# Patient Record
Sex: MALE | Race: WHITE | NOT HISPANIC OR LATINO | Employment: UNEMPLOYED | URBAN - NONMETROPOLITAN AREA
[De-identification: names, ages, dates, MRNs, and addresses within clinical notes are randomized per-mention and may not be internally consistent; named-entity substitution may affect disease eponyms.]

---

## 2017-06-29 ENCOUNTER — OFFICE VISIT - GICH (OUTPATIENT)
Dept: FAMILY MEDICINE | Facility: OTHER | Age: 27
End: 2017-06-29

## 2017-06-29 ENCOUNTER — HOSPITAL ENCOUNTER (OUTPATIENT)
Dept: RADIOLOGY | Facility: OTHER | Age: 27
End: 2017-06-29
Attending: NURSE PRACTITIONER

## 2017-06-29 ENCOUNTER — HISTORY (OUTPATIENT)
Dept: FAMILY MEDICINE | Facility: OTHER | Age: 27
End: 2017-06-29

## 2017-06-29 DIAGNOSIS — M25.531 PAIN IN RIGHT WRIST: ICD-10-CM

## 2017-06-29 DIAGNOSIS — S60.221A CONTUSION OF RIGHT HAND: ICD-10-CM

## 2017-07-14 ENCOUNTER — HISTORY (OUTPATIENT)
Dept: INTERNAL MEDICINE | Facility: OTHER | Age: 27
End: 2017-07-14

## 2017-07-14 ENCOUNTER — COMMUNICATION - GICH (OUTPATIENT)
Dept: INTERNAL MEDICINE | Facility: OTHER | Age: 27
End: 2017-07-14

## 2017-07-14 ENCOUNTER — OFFICE VISIT - GICH (OUTPATIENT)
Dept: INTERNAL MEDICINE | Facility: OTHER | Age: 27
End: 2017-07-14

## 2017-07-14 DIAGNOSIS — M54.50 LOW BACK PAIN: ICD-10-CM

## 2017-07-14 DIAGNOSIS — R10.33 PERIUMBILICAL PAIN: ICD-10-CM

## 2017-07-14 DIAGNOSIS — Z91.018 ALLERGY TO OTHER FOODS (CODE): ICD-10-CM

## 2017-07-14 DIAGNOSIS — K21.9 GASTRO-ESOPHAGEAL REFLUX DISEASE WITHOUT ESOPHAGITIS: ICD-10-CM

## 2017-07-14 DIAGNOSIS — J45.20 MILD INTERMITTENT ASTHMA, UNCOMPLICATED: ICD-10-CM

## 2017-07-14 DIAGNOSIS — J30.1 ALLERGIC RHINITIS DUE TO POLLEN: ICD-10-CM

## 2017-07-14 DIAGNOSIS — R53.83 OTHER FATIGUE: ICD-10-CM

## 2017-07-14 DIAGNOSIS — R53.81 OTHER MALAISE: ICD-10-CM

## 2017-07-14 DIAGNOSIS — R10.13 EPIGASTRIC PAIN: ICD-10-CM

## 2017-07-14 DIAGNOSIS — R79.89 OTHER SPECIFIED ABNORMAL FINDINGS OF BLOOD CHEMISTRY: ICD-10-CM

## 2017-07-14 DIAGNOSIS — E66.01 MORBID (SEVERE) OBESITY DUE TO EXCESS CALORIES (H): ICD-10-CM

## 2017-07-14 DIAGNOSIS — Z13.220 ENCOUNTER FOR SCREENING FOR LIPOID DISORDERS: ICD-10-CM

## 2017-07-14 DIAGNOSIS — Z71.89 OTHER SPECIFIED COUNSELING: Chronic | ICD-10-CM

## 2017-07-14 DIAGNOSIS — G89.29 OTHER CHRONIC PAIN: ICD-10-CM

## 2017-07-14 DIAGNOSIS — R19.4 CHANGE IN BOWEL HABITS: ICD-10-CM

## 2017-07-14 DIAGNOSIS — Z87.828 PERSONAL HISTORY OF OTHER (HEALED) PHYSICAL INJURY AND TRAUMA: ICD-10-CM

## 2017-07-14 DIAGNOSIS — J34.89 OTHER SPECIFIED DISORDERS OF NOSE AND NASAL SINUSES: ICD-10-CM

## 2017-07-14 LAB
A/G RATIO - HISTORICAL: 1.8 (ref 1–2)
ABSOLUTE BASOPHILS - HISTORICAL: 0 THOU/CU MM
ABSOLUTE EOSINOPHILS - HISTORICAL: 0.1 THOU/CU MM
ABSOLUTE IMMATURE GRANULOCYTES(METAS,MYELOS,PROS) - HISTORICAL: 0 THOU/CU MM
ABSOLUTE LYMPHOCYTES - HISTORICAL: 1.8 THOU/CU MM (ref 0.9–2.9)
ABSOLUTE MONOCYTES - HISTORICAL: 0.4 THOU/CU MM
ABSOLUTE NEUTROPHILS - HISTORICAL: 3.5 THOU/CU MM (ref 1.7–7)
ALBUMIN SERPL-MCNC: 4.8 G/DL (ref 3.5–5.7)
ALP SERPL-CCNC: 68 IU/L (ref 34–104)
ALT (SGPT) - HISTORICAL: 35 IU/L (ref 7–52)
AMYLASE SERPL-CCNC: 48 IU/L (ref 29–103)
ANION GAP - HISTORICAL: 13 (ref 5–18)
AST SERPL-CCNC: 19 IU/L (ref 13–39)
BASOPHILS # BLD AUTO: 0.3 %
BILIRUB SERPL-MCNC: 0.5 MG/DL (ref 0.3–1)
BUN SERPL-MCNC: 16 MG/DL (ref 7–25)
BUN/CREAT RATIO - HISTORICAL: 14
CALCIUM SERPL-MCNC: 9.7 MG/DL (ref 8.6–10.3)
CHLORIDE SERPLBLD-SCNC: 102 MMOL/L (ref 98–107)
CHOL/HDL RATIO - HISTORICAL: 3.38
CHOLESTEROL TOTAL: 189 MG/DL
CO2 SERPL-SCNC: 24 MMOL/L (ref 21–31)
CREAT SERPL-MCNC: 1.14 MG/DL (ref 0.7–1.3)
EOSINOPHIL NFR BLD AUTO: 2.2 %
ERYTHROCYTE [DISTWIDTH] IN BLOOD BY AUTOMATED COUNT: 13 % (ref 11.5–15.5)
ESTIMATED AVERAGE GLUCOSE: 97 MG/DL
GFR IF NOT AFRICAN AMERICAN - HISTORICAL: >60 ML/MIN/1.73M2
GLOBULIN - HISTORICAL: 2.7 G/DL (ref 2–3.7)
GLUCOSE SERPL-MCNC: 98 MG/DL (ref 70–105)
HCT VFR BLD AUTO: 44.5 % (ref 37–53)
HDLC SERPL-MCNC: 56 MG/DL (ref 23–92)
HEMOGLOBIN A1C MONITORING (POCT) - HISTORICAL: 5 % (ref 4–6.2)
HEMOGLOBIN: 15.6 G/DL (ref 13.5–17.5)
IMMATURE GRANULOCYTES(METAS,MYELOS,PROS) - HISTORICAL: 0.3 %
LDLC SERPL CALC-MCNC: 112 MG/DL
LIPASE SERPL-CCNC: 25.5 IU/L (ref 11–82)
LYMPHOCYTES NFR BLD AUTO: 30.9 % (ref 20–44)
MCH RBC QN AUTO: 30.5 PG (ref 26–34)
MCHC RBC AUTO-ENTMCNC: 35.1 G/DL (ref 32–36)
MCV RBC AUTO: 87 FL (ref 80–100)
MONOCYTES NFR BLD AUTO: 6.9 %
NEUTROPHILS NFR BLD AUTO: 59.4 % (ref 42–72)
NON-HDL CHOLESTEROL - HISTORICAL: 133 MG/DL
PATIENT STATUS - HISTORICAL: ABNORMAL
PLATELET # BLD AUTO: 191 THOU/CU MM (ref 140–440)
PMV BLD: 11.1 FL (ref 6.5–11)
POTASSIUM SERPL-SCNC: 4 MMOL/L (ref 3.5–5.1)
PROT SERPL-MCNC: 7.5 G/DL (ref 6.4–8.9)
RED BLOOD COUNT - HISTORICAL: 5.12 MIL/CU MM (ref 4.3–5.9)
SODIUM SERPL-SCNC: 139 MMOL/L (ref 133–143)
T3,FREE - HISTORICAL: 3.2 PG/ML (ref 2.5–3.9)
T4 FREE SERPL-MCNC: 0.86 NG/DL (ref 0.58–1.64)
TRIGL SERPL-MCNC: 107 MG/DL
TSH - HISTORICAL: 1.28 UIU/ML (ref 0.34–5.6)
WHITE BLOOD COUNT - HISTORICAL: 6 THOU/CU MM (ref 4.5–11)

## 2017-07-14 ASSESSMENT — PATIENT HEALTH QUESTIONNAIRE - PHQ9: SUM OF ALL RESPONSES TO PHQ QUESTIONS 1-9: 0

## 2017-07-16 LAB
TESTOSTERONE FREE - HISTORICAL: 11.4 NG/DL
TESTOSTERONE,TOTAL - HISTORICAL: 356 NG/DL

## 2017-07-17 ENCOUNTER — COMMUNICATION - GICH (OUTPATIENT)
Dept: SURGERY | Facility: OTHER | Age: 27
End: 2017-07-17

## 2017-07-17 LAB
CODFISH IGE - HISTORICAL: <0.1 KU/L
CORN (F8) IGE - HISTORICAL: 0.41 KU/L
EGG WHITE (F1) IGE - HISTORICAL: <0.1 KU/L
IMMUNOGLOBULIN E (IGE) - HISTORICAL: 348 KU/L (ref 22–107)
Lab: 0.11 KU/L
Lab: 0.16 KU/L
Lab: <0.1 KU/L
MILK (F2) IGE - HISTORICAL: <0.1 KU/L
PEANUT (F13) IGE - HISTORICAL: 0.59 KU/L
SHRIMP (F24) IGE: 0.14 KU/L
SOYBEAN IGE - HISTORICAL: 0.12 KU/L
WHEAT (F4) IGE - HISTORICAL: 0.24 KU/L

## 2017-07-18 ENCOUNTER — COMMUNICATION - GICH (OUTPATIENT)
Dept: SURGERY | Facility: OTHER | Age: 27
End: 2017-07-18

## 2017-07-18 DIAGNOSIS — R19.7 DIARRHEA: ICD-10-CM

## 2017-07-27 ENCOUNTER — AMBULATORY - GICH (OUTPATIENT)
Dept: RADIOLOGY | Facility: OTHER | Age: 27
End: 2017-07-27

## 2017-07-27 ENCOUNTER — HOSPITAL ENCOUNTER (OUTPATIENT)
Dept: RADIOLOGY | Facility: OTHER | Age: 27
End: 2017-07-27
Attending: INTERNAL MEDICINE

## 2017-07-27 DIAGNOSIS — R10.33 PERIUMBILICAL PAIN: ICD-10-CM

## 2017-07-27 DIAGNOSIS — J34.89 OTHER SPECIFIED DISORDERS OF NOSE AND NASAL SINUSES: ICD-10-CM

## 2017-07-27 DIAGNOSIS — R10.13 EPIGASTRIC PAIN: ICD-10-CM

## 2017-08-09 ENCOUNTER — COMMUNICATION - GICH (OUTPATIENT)
Dept: INTERNAL MEDICINE | Facility: OTHER | Age: 27
End: 2017-08-09

## 2017-08-09 ENCOUNTER — HISTORY (OUTPATIENT)
Dept: INTERNAL MEDICINE | Facility: OTHER | Age: 27
End: 2017-08-09

## 2017-08-09 ENCOUNTER — OFFICE VISIT - GICH (OUTPATIENT)
Dept: INTERNAL MEDICINE | Facility: OTHER | Age: 27
End: 2017-08-09

## 2017-08-09 DIAGNOSIS — R12 HEARTBURN: ICD-10-CM

## 2017-08-09 DIAGNOSIS — J32.0 CHRONIC MAXILLARY SINUSITIS: ICD-10-CM

## 2017-08-09 DIAGNOSIS — R10.13 EPIGASTRIC PAIN: ICD-10-CM

## 2017-08-09 DIAGNOSIS — R76.8 OTHER SPECIFIED ABNORMAL IMMUNOLOGICAL FINDINGS IN SERUM: ICD-10-CM

## 2017-08-09 DIAGNOSIS — K44.9 DIAPHRAGMATIC HERNIA WITHOUT OBSTRUCTION OR GANGRENE: ICD-10-CM

## 2017-08-09 DIAGNOSIS — Z91.018 ALLERGY TO OTHER FOODS (CODE): ICD-10-CM

## 2017-08-09 DIAGNOSIS — J32.2 CHRONIC ETHMOIDAL SINUSITIS: ICD-10-CM

## 2017-08-09 DIAGNOSIS — J34.2 DEVIATED NASAL SEPTUM: ICD-10-CM

## 2017-08-09 ASSESSMENT — PATIENT HEALTH QUESTIONNAIRE - PHQ9: SUM OF ALL RESPONSES TO PHQ QUESTIONS 1-9: 0

## 2017-08-16 RX ORDER — BUDESONIDE 0.5 MG/2ML
0.5 INHALANT ORAL 2 TIMES DAILY
COMMUNITY
End: 2017-09-22

## 2017-08-16 RX ORDER — IPRATROPIUM BROMIDE 42 UG/1
2 SPRAY, METERED NASAL 3 TIMES DAILY
COMMUNITY
End: 2017-09-22

## 2017-08-16 RX ORDER — ALBUTEROL SULFATE 90 UG/1
2 AEROSOL, METERED RESPIRATORY (INHALATION) 4 TIMES DAILY PRN
COMMUNITY
End: 2018-05-07

## 2017-08-16 RX ORDER — SUCRALFATE 1 G/1
1 TABLET ORAL 4 TIMES DAILY
COMMUNITY
End: 2017-09-22

## 2017-09-15 ENCOUNTER — SURGERY (OUTPATIENT)
Dept: SURGERY | Facility: OTHER | Age: 27
End: 2017-09-15

## 2017-09-22 ENCOUNTER — OFFICE VISIT (OUTPATIENT)
Dept: OTOLARYNGOLOGY | Facility: OTHER | Age: 27
End: 2017-09-22
Attending: OTOLARYNGOLOGY
Payer: COMMERCIAL

## 2017-09-22 VITALS
HEIGHT: 67 IN | BODY MASS INDEX: 47.87 KG/M2 | SYSTOLIC BLOOD PRESSURE: 124 MMHG | TEMPERATURE: 97.1 F | DIASTOLIC BLOOD PRESSURE: 82 MMHG | HEART RATE: 110 BPM | WEIGHT: 305 LBS

## 2017-09-22 DIAGNOSIS — J32.4 CHRONIC PANSINUSITIS: Primary | ICD-10-CM

## 2017-09-22 DIAGNOSIS — Z87.81 HISTORY OF CLOSED FRACTURE OF NASAL BONES: ICD-10-CM

## 2017-09-22 DIAGNOSIS — J30.2 PERENNIAL ALLERGIC RHINITIS WITH SEASONAL VARIATION: ICD-10-CM

## 2017-09-22 DIAGNOSIS — J34.2 DNS (DEVIATED NASAL SEPTUM): ICD-10-CM

## 2017-09-22 DIAGNOSIS — J30.89 PERENNIAL ALLERGIC RHINITIS WITH SEASONAL VARIATION: ICD-10-CM

## 2017-09-22 DIAGNOSIS — J34.3 NASAL TURBINATE HYPERTROPHY: ICD-10-CM

## 2017-09-22 DIAGNOSIS — K90.49 FOOD INTOLERANCE: ICD-10-CM

## 2017-09-22 PROCEDURE — 99204 OFFICE O/P NEW MOD 45 MIN: CPT | Mod: 25 | Performed by: OTOLARYNGOLOGY

## 2017-09-22 PROCEDURE — 99213 OFFICE O/P EST LOW 20 MIN: CPT | Mod: 25

## 2017-09-22 PROCEDURE — 31231 NASAL ENDOSCOPY DX: CPT | Performed by: OTOLARYNGOLOGY

## 2017-09-22 RX ORDER — FLUTICASONE PROPIONATE 50 MCG
2 SPRAY, SUSPENSION (ML) NASAL DAILY
Qty: 3 BOTTLE | Refills: 11 | Status: SHIPPED | OUTPATIENT
Start: 2017-09-22 | End: 2018-05-30

## 2017-09-22 RX ORDER — BUDESONIDE 0.5 MG/2ML
INHALANT ORAL
Qty: 3 BOX | Refills: 11 | Status: SHIPPED | OUTPATIENT
Start: 2017-09-22 | End: 2021-05-21

## 2017-09-22 RX ORDER — LEVOCETIRIZINE DIHYDROCHLORIDE 5 MG/1
5 TABLET, FILM COATED ORAL EVERY EVENING
Qty: 90 TABLET | Refills: 3 | Status: SHIPPED | OUTPATIENT
Start: 2017-09-22 | End: 2018-05-30

## 2017-09-22 ASSESSMENT — PAIN SCALES - GENERAL: PAINLEVEL: SEVERE PAIN (6)

## 2017-09-22 NOTE — MR AVS SNAPSHOT
After Visit Summary   9/22/2017    Marija Albright    MRN: [id number]           Patient Information     Date Of Birth          1990        Visit Information        Provider Department      9/22/2017 12:30 PM Sarah Bateman MD [facility] [place]        Today's Diagnoses     Chronic pansinusitis    -  1      Care Instructions    Thank you for allowing Dr. Bateman and our ENT team to participate in your care.  If you have a scheduling or an appointment question please contact [facility] Unit Coordinator at their direct line [phone].   ALL nursing questions or concerns can be directed to your ENT nurse at: [phone] - Freda    Complete Sinus CT (If not already done)  Use Budesonide twice daily  Use Xyzal at night as directed  Use Flonase as prescribed  Follow up for Allergy Skin Testing    Indications for allergy testing include:   1) Confirm suspicion of allergic rhinitis due to inhalant allergies  2) Identify the offending allergen to determine specific mode of treatment  3) In the case of chronic rhinosinusitis: when symptoms are not controlled by avoidance and pharmacotherapy  4) In the Asthma patient when exacerbations may be due to perennial allergen exposure  5) Suspect food allergy  6) Otitis Media, chronic rhinitis, atopic dermatitis, Meniere disease, headache, pharyngitis or eye symptoms    modified quantitative testing (MQT) will be performed.  Signed consent was obtained, and the risks of immunotherapy were discussed, including the potential for anaphylaxis.    If immunotherapy (IT) is recommended, there is continued risk of anaphylaxis.   Anaphylaxis can cause death. The patient will need to be monitored for 30 minutes post injection.  They must present their epinephrine pen prior to injection.  Subcutaneous as well as sublingual immunotherapy (SLIT) were discussed as potential treatment options.  The patient was told SLIT is not approved by the FDA and  "is cash pay.  The general time frame of immunotherapy was discussed (generally 3-5 years, sometimes longer), and the basic immunology behind IT was discussed.            Follow-ups after your visit        Who to contact     If you have questions or need follow up information about today's clinic visit or your schedule please contact [facility] directly at [phone].  Normal or non-critical lab and imaging results will be communicated to you by MyChart, letter or phone within 4 business days after the clinic has received the results. If you do not hear from us within 7 days, please contact the clinic through [employer] or phone. If you have a critical or abnormal lab result, we will notify you by phone as soon as possible.  Submit refill requests through Mendor or call your pharmacy and they will forward the refill request to us. Please allow 3 business days for your refill to be completed.          Additional Information About Your Visit        [employer] Share[employer] Information     Mendor lets you send messages to your doctor, view your test results, renew your prescriptions, schedule appointments and more. To sign up, go to [email or web address] . Click on \"Log in\" on the left side of the screen, which will take you to the Welcome page. Then click on \"Sign up Now\" on the right side of the page.     You will be asked to enter the access code listed below, as well as some personal information. Please follow the directions to create your username and password.     Your access code is: [email or web address]  Expires: 2017 12:52 PM     Your access code will  in 90 days. If you need help or a new code, please call your [facility] or [phone].        Care EveryWhere ID     This is your Care EveryWhere ID. This could be used by other organizations to access your [place] medical records  [id number]        Your Vitals Were     Pulse Temperature Height BMI (Body Mass Index)          110 97.1  F (36.2  C) " "(Tympanic) 5' 7\" (1.702 m) 47.77 kg/m2         Blood Pressure from Last 3 Encounters:   09/22/17 124/82   07/24/13 118/70   05/02/13 122/76    Weight from Last 3 Encounters:   09/22/17 (!) 305 lb (138.3 kg)   07/24/13 250 lb (113.4 kg)   05/02/13 261 lb (118.4 kg)              Today, you had the following     No orders found for display       Primary Care Provider Office Phone # Fax #    Sean Givens PA-C [phone] [address] [place] [address]        Equal Access to Services     JOSE G HERNÁNDEZ : Mesha Peraza, wakrishna mckenzie, noe kaalmada jo, say calzada. So [facility] [phone].    ATENCIÓN: Si habla español, tiene a yao disposición servicios gratuitos de asistencia lingüística. Llame al [phone].    We comply with applicable federal civil rights laws and [place] laws. We do not discriminate on the basis of race, color, national origin, age, disability sex, sexual orientation or gender identity.            Thank you!     Thank you for choosing [facility]  for your care. Our goal is always to provide you with excellent care. Hearing back from our patients is one way we can continue to improve our services. Please take a few minutes to complete the written survey that you may receive in the mail after your visit with us. Thank you!             Your Updated Medication List - Protect others around you: Learn how to safely use, store and throw away your medicines at www.disposemymeds.org.          This list is accurate as of: 9/22/17 12:52 PM.  Always use your most recent med list.                   Brand Name Dispense Instructions for use Diagnosis    albuterol 108 (90 BASE) MCG/ACT Inhaler    PROAIR HFA/PROVENTIL HFA/VENTOLIN HFA     Inhale 2 puffs into the lungs 4 times daily as needed for shortness of breath / dyspnea or wheezing          "

## 2017-09-22 NOTE — PATIENT INSTRUCTIONS
Thank you for allowing Dr. Bateman and our ENT team to participate in your care.  If you have a scheduling or an appointment question please contact [facility] Unit Coordinator at their direct line [phone].   ALL nursing questions or concerns can be directed to your ENT nurse at: [phone] - Freda    Complete Sinus CT (If not already done)  Use Budesonide twice daily  Use Xyzal at night as directed  Use Flonase as prescribed  Follow up for Allergy Skin Testing    Indications for allergy testing include:   1) Confirm suspicion of allergic rhinitis due to inhalant allergies  2) Identify the offending allergen to determine specific mode of treatment  3) In the case of chronic rhinosinusitis: when symptoms are not controlled by avoidance and pharmacotherapy  4) In the Asthma patient when exacerbations may be due to perennial allergen exposure  5) Suspect food allergy  6) Otitis Media, chronic rhinitis, atopic dermatitis, Meniere disease, headache, pharyngitis or eye symptoms    modified quantitative testing (MQT) will be performed.  Signed consent was obtained, and the risks of immunotherapy were discussed, including the potential for anaphylaxis.    If immunotherapy (IT) is recommended, there is continued risk of anaphylaxis.   Anaphylaxis can cause death. The patient will need to be monitored for 30 minutes post injection.  They must present their epinephrine pen prior to injection.  Subcutaneous as well as sublingual immunotherapy (SLIT) were discussed as potential treatment options.  The patient was told SLIT is not approved by the FDA and is cash pay.  The general time frame of immunotherapy was discussed (generally 3-5 years, sometimes longer), and the basic immunology behind IT was discussed.

## 2017-09-22 NOTE — PROGRESS NOTES
Otolaryngology Consultation    Patient: Marija Albright  : 1990    Patient presents with:  Consult: Deviated Nasal Septum, Sinusitis; Referred by Erasmo Saha      HPI:  Marija Albright is a 26 year old male seen today for chronic sinusitis.  He has had over 10 years of chronic congestion and post nasal drainage with associated recurrent sneezing, itchy eyes.  Distant Intradermal testing, state she was 'allergic to everything'  serum specific IgE food reviewed from [facility], low grade positive to corn and peanut.  No hx of anaphylaxis but he notes corn leads to nausea, bloating.  Tries to avoid corn syrup primarily due to weight concerns.     No hx of immunotherapy      He has a significant hx of nasal trauma sustained in sports during high school, but no prior surgery  He was accidentally struck in the face and noted associated ecchymosis and change in the appearance of his nose following the distant trauma.  Surgery was not recommended by the provider he saw at that time.      No new home exposures  Never tobacco  Family hx of perennial allergic rhinitis  Hx of asthma, no recent ED visits but his post nasal drainage seems to flare his asthma    He has tried nasal saline and nasal steroids, AH, in the past to no avail.    Current Outpatient Rx   Medication Sig Dispense Refill     albuterol (PROAIR HFA/PROVENTIL HFA/VENTOLIN HFA) 108 (90 BASE) MCG/ACT Inhaler Inhale 2 puffs into the lungs 4 times daily as needed for shortness of breath / dyspnea or wheezing         Allergies: Corn syrup [glucose]; Morphine sulfate; Peanuts [nuts]; Tramadol; and Gold au 198 [gold]     Past Medical History:   Diagnosis Date     Allergic rhinitis due to pollen      Attention deficit disorder without mention of hyperactivity      Reflux esophagitis      Sprain of cruciate ligament of knee     ACL tear-left knee     Unspecified asthma(493.90)        Past Surgical History:   Procedure Laterality Date     acl revision Left  "2012     C REPAIR CRUCIATE LIGAMENT,KNEE  12/2008    left     endovenous ablation saphenous vein with laser Right 01/04/2011     SHOULDER SURGERY Left      TONSILLECTOMY  2012       ENT family history reviewed    Social History   Substance Use Topics     Smoking status: Never Smoker     Smokeless tobacco: Never Used     Alcohol use No       Review of Systems  ROS: 10 point ROS neg other than the symptoms noted above in the HPI     Physical Exam  /82 (Cuff Size: Adult Large)  Pulse 110  Temp 97.1  F (36.2  C) (Tympanic)  Ht 5' 7\" (1.702 m)  Wt (!) 305 lb (138.3 kg)  BMI 47.77 kg/m2  General - The patient is well nourished and well developed, and appears to have good nutritional status.  Alert and oriented to person and place, answers questions and cooperates with examination appropriately.   Head and Face - Normocephalic and atraumatic, with no gross asymmetry noted.  The facial nerve is intact, with strong symmetric movements.  Voice and Breathing - The patient was breathing comfortably without the use of accessory muscles. There was no wheezing, stridor, or stertor.  The patients voice was clear and strong, and had appropriate pitch and quality.  Ears -The external auditory canals are patent, the tympanic membranes are intact without effusion, retraction or mass.  Bony landmarks are intact.  Eyes - Extraocular movements intact, and the pupils were reactive to light.  Sclera were not icteric or injected, conjunctiva were pink and moist.  Mouth - Examination of the oral cavity showed pink, healthy oral mucosa. No lesions or ulcerations noted.  The tongue was mobile and midline, and the dentition were in good condition.    Throat - The walls of the oropharynx were smooth, pink, moist, symmetric, and had no lesions or ulcerations.  The tonsillar pillars and soft palate were symmetric.  The uvula was midline on elevation.  Cobblestoning throughout Posterior oropharynx  Neck - Normal midline excursion of the " laryngotracheal complex during swallowing.  Full range of motion on passive movement.  Palpation of the occipital, submental, submandibular, internal jugular chain, and supraclavicular nodes did not demonstrate any abnormal lymph nodes or masses.  Palpation of the thyroid was soft and smooth, with no nodules or goiter appreciated.  The trachea was mobile and midline.  Nose - External contour is asymmetric, dorsal c- shaped skew to left.   Nasal mucosa is pink and moist with no abnormal mucus.  The septum and turbinates were evaluated: DNS caudal right, mid deviation R.  No polyps, masses, or purulence noted on examination.    To evaluate the nose and sinuses in the post operative state, I performed rigid nasal endoscopy. The LPN had previously sprayed both nares with lidocaine and neosynephrine.    I began with the LEFT side using a 0 degree rigid nasal endoscope, and then similarly examined the RIGHT side    Findings:  Inferior turbinates:  enalrged  Middle turbinate and middle meatus:  No purulence, no polyposis, narrow right MM  Mucosa is edematous throughout,  No allegra polyps nor polypoid degeneration  Nasopharynx could not be eval due to dns  The patient tolerated the procedure well      Imaging  Plain film sinuses [place]:  DNS, mucoperiosteal thickening throughout max, ethmoids    Impression and Plan- Marija CONNOR Jose Ramon is a 26 year old male with:    ICD-10-CM    1. Chronic pansinusitis J32.4 CT Sinus w/o Contrast     budesonide (PULMICORT) 0.5 MG/2ML neb solution   2. Nasal turbinate hypertrophy J34.3 fluticasone (FLONASE) 50 MCG/ACT spray   3. Perennial allergic rhinitis with seasonal variation J30.89 levocetirizine (XYZAL) 5 MG tablet    J30.2    4. DNS (deviated nasal septum) J34.2    5. History of closed fracture of nasal bones Z87.81    6. Food intolerance K90.49      Minimally proceed with functional septorhinoplasty, turbinate reduction due to chronic nasal obstruction    CT sinuses  pending        Use Budesonide twice daily  Use Xyzal at night as directed  Use Flonase as prescribed  Follow up for Allergy Skin Testing    Indications for allergy testing include:   1) Confirm suspicion of allergic rhinitis due to inhalant allergies  2) Identify the offending allergen to determine specific mode of treatment  3) In the case of chronic rhinosinusitis: when symptoms are not controlled by avoidance and pharmacotherapy  4) In the Asthma patient when exacerbations may be due to perennial allergen exposure  5) Suspect food allergy  6) Otitis Media, chronic rhinitis, atopic dermatitis, Meniere disease, headache, pharyngitis or eye symptoms    modified quantitative testing (MQT) will be performed.  Signed consent was obtained, and the risks of immunotherapy were discussed, including the potential for anaphylaxis.    If immunotherapy (IT) is recommended, there is continued risk of anaphylaxis.   Anaphylaxis can cause death. The patient will need to be monitored for 30 minutes post injection.  They must present their epinephrine pen prior to injection.  Subcutaneous as well as sublingual immunotherapy (SLIT) were discussed as potential treatment options.  The patient was told SLIT is not approved by the FDA and is cash pay.  The general time frame of immunotherapy was discussed (generally 3-5 years, sometimes longer), and the basic immunology behind IT was discussed.          Sarah Bateman D.O.  Otolaryngology/Head and Neck Surgery  Allergy

## 2017-09-22 NOTE — NURSING NOTE
"Chief Complaint   Patient presents with     Consult     Deviated Nasal Septum, Sinusitis; Referred by Erasmo Saha       Initial /82 (Cuff Size: Adult Large)  Pulse 110  Temp 97.1  F (36.2  C) (Tympanic)  Ht 5' 7\" (1.702 m)  Wt (!) 305 lb (138.3 kg)  BMI 47.77 kg/m2 Estimated body mass index is 47.77 kg/(m^2) as calculated from the following:    Height as of this encounter: 5' 7\" (1.702 m).    Weight as of this encounter: 305 lb (138.3 kg).  Medication Reconciliation: complete   Freda Egan      "

## 2017-09-25 ENCOUNTER — TELEPHONE (OUTPATIENT)
Dept: ALLERGY | Facility: OTHER | Age: 27
End: 2017-09-25

## 2017-09-25 NOTE — TELEPHONE ENCOUNTER
I tried to reach Marija regarding instructions and scheduling for allergy skin testing.  NA/LM to call.  Anastasiya Curtis

## 2017-09-26 ENCOUNTER — AMBULATORY - GICH (OUTPATIENT)
Dept: RADIOLOGY | Facility: OTHER | Age: 27
End: 2017-09-26

## 2017-09-26 DIAGNOSIS — J32.4 CHRONIC PANSINUSITIS: ICD-10-CM

## 2017-09-28 ENCOUNTER — HOSPITAL ENCOUNTER (OUTPATIENT)
Dept: RADIOLOGY | Facility: OTHER | Age: 27
End: 2017-09-28

## 2017-09-28 DIAGNOSIS — J32.4 CHRONIC PANSINUSITIS: ICD-10-CM

## 2017-10-10 ENCOUNTER — TELEPHONE (OUTPATIENT)
Dept: ALLERGY | Facility: OTHER | Age: 27
End: 2017-10-10

## 2017-10-10 NOTE — TELEPHONE ENCOUNTER
Spoke with patient regarding MQT allergy testing instructions.  Patient was resistant to being tested, explained it is the patient's decision.  After discussing the testing in detail, patient stated he would talk with the [facility] and schedule an appointment.  Patient transferred to [facility].

## 2017-12-21 ENCOUNTER — COMMUNICATION - GICH (OUTPATIENT)
Dept: SURGERY | Facility: OTHER | Age: 27
End: 2017-12-21

## 2017-12-21 ENCOUNTER — HOSPITAL ENCOUNTER (OUTPATIENT)
Dept: RADIOLOGY | Facility: OTHER | Age: 27
End: 2017-12-21
Attending: INTERNAL MEDICINE

## 2017-12-21 ENCOUNTER — OFFICE VISIT - GICH (OUTPATIENT)
Dept: INTERNAL MEDICINE | Facility: OTHER | Age: 27
End: 2017-12-21

## 2017-12-21 ENCOUNTER — HISTORY (OUTPATIENT)
Dept: INTERNAL MEDICINE | Facility: OTHER | Age: 27
End: 2017-12-21

## 2017-12-21 DIAGNOSIS — J32.0 CHRONIC MAXILLARY SINUSITIS: ICD-10-CM

## 2017-12-21 DIAGNOSIS — J30.1 ALLERGIC RHINITIS DUE TO POLLEN: ICD-10-CM

## 2017-12-21 DIAGNOSIS — M43.16 SPONDYLOLISTHESIS OF LUMBAR REGION: ICD-10-CM

## 2017-12-21 DIAGNOSIS — R19.7 DIARRHEA: ICD-10-CM

## 2017-12-21 DIAGNOSIS — J34.2 DEVIATED NASAL SEPTUM: ICD-10-CM

## 2017-12-21 DIAGNOSIS — J32.2 CHRONIC ETHMOIDAL SINUSITIS: ICD-10-CM

## 2017-12-21 DIAGNOSIS — M25.552 PAIN IN LEFT HIP: ICD-10-CM

## 2017-12-21 ASSESSMENT — PATIENT HEALTH QUESTIONNAIRE - PHQ9: SUM OF ALL RESPONSES TO PHQ QUESTIONS 1-9: 0

## 2017-12-27 NOTE — PROGRESS NOTES
Patient Information     Patient Name MRN Sex Marija Fang [id number] Male 1990      Progress Notes by Diana Alcaraz NP at 2017  4:00 PM     Author:  Diana Alcaraz NP Service:  (none) Author Type:  PHYS- Nurse Practitioner     Filed:  2017  5:04 PM Encounter Date:  2017 Status:  Signed     :  Diana Alcaraz NP (PHYS- Nurse Practitioner)            Nursing Notes:   Mariaa Mcdonough  2017  4:27 PM  Signed  The patient presents with right wrist injury .  Patient stating this happened while he was working on a tire.  He stated he landed on his right wrist and has pain 9/10.  Patient stating he is having tingling esperanza burning in the right wrist . Mariaa Mcdonough LPN..............2017 4:18 PM      SUBJECTIVE:    Marija Albright is a 26 y.o. male who presents for hand pain    Hand Injury    The incident occurred less than 1 hour ago. The incident occurred at home. The injury mechanism was a direct blow and a fall (Fell while changing a tire and landed on the hand). The pain is present in the right hand. The quality of the pain is described as aching. The pain radiates to the right hand. The pain is at a severity of 9/10. The pain has been constant since the incident. Pertinent negatives include no chest pain, muscle weakness, numbness or tingling. The symptoms are aggravated by movement, palpation and lifting. He has tried ice for the symptoms. The treatment provided mild relief.       Current Outpatient Prescriptions on File Prior to Visit       Medication  Sig Dispense Refill     albuterol HFA (PRO-AIR,VENTOLIN,PROVENTIL) 90 mcg/actuation inhaler Inhale 2 Puffs by mouth 4 times daily if needed. 1 Inhaler 5     promethazine (PHENERGAN) 25 mg tablet Take 25 mg by mouth.       No current facility-administered medications on file prior to visit.        REVIEW OF SYSTEMS:  Review of Systems   Cardiovascular: Negative for chest pain.   Neurological: Negative  "for tingling and numbness.       OBJECTIVE:  /88  Pulse 67  Temp 97.7  F (36.5  C) (Tympanic)  Ht 1.702 m (5' 7\")  Wt (!) 136.7 kg (301 lb 6.4 oz)  BMI 47.21 kg/m2    EXAM:   Physical Exam   Constitutional: He is well-developed, well-nourished, and in no distress.   HENT:   Head: Normocephalic and atraumatic.   Eyes: Conjunctivae are normal.   Neck: Neck supple.   Cardiovascular: Normal rate.    Pulmonary/Chest: Effort normal. No respiratory distress.   Musculoskeletal:        Right wrist: He exhibits normal range of motion, no tenderness, no bony tenderness, no swelling, no effusion, no crepitus and no deformity.        Right hand: He exhibits decreased range of motion, tenderness, bony tenderness and swelling. He exhibits normal capillary refill and no deformity. Normal sensation noted. Normal strength noted.        Hands:  Pain over 5th metacarpal. Mild swelling noted. Reduced ROM of 4th and 5th fingers.    Neurological: He is alert.   Skin: Skin is warm and dry.   Nursing note and vitals reviewed.    Completed Hand xray.  I personally reviewed the xray. There was no acute fractures in the hand or what was seen of the wrist upon initial read of xray.  Final read pending by radiology.    ASSESSMENT/PLAN:    ICD-10-CM    1. Wrist pain, acute, right M25.531 XR HAND 3 VIEWS RIGHT      CANCELED: XR WRIST W NAVICULAR MINIMUM 3 VIEWS RIGHT   2. Contusion of right hand, initial encounter S60.221A         Plan:  Ace bandage applied. Home cares and OTC gone over. I explained my diagnostic considerations and recommendations to the patient, who voiced understanding and agreement with the treatment plan. All questions were answered. We discussed potential side effects of any prescribed or recommended therapies, as well as expectations for response to treatments. He was advised to contact our office if there is no improvement or worsening of conditions or symptoms.  If s/s worsen or persist, patient will either come " back or follow up with PCP.         SLOANE AMARO NP ....................  6/29/2017   5:04 PM

## 2017-12-27 NOTE — PROGRESS NOTES
Patient Information     Patient Name MRN Sex Marija Fang [id number] Male 1990      Progress Notes by Lyn Harden at 2017 10:13 AM     Author:  Lyn Harden Service:  (none) Author Type:  Other Clinical Staff     Filed:  2017 10:13 AM Date of Service:  2017 10:13 AM Status:  Signed     :  Lyn Harden (Other Clinical Staff)            IV Contrast- Discharge Instructions After Your CT Scan      The IV contrast you received today will be filtered from your bloodstream by your kidneys during the next 24 hours and pass from the body in urine.  You will not be aware of this process and your urine will not change in color.  To help this process you should drink at least 4 additional glasses of water or juice today.  This reduces stress on your kidneys.    Most contrast reactions are immediate.  Should you develop symptoms of concern after discharge, contact the department at the number below.  After hours you should contact your personal physician.  If you develop breathing distress or wheezing, call 911.

## 2017-12-27 NOTE — PROGRESS NOTES
Patient Information     Patient Name MRN Sex Marija Fang [id number] Male 1990      Progress Notes by Erasmo Saha MD at 2017  4:00 PM     Author:  Erasmo Saha MD Service:  (none) Author Type:  Physician     Filed:  2017 12:53 AM Encounter Date:  2017 Status:  Signed     :  Erasmo Saha MD (Physician)            Nursing Notes:   Radhika Asif  2017  4:41 PM  Signed  Patient presents to the clinic for follow up with CT results with additional questions about medication.    Radhika Asif LPN        2017 4:35 PM    Marija Albright presents to clinic today for:   Chief Complaint    Patient presents with      Follow Up     HPI: Mr. Albright is a 26 y.o. male who presents today for evaluation of above.     (R76.8) Elevated IgE level  (primary encounter diagnosis)  (Z91.018) Multiple food allergies - Corn and Peanuts  (K44.9) Sliding hiatal hernia  (J34.2) Deviated nasal septum  (J32.2) Chronic ethmoidal sinusitis  (J32.0) Chronic maxillary sinusitis  (R10.13) Epigastric abdominal pain     Patient presents for lab follow-up.  IgE levels were quite elevated.  He is one with this means.  He also had a number of food allergies such as corn and Peanuts.  He got the lab results in the mail and actually quit eating corn and Peanuts and a lot of his bowel problems, abdominal pain cramping and stool changes got quite a bit better.  States he hasn't felt that good for a long time.  He actually had 2 or 3 days where he felt really good in his bowel habits actually almost normalized.    CT imaging recently showed sliding hiatal hernia.  States that the Carafate really has been helpful.  He took Prilosec for years and it really didn't help at all.  We also talked about Pepcid or Zantac is a possibility used to help with, what sounds like bile reflux symptoms.  Prescriptions sent to pharmacy.    Patient does have a deviated septum as well as some chronic ethmoid and  maxillary sinusitis.  He broke his nose years ago.  He would like to see ENT to consider possible nasal or sinus surgery.  Referral sent.    With his epigastric abdominal pain, advise consideration of H. pylori gastric infection, also could be due to his hiatal hernia, bile reflux.  He wants to continue Carafate.  Refills sent to pharmacy.  -- We also talked about the possibility of eosinophilic esophagitis.  Advised that this would need to be diagnosed with an EGD.  He does have an EGD scheduled for later in the month.    Mr. Arreagas Body mass index is 47.46 kg/(m^2). This is out of the normal range for a 26 y.o. Normal range for ages 18+ is between 18.5 and 24.9. To lose weight we reviewed risks and benefits of appropriate options such as diet, exercise, and medications. Patient's strategy will be  self-directed nutrition plan and self-directed exercise program   BP Readings from Last 1 Encounters:08/09/17 : 130/78  Mr. Phelps blood pressure is out of the normal range for adults. Per JNC-8 guidelines normal adult blood pressure is < 120/80, pre-hypertensive is between 120/80 and 139/89, and hypertension is 140/90 or greater. Risks of hypertension were discussed. Patient's strategy will be weight loss, increased activity and reduced salt intake    Functional Capacity: > 4 METS.   Reports that he can climb a flight of stairs without any chest pain/heaviness or shortness of breath.   Patient reports no current symptoms of fevers, chills, nausea/vomiting.   No cough. No shortness of breath.   No change in bladder habits. No melena, hematochezia. No Hematuria.   No rashes. No palpitations.  No orthopnea/paroxysmal nocturnal dyspnea   No vision or hearing issues.   No significant mood issues   No bruising.     SAGE:  No flowsheet data found.    PHQ9:  PHQ Depression Screening 7/14/2017 8/9/2017   Date of PHQ exam (doc flow) 7/14/2017 8/9/2017   1. Lack of interest/pleasure 0 - Not at all 0 - Not at all   2.  Feeling down/depressed 0 - Not at all 0 - Not at all   PHQ-2 TOTAL SCORE 0 0   3. Trouble sleeping 0 - Not at all 0 - Not at all   4. Decreased energy 0 - Not at all 0 - Not at all   5. Appetite change 0 - Not at all 0 - Not at all   6. Feelings of failure 0 - Not at all 0 - Not at all   7. Trouble concentrating 0 - Not at all 0 - Not at all   8. Activity level 0 - Not at all 0 - Not at all   9. Hurting yourself 0 - Not at all 0 - Not at all   PHQ-9 TOTAL SCORE 0 0   PHQ-9 Severity Level none none   Functional Impairment not difficult at all not difficult at all   Some recent data might be hidden          I have personally reviewed the past medical history, past surgical history, medications, allergies, family and social history as listed below, on 8/9/2017.    Patient Active Problem List       Diagnosis  Date Noted     Multiple food allergies - Corn and Peanuts  08/09/2017     Sliding hiatal hernia  08/09/2017     Elevated IgE level  08/09/2017     Chronic ethmoidal sinusitis  08/09/2017     Chronic maxillary sinusitis  08/09/2017     Deviated nasal septum  08/09/2017     Mild intermittent asthma without complication  07/14/2017     History of tear of ACL (anterior cruciate ligament)  07/14/2017     Change in stool habits  07/14/2017     Morbid obesity with BMI of 45.0-49.9, adult ([facility])  09/12/2016     s/p left shoulder arthroscopic anterior labral repair with capsulorrhaphy on 9/21/2015 by Dr. Gamboa  09/29/2015     SLAP (superior labrum from anterior to posterior) tear  04/17/2015     Cervical radiculopathy  09/11/2014     GERD (gastroesophageal reflux disease)  07/24/2014     Chronic bilateral low back pain without sciatica  04/28/2011     SPONDYLOLISTHESIS  02/16/2011     REFLUX ESOPHAGITIS       GE Reflux          ALLERGIC RHINITIS, SEASONAL       Past Medical History:     Diagnosis  Date     ADHD (attention deficit hyperactivity disorder)      Asthma, moderate persistent      GERD (gastroesophageal reflux  disease)      SLAP (superior labrum from anterior to posterior) tear 04/17/2015     Past Surgical History:      Procedure  Laterality Date     ACL RECONSTRUCTION Left 12/2008    repair       ACL RECONSTRUCTION Left 2012    revision       ENDOVENOUS ABLATION SAPHENOUS VEIN W/ LASER Right 1/4/2011     fractured arm Left 06/2004    Fracture of left distal radius-torus fracture.        left shoulder repair      labral repair       TONSILLECTOMY  age 21     Current Outpatient Prescriptions       Medication  Sig Dispense Refill     albuterol HFA (PRO-AIR,VENTOLIN,PROVENTIL) 90 mcg/actuation inhaler Inhale 2 Puffs by mouth 4 times daily if needed. 1 Inhaler 5     [START ON 9/14/2017] bisacodyl (DULCOLAX) 5 mg tablet Take 2 tablets by mouth one time for 1 dose. 2 tablet 0     budesonide (PULMICORT RESPULES) 0.5 mg/2 mL neb suspension Make 240 cc Gianluca med sinus irrigationMix 2 ml vial of budesonide 0.5 mg Rinse BID for 2 weeks 100 mL 1     ipratropium (ATROVENT NASAL) 0.06 % nasal spray Inhale 2 Sprays in the nostril(s) 3 times daily. - for Chronic Rhinitis 15 mL 3     [START ON 9/14/2017] polyethylene glycol-electrolyte (NULYTELY) 420 gram solution Take 240 mL by mouth every 10 minutes. 4000 mL 0     ranitidine (ZANTAC) 150 mg tablet Take 1 tablet by mouth 2 times daily. As needed for heartburn -- cancel Rx for omeprazole 60 tablet 3     sucralfate (CARAFATE) 1 gram tablet Take 1 tablet by mouth 4 times daily before meals and at bedtime. - as needed for Heartburn 360 tablet 3     Allergies      Allergen   Reactions     Morphine  Nausea And Vomiting and Pancreatitis     Gold Au 198  Rash     [place]  GI Upset     Other [Unlisted Allergen (Include Detail In Comments)]  Shortness Of Breath and Edema     Patient states he had a reaction at a [employer] unsure of what caused the reaction.       Peanut  GI Upset     Tramadol  Dizziness     Family History       Problem   Relation Age of Onset     Heart Disease  Father       "Cardiac disease        Other  Father      back problems       Liver cancer  Father      -- Hx Alcohol abuse       Other  Mother      Multiple orthopedic problems       Family Status     Relation  Status     Father      Mother      Social History     Social History        Marital status:  Single     Spouse name: N/A     Number of children:  N/A     Years of education:  N/A     Social History Main Topics         Smoking status:   Never Smoker     Smokeless tobacco:   Never Used      Comment: used it 9/20/15      Alcohol use   No     Drug use:   No     Sexual activity:   Yes     Partners:  Female     Other Topics  Concern     Blood Transfusions No     Exercise Yes     Seat Belt Yes     Social History Narrative     Parents are . Lives with mother in [place].    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      9/2016.     Pertinent ROS was performed and was negative as noted in HPI above.     EXAM:   Vitals:     08/09/17 1639   BP: 130/78   Pulse: 60   Weight: (!) 137.4 kg (303 lb)     BP Readings from Last 3 Encounters:    08/09/17 130/78   07/14/17 122/70   06/29/17 168/88     Wt Readings from Last 3 Encounters:    08/09/17 (!) 137.4 kg (303 lb)   07/14/17 (!) 136.8 kg (301 lb 8 oz)   06/29/17 (!) 136.7 kg (301 lb 6.4 oz)     Estimated body mass index is 47.46 kg/(m^2) as calculated from the following:    Height as of 6/29/17: 1.702 m (5' 7\").    Weight as of this encounter: 137.4 kg (303 lb).     EXAM:  Constitutional: Pleasant, alert, appropriate appearance for age. No acute distress  Lymphatic Exam: Non-palpable nodes in neck, clavicular regions  Pulmonary: Lungs are clear to auscultation bilaterally, without wheezes or crackles  Cardiovascular Exam: regular rate and rhythm, no pedal edema  Gastrointestinal Exam: + Mild epigastric tenderness to palpation.  + Obese - Soft, non-distended, positive bowel sounds  Integument: No abnormal rashes, sores, or ulcerations noted  Neurologic Exam: CN 3-12 " grossly intact   Musculoskeletal Exam: Moves upper and lower extremities symmetrically, No focal weakness  Gait and station appear grossly normal  Psychiatric Exam: Awake and Alert, Affect and mood appropriate  Speech is fluent, Thought process is normal    INVESTIGATIONS:  Results for orders placed or performed in visit on 07/14/17      COMPLETE METABOLIC PANEL      Result  Value Ref Range    SODIUM 139 133 - 143 mmol/L    POTASSIUM 4.0 3.5 - 5.1 mmol/L    CHLORIDE 102 98 - 107 mmol/L    CO2,TOTAL 24 21 - 31 mmol/L    ANION GAP 13 5 - 18                    GLUCOSE 98 70 - 105 mg/dL    CALCIUM 9.7 8.6 - 10.3 mg/dL    BUN 16 7 - 25 mg/dL    CREATININE 1.14 0.70 - 1.30 mg/dL    BUN/CREAT RATIO           14                    GFR if African American >60 >60 ml/min/1.73m2    GFR if not African American >60 >60 ml/min/1.73m2    ALBUMIN 4.8 3.5 - 5.7 g/dL    PROTEIN,TOTAL 7.5 6.4 - 8.9 g/dL    GLOBULIN                  2.7 2.0 - 3.7 g/dL    A/G RATIO 1.8 1.0 - 2.0                    BILIRUBIN,TOTAL 0.5 0.3 - 1.0 mg/dL    ALK PHOSPHATASE 68 34 - 104 IU/L    ALT (SGPT) 35 7 - 52 IU/L    AST (SGOT) 19 13 - 39 IU/L   LIPID PANEL      Result  Value Ref Range    CHOLESTEROL,TOTAL 189 <200 mg/dL    TRIGLYCERIDES 107 <150 mg/dL    HDL CHOLESTEROL 56 23 - 92 mg/dL    NON-HDL CHOLESTEROL 133 <145 mg/dl    CHOL/HDL RATIO            3.38 <4.50                    LDL CHOLESTEROL 112 (H) <100 mg/dL    PATIENT STATUS            NOT GIVEN                   Hgb A1c      Result  Value Ref Range    HEMOGLOBIN A1C MONITORING (POCT) 5.0 4.0 - 6.2 %    ESTIMATED AVERAGE GLUCOSE  97 mg/dL   LIPASE      Result  Value Ref Range    LIPASE 25.5 11.0 - 82.0 IU/L   AMYLASE      Result  Value Ref Range    AMYLASE 48 29 - 103 IU/L   TSH      Result  Value Ref Range    TSH 1.28 0.34 - 5.60 uIU/mL   T3,FREE      Result  Value Ref Range    T3,FREE 3.20 2.50 - 3.90 pg/mL   T4,FREE      Result  Value Ref Range    T4,FREE 0.86 0.58 - 1.64 ng/dL    TESTOSTERONE TOTAL AND FREE      Result  Value Ref Range    TESTOSTERONE FREE 11.4 5.25 - 20.7 ng/dL    TESTOSTERONE,TOTAL        356 240 - 950 ng/dL   ADULT FOOD ALLERGY PROFILE      Result  Value Ref Range    Clam (F207) IgE 0.11 <=0.35 kU/L    Codfish (F3) IgE               <0.10 <=0.35 kU/L    Corn (F8) IgE 0.41 (H) <=0.35 kU/L    Egg White (F1) IgE             <0.10 <=0.35 kU/L    IMMUNOGLOBULIN E (IGE) 348.0 (H) 22.0 - 107.0 kU/L    MILK (F2) IGE <0.10 <=0.35 kU/L    PEANUT (F13) IGE 0.59 (H) <=0.35 kU/L    SCALLOP (F338) IGE <0.10 <=0.35 kU/L    SHRIMP (F24) IGE 0.14 <=0.35 kU/L    SOYBEAN (F14) IGE 0.12 <=0.35 kU/L    WALNUT (F256) IGE 0.16 <=0.35 kU/L    WHEAT (F4) IGE 0.24 <=0.35 kU/L   CBC WITH AUTO DIFFERENTIAL      Result  Value Ref Range    WHITE BLOOD COUNT         6.0 4.5 - 11.0 thou/cu mm    RED BLOOD COUNT           5.12 4.30 - 5.90 mil/cu mm    HEMOGLOBIN                15.6 13.5 - 17.5 g/dL    HEMATOCRIT                44.5 37.0 - 53.0 %    MCV                       87 80 - 100 fL    MCH                       30.5 26.0 - 34.0 pg    MCHC                      35.1 32.0 - 36.0 g/dL    RDW                       13.0 11.5 - 15.5 %    PLATELET COUNT            191 140 - 440 thou/cu mm    MPV                       11.1 (H) 6.5 - 11.0 fL    NEUTROPHILS               59.4 42.0 - 72.0 %    LYMPHOCYTES               30.9 20.0 - 44.0 %    MONOCYTES                 6.9 <12.0 %    EOSINOPHILS               2.2 <8.0 %    BASOPHILS                 0.3 <3.0 %    IMMATURE GRANULOCYTES(METAS,MYELOS,PROS) 0.3 %    ABSOLUTE NEUTROPHILS      3.5 1.7 - 7.0 thou/cu mm    ABSOLUTE LYMPHOCYTES      1.8 0.9 - 2.9 thou/cu mm    ABSOLUTE MONOCYTES        0.4 <0.9 thou/cu mm    ABSOLUTE EOSINOPHILS      0.1 <0.5 thou/cu mm    ABSOLUTE BASOPHILS        0.0 <0.3 thou/cu mm    ABSOLUTE IMMATURE GRANULOCYTES(METAS,MYELOS,PROS) 0.0 <=0.3 thou/cu mm       ASSESSMENT AND PLAN:  Marija was seen today for follow up.    Diagnoses and  all orders for this visit:    Elevated IgE level    Multiple food allergies - Corn and Peanuts  -     ranitidine (ZANTAC) 150 mg tablet; Take 1 tablet by mouth 2 times daily. As needed for heartburn -- cancel Rx for omeprazole    Sliding hiatal hernia  -     sucralfate (CARAFATE) 1 gram tablet; Take 1 tablet by mouth 4 times daily before meals and at bedtime. - as needed for Heartburn    Deviated nasal septum  -     AMB CONSULT TO ENT; Future    Chronic ethmoidal sinusitis  -     AMB CONSULT TO ENT; Future    Chronic maxillary sinusitis  -     AMB CONSULT TO ENT; Future    Epigastric abdominal pain  -     sucralfate (CARAFATE) 1 gram tablet; Take 1 tablet by mouth 4 times daily before meals and at bedtime. - as needed for Heartburn  -     ranitidine (ZANTAC) 150 mg tablet; Take 1 tablet by mouth 2 times daily. As needed for heartburn -- cancel Rx for omeprazole      lab results and schedule of future lab studies reviewed with patient, reviewed diet, exercise and weight control, recommended sodium restriction    -- Expected clinical course discussed   -- Medications and their side effects discussed    Return if symptoms worsen or fail to improve.    Patient Instructions     1. Elevated IgE level  2. Multiple food allergies - Corn and Peanuts  3. Sliding hiatal hernia    With the yellow reflux 8/9/2017 -- omeprazole could made bile reflux worse.     With your sliding hiatal hernia -- use carafate 2 to 3 times daily as needed...     -- consider trying Zantac or Pepcid -- (use generic), up to twice daily if needed for heartburn.      Consider inflammatory or auto-immune bowel problem (chrons vs other) -- consider possible eosinophilic esophagitis, causing your heartburn issues.     + Peanut and corn reaction noted on allergy testing.   + IgE elevated -- due to allergy / reactivity.     Consider trial of Zyrtec, Claritin, or Allegra -- as needed to help with allergy symptoms.     -- Another option could be Singulair /  Montelukast.     Results for orders placed or performed in visit on 07/14/17      COMPLETE METABOLIC PANEL      Result  Value Ref Range    SODIUM 139 133 - 143 mmol/L    POTASSIUM 4.0 3.5 - 5.1 mmol/L    CHLORIDE 102 98 - 107 mmol/L    CO2,TOTAL 24 21 - 31 mmol/L    ANION GAP 13 5 - 18                    GLUCOSE 98 70 - 105 mg/dL    CALCIUM 9.7 8.6 - 10.3 mg/dL    BUN 16 7 - 25 mg/dL    CREATININE 1.14 0.70 - 1.30 mg/dL    BUN/CREAT RATIO           14                    GFR if African American >60 >60 ml/min/1.73m2    GFR if not African American >60 >60 ml/min/1.73m2    ALBUMIN 4.8 3.5 - 5.7 g/dL    PROTEIN,TOTAL 7.5 6.4 - 8.9 g/dL    GLOBULIN                  2.7 2.0 - 3.7 g/dL    A/G RATIO 1.8 1.0 - 2.0                    BILIRUBIN,TOTAL 0.5 0.3 - 1.0 mg/dL    ALK PHOSPHATASE 68 34 - 104 IU/L    ALT (SGPT) 35 7 - 52 IU/L    AST (SGOT) 19 13 - 39 IU/L   LIPID PANEL      Result  Value Ref Range    CHOLESTEROL,TOTAL 189 <200 mg/dL    TRIGLYCERIDES 107 <150 mg/dL    HDL CHOLESTEROL 56 23 - 92 mg/dL    NON-HDL CHOLESTEROL 133 <145 mg/dl    CHOL/HDL RATIO            3.38 <4.50                    LDL CHOLESTEROL 112 (H) <100 mg/dL    PATIENT STATUS            NOT GIVEN                   Hgb A1c      Result  Value Ref Range    HEMOGLOBIN A1C MONITORING (POCT) 5.0 4.0 - 6.2 %    ESTIMATED AVERAGE GLUCOSE  97 mg/dL   LIPASE      Result  Value Ref Range    LIPASE 25.5 11.0 - 82.0 IU/L   AMYLASE      Result  Value Ref Range    AMYLASE 48 29 - 103 IU/L   TSH      Result  Value Ref Range    TSH 1.28 0.34 - 5.60 uIU/mL   T3,FREE      Result  Value Ref Range    T3,FREE 3.20 2.50 - 3.90 pg/mL   T4,FREE      Result  Value Ref Range    T4,FREE 0.86 0.58 - 1.64 ng/dL   TESTOSTERONE TOTAL AND FREE      Result  Value Ref Range    TESTOSTERONE FREE 11.4 5.25 - 20.7 ng/dL    TESTOSTERONE,TOTAL        356 240 - 950 ng/dL   ADULT FOOD ALLERGY PROFILE      Result  Value Ref Range    Clam (F207) IgE 0.11 <=0.35 kU/L    Codfish (F3) IgE                <0.10 <=0.35 kU/L    Corn (F8) IgE 0.41 (H) <=0.35 kU/L    Egg White (F1) IgE             <0.10 <=0.35 kU/L    IMMUNOGLOBULIN E (IGE) 348.0 (H) 22.0 - 107.0 kU/L    MILK (F2) IGE <0.10 <=0.35 kU/L    PEANUT (F13) IGE 0.59 (H) <=0.35 kU/L    SCALLOP (F338) IGE <0.10 <=0.35 kU/L    SHRIMP (F24) IGE 0.14 <=0.35 kU/L    SOYBEAN (F14) IGE 0.12 <=0.35 kU/L    WALNUT (F256) IGE 0.16 <=0.35 kU/L    WHEAT (F4) IGE 0.24 <=0.35 kU/L   CBC WITH AUTO DIFFERENTIAL      Result  Value Ref Range    WHITE BLOOD COUNT         6.0 4.5 - 11.0 thou/cu mm    RED BLOOD COUNT           5.12 4.30 - 5.90 mil/cu mm    HEMOGLOBIN                15.6 13.5 - 17.5 g/dL    HEMATOCRIT                44.5 37.0 - 53.0 %    MCV                       87 80 - 100 fL    MCH                       30.5 26.0 - 34.0 pg    MCHC                      35.1 32.0 - 36.0 g/dL    RDW                       13.0 11.5 - 15.5 %    PLATELET COUNT            191 140 - 440 thou/cu mm    MPV                       11.1 (H) 6.5 - 11.0 fL    NEUTROPHILS               59.4 42.0 - 72.0 %    LYMPHOCYTES               30.9 20.0 - 44.0 %    MONOCYTES                 6.9 <12.0 %    EOSINOPHILS               2.2 <8.0 %    BASOPHILS                 0.3 <3.0 %    IMMATURE GRANULOCYTES(METAS,MYELOS,PROS) 0.3 %    ABSOLUTE NEUTROPHILS      3.5 1.7 - 7.0 thou/cu mm    ABSOLUTE LYMPHOCYTES      1.8 0.9 - 2.9 thou/cu mm    ABSOLUTE MONOCYTES        0.4 <0.9 thou/cu mm    ABSOLUTE EOSINOPHILS      0.1 <0.5 thou/cu mm    ABSOLUTE BASOPHILS        0.0 <0.3 thou/cu mm    ABSOLUTE IMMATURE GRANULOCYTES(METAS,MYELOS,PROS) 0.0 <=0.3 thou/cu mm        4. Deviated nasal septum  5. Chronic ethmoidal sinusitis  6. Chronic maxillary sinusitis  - AMB CONSULT TO ENT -- Dr. Bateman in [place]  - they will call with date/time of appointment.      -- Facial / Sinus Xray results and CT Abd/pelvis reviewed today.     Return as needed for follow-up with Dr. Saha.    Clinic :  [phone]  Appointment line: [phone]      Erasmo Saha MD

## 2017-12-27 NOTE — PROGRESS NOTES
Patient Information     Patient Name MRN Sex Marija Fang [id number] Male 1990      Progress Notes by Lyn Harden at 2017 10:13 AM     Author:  Lyn Harden Service:  (none) Author Type:  Other Clinical Staff     Filed:  2017 10:13 AM Date of Service:  2017 10:13 AM Status:  Signed     :  Lyn Harden (Other Clinical Staff)            Falls Risk Criteria:    Age 65 and older or under age 4        Sensory deficits    Poor vision    Use of ambulatory aides    Impaired judgment    Unable to walk independently    Meets High Risk criteria for falls:  no
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
## 2017-12-28 NOTE — TELEPHONE ENCOUNTER
Patient Information     Patient Name MRN Sex Marija Fang [id number] Male 1990      Telephone Encounter by Katiuska Khoury MD at 2017 12:40 PM     Author:  Katiuska Khoury MD Service:  (none) Author Type:  Physician     Filed:  2017 12:40 PM Encounter Date:  2017 Status:  Signed     :  Katiuska Khoury MD (Physician)            Ok to schedule. Thanks! Katiuska Khoury MD ....................  2017   12:40 PM

## 2017-12-28 NOTE — TELEPHONE ENCOUNTER
Patient Information     Patient Name MRN Sex Marija Fagn [id number] Male 1990      Telephone Encounter by Katiuska Khoury MD at 2017  4:19 PM     Author:  Katiuska Khoury MD Service:  (none) Author Type:  Physician     Filed:  2017  4:19 PM Encounter Date:  2017 Status:  Signed     :  Katiuska Khoury MD (Physician)            Thanks!

## 2017-12-28 NOTE — PROGRESS NOTES
Patient Information     Patient Name MRN Sex Marija Fang [id number] Male 1990      Progress Notes by Lyn Harden at 2017 10:13 AM     Author:  Lyn Harden Service:  (none) Author Type:  Other Clinical Staff     Filed:  2017 10:13 AM Date of Service:  2017 10:13 AM Status:  Signed     :  Lyn Harden (Other Clinical Staff)            1.  Has the patient had a previous reaction to IV contrast? No    2.  Does the patient have kidney disease? No    3.  Is the patient on dialysis? No    If YES to any of these questions, exam will be reviewed with a Radiologist before administering contrast.

## 2017-12-28 NOTE — PATIENT INSTRUCTIONS
"Patient Information     Patient Name MRN Marija Nava [id number] Male 1990      Patient Instructions by Erasmo Saah MD at 2017 10:20 AM     Author:  Erasmo Saha MD  Service:  (none) Author Type:  Physician     Filed:  2017 11:11 AM  Encounter Date:  2017 Status:  Addendum     :  Erasmo Saha MD (Physician)        Related Notes: Original Note by Erasmo Saha MD (Physician) filed at 2017 11:10 AM            Avoid NSAIDS like Ibuprofen/Advil, Naproxen/Aleve -- can hurt the kidneys.     Labs today.     Multiple potential issues, contributing to your inability to lose weight, fatigue, low energy, abdominal pain.     CT of abdomen / pelvis ordered  - they will call with date/time of appointment.      Consider gallstones, hernia, pancreatitis, other GI issues....    Allergy list updated.     Glad your breathing is doing well. Use albuterol inhaler intermittently if needed.     Your Body mass index (BMI) is:  Estimated body mass index is 47.22 kg/(m^2) as calculated from the following:    Height as of 17: 1.702 m (5' 7\").    Weight as of this encounter: 136.8 kg (301 lb 8 oz).   (BMI ranges: Normal 18.5 - 25, Overweight 25 - 30, Obesity greater than 30)     Facts about losing weight:   -- Overweight and Obesity increase your risk for developing diabetes, high blood pressure and stroke, and shorten your life.   -- 90% of weight loss comes from dietary changes, only 10% from exercise   -- For every 1 pound of of weight loss -- this is equal to about 8 to 10 pounds of weight off of your knees.   -- there are about 3500 calories in 1 pound of body weight.     -- Goal Caloric intake -- 1200 to 1500 daily.     Get out and walk for 10 to 15 minutes after each meal -- this significantly lowers the spike in blood sugar after eating.     What have successful people done to lose large amounts of weight, and keep it off?   -- Used both diet and exercise to lose " weight   -- Ate a healthy breakfast every day   -- Exercised an hour per day   -- Watched less than 10 hours of TV per week   -- Weighed themselves weekly   -- Drink a large glass of water 10-15 minutes before your meals.   -- Use smaller dinner plates and don't go back for seconds.     What should I do?   -- Work on 5-10% weight loss   -- Focus on a few healthy dietary changes   -- Eat more fresh fruits and vegetables, and fewer carbohydrates (http://myplate.gov/)   -- Exercise every day   -- Weigh yourself once a week    Weight Management   Weight Watchers     Meets Mondays 9:30, 11:45, or 5:30    [facility]illy, [address], [place], [place]     Contact [place] [phone] [email or web address]."[employer]  Weight Watchers www.weightwatchers.com    -- Consider My Fitness Pal on your smart phone  http://www.[employer]/      I would recommend a focus on weight loss and increased exercise with a goal of 30 minutes of exercise at least 5 times per week in order to help prevent worsening of your blood sugar control.     Keep working to try obtain/maintain healthy weight, weight loss, healthy diet and regular exercise.     1. Encounter to establish care with new doctor  2. Mild intermittent asthma without complication  3. Allergic rhinitis due to pollen, unspecified rhinitis seasonality    Start trial of:    - ipratropium (ATROVENT NASAL) 0.06 % nasal spray; Inhale 2 Sprays in the nostril(s) 3 times daily. - for Chronic Rhinitis  Dispense: 15 mL; Refill: 3  - budesonide (PULMICORT RESPULES) 0.5 mg/2 mL neb suspension; Make 240 cc Gianluca med sinus irrigationMix 2 ml vial of budesonide 0.5 mg Rinse BID for 2 weeks  Dispense: 100 mL; Refill: 1    4. Morbid obesity with BMI of 45.0-49.9, adult ([facility])  - Hgb A1c; Future  - TSH; Future  - T3,FREE; Future  - T4,FREE; Future  - TESTOSTERONE TOTAL AND FREE; Future    5. History of tear of ACL (anterior cruciate ligament)    6. Chronic bilateral low back pain without sciatica    7.  Gastroesophageal reflux disease, esophagitis presence not specified    8. Sinus pressure    9. Elevated serum creatinine  - COMPLETE METABOLIC PANEL; Future    10. Epigastric abdominal pain  - CBC WITH DIFFERENTIAL; Future  - COMPLETE METABOLIC PANEL; Future  - LIPASE; Future  - AMYLASE; Future  - CT ABDOMEN PELVIS W; Future    11. Screening for lipid disorders  - LIPID PANEL; Future    12. Periumbilical abdominal pain  - CT ABDOMEN PELVIS W; Future    13. Malaise and fatigue  - TESTOSTERONE TOTAL AND FREE; Future    -- Start trial of Carafate -- to help with stomach issues / pain.       Return in approximately 1 month(s), or sooner as needed for follow-up with Dr. Saha.  -- follow-up labs, CT results, abd pain.     Clinic : [phone]  Appointment line: [phone]

## 2017-12-28 NOTE — TELEPHONE ENCOUNTER
Patient Information     Patient Name MRN Sex Marija Fang [id number] Male 1990      Telephone Encounter by Radhika Asif at 2017  4:19 PM     Author:  Radhika Asif Service:  (none) Author Type:  (none)     Filed:  2017  4:20 PM Encounter Date:  2017 Status:  Signed     :  Radhika Asif            Patient notified of providers note.      Radhika Asif LPN        2017 4:19 PM

## 2017-12-28 NOTE — TELEPHONE ENCOUNTER
Patient Information     Patient Name MRN Sex Marija Fang [id number] Male 1990      Telephone Encounter by Erasmo Saha MD at 2017  4:03 PM     Author:  Erasmo Saha MD Service:  (none) Author Type:  Physician     Filed:  2017  4:03 PM Encounter Date:  2017 Status:  Signed     :  Erasmo Saha MD (Physician)            Should start with a sinus x-ray as the first test.  Orders placed.    Erasmo Saha MD

## 2017-12-28 NOTE — TELEPHONE ENCOUNTER
Patient Information     Patient Name MRN Sex Marija Fang [id number] Male 1990      Telephone Encounter by Anette Warren at 2017  2:09 PM     Author:  Anette Warren Service:  (none) Author Type:  (none)     Filed:  2017  2:23 PM Encounter Date:  2017 Status:  Signed     :  Anette Warren            Spoke with patient again he stated that he used to take Omeprazole for 8-10 years and it didn't help him, he also would like to know why his ImmunoGlobulin E (IGE) is high. Please advise. Anette Warren LPN......................2017 2:23 PM

## 2017-12-28 NOTE — TELEPHONE ENCOUNTER
Patient Information     Patient Name MRN Sex Marija Fang [id number] Male 1990      Telephone Encounter by Erasmo Saha MD at 2017 12:19 PM     Author:  Erasmo Saha MD Service:  (none) Author Type:  Physician     Filed:  2017 12:20 PM Encounter Date:  2017 Status:  Signed     :  Erasmo Saha MD (Physician)            Continue Carafate.  Use Carafate 2-4 times daily prior to meals or on an empty stomach --- either at bedtime or in the middle of the night.     Start trial of omeprazole/Prilosec 40 mg once daily before a meal.    -- Prescription sent to pharmacy.    Keep appointment on Friday as scheduled.    Erasmo Saha MD

## 2017-12-28 NOTE — TELEPHONE ENCOUNTER
Patient Information     Patient Name MRN Marija Nava [id number] Male 1990      Telephone Encounter by Erasmo Saha MD at 2017  2:38 PM     Author:  Erasmo Saha MD Service:  (none) Author Type:  Physician     Filed:  2017  2:38 PM Encounter Date:  2017 Status:  Signed     :  Erasmo Saha MD (Physician)            Noted.   See if patient wants to come today at 4PM instead of Friday afternoon. We can address his concerns at that time.     Erasmo Saha MD

## 2017-12-28 NOTE — TELEPHONE ENCOUNTER
Patient Information     Patient Name MRN Sex Marija Fang [id number] Male 1990      Telephone Encounter by Anette Warren at 2017 10:05 AM     Author:  Anette Warren  Service:  (none) Author Type:  (none)     Filed:  2017 10:08 AM  Encounter Date:  2017 Status:  Addendum     :  Anette Warren        Related Notes: Original Note by Anette Warren filed at 2017 10:06 AM            Spoke with patient, he stated he vomited this morning and it was just bile before he ate. He also stated he had cut back on his pump inhibitors from 4 pills a day to 2 and was fine up until today. He has an apt. With Erasmo Saha MD this Friday, please advise. Anette Warren LPN......................2017 10:08 AM

## 2017-12-28 NOTE — TELEPHONE ENCOUNTER
Patient Information     Patient Name MRN Sex Marija Fang [id number] Male 1990      Telephone Encounter by Nora Huitron at 2017 11:16 AM     Author:  Nora Huitron Service:  (none) Author Type:  (none)     Filed:  2017 11:18 AM Encounter Date:  2017 Status:  Signed     :  Nora Huitron            Patient referred by Dr. Saha for a Diagnostic Colonoscopy.   Diagnosis is Gastroesophageal reflux disease , Epigastric abdominal pain, Periumbilical abdominal pain and change in stool habits.   Please advise.  Thank you.

## 2017-12-28 NOTE — TELEPHONE ENCOUNTER
Patient Information     Patient Name MRN Sex Marija Fang [id number] Male 1990      Telephone Encounter by Nora Huitron at 2017  2:16 PM     Author:  Nora Huitron Service:  (none) Author Type:  (none)     Filed:  2017  2:19 PM Encounter Date:  2017 Status:  Signed     :  Nora Huitron            Screening Questions for the Scheduling of Screening Colonoscopies   (If Colonoscopy is diagnostic, Provider should review the chart before scheduling.)  Are you younger than 50 or older than 80?  YES   Do you take aspirin or fish oil?  NO (if yes, tell patient to stop 1 week prior to Colonoscopy)  Do you take warfarin (Coumadin), clopidogrel (Plavix), apixaban (Eliquis), dabigatram (Pradaxa), rivaroxaban (Xarelto) or any blood thinner? NO  Do you use oxygen at home?  NO  Do you have kidney disease? NO  Are you on dialysis? NO  Have you had a stroke or heart attack in the last year? NO  Have you had a stent in your heart or any blood vessel in the last year? NO  Have you had a transplant of any organ? NO  Have you had a colonoscopy or upper endoscopy (EGD) before? NO         When?  NO  Date of scheduled Colonoscopy.  09/15/2017  Provider RICHARD Daugherty  [address]

## 2017-12-28 NOTE — TELEPHONE ENCOUNTER
Patient Information     Patient Name MRN Sex Marija Fang [id number] Male 1990      Telephone Encounter by Radhika Asif at 2017  3:40 PM     Author:  Radhika Asif Service:  (none) Author Type:  (none)     Filed:  2017  3:40 PM Encounter Date:  2017 Status:  Signed     :  aRdhika Asif            Patient placed with Erasmo Saha MD for today at 1620.    Radhika Asif LPN        2017 3:40 PM

## 2017-12-29 NOTE — PATIENT INSTRUCTIONS
Patient Information     Patient Name MRN Marija Nava [id number] Male 1990      Patient Instructions by Erasmo Saha MD at 2017  4:00 PM     Author:  Erasmo Saha MD  Service:  (none) Author Type:  Physician     Filed:  2017  5:04 PM  Encounter Date:  2017 Status:  Addendum     :  Erasmo Saha MD (Physician)        Related Notes: Original Note by Erasmo Saha MD (Physician) filed at 2017  4:59 PM            1. Elevated IgE level  2. Multiple food allergies - Corn and Peanuts  3. Sliding hiatal hernia    With the yellow reflux 2017 -- omeprazole could made bile reflux worse.     With your sliding hiatal hernia -- use carafate 2 to 3 times daily as needed...     -- consider trying Zantac or Pepcid -- (use generic), up to twice daily if needed for heartburn.      Consider inflammatory or auto-immune bowel problem (chrons vs other) -- consider possible eosinophilic esophagitis, causing your heartburn issues.     + Peanut and corn reaction noted on allergy testing.   + IgE elevated -- due to allergy / reactivity.     Consider trial of Zyrtec, Claritin, or Allegra -- as needed to help with allergy symptoms.     -- Another option could be Singulair / Montelukast.     Results for orders placed or performed in visit on 17      COMPLETE METABOLIC PANEL      Result  Value Ref Range    SODIUM 139 133 - 143 mmol/L    POTASSIUM 4.0 3.5 - 5.1 mmol/L    CHLORIDE 102 98 - 107 mmol/L    CO2,TOTAL 24 21 - 31 mmol/L    ANION GAP 13 5 - 18                    GLUCOSE 98 70 - 105 mg/dL    CALCIUM 9.7 8.6 - 10.3 mg/dL    BUN 16 7 - 25 mg/dL    CREATININE 1.14 0.70 - 1.30 mg/dL    BUN/CREAT RATIO           14                    GFR if African American >60 >60 ml/min/1.73m2    GFR if not African American >60 >60 ml/min/1.73m2    ALBUMIN 4.8 3.5 - 5.7 g/dL    PROTEIN,TOTAL 7.5 6.4 - 8.9 g/dL    GLOBULIN                  2.7 2.0 - 3.7 g/dL    A/G RATIO 1.8 1.0 - 2.0                     BILIRUBIN,TOTAL 0.5 0.3 - 1.0 mg/dL    ALK PHOSPHATASE 68 34 - 104 IU/L    ALT (SGPT) 35 7 - 52 IU/L    AST (SGOT) 19 13 - 39 IU/L   LIPID PANEL      Result  Value Ref Range    CHOLESTEROL,TOTAL 189 <200 mg/dL    TRIGLYCERIDES 107 <150 mg/dL    HDL CHOLESTEROL 56 23 - 92 mg/dL    NON-HDL CHOLESTEROL 133 <145 mg/dl    CHOL/HDL RATIO            3.38 <4.50                    LDL CHOLESTEROL 112 (H) <100 mg/dL    PATIENT STATUS            NOT GIVEN                   Hgb A1c      Result  Value Ref Range    HEMOGLOBIN A1C MONITORING (POCT) 5.0 4.0 - 6.2 %    ESTIMATED AVERAGE GLUCOSE  97 mg/dL   LIPASE      Result  Value Ref Range    LIPASE 25.5 11.0 - 82.0 IU/L   AMYLASE      Result  Value Ref Range    AMYLASE 48 29 - 103 IU/L   TSH      Result  Value Ref Range    TSH 1.28 0.34 - 5.60 uIU/mL   T3,FREE      Result  Value Ref Range    T3,FREE 3.20 2.50 - 3.90 pg/mL   T4,FREE      Result  Value Ref Range    T4,FREE 0.86 0.58 - 1.64 ng/dL   TESTOSTERONE TOTAL AND FREE      Result  Value Ref Range    TESTOSTERONE FREE 11.4 5.25 - 20.7 ng/dL    TESTOSTERONE,TOTAL        356 240 - 950 ng/dL   ADULT FOOD ALLERGY PROFILE      Result  Value Ref Range    Clam (F207) IgE 0.11 <=0.35 kU/L    Codfish (F3) IgE               <0.10 <=0.35 kU/L    Corn (F8) IgE 0.41 (H) <=0.35 kU/L    Egg White (F1) IgE             <0.10 <=0.35 kU/L    IMMUNOGLOBULIN E (IGE) 348.0 (H) 22.0 - 107.0 kU/L    MILK (F2) IGE <0.10 <=0.35 kU/L    PEANUT (F13) IGE 0.59 (H) <=0.35 kU/L    SCALLOP (F338) IGE <0.10 <=0.35 kU/L    SHRIMP (F24) IGE 0.14 <=0.35 kU/L    SOYBEAN (F14) IGE 0.12 <=0.35 kU/L    WAL[place]UT (F256) IGE 0.16 <=0.35 kU/L    WHEAT (F4) IGE 0.24 <=0.35 kU/L   CBC WITH AUTO DIFFERENTIAL      Result  Value Ref Range    WHITE BLOOD COUNT         6.0 4.5 - 11.0 thou/cu mm    RED BLOOD COUNT           5.12 4.30 - 5.90 mil/cu mm    HEMOGLOBIN                15.6 13.5 - 17.5 g/dL    HEMATOCRIT                44.5 37.0 - 53.0 %    MCV                        87 80 - 100 fL    MCH                       30.5 26.0 - 34.0 pg    MCHC                      35.1 32.0 - 36.0 g/dL    RDW                       13.0 11.5 - 15.5 %    PLATELET COUNT            191 140 - 440 thou/cu mm    MPV                       11.1 (H) 6.5 - 11.0 fL    NEUTROPHILS               59.4 42.0 - 72.0 %    LYMPHOCYTES               30.9 20.0 - 44.0 %    MONOCYTES                 6.9 <12.0 %    EOSINOPHILS               2.2 <8.0 %    BASOPHILS                 0.3 <3.0 %    IMMATURE GRANULOCYTES(METAS,MYELOS,PROS) 0.3 %    ABSOLUTE NEUTROPHILS      3.5 1.7 - 7.0 thou/cu mm    ABSOLUTE LYMPHOCYTES      1.8 0.9 - 2.9 thou/cu mm    ABSOLUTE MONOCYTES        0.4 <0.9 thou/cu mm    ABSOLUTE EOSINOPHILS      0.1 <0.5 thou/cu mm    ABSOLUTE BASOPHILS        0.0 <0.3 thou/cu mm    ABSOLUTE IMMATURE GRANULOCYTES(METAS,MYELOS,PROS) 0.0 <=0.3 thou/cu mm        4. Deviated nasal septum  5. Chronic ethmoidal sinusitis  6. Chronic maxillary sinusitis  - AMB CONSULT TO ENT -- Dr. Bateman in [place]  - they will call with date/time of appointment.      -- Facial / Sinus Xray results and CT Abd/pelvis reviewed today.     Return as needed for follow-up with Dr. Saha.    Clinic : [phone]  Appointment line: [phone]

## 2017-12-29 NOTE — PATIENT INSTRUCTIONS
Patient Information     Patient Name MRN Sex Marija Fang [id number] Male 1990      Patient Instructions by Diana Alcaraz NP at 2017  4:00 PM     Author:  Diana Alcaraz NP  Service:  (none) Author Type:  PHYS- Nurse Practitioner     Filed:  2017  4:53 PM  Encounter Date:  2017 Status:  Addendum     :  Diana Alcaraz NP (PHYS- Nurse Practitioner)        Related Notes: Original Note by Diana Alcaraz NP (PHYS- Nurse Practitioner) filed at 2017  4:51 PM            The x-ray today showed no sign of fracture. Radiologist will review the X-ray within 24-48 hours, I will contact you if the radiologist finds anything of significance on the x-ray that I did not see.    Rest the RT hand, avoid any activity which causes pain.    Apply cold packs to the affected area for 15-20 minutes, 4 times a day. A bag of frozen corn or peas often works well as a cold pack. A cold pack is usually the best treatment for the 1st 2 days after an injury. After 48 hours, apply heat or ice, whichever gives relief.    Compress the painful area with an elastic bandage to minimize swelling. Make sure the bandage is not too tight, however. If the skin beyond the Ace wrap is swollen, cool or darker in color than the opposite side, the bandage might be too tight.    Elevate the injured area as much as you are able. If you can get this higher than the heart, this will help minimize pain and swelling.    Also, Take ibuprofen (Advil, Motrin) or naproxen (Aleve), or a similar prescription medication. Use regularly for the first 7-10 days. Later, take as needed for pain, swelling or stiffness. Take this type of medication with food to minimize any stomach irritation. Tylenol may also be taken to help ease the pain.    Call or return to clinic as needed if your pain becomes significantly worse, or fails to improve as anticipated despite following the above recommendations.

## 2017-12-30 NOTE — NURSING NOTE
Patient Information     Patient Name MRN Sex Marija Fang [id number] Male 1990      Nursing Note by Mariaa Mcdonough at 2017  4:00 PM     Author:  Mariaa Mcdonough Service:  (none) Author Type:  (none)     Filed:  2017  4:27 PM Encounter Date:  2017 Status:  Signed     :  Mariaa Mcdonough            The patient presents with right wrist injury .  Patient stating this happened while he was working on a tire.  He stated he landed on his right wrist and has pain 9/10.  Patient stating he is having tingling esperanza burning in the right wrist . Mariaa Mcdonough LPN..............2017 4:18 PM

## 2017-12-30 NOTE — NURSING NOTE
Patient Information     Patient Name MRN Sex Marija Fang [id number] Male 1990      Nursing Note by Radhika Asif at 2017  4:00 PM     Author:  Radhika Asif Service:  (none) Author Type:  (none)     Filed:  2017  4:41 PM Encounter Date:  2017 Status:  Signed     :  Radhika Asif            Patient presents to the clinic for follow up with CT results with additional questions about medication.    Radhika Asif LPN        2017 4:35 PM

## 2017-12-30 NOTE — NURSING NOTE
Patient Information     Patient Name MRN Marija Nava [id number] Male 1990      Nursing Note by Radhika Asif at 2017 10:20 AM     Author:  Radhika Asif Service:  (none) Author Type:  (none)     Filed:  2017 10:51 AM Encounter Date:  2017 Status:  Signed     :  Radhika Asif            Patient presents to the clinic for establish care.    Radhika Asif LPN        2017 10:41 AM

## 2018-01-04 ENCOUNTER — HISTORY (OUTPATIENT)
Dept: FAMILY MEDICINE | Facility: OTHER | Age: 28
End: 2018-01-04

## 2018-01-04 ENCOUNTER — OFFICE VISIT - GICH (OUTPATIENT)
Dept: FAMILY MEDICINE | Facility: OTHER | Age: 28
End: 2018-01-04

## 2018-01-04 DIAGNOSIS — E66.9 OBESITY: ICD-10-CM

## 2018-01-04 DIAGNOSIS — M25.552 PAIN IN LEFT HIP: ICD-10-CM

## 2018-01-23 ENCOUNTER — OFFICE VISIT - GICH (OUTPATIENT)
Dept: OTOLARYNGOLOGY | Facility: OTHER | Age: 28
End: 2018-01-23

## 2018-01-23 DIAGNOSIS — Z00.00 ENCOUNTER FOR GENERAL ADULT MEDICAL EXAMINATION WITHOUT ABNORMAL FINDINGS: ICD-10-CM

## 2018-01-26 VITALS
WEIGHT: 303 LBS | DIASTOLIC BLOOD PRESSURE: 88 MMHG | TEMPERATURE: 97.7 F | WEIGHT: 301.4 LBS | SYSTOLIC BLOOD PRESSURE: 130 MMHG | BODY MASS INDEX: 47.3 KG/M2 | BODY MASS INDEX: 50.48 KG/M2 | DIASTOLIC BLOOD PRESSURE: 78 MMHG | HEART RATE: 67 BPM | SYSTOLIC BLOOD PRESSURE: 168 MMHG | HEART RATE: 60 BPM | HEIGHT: 67 IN

## 2018-01-26 VITALS
DIASTOLIC BLOOD PRESSURE: 70 MMHG | BODY MASS INDEX: 50.23 KG/M2 | WEIGHT: 301.5 LBS | SYSTOLIC BLOOD PRESSURE: 122 MMHG | HEART RATE: 68 BPM

## 2018-01-28 ASSESSMENT — PATIENT HEALTH QUESTIONNAIRE - PHQ9
SUM OF ALL RESPONSES TO PHQ QUESTIONS 1-9: 0
SUM OF ALL RESPONSES TO PHQ QUESTIONS 1-9: 0

## 2018-01-31 ENCOUNTER — HOSPITAL ENCOUNTER (OUTPATIENT)
Dept: SURGERY | Facility: OTHER | Age: 28
Discharge: HOME | End: 2018-01-31
Attending: SURGERY | Admitting: SURGERY

## 2018-01-31 ENCOUNTER — HISTORY (OUTPATIENT)
Dept: SURGERY | Facility: OTHER | Age: 28
End: 2018-01-31

## 2018-01-31 ENCOUNTER — SURGERY (OUTPATIENT)
Dept: SURGERY | Facility: OTHER | Age: 28
End: 2018-01-31

## 2018-02-05 ENCOUNTER — DOCUMENTATION ONLY (OUTPATIENT)
Dept: FAMILY MEDICINE | Facility: OTHER | Age: 28
End: 2018-02-05

## 2018-02-05 PROBLEM — K44.9 SLIDING HIATAL HERNIA: Status: ACTIVE | Noted: 2017-08-09

## 2018-02-05 PROBLEM — Z91.018 MULTIPLE FOOD ALLERGIES: Status: ACTIVE | Noted: 2017-08-09

## 2018-02-05 PROBLEM — J34.2 DEVIATED NASAL SEPTUM: Status: ACTIVE | Noted: 2017-08-09

## 2018-02-05 PROBLEM — J32.0 CHRONIC MAXILLARY SINUSITIS: Status: ACTIVE | Noted: 2017-08-09

## 2018-02-05 PROBLEM — R76.8 ELEVATED IGE LEVEL: Status: ACTIVE | Noted: 2017-08-09

## 2018-02-05 PROBLEM — K21.00 REFLUX ESOPHAGITIS: Status: ACTIVE | Noted: 2018-02-05

## 2018-02-05 PROBLEM — J32.2 CHRONIC ETHMOIDAL SINUSITIS: Status: ACTIVE | Noted: 2017-08-09

## 2018-02-05 PROBLEM — Z87.828 HISTORY OF TEAR OF ACL (ANTERIOR CRUCIATE LIGAMENT): Status: ACTIVE | Noted: 2017-07-14

## 2018-02-05 PROBLEM — R19.4 CHANGE IN STOOL HABITS: Status: ACTIVE | Noted: 2017-07-14

## 2018-02-05 PROBLEM — J30.1 ALLERGIC RHINITIS DUE TO POLLEN: Status: ACTIVE | Noted: 2018-02-05

## 2018-02-05 RX ORDER — POLYETHYLENE GLYCOL 3350, SODIUM CHLORIDE, SODIUM BICARBONATE, POTASSIUM CHLORIDE 420; 11.2; 5.72; 1.48 G/4L; G/4L; G/4L; G/4L
240 POWDER, FOR SOLUTION ORAL
COMMUNITY
Start: 2017-12-22 | End: 2018-05-30

## 2018-02-09 VITALS
SYSTOLIC BLOOD PRESSURE: 138 MMHG | DIASTOLIC BLOOD PRESSURE: 78 MMHG | WEIGHT: 292 LBS | HEART RATE: 80 BPM | BODY MASS INDEX: 45.83 KG/M2 | HEIGHT: 67 IN

## 2018-02-09 VITALS
HEART RATE: 78 BPM | SYSTOLIC BLOOD PRESSURE: 136 MMHG | DIASTOLIC BLOOD PRESSURE: 82 MMHG | WEIGHT: 295 LBS | BODY MASS INDEX: 46.2 KG/M2

## 2018-02-11 ASSESSMENT — PATIENT HEALTH QUESTIONNAIRE - PHQ9: SUM OF ALL RESPONSES TO PHQ QUESTIONS 1-9: 0

## 2018-02-12 NOTE — NURSING NOTE
Patient Information     Patient Name MRN Sex Marija Fang [id number] Male 1990      Nursing Note by Radhika Asif at 2017 11:00 AM     Author:  Radhika Asif Service:  (none) Author Type:  (none)     Filed:  2017 11:29 AM Encounter Date:  2017 Status:  Signed     :  Radhika Asif            Patient presents to the clinic for express concerns about lower back and left hip pain since last year.      Radhika Asif LPN        2017 11:21 AM

## 2018-02-12 NOTE — TELEPHONE ENCOUNTER
Patient Information     Patient Name MRN Sex Marija Fang [id number] Male 1990      Telephone Encounter by Nora Huitron at 2017 12:09 PM     Author:  Nora Huitron Service:  (none) Author Type:  (none)     Filed:  2017 12:15 PM Encounter Date:  2017 Status:  Signed     :  Nora Huitron            Screening Questions for the Scheduling of Screening Colonoscopies   (If Colonoscopy is diagnostic, Provider should review the chart before scheduling.)  Are you younger than 50 or older than 80?  YES   Do you take aspirin or fish oil?  FISH OIL  (if yes, tell patient to stop 1 week prior to Colonoscopy)  Do you take warfarin (Coumadin), clopidogrel (Plavix), apixaban (Eliquis), dabigatram (Pradaxa), rivaroxaban (Xarelto) or any blood thinner? NO   Do you use oxygen at home?  NO NO   Do you have kidney disease? NO   Are you on dialysis? NO   Have you had a stroke or heart attack in the last year? NO   Have you had a stent in your heart or any blood vessel in the last year? NO   Have you had a transplant of any organ? NO   Have you had a colonoscopy or upper endoscopy (EGD) before? NO          When?  NO   Date of scheduled Colonoscopy. 2018  Provider appweevr GLOBE
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
## 2018-02-12 NOTE — PATIENT INSTRUCTIONS
Patient Information     Patient Name MRN Sex Marija Fang [id number] Male 1990      Patient Instructions by Erasmo Saha MD at 2017 11:00 AM     Author:  Erasmo Saha MD Service:  (none) Author Type:  Physician     Filed:  2017 11:36 AM Encounter Date:  2017 Status:  Signed     :  Erasmo Saha MD (Physician)            Glad your bowel issues are getting better.  Reschedule colonoscopy.     Hip xray today.     If hip problems are more significant --> Call [phone] or [phone]  Otherwise -- [phone]  - or -  [phone]     -- To schedule an appointment with the orthopedic clinic:   -- Dr. Pringle or Sports Medicine - Dr. Ryan.     Glad the Zantac has been helping.     ENT referral sent  - they will call with date/time of appointment.    -- Dr. Mendoza    Return as needed for follow-up with Dr. Saha.    Clinic : [phone]  Appointment line: [phone]

## 2018-02-13 NOTE — PROGRESS NOTES
Patient Information     Patient Name MRN Sex Marija Fang [id number] Male 1990      Progress Notes by Sherly Alexandre at 2018  1:00 PM     Author:  Sherly Alexandre Service:  (none) Author Type:  (none)     Filed:  2018  9:24 AM Encounter Date:  2018 Status:  Signed     :  Sherly Alexandre            See scanned document.

## 2018-02-13 NOTE — OR POSTOP
Patient Information     Patient Name MRN Sex Marija Fang [id number] Male 1990      OR PostOp by Clara Shea RN at 2018  1:17 PM     Author:  Clara Shea RN Service:  (none) Author Type:  NURS- Registered Nurse     Filed:  2018  1:17 PM Date of Service:  2018  1:17 PM Status:  Signed     :  Clara Shea RN (NURS- Registered Nurse)            Discharge Note    Data:  Marija Albright has been discharged home at 1210 via ambulatory accompanied by Registered Nurse.      Action:  Written discharge/follow-up instructions were provided to patient. Prescriptions : None.  Belongings sent with patient. Medications from home sent with patient/family: Not Applicable  Equipment none .     Response:  Patient verbalized understanding of discharge instructions, reason for discharge, and necessary follow-up appointments.

## 2018-02-13 NOTE — OR NURSING
Patient Information     Patient Name MRN Sex Marija Fang [id number] Male 1990      OR Nursing by Felecia White RN at 2018 10:57 AM     Author:  Felecia White RN Service:  (none) Author Type:  NURS- Registered Nurse     Filed:  2018 10:57 AM Date of Service:  2018 10:57 AM Status:  Signed     :  Felecia White RN (NURS- Registered Nurse)            Received preop report from Elizabeth Hernandez RN.

## 2018-02-13 NOTE — OR ANESTHESIA
Patient Information     Patient Name MRN Sex Marija Fang [id number] Male 1990      OR Anesthesia by Maximino Perales CRNA at 2018 10:10 AM     Author:  Maximino Perales CRNA Service:  (none) Author Type:  NURS- Nurse Anesthetist     Filed:  2018 10:10 AM Date of Service:  2018 10:10 AM Status:  Signed     :  Maximino Perales CRNA (NURS- Nurse Anesthetist)            ANESTHESIAPREOP      PREANESTHETIC EXAM    Marija Albright is a 27 y.o. male    /78 (Cuff Size: Adult Regular)  Pulse 59  Temp 97.2  F (36.2  C)  Resp 18  SpO2 98%  There is no height or weight on file to calculate BMI.    ALLERGIES    Morphine; Gold au 198; Corn; Other [unlisted allergen (include detail in comments)]; Peanut; and Tramadol      PAST MEDICAL HISTORY    Past Medical History:     Diagnosis  Date     ADHD (attention deficit hyperactivity disorder)      Asthma, moderate persistent      GERD (gastroesophageal reflux disease)      SLAP (superior labrum from anterior to posterior) tear 2015       Patient Active Problem List     Diagnosis  Code     Spondylolisthesis of lumbar region M43.16     REFLUX ESOPHAGITIS K21.0     ALLERGIC RHINITIS, SEASONAL J30.1     Chronic bilateral low back pain without sciatica M54.5, G89.29     GERD (gastroesophageal reflux disease) K21.9     Cervical radiculopathy M54.12     s/p left shoulder arthroscopic anterior labral repair with capsulorrhaphy on 2015 by Dr. Gamboa Z47.89     Morbid obesity with BMI of 45.0-49.9, adult (HC) E66.01, Z68.42     Mild intermittent asthma without complication J45.20     History of tear of ACL (anterior cruciate ligament) Z87.828     Change in stool habits R19.4     Multiple food allergies - Corn and Peanuts Z91.018     Sliding hiatal hernia K44.9     Elevated IgE level R76.8     Chronic ethmoidal sinusitis J32.2     Chronic maxillary sinusitis J32.0     Deviated nasal septum J34.2       Family History        Problem   Relation Age of Onset     Heart Disease  Father      Cardiac disease        Other  Father      back problems       Liver cancer  Father      -- Hx Alcohol abuse       Other  Mother      Multiple orthopedic problems         Past Surgical History:      Procedure  Laterality Date     ACL RECONSTRUCTION Left 12/2008    repair       ACL RECONSTRUCTION Left 2012    revision       ENDOVENOUS ABLATION SAPHENOUS VEIN W/ LASER Right 1/4/2011     fractured arm Left 06/2004    Fracture of left distal radius-torus fracture.        left shoulder repair      labral repair       TONSILLECTOMY  age 21       Major Anesthetic Reactions: none    PMH/PSH Reviewed      History     Smoking Status       Never Smoker    Smokeless Tobacco       Never Used      Comment: used it 9/20/15     History    Alcohol Use No       Medications have been reviewed in coordination with proposed intra-procedure medications.    Prescriptions Prior to Admission       Medication  Sig Dispense Refill     albuterol HFA (PRO-AIR,VENTOLIN,PROVENTIL) 90 mcg/actuation inhaler Inhale 2 Puffs by mouth 4 times daily if needed. 1 Inhaler 5     budesonide (PULMICORT RESPULES) 0.5 mg/2 mL neb suspension Make 240 cc Gianluca med sinus irrigationMix 2 ml vial of budesonide 0.5 mg Rinse BID for 2 weeks 100 mL 1     ipratropium (ATROVENT NASAL) 0.06 % nasal spray Inhale 2 Sprays in the nostril(s) 3 times daily. - for Chronic Rhinitis 15 mL 3     ranitidine (ZANTAC) 150 mg tablet Take 1 tablet by mouth 2 times daily. As needed for heartburn -- cancel Rx for omeprazole 60 tablet 3     sucralfate (CARAFATE) 1 gram tablet Take 1 tablet by mouth 4 times daily before meals and at bedtime. - as needed for Heartburn 360 tablet 3       Recent Labs  No results found for this visit on 01/31/18.    NPO Status Noted:  Yes    Airway Class:  1 - full beard      ASA Physical Status: 2    ANESTHETIC PLAN    Anesthetic Plan: Mac    The risks, benefits, and alternatives of the procedure  were discussed.    Postop Note: Please see the chart for the postop note.    Maximino Perales CRNA ....................  1/31/2018   10:10 AM

## 2018-02-13 NOTE — PROCEDURES
Patient Information     Patient Name MRN Sex Marija Fang [id number] Male 1990      Procedures by Katiuska Khoury MD at 2018 11:10 AM     Author:  Katiuska Khoury MD Service:  (none) Author Type:  Physician     Filed:  2018 11:14 AM Date of Service:  2018 11:10 AM Status:  Signed     :  Katiuska Khoury MD (Physician)        Pre-procedure Diagnoses:    1. Diarrhea, unspecified type [R19.7]           Post-procedure Diagnoses:    1. Diarrhea, unspecified type [R19.7]           Procedures:    1. DC COLONOSCOPY BIOPSY SINGLE OR MULTIPLE [04000.0]               PROCEDURE NOTE    SURGEON: Katiuska Khuory MD.    PRE-OP DIAGNOSIS:  Diagnostic Colonoscopy, diarrhea      POST-OP DIAGNOSIS: mild irritation of colon mucosa    PROCEDURE:  Colonoscopy with random biopsies    SPECIMEN:  Terminal ileum and random colon biopsies    ANESTHESIA:  See anesthesia note, coverage requested due to body mass index more than 40    ESTIMATED BLOOD LOSS: none    COMPLICATIONS:  None    INDICATION FOR THE PROCEDURE: The patient is a 27 y.o. male. The patient presents with diarrhea. I explained to the patient the risks, benefits and alternatives to diagnostic colonoscopy for evaluating his complaint. We specifically discussed the risks of bleeding, infection, perforation, potential inability to reach the cecum and the risks of sedation. The patient's questions were answered and the patient wished to proceed. Informed consent paperwork was completed.    PROCEDURE: The patient was taken to the endoscopy suite. Appropriate monitors were attached. The patient was placed in the left lateral decubitus position.Timeout was performed confirming the patient's identity and procedure to be performed.  After appropriate sedation was confirmed, digital rectal exam was performed.  There was normal tone and no gross abnormality was noted.  The lubricated colonoscope was introduced into the anus the colon was insufflated with  air. The prep quality was adequate. Under direct visualization the scope was advanced to the cecum. The ileocecal valve was intubated and the terminal ileum inspected. No gross abnormality was noted. Biopsy was obtained. The scope was withdrawn back into the cecum. The mucosa of colon was inspected while withdrawing the scope. There some areas of patchy, mild, irritation. Random cold biopsies were taken. The scope was retroflexed in the rectum and the anorectal junction was inspected. No abnormalities were noted. The scope was returned to a neutral position and the colon was decompressed. The scope was removed. The patient tolerated the procedure with no immediately apparent complication. The patient was taken to recovery in stable condition.    FOLLOW UP:  RECOMMEND high fiber diet, will call with pathology results.    Katiuska Khoury MD

## 2018-02-13 NOTE — OR ANESTHESIA
Patient Information     Patient Name MRN Sex Marija Evans [id number] Male 1990      OR Anesthesia by Maximino Perales CRNA at 2018 11:25 AM     Author:  Maximino Perales CRNA Service:  (none) Author Type:  NURS- Nurse Anesthetist     Filed:  2018 11:26 AM Date of Service:  2018 11:25 AM Status:  Signed     :  Maximino Perales CRNA (NURS- Nurse Anesthetist)            Anesthesia Post Operative Care Note    Name: Marija Albright  MRN:   [id number]  :    1990       Procedure Done:  See Surgeon Note   Case Cancelled for Anesthetic Reason:  No      Anesthesia Technique    Anesthetic Type:  MAC       MAC Type:  NC     Oral Trauma:  No    Intraoperative Course   Hemodynamics:  Stable    Ventilation Normal:  Yes Lung Sounds:  Normal      PACU Course        Nondepolarizer Used:       Reversed: N/A   Hemodynamics:  Stable      Hydration: Euvolemic   Temperature:  36.1 - 38.3      Mental Status:  Awake, alert, follows commands   Pain Management:  Adequate   Regional Block:  No   Anesthesia Complications:  None     Anesthesia Communication: Patient requested pain medication for anal pain following colonoscopy      Vital Signs:  Temp: 97.7  F (36.5  C)  Pulse: 59  BP: 166/78  Resp: 16  SpO2: 98 %    O2 Device: Room Air                  Active Lines:  Patient Lines/Drains/Airways Status    Active Line     Name: Placement date: Placement time: Site: Days:    PERIPHERAL VAD Right;Forearm 20 14   1814      1280    PERIPHERAL VAD Left Forearm 22 18   1000   Forearm   less than 1    PERIPHERAL NERVE CATHETER LUE, Interscalene Nerve 09/21/15   0710   LUE, Interscalene Nerve   863                Intake & Output:  Date  18 07 - 18 0659(Not Admitted)    18 07 - 18 0659      Shift  7611-9207 9282-8712 1283-5790 24 Hour Total 8706-5590 9779-7385 0617-2917 24 Hour Total   I  N  T  A  K  E   Shift Total           O  U  T  P  U  T   Other      200   200       +I/O+    Liquid Stool     200   200    Shift Total     200   200   NET      -200   -200   Weight (kg)                  Labs:  No results for input(s): RW6DBMYQDMD, WPN2THWXNOJG, PHARTERIAL, IFB1SZKNJYPF, Z1DDFOBNWWNY in the last 24 hours.    No results for input(s): MAGNESIUM in the last 24 hours.    No results for input(s): GLUCOSEMETER in the last 720 hours.        Maximino Perales CRNA ....................  1/31/2018   11:25 AM

## 2018-02-13 NOTE — H&P
Patient Information     Patient Name MRN Sex Marija Fang [id number] Male 1990      H&P by Katiuska Khoury MD at 2018 10:34 AM     Author:  Katiuska Khoury MD Service:  (none) Author Type:  Physician     Filed:  2018 10:35 AM Date of Service:  2018 10:34 AM Status:  Signed     :  Katiuska Khoury MD (Physician)            PRE-PROCEDURE NOTE    CHIEF COMPLAINT / REASON FOR PROCEDURE:  diarrhea    PERTINENT HISTORY   Patient complains of diarrhea. Previous colonoscopy none. No family history of colon polyps or colon cancer.    Past Medical History:     Diagnosis  Date     ADHD (attention deficit hyperactivity disorder)      Asthma, moderate persistent      GERD (gastroesophageal reflux disease)      SLAP (superior labrum from anterior to posterior) tear 2015     Past Surgical History:      Procedure  Laterality Date     ACL RECONSTRUCTION Left 2008    repair       ACL RECONSTRUCTION Left     revision       ENDOVENOUS ABLATION SAPHENOUS VEIN W/ LASER Right 2011     fractured arm Left 2004    Fracture of left distal radius-torus fracture.        left shoulder repair      labral repair       TONSILLECTOMY  age 21     Other:  None  Bleeding tendencies:  No    Relevant Family History:  None    Relevant Social History:  None    A relevant review of systems was performed and was negative. See HPI.    ALLERGIES/SENSITIVITIES:   Allergies      Allergen   Reactions     Morphine  Nausea And Vomiting and Pancreatitis     Gold Au 198  Rash     [place]  GI Upset     Other [Unlisted Allergen (Include Detail In Comments)]  Shortness Of Breath and Edema     Patient states he had a reaction at a [employer] unsure of what caused the reaction.   ? peanuts      Peanut  GI Upset     Tramadol  Dizziness        CURRENT MEDICATIONS:    Current Facility-Administered Medications        Medication  Dose Route Frequency Last Rate     lactated Ringers infusion  25 mL/hr Intravenous  continuous 25 mL/hr (01/31/18 1000)     lidocaine (1%) injection 0.1-1 mL  0.1-1 mL Intra-Dermal one time prn       sodium chloride 0.9% 5 mL syringe (NORMAL SALINE)  5 mL Intravenous Each Time PRN        Prior to Admission medications          Medication Sig Start Date End Date Taking? Last Dose Authorizing Provider   albuterol HFA (PRO-AIR,VENTOLIN,PROVENTIL) 90 mcg/actuation inhaler Inhale 2 Puffs by mouth 4 times daily if needed. 7/15/16   Past Month at Unknown time Wallace Tellez MD   budesonide (PULMICORT RESPULES) 0.5 mg/2 mL neb suspension Make 240 cc Gianluca med sinus irrigationMix 2 ml vial of budesonide 0.5 mg Rinse BID for 2 weeks 7/14/17   More than one month ago at Unknown time Erasmo Saha MD   ipratropium (ATROVENT NASAL) 0.06 % nasal spray Inhale 2 Sprays in the nostril(s) 3 times daily. - for Chronic Rhinitis 7/14/17   More than one month ago at Unknown time Erasmo Saha MD   ranitidine (ZANTAC) 150 mg tablet Take 1 tablet by mouth 2 times daily. As needed for heartburn -- cancel Rx for omeprazole 8/9/17   More than one month ago at Unknown time Erasmo Saha MD   sucralfate (CARAFATE) 1 gram tablet Take 1 tablet by mouth 4 times daily before meals and at bedtime. - as needed for Heartburn 8/9/17   More than one month ago at Unknown time Erasmo Saha MD       PRE-SEDATION ASSESSMENT:    Lung Exam:  Normal  Heart Exam:  Normal    Comment(s):      IMPRESSION:  diarrhea.    PLAN:  I discussed diagnostic colonoscopy with the patient. Anesthesia coverage requested due to body mass index more than 40.    Katiuska Khoury MD

## 2018-05-07 ENCOUNTER — OFFICE VISIT (OUTPATIENT)
Dept: FAMILY MEDICINE | Facility: OTHER | Age: 28
End: 2018-05-07
Attending: FAMILY MEDICINE
Payer: COMMERCIAL

## 2018-05-07 VITALS — BODY MASS INDEX: 47.36 KG/M2 | TEMPERATURE: 98 F | HEART RATE: 52 BPM | WEIGHT: 302.4 LBS | OXYGEN SATURATION: 99 %

## 2018-05-07 DIAGNOSIS — J45.20 MILD INTERMITTENT ASTHMA WITHOUT COMPLICATION: Primary | ICD-10-CM

## 2018-05-07 DIAGNOSIS — R05.9 COUGH: ICD-10-CM

## 2018-05-07 PROCEDURE — G0463 HOSPITAL OUTPT CLINIC VISIT: HCPCS

## 2018-05-07 PROCEDURE — 99213 OFFICE O/P EST LOW 20 MIN: CPT | Performed by: FAMILY MEDICINE

## 2018-05-07 RX ORDER — CODEINE PHOSPHATE AND GUAIFENESIN 10; 100 MG/5ML; MG/5ML
1 SOLUTION ORAL EVERY 4 HOURS PRN
Qty: 118 ML | Refills: 1 | Status: SHIPPED | OUTPATIENT
Start: 2018-05-07 | End: 2018-05-11

## 2018-05-07 RX ORDER — ALBUTEROL SULFATE 90 UG/1
2 AEROSOL, METERED RESPIRATORY (INHALATION) 4 TIMES DAILY PRN
Qty: 1 INHALER | Refills: 3 | Status: SHIPPED | OUTPATIENT
Start: 2018-05-07 | End: 2018-10-29

## 2018-05-07 RX ORDER — PREDNISONE 20 MG/1
TABLET ORAL
Qty: 20 TABLET | Refills: 0 | Status: SHIPPED | OUTPATIENT
Start: 2018-05-07 | End: 2018-05-30

## 2018-05-07 NOTE — MR AVS SNAPSHOT
"              After Visit Summary   2018    Marija Albright    MRN: [id number]           Patient Information     Date Of Birth          1990        Visit Information        Provider Department      2018 10:45 AM Hector Samson MD [facility]        Today's Diagnoses     Mild intermittent asthma without complication    -  1    Cough           Follow-ups after your visit        Who to contact     If you have questions or need follow up information about today's clinic visit or your schedule please contact [facility] directly at [phone].  Normal or non-critical lab and imaging results will be communicated to you by Neomobilehart, letter or phone within 4 business days after the clinic has received the results. If you do not hear from us within 7 days, please contact the clinic through [employer] or phone. If you have a critical or abnormal lab result, we will notify you by phone as soon as possible.  Submit refill requests through [employer] or call your pharmacy and they will forward the refill request to us. Please allow 3 business days for your refill to be completed.          Additional Information About Your Visit        MyChart Information     [employer]addio lets you send messages to your doctor, view your test results, renew your prescriptions, schedule appointments and more. To sign up, go to [email or web address] . Click on \"Log in\" on the left side of the screen, which will take you to the Welcome page. Then click on \"Sign up Now\" on the right side of the page.     You will be asked to enter the access code listed below, as well as some personal information. Please follow the directions to create your username and password.     Your access code is: [email or web address]  Expires: 2018 11:11 AM     Your access code will  in 90 days. If you need help or a new code, please call your [place] clinic or [phone].        Care EveryWhere ID     This is your Care " EveryWhere ID. This could be used by other organizations to access your [facility] medical records  [id number]        Your Vitals Were     Pulse Temperature Pulse Oximetry BMI (Body Mass Index)          52 98  F (36.7  C) 99% 47.36 kg/m2         Blood Pressure from Last 3 Encounters:   01/04/18 136/82   12/21/17 138/78   09/22/17 124/82    Weight from Last 3 Encounters:   05/07/18 302 lb 6.4 oz (137.2 kg)   01/04/18 295 lb (133.8 kg)   12/21/17 292 lb (132.5 kg)              Today, you had the following     No orders found for display         Today's Medication Changes          These changes are accurate as of 5/7/18 11:11 AM.  If you have any questions, ask your nurse or doctor.               Start taking these medicines.        Dose/Directions    guaiFENesin-codeine 100-10 MG/5ML Soln solution   Commonly known as:  ROBITUSSIN AC   Used for:  Cough   Started by:  Hector Samson MD        Dose:  1 tsp.   Take 5 mLs by mouth every 4 hours as needed for cough   Quantity:  118 mL   Refills:  1       predniSONE 20 MG tablet   Commonly known as:  DELTASONE   Used for:  Mild intermittent asthma without complication   Started by:  Hector Samson MD        Take 3 tabs (60 mg) by mouth daily x 3 days, 2 tabs (40 mg) daily x 3 days, 1 tab (20 mg) daily x 3 days, then 1/2 tab (10 mg) x 3 days.   Quantity:  20 tablet   Refills:  0            Where to get your medicines      These medications were sent to [facility], - [place] - [address]  [address] [facility], [place] [address]     Phone:  [phone]     albuterol 108 (90 Base) MCG/ACT Inhaler    predniSONE 20 MG tablet         Some of these will need a paper prescription and others can be bought over the counter.  Ask your nurse if you have questions.     Bring a paper prescription for each of these medications     guaiFENesin-codeine 100-10 MG/5ML Soln solution                Primary Care Provider Office Phone # Fax #     Erasmo Saha [phone]       [address] [facility] [address]        Equal Access to Services     MAYE HERNÁNDEZ : Mesha Peraza, sabino mckenzie, daniellecj polancomageovani rodriguezleonidesgeovani, waxdejah lilain hayaajono rodriguezdylan ervinrocío calzada. So [facility] [phone].    ATENCIÓN: Si habla español, tiene a yao disposición servicios gratuitos de asistencia lingüística. Llame al [phone].    We comply with applicable federal civil rights laws and [place] laws. We do not discriminate on the basis of race, color, national origin, age, disability, sex, sexual orientation, or gender identity.            Thank you!     Thank you for choosing [facility] AND [facility]  for your care. Our goal is always to provide you with excellent care. Hearing back from our patients is one way we can continue to improve our services. Please take a few minutes to complete the written survey that you may receive in the mail after your visit with us. Thank you!             Your Updated Medication List - Protect others around you: Learn how to safely use, store and throw away your medicines at www.disposemymeds.org.          This list is accurate as of 5/7/18 11:11 AM.  Always use your most recent med list.                   Brand Name Dispense Instructions for use Diagnosis    albuterol 108 (90 Base) MCG/ACT Inhaler    PROAIR HFA/PROVENTIL HFA/VENTOLIN HFA    1 Inhaler    Inhale 2 puffs into the lungs 4 times daily as needed for shortness of breath / dyspnea or wheezing    Mild intermittent asthma without complication       budesonide 0.5 MG/2ML neb solution    PULMICORT    3 Box    Squirt entire vial into previously made marshal med saline bottle, mix, and irrigate each nostril until entire bottle empty.  Do this twice daily.    Chronic pansinusitis       fluticasone 50 MCG/ACT spray    FLONASE    3 Bottle    Spray 2 sprays into both nostrils daily    Nasal turbinate hypertrophy       guaiFENesin-codeine 100-10  MG/5ML Soln solution    ROBITUSSIN AC    118 mL    Take 5 mLs by mouth every 4 hours as needed for cough    Cough       levocetirizine 5 MG tablet    XYZAL    90 tablet    Take 1 tablet (5 mg) by mouth every evening    Perennial allergic rhinitis with seasonal variation       polyethylene glycol-electrolytes 420 g solution    NULYTELY     Take 240 mLs by mouth every 10 minutes        predniSONE 20 MG tablet    DELTASONE    20 tablet    Take 3 tabs (60 mg) by mouth daily x 3 days, 2 tabs (40 mg) daily x 3 days, 1 tab (20 mg) daily x 3 days, then 1/2 tab (10 mg) x 3 days.    Mild intermittent asthma without complication

## 2018-05-07 NOTE — NURSING NOTE
Patient here for breathing difficulties with tightness in the chest and wheezing for the past 1 week. Martita Esquivel LPN .......................5/7/2018  10:50 AM

## 2018-05-09 ASSESSMENT — ENCOUNTER SYMPTOMS: COUGH: 1

## 2018-05-09 NOTE — PROGRESS NOTES
SUBJECTIVE:   Marija Albright is a 27 year old male who presents to clinic today for the following health issues: Difficulty breathing    HPI Comments: Patient arrives here for intermittent difficulty breathing.  States that at times he will develop some wheezing.  Coughing.  This been going on on and off.  At times the cough will have thick sputum.  He does have a history of asthma.  Does not smoke.  This episode has been last for 5 days.  Patient also reports he needs refills    Cough           Patient Active Problem List    Diagnosis Date Noted     Allergic rhinitis due to pollen 02/05/2018     Priority: Medium     Reflux esophagitis 02/05/2018     Priority: Medium     Overview:   GE Reflux       Chronic ethmoidal sinusitis 08/09/2017     Priority: Medium     Chronic maxillary sinusitis 08/09/2017     Priority: Medium     Deviated nasal septum 08/09/2017     Priority: Medium     Elevated IgE level 08/09/2017     Priority: Medium     Multiple food allergies 08/09/2017     Priority: Medium     Sliding hiatal hernia 08/09/2017     Priority: Medium     Change in stool habits 07/14/2017     Priority: Medium     History of tear of ACL (anterior cruciate ligament) 07/14/2017     Priority: Medium     Mild intermittent asthma without complication 07/14/2017     Priority: Medium     Morbid obesity with BMI of 45.0-49.9, adult (H) 09/12/2016     Priority: Medium     Aftercare following surgery of the musculoskeletal system 09/29/2015     Priority: Medium     SLAP (superior labrum from anterior to posterior) tear 04/17/2015     Priority: Medium     Cervical radiculopathy 09/11/2014     Priority: Medium     CARDIOVASCULAR SCREENING; LDL GOAL LESS THAN 160 09/14/2012     Priority: Medium     Obesity 09/14/2012     Priority: Medium     ADHD (attention deficit hyperactivity disorder) 09/14/2012     Priority: Medium     Previously managed by Psychiatry in [place], [place]       Seasonal allergic rhinitis 09/14/2012     Priority:  Medium     GERD (gastroesophageal reflux disease) 09/14/2012     Priority: Medium     Chronic bilateral low back pain without sciatica 04/28/2011     Priority: Medium     Spondylolisthesis of lumbar region 02/16/2011     Priority: Medium     Anterior cruciate ligament sprain 10/02/2008     Priority: Medium     Overview:   IMO Update 10/11       Pain in joint, shoulder region 01/04/2008     Priority: Medium     Overview:   IMO Update 10/11       Recurrent dislocation of shoulder joint 01/04/2008     Priority: Medium     Overview:   IMO Update 10/11       Superior glenoid labrum lesion 01/04/2008     Priority: Medium     Overview:   IMO Update 10/11       Attention deficit hyperactivity disorder (ADHD) 04/18/2006     Priority: Medium     Overview:   IMO Update 10 2016       Past Medical History:   Diagnosis Date     Attention-deficit hyperactivity disorder     No Comments Provided     Gastro-esophageal reflux disease without esophagitis     No Comments Provided     Moderate persistent asthma, uncomplicated     No Comments Provided     Superior glenoid labrum lesion of shoulder     04/17/2015      Social History     Social History Narrative    Parents are . Lives with mother in [place].  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    9/2016.     Current Outpatient Prescriptions   Medication Sig Dispense Refill     albuterol (PROAIR HFA/PROVENTIL HFA/VENTOLIN HFA) 108 (90 Base) MCG/ACT Inhaler Inhale 2 puffs into the lungs 4 times daily as needed for shortness of breath / dyspnea or wheezing 1 Inhaler 3     budesonide (PULMICORT) 0.5 MG/2ML neb solution Squirt entire vial into previously made marshal med saline bottle, mix, and irrigate each nostril until entire bottle empty.  Do this twice daily. 3 Box 11     guaiFENesin-codeine (ROBITUSSIN AC) 100-10 MG/5ML SOLN solution Take 5 mLs by mouth every 4 hours as needed for cough 118 mL 1     predniSONE (DELTASONE) 20 MG tablet Take 3 tabs (60 mg) by mouth  daily x 3 days, 2 tabs (40 mg) daily x 3 days, 1 tab (20 mg) daily x 3 days, then 1/2 tab (10 mg) x 3 days. 20 tablet 0     fluticasone (FLONASE) 50 MCG/ACT spray Spray 2 sprays into both nostrils daily 3 Bottle 11     levocetirizine (XYZAL) 5 MG tablet Take 1 tablet (5 mg) by mouth every evening 90 tablet 3     polyethylene glycol-electrolytes (NULYTELY) 420 G solution Take 240 mLs by mouth every 10 minutes       Allergies   Allergen Reactions     Corn Syrup [Glucose]      Morphine Sulfate Nausea and Vomiting     Cannot take it in pill form, IV ok   Pancreatitis     Peanuts [Nuts]      Gold Au 198 [Gold] Rash       Review of Systems   Respiratory: Positive for cough.         OBJECTIVE:     Pulse 52  Temp 98  F (36.7  C)  Wt 302 lb 6.4 oz (137.2 kg)  SpO2 99%  BMI 47.36 kg/m2  Body mass index is 47.36 kg/(m^2).  Physical Exam   Constitutional: He appears well-developed.   HENT:   Right Ear: External ear normal.   Left Ear: External ear normal.   Eyes: Pupils are equal, round, and reactive to light.   Cardiovascular: Normal rate and regular rhythm.    No murmur heard.  Pulmonary/Chest: Effort normal and breath sounds normal.   Patient has wheezes bilateral.  Expiratory.       none     ASSESSMENT/PLAN:         1. Mild intermittent asthma without complication  Proceed with a prednisone taper.  Medications refilled.  - albuterol (PROAIR HFA/PROVENTIL HFA/VENTOLIN HFA) 108 (90 Base) MCG/ACT Inhaler; Inhale 2 puffs into the lungs 4 times daily as needed for shortness of breath / dyspnea or wheezing  Dispense: 1 Inhaler; Refill: 3  - predniSONE (DELTASONE) 20 MG tablet; Take 3 tabs (60 mg) by mouth daily x 3 days, 2 tabs (40 mg) daily x 3 days, 1 tab (20 mg) daily x 3 days, then 1/2 tab (10 mg) x 3 days.  Dispense: 20 tablet; Refill: 0    2. Cough  Patient is requesting to help sleeping.  - guaiFENesin-codeine (ROBITUSSIN AC) 100-10 MG/5ML SOLN solution; Take 5 mLs by mouth every 4 hours as needed for cough  Dispense:  118 mL; Refill: 1      Hector Samson MD  [facility] AND [facility]

## 2018-05-11 ENCOUNTER — TELEPHONE (OUTPATIENT)
Dept: INTERNAL MEDICINE | Facility: OTHER | Age: 28
End: 2018-05-11

## 2018-05-11 DIAGNOSIS — R05.9 COUGH: ICD-10-CM

## 2018-05-11 RX ORDER — CODEINE PHOSPHATE AND GUAIFENESIN 10; 100 MG/5ML; MG/5ML
1 SOLUTION ORAL EVERY 4 HOURS PRN
Qty: 118 ML | Refills: 1 | Status: SHIPPED | OUTPATIENT
Start: 2018-05-11 | End: 2018-05-30

## 2018-05-11 NOTE — TELEPHONE ENCOUNTER
Patient called and stated he would like to speak with a nurse or provider.  Would not disclose information as to what the call was about.     Ada Rojas on 5/11/2018 at 11:37 AM

## 2018-05-11 NOTE — TELEPHONE ENCOUNTER
I spoke with patient and he said he needs refill of cough medicine ( Robitussin AC) sent to [facility] pharmacy.  He saw Dr Samson on Monday and it has helped a lot especially at night. Pratibha Olivo LPN ....................5/11/2018   12:33 PM

## 2018-05-30 ENCOUNTER — OFFICE VISIT (OUTPATIENT)
Dept: FAMILY MEDICINE | Facility: OTHER | Age: 28
End: 2018-05-30
Attending: NURSE PRACTITIONER
Payer: COMMERCIAL

## 2018-05-30 ENCOUNTER — HOSPITAL ENCOUNTER (OUTPATIENT)
Dept: GENERAL RADIOLOGY | Facility: OTHER | Age: 28
Discharge: HOME | End: 2018-05-30
Attending: NURSE PRACTITIONER | Admitting: NURSE PRACTITIONER
Payer: COMMERCIAL

## 2018-05-30 ENCOUNTER — TELEPHONE (OUTPATIENT)
Dept: INTERNAL MEDICINE | Facility: OTHER | Age: 28
End: 2018-05-30

## 2018-05-30 ENCOUNTER — NURSE TRIAGE (OUTPATIENT)
Dept: INTERNAL MEDICINE | Facility: OTHER | Age: 28
End: 2018-05-30

## 2018-05-30 VITALS
WEIGHT: 299 LBS | TEMPERATURE: 97 F | HEART RATE: 64 BPM | OXYGEN SATURATION: 97 % | DIASTOLIC BLOOD PRESSURE: 80 MMHG | SYSTOLIC BLOOD PRESSURE: 128 MMHG | BODY MASS INDEX: 46.83 KG/M2

## 2018-05-30 DIAGNOSIS — R05.9 COUGH: ICD-10-CM

## 2018-05-30 DIAGNOSIS — J45.21 MILD INTERMITTENT ASTHMA WITH EXACERBATION: Primary | ICD-10-CM

## 2018-05-30 DIAGNOSIS — R35.0 URINARY FREQUENCY: ICD-10-CM

## 2018-05-30 PROCEDURE — G0463 HOSPITAL OUTPT CLINIC VISIT: HCPCS

## 2018-05-30 PROCEDURE — 99214 OFFICE O/P EST MOD 30 MIN: CPT | Performed by: NURSE PRACTITIONER

## 2018-05-30 PROCEDURE — G0463 HOSPITAL OUTPT CLINIC VISIT: HCPCS | Mod: 25

## 2018-05-30 PROCEDURE — 25000125 ZZHC RX 250: Performed by: NURSE PRACTITIONER

## 2018-05-30 PROCEDURE — 51798 US URINE CAPACITY MEASURE: CPT | Performed by: NURSE PRACTITIONER

## 2018-05-30 PROCEDURE — 71046 X-RAY EXAM CHEST 2 VIEWS: CPT

## 2018-05-30 PROCEDURE — 94640 AIRWAY INHALATION TREATMENT: CPT | Performed by: NURSE PRACTITIONER

## 2018-05-30 RX ORDER — PREDNISONE 20 MG/1
20 TABLET ORAL DAILY
Qty: 5 TABLET | Refills: 0 | Status: SHIPPED | OUTPATIENT
Start: 2018-05-30 | End: 2018-06-04

## 2018-05-30 RX ORDER — IPRATROPIUM BROMIDE AND ALBUTEROL SULFATE 2.5; .5 MG/3ML; MG/3ML
3 SOLUTION RESPIRATORY (INHALATION) ONCE
Status: COMPLETED | OUTPATIENT
Start: 2018-05-30 | End: 2018-05-30

## 2018-05-30 RX ADMIN — IPRATROPIUM BROMIDE AND ALBUTEROL SULFATE 3 ML: .5; 3 SOLUTION RESPIRATORY (INHALATION) at 16:15

## 2018-05-30 ASSESSMENT — ENCOUNTER SYMPTOMS
COUGH: 1
BACK PAIN: 1
DIFFICULTY URINATING: 1

## 2018-05-30 ASSESSMENT — PAIN SCALES - GENERAL: PAINLEVEL: MILD PAIN (3)

## 2018-05-30 NOTE — NURSING NOTE
Patient presents for follow up Regarding cough X 2 weeks also is concerned with pain on his left side back and stomach.  IS also concerned with urinary frequency.  He states he is not peeing as often as he should but this has been going on for quite some time. He also states he is aware he needs to be seen by his primary to discuss the issues that have been going on for awhile.  When asked if he could leave a urine sample he stated he did not think he could go right now, but maybe after a little while he could try again.   Yen Chiang LPN........................5/30/2018  3:12 PM

## 2018-05-30 NOTE — TELEPHONE ENCOUNTER
"Patient calling and states is having some flank pain on both side and abdominal pain. States has had for awhile but seems to be getting worse.  Patient also states has been treated for cold and still has cough and has still been coughing up \"black stuff\".  Patient states doesn't have urine flow as usual.  Low energy.  Denies fever, pain with urination, blood in urine.  Informed patient Dr. Saha was only here for 1/2 day today.  Informed patient he should be seen within 24 hours and patient states he is will to see someone else.\  Patient transferred to scheduling.  Sandy Sanchez RN on 5/30/2018 at 12:27 PM          Reason for Disposition    MODERATE pain (e.g., interferes with normal activities or awakens from sleep)    Additional Information    Negative: Passed out (i.e., lost consciousness, collapsed and was not responding)    Negative: Shock suspected (e.g., cold/pale/clammy skin, too weak to stand, low BP, rapid pulse)    Negative: Difficult to awaken or acting confused  (e.g., disoriented, slurred speech)    Negative: Sounds like a life-threatening emergency to the triager    Negative: Followed a major injury to the back (e.g., MVA, fall > 10 feet or 3 meters, penetrating injury, etc.)    Negative: Back pain or flank pain during pregnancy    Negative: Upper, mid or lower back pain that occurs mainly in the midline    Negative: [1] SEVERE pain (e.g., excruciating, scale 8-10) AND [2] present > 1 hour    Negative: [1] SEVERE pain (e.g., excruciating, scale 8-10) AND [2] not improved after pain medicine    Negative: [1] Sudden onset of severe flank pain AND [2] age > 60    Negative: [1] Abdominal pain AND [2] age > 60    Negative: [1] Unable to urinate (or only a few drops) > 4 hours AND     [2] bladder feels very full (e.g., palpable bladder or strong urge to urinate)    Negative: Vomiting    Negative: Weakness of a leg or foot (e.g., unable to bear weight, dragging foot)    Negative: Patient sounds very " "sick or weak to the triager    Negative: Fever > 100.5 F (38.1 C)    Negative: Pain or burning with urination    Answer Assessment - Initial Assessment Questions  1. LOCATION: \"Where does it hurt?\" (e.g., left, right)      both  2. ONSET: \"When did the pain start?\"      Months getting worse  3. SEVERITY: \"How bad is the pain?\" (e.g., Scale 1-10; mild, moderate, or severe)    - MILD (1-3): doesn't interfere with normal activities     - MODERATE (4-7): interferes with normal activities or awakens from sleep     - SEVERE (8-10): excruciating pain and patient unable to do normal activities (stays in bed)        Every morning when gets up rate 4-7  4. PATTERN: \"Does the pain come and go, or is it constant?\"       Low dull pain all day, stomach worse in morning and sometimes goes away  5. CAUSE: \"What do you think is causing the pain?\"      Unsure, Nexium since 10 years old  6. OTHER SYMPTOMS:  \"Do you have any other symptoms?\" (e.g., fever, abdominal pain, vomiting, leg weakness, burning with urination, blood in urine)      Abdominal, vomit in morning has been going of for a year, slight visual issues  7. PREGNANCY:  \"Is there any chance you are pregnant?\" \"When was your last menstrual period?\"      n/a    Protocols used: FLANK PAIN-ADULT-AH      "

## 2018-05-30 NOTE — PROGRESS NOTES
SUBJECTIVE:   Marija Albright is a 27 year old male who presents to clinic today for the following health issues:    Presents for multiple concerns such as he feels that he is unable to urinate adequately, however he does not drink more than a few glasses of water or fluid in a day.  He denies any flank pain, hematuria, dysuria    Concerns about cough, was seen a couple of weeks ago, treated with prednisone for asthma exacerbation, continues to cough, wants to make sure he does not have pneumonia, he does use Pulmicort nebulizer twice daily at home, he is uncertain how often he uses albuterol  Reports having back pain, this is been going on for quite some time, does not know if this is related to frequent cough and current exacerbation  Does not remember having pulmonary function testing.  He denies fever, chills, nausea, diarrhea or vomiting.  Would like to see his primary to follow-up on his other issues but wants to make sure he does not have pneumonia today    HPI    Patient Active Problem List   Diagnosis     CARDIOVASCULAR SCREENING; LDL GOAL LESS THAN 160     Obesity     ADHD (attention deficit hyperactivity disorder)     Seasonal allergic rhinitis     GERD (gastroesophageal reflux disease)     Allergic rhinitis due to pollen     Anterior cruciate ligament sprain     Attention deficit hyperactivity disorder (ADHD)     Cervical radiculopathy     Change in stool habits     Chronic bilateral low back pain without sciatica     Chronic ethmoidal sinusitis     Chronic maxillary sinusitis     Deviated nasal septum     History of tear of ACL (anterior cruciate ligament)     Elevated IgE level     Mild intermittent asthma without complication     Morbid obesity with BMI of 45.0-49.9, adult (H)     Multiple food allergies     Pain in joint, shoulder region     Recurrent dislocation of shoulder joint     Reflux esophagitis     Aftercare following surgery of the musculoskeletal system     SLAP (superior labrum from  anterior to posterior) tear     Sliding hiatal hernia     Spondylolisthesis of lumbar region     Superior glenoid labrum lesion     Past Surgical History:   Procedure Laterality Date     ARTHROSCOPIC RECONSTRUCTION ANTERIOR CRUCIATE LIGAMENT      12/2008,repair     ARTHROSCOPIC RECONSTRUCTION ANTERIOR CRUCIATE LIGAMENT      2012,revision     OTHER SURGICAL HISTORY      06/2004,252566,OTHER,Left,Fracture of left distal radius-torus fracture.     OTHER SURGICAL HISTORY      405667,OTHER,labral repair     OTHER SURGICAL HISTORY      1/4/2011,KRE923,ENDOVENOUS ABLATION SAPHENOUS VEIN W/ LASER,Right     OTHER SURGICAL HISTORY      1/31/2018,61597.0,CA COLONOSCOPY BIOPSY SINGLE OR MULTIPLE     TONSILLECTOMY      age 21       Social History   Substance Use Topics     Smoking status: Never Smoker     Smokeless tobacco: Never Used      Comment: Quit smoking: used it 9/20/15     Alcohol use Yes      Comment: 8 a week      Family History   Problem Relation Age of Onset     HEART DISEASE Father      Heart Disease,Cardiac disease     Other - See Comments Father      back problems     Liver Cancer Father      Liver cancer,-- Hx Alcohol abuse     Other - See Comments Mother      Multiple orthopedic problems         Current Outpatient Prescriptions   Medication Sig Dispense Refill     predniSONE (DELTASONE) 20 MG tablet Take 1 tablet (20 mg) by mouth daily for 5 days 5 tablet 0     albuterol (PROAIR HFA/PROVENTIL HFA/VENTOLIN HFA) 108 (90 Base) MCG/ACT Inhaler Inhale 2 puffs into the lungs 4 times daily as needed for shortness of breath / dyspnea or wheezing 1 Inhaler 3     budesonide (PULMICORT) 0.5 MG/2ML neb solution Squirt entire vial into previously made marshal med saline bottle, mix, and irrigate each nostril until entire bottle empty.  Do this twice daily. 3 Box 11     Allergies   Allergen Reactions     Corn Syrup [Glucose]      Morphine Sulfate Nausea and Vomiting     Cannot take it in pill form, IV ok   Pancreatitis      Peanuts [Nuts]      Gold Au 198 [Gold] Rash     BP Readings from Last 3 Encounters:   05/30/18 128/80   01/04/18 136/82   12/21/17 138/78    Wt Readings from Last 3 Encounters:   05/30/18 299 lb (135.6 kg)   05/07/18 302 lb 6.4 oz (137.2 kg)   01/04/18 295 lb (133.8 kg)                  Labs reviewed in EPIC    Review of Systems   HENT: Positive for congestion.    Respiratory: Positive for cough.    Genitourinary: Positive for difficulty urinating.   Musculoskeletal: Positive for back pain.   All other systems reviewed and are negative.       OBJECTIVE:     /80 (BP Location: Right arm, Patient Position: Sitting, Cuff Size: Adult Large)  Pulse 64  Temp 97  F (36.1  C) (Tympanic)  Wt 299 lb (135.6 kg)  SpO2 97%  BMI 46.83 kg/m2  Body mass index is 46.83 kg/(m^2).  Physical Exam   Constitutional: He is oriented to person, place, and time. He appears well-developed and well-nourished.   Cardiovascular: Normal rate and regular rhythm.    Pulmonary/Chest: Breath sounds normal. He is in respiratory distress. He has no wheezes. He has no rales. He exhibits no tenderness.   Abdominal: Soft. Bowel sounds are normal. There is no tenderness.   Genitourinary:   Genitourinary Comments: Postvoid residual bladder scan 44 cc   Musculoskeletal: Normal range of motion.   Neurological: He is alert and oriented to person, place, and time.   Skin: Skin is warm and dry.   Psychiatric:   Easily mildly agitated--- answers questions appropriately   Nursing note and vitals reviewed.      Results for orders placed or performed in visit on 05/30/18   XR Chest 2 Views    Narrative    PROCEDURE:  XR CHEST 2 VW    HISTORY: ; Mild intermittent asthma with exacerbation, .    COMPARISON:  None.    FINDINGS:  The cardiomediastinal contours are normal.  The trachea is midline.  No focal consolidation, effusion or pneumothorax.  Minimal right  perihilar tram tracking is noted, suggesting mild bronchial wall  thickening.  No suspicious osseous  lesion or subdiaphragmatic free air.      Impression    IMPRESSION:      Question subtle bronchial wall thickening, likely reflecting the known  diagnosis of asthma. No focal consolidation.    THERESA CLAYTON MD         ASSESSMENT/PLAN:   Exam and chest x-ray did not show any evidence of pneumonia, he is not currently in any respiratory distress  O2 saturations 98% although he feels this is low as he usually runs 100%    Given DuoNeb does not feel makes any difference---    Given specimen labeled cups to leave a urine specimen as he was not able to produce a specimen during office visit--and ordered labs to check kidney function however he declines and will further workup with his primary    He is agreeable to a short prednisone burst for his asthma  Discussed pending results of pulmonary function testing he may need a different controller inhaler      1. Mild intermittent asthma with exacerbation  Symptoms related to inflammation probable postviral syndrome which will take several weeks until returning to baseline  Question asthma control    He wants to follow-up with his primary about this and other concerns    Encouraged to continue daily controller budesonide and albuterol as needed for cough, shortness of breath, wheezes  - XR Chest 2 Views  - predniSONE (DELTASONE) 20 MG tablet; Take 1 tablet (20 mg) by mouth daily for 5 days  Dispense: 5 tablet; Refill: 0    2. Cough    - predniSONE (DELTASONE) 20 MG tablet; Take 1 tablet (20 mg) by mouth daily for 5 days  Dispense: 5 tablet; Refill: 0    3. Urinary frequency    Urinary issues may be due to lack of fluid intake--strongly encouraged to get 8-10 glasses of preferably water or other clear fluid in a 24-hour period    - Urinalysis w Reflex Microscopic If Positive  - Comprehensive metabolic panel  - CBC and Differential      [facility] AND \[facility] [place] [facility]\""

## 2018-05-30 NOTE — MR AVS SNAPSHOT
"              After Visit Summary   5/30/2018    Marija Albright    MRN: [id number]           Patient Information     Date Of Birth          1990        Visit Information        Provider Department      5/30/2018 3:15 PM Beatrice Hernandez APRN CNP [facility]        Today's Diagnoses     Mild intermittent asthma with exacerbation    -  1    Cough        Urinary frequency          Care Instructions    Use your asthma med pulmicort to control breathing and albuterol as needed for wheez or coughing          Follow-ups after your visit        Follow-up notes from your care team     Return if symptoms worsen or fail to improve.      Who to contact     If you have questions or need follow up information about today's clinic visit or your schedule please contact [facility] directly at [phone].  Normal or non-critical lab and imaging results will be communicated to you by Joontohart, letter or phone within 4 business days after the clinic has received the results. If you do not hear from us within 7 days, please contact the clinic through Joontohart or phone. If you have a critical or abnormal lab result, we will notify you by phone as soon as possible.  Submit refill requests through [employer] or call your pharmacy and they will forward the refill request to us. Please allow 3 business days for your refill to be completed.          Additional Information About Your Visit        [employer] Information     [employer] lets you send messages to your doctor, view your test results, renew your prescriptions, schedule appointments and more. To sign up, go to [email or web address] . Click on \"Log in\" on the left side of the screen, which will take you to the Welcome page. Then click on \"Sign up Now\" on the right side of the page.     You will be asked to enter the access code listed below, as well as some personal information. Please follow the directions to create your username and " password.     Your access code is: [email or web address]  Expires: 2018 11:11 AM     Your access code will  in 90 days. If you need help or a new code, please call your [facility] or [phone].        Care EveryWhere ID     This is your Care EveryWhere ID. This could be used by other organizations to access your [place] medical records  [id number]        Your Vitals Were     Pulse Temperature Pulse Oximetry BMI (Body Mass Index)          64 97  F (36.1  C) (Tympanic) 97% 46.83 kg/m2         Blood Pressure from Last 3 Encounters:   18 128/80   18 136/82   17 138/78    Weight from Last 3 Encounters:   18 299 lb (135.6 kg)   18 302 lb 6.4 oz (137.2 kg)   18 295 lb (133.8 kg)              We Performed the Following     CBC and Differential     Comprehensive metabolic panel     Urinalysis w Reflex Microscopic If Positive     XR Chest 2 Views        Primary Care Provider Office Phone # Fax #    Erasmo Saha [phone]       [address] [facility] [address]        Equal Access to Services     JOSE G HERNÁNDEZ : Hadii aj penningtono Sogracia, waaxda luqadaha, qaybta kaalmada adeegyada, say calzada. So [facility] [phone].    ATENCIÓN: Si habla español, tiene a yao disposición servicios gratuitos de asistencia lingüística. Llame al [phone].    We comply with applicable federal civil rights laws and [place] laws. We do not discriminate on the basis of race, color, national origin, age, disability, sex, sexual orientation, or gender identity.            Thank you!     Thank you for choosing [facility] AND [facility] [place]  for your care. Our goal is always to provide you with excellent care. Hearing back from our patients is one way we can continue to improve our services. Please take a few minutes to complete the written survey that you may receive in the mail after your visit with us. Thank you!             Your  Updated Medication List - Protect others around you: Learn how to safely use, store and throw away your medicines at www.disposemymeds.org.          This list is accurate as of 5/30/18  4:52 PM.  Always use your most recent med list.                   Brand Name Dispense Instructions for use Diagnosis    albuterol 108 (90 Base) MCG/ACT Inhaler    PROAIR HFA/PROVENTIL HFA/VENTOLIN HFA    1 Inhaler    Inhale 2 puffs into the lungs 4 times daily as needed for shortness of breath / dyspnea or wheezing    Mild intermittent asthma without complication       budesonide 0.5 MG/2ML neb solution    PULMICORT    3 Box    Squirt entire vial into previously made marshal med saline bottle, mix, and irrigate each nostril until entire bottle empty.  Do this twice daily.    Chronic pansinusitis

## 2018-06-15 ENCOUNTER — OFFICE VISIT (OUTPATIENT)
Dept: FAMILY MEDICINE | Facility: OTHER | Age: 28
End: 2018-06-15
Attending: NURSE PRACTITIONER
Payer: COMMERCIAL

## 2018-06-15 VITALS
RESPIRATION RATE: 14 BRPM | SYSTOLIC BLOOD PRESSURE: 122 MMHG | HEART RATE: 88 BPM | TEMPERATURE: 98.7 F | DIASTOLIC BLOOD PRESSURE: 72 MMHG | WEIGHT: 296 LBS | BODY MASS INDEX: 46.36 KG/M2

## 2018-06-15 DIAGNOSIS — L25.5 DERMATITIS DUE TO PLANTS, INCLUDING POISON IVY, SUMAC, AND OAK: Primary | ICD-10-CM

## 2018-06-15 PROCEDURE — 99213 OFFICE O/P EST LOW 20 MIN: CPT | Performed by: NURSE PRACTITIONER

## 2018-06-15 PROCEDURE — G0463 HOSPITAL OUTPT CLINIC VISIT: HCPCS

## 2018-06-15 RX ORDER — PREDNISONE 20 MG/1
20 TABLET ORAL 2 TIMES DAILY
Qty: 42 TABLET | Refills: 0 | Status: SHIPPED | OUTPATIENT
Start: 2018-06-15 | End: 2018-10-29

## 2018-06-15 ASSESSMENT — ENCOUNTER SYMPTOMS
APPETITE CHANGE: 1
COUGH: 0
WHEEZING: 0
SHORTNESS OF BREATH: 0
PALPITATIONS: 0
GASTROINTESTINAL NEGATIVE: 1

## 2018-06-15 NOTE — NURSING NOTE
Patient presents to clinic for complaint of rash to under arms. Patient was exposed to poison ivy about a week ago on his legs. He comes in today because he has had a new rash develop under both arms near the armpit but denies any additional exposures.  Jordan Werner LPN...... 1:29 PM 6/15/2018

## 2018-06-15 NOTE — PATIENT INSTRUCTIONS
I think this is the poison ivy and I ordered the prednisone for a taper over 3 weeks.  If if continues to spread, or gets worse you should return to see your primary care provider.     Continue using calamine lotion to the area.     Consider tyring Claratin or Zyrtec (or generic version) once a day in the mornings.     Dont use soap to the areas.     Follow up with your primary care provider if you are not improving, in the next week or so.

## 2018-06-15 NOTE — MR AVS SNAPSHOT
"              After Visit Summary   6/15/2018    Marija Albright    MRN: [id number]           Patient Information     Date Of Birth          1990        Visit Information        Provider Department      6/15/2018 12:30 PM Thalia Hays APRN CNP [facility]        Today's Diagnoses     Dermatitis due to plants, including poison ivy, sumac, and oak    -  1      Care Instructions    I think this is the poison ivy and I ordered the prednisone for a taper over 3 weeks.  If if continues to spread, or gets worse you should return to see your primary care provider.     Continue using calamine lotion to the area.     Consider tyring Claratin or Zyrtec (or generic version) once a day in the mornings.     Dont use soap to the areas.     Follow up with your primary care provider if you are not improving, in the next week or so.             Follow-ups after your visit        Who to contact     If you have questions or need follow up information about today's clinic visit or your schedule please contact [facility] directly at [phone].  Normal or non-critical lab and imaging results will be communicated to you by Perficienthart, letter or phone within 4 business days after the clinic has received the results. If you do not hear from us within 7 days, please contact the clinic through deviantARTt or phone. If you have a critical or abnormal lab result, we will notify you by phone as soon as possible.  Submit refill requests through Matchfund or call your pharmacy and they will forward the refill request to us. Please allow 3 business days for your refill to be completed.          Additional Information About Your Visit        Perficienthart Information     Matchfund lets you send messages to your doctor, view your test results, renew your prescriptions, schedule appointments and more. To sign up, go to [email or web address] . Click on \"Log in\" on the left side of the screen, which will take " "you to the Welcome page. Then click on \"Sign up Now\" on the right side of the page.     You will be asked to enter the access code listed below, as well as some personal information. Please follow the directions to create your username and password.     Your access code is: [email or web address]  Expires: 2018 11:11 AM     Your access code will  in 90 days. If you need help or a new code, please call your [facility] clinic or [phone].        Care EveryWhere ID     This is your Care EveryWhere ID. This could be used by other organizations to access your [facility] medical records  [id number]        Your Vitals Were     Pulse Temperature Respirations BMI (Body Mass Index)          88 98.7  F (37.1  C) (Tympanic) 14 46.36 kg/m2         Blood Pressure from Last 3 Encounters:   06/15/18 122/72   18 128/80   18 136/82    Weight from Last 3 Encounters:   06/15/18 296 lb (134.3 kg)   18 299 lb (135.6 kg)   18 302 lb 6.4 oz (137.2 kg)              Today, you had the following     No orders found for display         Today's Medication Changes          These changes are accurate as of 6/15/18  2:26 PM.  If you have any questions, ask your nurse or doctor.               Start taking these medicines.        Dose/Directions    predniSONE 20 MG tablet   Commonly known as:  DELTASONE   Used for:  Dermatitis due to plants, including poison ivy, sumac, and oak   Started by:  Thalia Hays APRN CNP        Dose:  20 mg   Take 1 tablet (20 mg) by mouth 2 times daily   Quantity:  42 tablet   Refills:  0            Where to get your medicines      These medications were sent to [facility], - [place] - [address]  [address]  [place] [address]     Phone:  [phone]     predniSONE 20 MG tablet                Primary Care Provider Office Phone # Fax #    Erasmo Saha [phone]       [address] [facility] [place] [address]      "   Equal Access to Services     [facility] : Hadii aj garrison aditirebecca Stu, waaxda luqadaha, qaybta kaalmageovani osorio, say calzada. So [facility] [phone].    ATENCIÓN: Si habla español, tiene a yao disposición servicios gratuitos de asistencia lingüística. Silviaame al [phone].    We comply with applicable federal civil rights laws and [place] laws. We do not discriminate on the basis of race, color, national origin, age, disability, sex, sexual orientation, or gender identity.            Thank you!     Thank you for choosing [facility] AND [facility]  for your care. Our goal is always to provide you with excellent care. Hearing back from our patients is one way we can continue to improve our services. Please take a few minutes to complete the written survey that you may receive in the mail after your visit with us. Thank you!             Your Updated Medication List - Protect others around you: Learn how to safely use, store and throw away your medicines at www.disposemymeds.org.          This list is accurate as of 6/15/18  2:26 PM.  Always use your most recent med list.                   Brand Name Dispense Instructions for use Diagnosis    albuterol 108 (90 Base) MCG/ACT Inhaler    PROAIR HFA/PROVENTIL HFA/VENTOLIN HFA    1 Inhaler    Inhale 2 puffs into the lungs 4 times daily as needed for shortness of breath / dyspnea or wheezing    Mild intermittent asthma without complication       budesonide 0.5 MG/2ML neb solution    PULMICORT    3 Box    Squirt entire vial into previously made marshal med saline bottle, mix, and irrigate each nostril until entire bottle empty.  Do this twice daily.    Chronic pansinusitis       predniSONE 20 MG tablet    DELTASONE    42 tablet    Take 1 tablet (20 mg) by mouth 2 times daily    Dermatitis due to plants, including poison ivy, sumac, and oak

## 2018-06-15 NOTE — PROGRESS NOTES
SUBJECTIVE:   Marija Albright is a 27 year old male presenting with complaint of worsening rash.  Patient was exposed to poison ivy about a week ago on his legs. He comes in today because he has had a new areas of rash develop under both arms near the armpit but denies any additional exposures.  Reports rash seems slightly different than poison ivy in the past but is very, very pruritic. Initial lesions were predominantly on lower legs,linear, some were vesicular. Does not think he exposed his axilla but area is also very pruritic.   Chief Complaint   Patient presents with     Derm Problem       He is an established patient of [facility].    Rash    Onset of rash was 1 week(s) ago.   Course of illness is worsening.  Severity moderate  Current and Associated symptoms: itching and dry   Location of the rash: lower leg and bilateral axilla.  Previous history of a similar rash? Yes  Is somewhat different that on lower legs but very itchy.   Recent exposure history: poison ivy and questions poison sumac also.   Denies exposure to: New soaps or laundry detergent.   Associated symptoms include: nothing.  Treatment measures tried include: otc calamine, topical antihistamine, and has tried oral benadryl.         Review of Systems   Constitutional: Positive for appetite change.   HENT: Negative.    Respiratory: Negative for cough, shortness of breath and wheezing.         Is using albuterol neb twice a day regularly per recent Rx.    Cardiovascular: Negative for chest pain, palpitations and leg swelling.   Gastrointestinal: Negative.    Genitourinary: Negative.    Musculoskeletal: Negative.    Skin: Positive for rash.       Past Medical History:   Diagnosis Date     Attention-deficit hyperactivity disorder     No Comments Provided     Gastro-esophageal reflux disease without esophagitis     No Comments Provided     Moderate persistent asthma, uncomplicated     No Comments Provided     Superior glenoid labrum lesion of  shoulder     04/17/2015     Family History   Problem Relation Age of Onset     HEART DISEASE Father      Heart Disease,Cardiac disease     Other - See Comments Father      back problems     Liver Cancer Father      Liver cancer,-- Hx Alcohol abuse     Other - See Comments Mother      Multiple orthopedic problems     Current Outpatient Prescriptions   Medication Sig Dispense Refill     predniSONE (DELTASONE) 20 MG tablet Take 1 tablet (20 mg) by mouth 2 times daily 42 tablet 0     albuterol (PROAIR HFA/PROVENTIL HFA/VENTOLIN HFA) 108 (90 Base) MCG/ACT Inhaler Inhale 2 puffs into the lungs 4 times daily as needed for shortness of breath / dyspnea or wheezing 1 Inhaler 3     budesonide (PULMICORT) 0.5 MG/2ML neb solution Squirt entire vial into previously made marshal med saline bottle, mix, and irrigate each nostril until entire bottle empty.  Do this twice daily. [address]     Social History   Substance Use Topics     Smoking status: Never Smoker     Smokeless tobacco: Never Used      Comment: Quit smoking: used it 9/20/15     Alcohol use Yes      Comment: 8 a week        OBJECTIVE  /72 (BP Location: Left arm, Patient Position: Sitting, Cuff Size: Adult Large)  Pulse 88  Temp 98.7  F (37.1  C) (Tympanic)  Resp 14  Wt 296 lb (134.3 kg)  BMI 46.36 kg/m2    Physical Exam   Constitutional: He appears well-developed and well-nourished.   HENT:   Head: Normocephalic.   Eyes: Conjunctivae are normal.   Neck: Neck supple.   Cardiovascular: Normal rate, regular rhythm and normal heart sounds.    Pulmonary/Chest: Effort normal and breath sounds normal. No respiratory distress.   Skin: Skin is warm and dry. Rash noted.   Linear but healing rash on bilateral lower legs.   Fine, diffuse non pustular rash bilateral axilla.      Psychiatric: He has a normal mood and affect.       Labs:  No results found for this or any previous visit (from the past 24 hour(s)).    X-Ray was not done.    ASSESSMENT:      ICD-10-CM    1.  Dermatitis due to plants, including poison ivy, sumac, and oak L25.5 predniSONE (DELTASONE) 20 MG tablet        Medical Decision Making:    Differential Diagnosis:  Rash: Contact dermatitis  Eczema  Non-specific rash  Poison ivy  Poison oak    Serious Comorbid Conditions:  Adult:  Asthma and Obesity     PLAN:    Poison Ivy-Sumac or Oak Contact Rash:Rx with 21 course of Prednisone,   Instructed pt to consider trial of second generation antihistamine such as claritin or zyrtec.   Reviewed self care measures. Pt with no further questions.       Followup:    If not improving or if condition worsens, follow up with your Primary Care Provider    Patient Instructions   I think this is the poison ivy and I ordered the prednisone for a taper over 3 weeks.  If if continues to spread, or gets worse you should return to see your primary care provider.     Continue using calamine lotion to the area.     Consider tyring Claratin or Zyrtec (or generic version) once a day in the mornings.     Dont use soap to the areas.     Follow up with your primary care provider if you are not improving, in the next week or so.

## 2018-06-18 ASSESSMENT — ENCOUNTER SYMPTOMS: MUSCULOSKELETAL NEGATIVE: 1

## 2018-07-23 NOTE — PROGRESS NOTES
Patient Information     Patient Name  Marija Albright MRN  [id number] Sex  Male   1990      Letter by Erasmo Saha MD at      Author:  Erasmo Saha MD Service:  (none) Author Type:  (none)    Filed:   Encounter Date:  2017 Status:  (Other)           Marija Albright  [address] [facility] [address]          2017    Dear Mr. Albright:    Following are the tests completed during your last clinic visit.  The results of these tests are included below.      Overall, labs are all essentially normal.    Kidney function has improved.    Results for orders placed or performed in visit on 17      COMPLETE METABOLIC PANEL      Result  Value Ref Range    SODIUM 139 133 - 143 mmol/L    POTASSIUM 4.0 3.5 - 5.1 mmol/L    CHLORIDE 102 98 - 107 mmol/L    CO2,TOTAL 24 21 - 31 mmol/L    ANION GAP 13 5 - 18                    GLUCOSE 98 70 - 105 mg/dL    CALCIUM 9.7 8.6 - 10.3 mg/dL    BUN 16 7 - 25 mg/dL    CREATININE 1.14 0.70 - 1.30 mg/dL    BUN/CREAT RATIO           14                    GFR if African American >60 >60 ml/min/1.73m2    GFR if not African American >60 >60 ml/min/1.73m2    ALBUMIN 4.8 3.5 - 5.7 g/dL    PROTEIN,TOTAL 7.5 6.4 - 8.9 g/dL    GLOBULIN                  2.7 2.0 - 3.7 g/dL    A/G RATIO 1.8 1.0 - 2.0                    BILIRUBIN,TOTAL 0.5 0.3 - 1.0 mg/dL    ALK PHOSPHATASE 68 34 - 104 IU/L    ALT (SGPT) 35 7 - 52 IU/L    AST (SGOT) 19 13 - 39 IU/L   LIPID PANEL      Result  Value Ref Range    CHOLESTEROL,TOTAL 189 <200 mg/dL    TRIGLYCERIDES 107 <150 mg/dL    HDL CHOLESTEROL 56 23 - 92 mg/dL    NON-HDL CHOLESTEROL 133 <145 mg/dl    CHOL/HDL RATIO            3.38 <4.50                    LDL CHOLESTEROL 112 (H) <100 mg/dL    PATIENT STATUS            NOT GIVEN                   Hgb A1c      Result  Value Ref Range    HEMOGLOBIN A1C MONITORING (POCT) 5.0 4.0 - 6.2 %    ESTIMATED AVERAGE GLUCOSE  97 mg/dL   LIPASE      Result  Value Ref Range    LIPASE 25.5 11.0 - 82.0  IU/L   AMYLASE      Result  Value Ref Range    AMYLASE 48 29 - 103 IU/L   TSH      Result  Value Ref Range    TSH 1.28 0.34 - 5.60 uIU/mL   T3,FREE      Result  Value Ref Range    T3,FREE 3.20 2.50 - 3.90 pg/mL   T4,FREE      Result  Value Ref Range    T4,FREE 0.86 0.58 - 1.64 ng/dL   CBC WITH AUTO DIFFERENTIAL      Result  Value Ref Range    WHITE BLOOD COUNT         6.0 4.5 - 11.0 thou/cu mm    RED BLOOD COUNT           5.12 4.30 - 5.90 mil/cu mm    HEMOGLOBIN                15.6 13.5 - 17.5 g/dL    HEMATOCRIT                44.5 37.0 - 53.0 %    MCV                       87 80 - 100 fL    MCH                       30.5 26.0 - 34.0 pg    MCHC                      35.1 32.0 - 36.0 g/dL    RDW                       13.0 11.5 - 15.5 %    PLATELET COUNT            191 140 - 440 thou/cu mm    MPV                       11.1 (H) 6.5 - 11.0 fL    NEUTROPHILS               59.4 42.0 - 72.0 %    LYMPHOCYTES               30.9 20.0 - 44.0 %    MONOCYTES                 6.9 <12.0 %    EOSINOPHILS               2.2 <8.0 %    BASOPHILS                 0.3 <3.0 %    IMMATURE GRANULOCYTES(METAS,MYELOS,PROS) 0.3 %    ABSOLUTE NEUTROPHILS      3.5 1.7 - 7.0 thou/cu mm    ABSOLUTE LYMPHOCYTES      1.8 0.9 - 2.9 thou/cu mm    ABSOLUTE MONOCYTES        0.4 <0.9 thou/cu mm    ABSOLUTE EOSINOPHILS      0.1 <0.5 thou/cu mm    ABSOLUTE BASOPHILS        0.0 <0.3 thou/cu mm    ABSOLUTE IMMATURE GRANULOCYTES(METAS,MYELOS,PROS) 0.0 <=0.3 thou/cu mm         If you have any further questions or problems contact my office at  [phone]   --- or send Tribogenics message --- otherwise schedule an appointment.    Clinic : [phone]  Appointment line: [phone]     Thank you,    Erasmo Saha MD    Internal Medicine  [facility]     Reviewed and electronically signed by provider.

## 2018-07-23 NOTE — PROGRESS NOTES
Patient Information     Patient Name  Marija Albright MRN  [id number] Sex  Male   1990      Letter by Erasmo Saha MD at      Author:  Erasmo Saha MD Service:  (none) Author Type:  (none)    Filed:   Encounter Date:  2017 Status:  (Other)           Marija Albright  [address] [facility] [address]          2017    Dear Mr. Albright:    Following are the tests completed during your last clinic visit.  The results of these tests are included below.      2017 -  Procedure: XR HIP 2 OR 3 VIEWS W PELVIS LEFT     HISTORY:  Lateral pain of left hip.     TECHNIQUE: Pelvis one view and 2 views left hip.     COMPARISON: None.     FINDINGS:     No acute fracture or dislocation. Joint spaces are maintained.     IMPRESSION: Negative pelvis and left hip x-rays.       Electronically Signed By: Clemencia Vargas M.D. on 2017 12:01 PM    If you have any further questions or problems contact my office at  [phone]   --- or send Vonjour message --- otherwise schedule an appointment.    Clinic : [phone]  Appointment line: [phone]     Thank you,    Erasmo Saha MD    Internal Medicine  [facility]     Reviewed and electronically signed by provider.

## 2018-07-23 NOTE — PROGRESS NOTES
Patient Information     Patient Name  Marija Albright MRN  [id number] Sex  Male   1990      Letter by Erasmo Saha MD at      Author:  Erasmo Saha MD Service:  (none) Author Type:  (none)    Filed:   Encounter Date:  2017 Status:  (Other)           Marija Albright  [address] [facility] [address]          2017    Dear Mr. Albright:    Following are the tests completed during your last clinic visit.  The results of these tests are included below.        Results for orders placed or performed in visit on 17      COMPLETE METABOLIC PANEL      Result  Value Ref Range    SODIUM 139 133 - 143 mmol/L    POTASSIUM 4.0 3.5 - 5.1 mmol/L    CHLORIDE 102 98 - 107 mmol/L    CO2,TOTAL 24 21 - 31 mmol/L    ANION GAP 13 5 - 18                    GLUCOSE 98 70 - 105 mg/dL    CALCIUM 9.7 8.6 - 10.3 mg/dL    BUN 16 7 - 25 mg/dL    CREATININE 1.14 0.70 - 1.30 mg/dL    BUN/CREAT RATIO           14                    GFR if African American >60 >60 ml/min/1.73m2    GFR if not African American >60 >60 ml/min/1.73m2    ALBUMIN 4.8 3.5 - 5.7 g/dL    PROTEIN,TOTAL 7.5 6.4 - 8.9 g/dL    GLOBULIN                  2.7 2.0 - 3.7 g/dL    A/G RATIO 1.8 1.0 - 2.0                    BILIRUBIN,TOTAL 0.5 0.3 - 1.0 mg/dL    ALK PHOSPHATASE 68 34 - 104 IU/L    ALT (SGPT) 35 7 - 52 IU/L    AST (SGOT) 19 13 - 39 IU/L   LIPID PANEL      Result  Value Ref Range    CHOLESTEROL,TOTAL 189 <200 mg/dL    TRIGLYCERIDES 107 <150 mg/dL    HDL CHOLESTEROL 56 23 - 92 mg/dL    NON-HDL CHOLESTEROL 133 <145 mg/dl    CHOL/HDL RATIO            3.38 <4.50                    LDL CHOLESTEROL 112 (H) <100 mg/dL    PATIENT STATUS            NOT GIVEN                   Hgb A1c      Result  Value Ref Range    HEMOGLOBIN A1C MONITORING (POCT) 5.0 4.0 - 6.2 %    ESTIMATED AVERAGE GLUCOSE  97 mg/dL   LIPASE      Result  Value Ref Range    LIPASE 25.5 11.0 - 82.0 IU/L   AMYLASE      Result  Value Ref Range    AMYLASE 48 29 - 103 IU/L   TSH       Result  Value Ref Range    TSH 1.28 0.34 - 5.60 uIU/mL   T3,FREE      Result  Value Ref Range    T3,FREE 3.20 2.50 - 3.90 pg/mL   T4,FREE      Result  Value Ref Range    T4,FREE 0.86 0.58 - 1.64 ng/dL   TESTOSTERONE TOTAL AND FREE      Result  Value Ref Range    TESTOSTERONE FREE 11.4 5.25 - 20.7 ng/dL    TESTOSTERONE,TOTAL        356 240 - 950 ng/dL   ADULT FOOD ALLERGY PROFILE      Result  Value Ref Range    Clam (F207) IgE 0.11 <=0.35 kU/L    Codfish (F3) IgE               <0.10 <=0.35 kU/L    Corn (F8) IgE 0.41 (H) <=0.35 kU/L    Egg White (F1) IgE             <0.10 <=0.35 kU/L    IMMUNOGLOBULIN E (IGE) 348.0 (H) 22.0 - 107.0 kU/L    MILK (F2) IGE <0.10 <=0.35 kU/L    PEANUT (F13) IGE 0.59 (H) <=0.35 kU/L    SCALLOP (F338) IGE <0.10 <=0.35 kU/L    SHRIMP (F24) IGE 0.14 <=0.35 kU/L    SOYBEAN (F14) IGE 0.12 <=0.35 kU/L    WALNUT (F256) IGE 0.16 <=0.35 kU/L    WHEAT (F4) IGE 0.24 <=0.35 kU/L   CBC WITH AUTO DIFFERENTIAL      Result  Value Ref Range    WHITE BLOOD COUNT         6.0 4.5 - 11.0 thou/cu mm    RED BLOOD COUNT           5.12 4.30 - 5.90 mil/cu mm    HEMOGLOBIN                15.6 13.5 - 17.5 g/dL    HEMATOCRIT                44.5 37.0 - 53.0 %    MCV                       87 80 - 100 fL    MCH                       30.5 26.0 - 34.0 pg    MCHC                      35.1 32.0 - 36.0 g/dL    RDW                       13.0 11.5 - 15.5 %    PLATELET COUNT            191 140 - 440 thou/cu mm    MPV                       11.1 (H) 6.5 - 11.0 fL    NEUTROPHILS               59.4 42.0 - 72.0 %    LYMPHOCYTES               30.9 20.0 - 44.0 %    MONOCYTES                 6.9 <12.0 %    EOSINOPHILS               2.2 <8.0 %    BASOPHILS                 0.3 <3.0 %    IMMATURE GRANULOCYTES(METAS,MYELOS,PROS) 0.3 %    ABSOLUTE NEUTROPHILS      3.5 1.7 - 7.0 thou/cu mm    ABSOLUTE LYMPHOCYTES      1.8 0.9 - 2.9 thou/cu mm    ABSOLUTE MONOCYTES        0.4 <0.9 thou/cu mm    ABSOLUTE EOSINOPHILS      0.1 <0.5 thou/cu mm     ABSOLUTE BASOPHILS        0.0 <0.3 thou/cu mm    ABSOLUTE IMMATURE GRANULOCYTES(METAS,MYELOS,PROS) 0.0 <=0.3 thou/cu mm         If you have any further questions or problems contact my office at  [phone]   --- or send Festicket message --- otherwise schedule an appointment.    Clinic : [phone]  Appointment line: [phone]     Thank you,    Erasmo Saha MD    Internal Medicine  [facility]     Reviewed and electronically signed by provider.

## 2018-07-23 NOTE — PROGRESS NOTES
Patient Information     Patient Name  Marija Albright MRN  [id number] Sex  Male   1990      Letter by Erasmo Saha MD at      Author:  Erasmo Saha MD Service:  (none) Author Type:  (none)    Filed:   Date of Service:   Status:  (Other)           Marija Albright  [address] [facility] [address]          2017    Dear Mr. Albright:    Following are the tests completed during your last clinic visit.  The results of these tests are included below.      2017 -- PROCEDURE:  CT ABDOMEN PELVIS W.    HISTORY:  Epigastric abdominal pain.      TECHNIQUE:  Helical CT of the abdomen and pelvis was performed following injection of intravenous contrast.  Ingested oral contrast partially opacifies the bowel.      COMPARISON:  Ultrasound 2016.    FINDINGS:      Limited images through the lung bases demonstrate no focal consolidation or mass.  The heart size is normal. No pericardial or pleural effusions are seen. A small sliding hiatal hernia is present.    The liver demonstrates no mass or intrahepatic biliary ductal dilatation.  The gallbladder is present.  The spleen, pancreas and adrenal glands are unremarkable.  Symmetric nephrograms are present without evidence of hydronephrosis.  The bowel is normal in caliber. There is no abdominal aortic aneurysm.    No free fluid, free air or adenopathy is present.  No suspicious osseous lesions are identified.    IMPRESSION:    Small sliding hiatal hernia. Correlate for evidence of reflux disease.    Electronically Signed By: Aaron Hung on 2017 12:05 PM    If you have any further questions or problems contact my office at  [phone]   --- or send AetherPalT message --- otherwise schedule an appointment.    Clinic : [phone]  Appointment line: [phone]     Thank you,    Erasmo Saha MD    Internal Medicine  [facility]     Reviewed and electronically signed by provider.

## 2018-07-24 NOTE — PROGRESS NOTES
Patient Information     Patient Name  Marija Albright MRN  [id number] Sex  Male   1990      Letter by Erasmo Saha MD at      Author:  Erasmo Saha MD Service:  (none) Author Type:  (none)    Filed:   Date of Service:   Status:  (Other)           Marija Albright  [address] [facility] [address]          2017    Dear Mr. Albright:    Following are the tests completed during your last clinic visit.  The results of these tests are included below.      With your deviated nasal septum, it may be worthwhile seeing one of the ENT surgeons to talk about possible nasal/sinus surgery.     We can talk about this at your follow-up appointment and send referral at that time if needed.       2017  -- XR SINUS 2 VIEWS YODER AND PA    HISTORY:  Sinus pressure.    TECHNIQUE: 2 views of the paranasal sinuses.    COMPARISON: CT head 2016.    FINDINGS:    The paranasal sinuses are grossly normally aerated. No air-fluid levels are seen. Moderate rightward nasal septal deviation is present. Moderate ethmoid and maxillary mucosal thickening is present on the prior CT head.   Consider follow-up CT sinus.    Electronically Signed By: Aaron Hung on 2017 12:12 PM         If you have any further questions or problems contact my office at  [phone]   --- or send TradeHarborT message --- otherwise schedule an appointment.    Clinic : [phone]  Appointment line: [phone]     Thank you,    Erasmo Saha MD    Internal Medicine  [facility]     Reviewed and electronically signed by provider.

## 2018-10-29 ENCOUNTER — OFFICE VISIT (OUTPATIENT)
Dept: INTERNAL MEDICINE | Facility: OTHER | Age: 28
End: 2018-10-29
Attending: INTERNAL MEDICINE
Payer: COMMERCIAL

## 2018-10-29 VITALS
WEIGHT: 311 LBS | SYSTOLIC BLOOD PRESSURE: 130 MMHG | DIASTOLIC BLOOD PRESSURE: 78 MMHG | BODY MASS INDEX: 48.71 KG/M2 | HEART RATE: 76 BPM

## 2018-10-29 DIAGNOSIS — S43.432D SUPERIOR GLENOID LABRUM LESION OF LEFT SHOULDER, SUBSEQUENT ENCOUNTER: ICD-10-CM

## 2018-10-29 DIAGNOSIS — M25.512 PAIN IN JOINT OF LEFT SHOULDER: ICD-10-CM

## 2018-10-29 DIAGNOSIS — J45.20 MILD INTERMITTENT ASTHMA WITHOUT COMPLICATION: ICD-10-CM

## 2018-10-29 DIAGNOSIS — M24.412 RECURRENT DISLOCATION OF LEFT SHOULDER: Primary | ICD-10-CM

## 2018-10-29 DIAGNOSIS — E66.01 MORBID OBESITY WITH BMI OF 45.0-49.9, ADULT (H): ICD-10-CM

## 2018-10-29 PROCEDURE — 99214 OFFICE O/P EST MOD 30 MIN: CPT | Performed by: INTERNAL MEDICINE

## 2018-10-29 PROCEDURE — G0463 HOSPITAL OUTPT CLINIC VISIT: HCPCS

## 2018-10-29 RX ORDER — ALBUTEROL SULFATE 90 UG/1
2 AEROSOL, METERED RESPIRATORY (INHALATION) 4 TIMES DAILY PRN
Qty: 3 INHALER | Refills: 3 | Status: SHIPPED | OUTPATIENT
Start: 2018-10-29 | End: 2019-10-11

## 2018-10-29 ASSESSMENT — ENCOUNTER SYMPTOMS
FATIGUE: 0
AGITATION: 0
COUGH: 0
DIZZINESS: 0
HEMATURIA: 0
LIGHT-HEADEDNESS: 0
BACK PAIN: 1
MYALGIAS: 0
WHEEZING: 0
VOMITING: 0
ARTHRALGIAS: 1
DIARRHEA: 0
SHORTNESS OF BREATH: 0
BRUISES/BLEEDS EASILY: 0
ABDOMINAL PAIN: 0
DYSURIA: 0
EYE PAIN: 0
PALPITATIONS: 0
FEVER: 0
CHILLS: 0
NAUSEA: 0
CONFUSION: 0

## 2018-10-29 ASSESSMENT — PAIN SCALES - GENERAL: PAINLEVEL: MODERATE PAIN (5)

## 2018-10-29 ASSESSMENT — ANXIETY QUESTIONNAIRES
6. BECOMING EASILY ANNOYED OR IRRITABLE: NOT AT ALL
IF YOU CHECKED OFF ANY PROBLEMS ON THIS QUESTIONNAIRE, HOW DIFFICULT HAVE THESE PROBLEMS MADE IT FOR YOU TO DO YOUR WORK, TAKE CARE OF THINGS AT HOME, OR GET ALONG WITH OTHER PEOPLE: NOT DIFFICULT AT ALL
5. BEING SO RESTLESS THAT IT IS HARD TO SIT STILL: NOT AT ALL
1. FEELING NERVOUS, ANXIOUS, OR ON EDGE: NOT AT ALL
2. NOT BEING ABLE TO STOP OR CONTROL WORRYING: NOT AT ALL
3. WORRYING TOO MUCH ABOUT DIFFERENT THINGS: NOT AT ALL
GAD7 TOTAL SCORE: 0
7. FEELING AFRAID AS IF SOMETHING AWFUL MIGHT HAPPEN: NOT AT ALL

## 2018-10-29 ASSESSMENT — PATIENT HEALTH QUESTIONNAIRE - PHQ9
5. POOR APPETITE OR OVEREATING: NOT AT ALL
SUM OF ALL RESPONSES TO PHQ QUESTIONS 1-9: 0

## 2018-10-29 NOTE — NURSING NOTE
Patient presents to the clinic for follow up with general health concerns and to see if he can get a permit for his left shoulder problems.      Radhika Asif LPN 10/29/2018 8:30 AM

## 2018-10-29 NOTE — PATIENT INSTRUCTIONS
To help with weight loss and improve blood sugar control....    -- Try to avoid Carbohydrates as much as possible -- breads, pasta, baked goods, cakes, oatmeal, cold cereal, potatoes.   These are turned to sugar in one metabolic conversion, cause insulin secretion and increased fat deposition / weight gain.      -- Eat more lean meats, proteins, eggs, nuts, vegetables.       Albuterol inhaler refilled.     Okay to drop off forms for crossbow permit.     Return as needed for follow-up with Dr. Saha.    Clinic : [phone]  Appointment line: [phone]

## 2018-10-29 NOTE — PROGRESS NOTES
Nursing Notes:   Radhika Asif LPN  10/29/2018  8:44 AM  Signed  Patient presents to the clinic for follow up with general health concerns and to see if he can get a permit for his left shoulder problems.      Radhika Asif LPN 10/29/2018 8:30 AM      Nursing note reviewed with patient.  Accurracy and completeness verified.   Mr. Albright is a 27 year old male who:  Patient presents with:  Musculoskeletal Problem      ICD-10-CM    1. Recurrent dislocation of left shoulder M24.412    2. Pain in joint of left shoulder M25.512    3. Superior glenoid labrum lesion of left shoulder, subsequent encounter S43.432D    4. Mild intermittent asthma without complication J45.20 albuterol (PROAIR HFA/PROVENTIL HFA/VENTOLIN HFA) 108 (90 Base) MCG/ACT inhaler   5. Morbid obesity with BMI of 45.0-49.9, adult (H) E66.01     Z68.42      HPI  Patient comes in for follow-up.  Has had left shoulder surgery in the past.  Reports he had 4 anchors placed to repair and anterior labrum repair.  This was performed in approximately September 2016.  Reports that when doing some practice for both hunting recently, he is able to pull back with his right arm but his lockout arm, his left arm, pops in and out of joint -- subluxing or dislocating.  Reports that he was also seen by orthopedic surgery previously regarding his left elbow and his left knee and evidently they recommended surgery on his left elbow due to significant crepitus and discomfort.  He has done a lot of weightlifting in the past.    Patient wishes to get some paperwork filled out to allow crossbow use rather than regular bow hunting permit/license.    Intermittent asthma, overall doing quite well.  Was on Advair for a while but is now just using albuterol inhaler.  Needs refills today.    Obesity, discussed need for reduced oral caloric intake.  Regular exercise.  Reduce carbohydrate intake.  Information printed and reviewed.    Functional Capacity: > 4 METS.   Reports that he  can climb a flight of stairs without any chest pain/heaviness or shortness of breath.   No orthopnea/paroxysmal nocturnal dyspnea  Review of Systems   Constitutional: Negative for chills, fatigue and fever.   HENT: Negative for congestion and hearing loss.    Eyes: Negative for pain and visual disturbance.   Respiratory: Negative for cough, shortness of breath and wheezing.    Cardiovascular: Negative for chest pain and palpitations.   Gastrointestinal: Negative for abdominal pain, diarrhea, nausea and vomiting.   Endocrine: Negative for cold intolerance and heat intolerance.   Genitourinary: Negative for dysuria and hematuria.   Musculoskeletal: Positive for arthralgias (+ left shoulder pain / ROM issues with certain arm positions - still frequently getting dislocations. hx of labral tear. ), back pain (+ pains about L1 with dock lifting) and gait problem (+ left knee will lock at times, having instability - worse with side-to-side and stopping movements). Negative for myalgias.   Skin: Negative for pallor.   Allergic/Immunologic: Negative for immunocompromised state.   Neurological: Negative for dizziness and light-headedness.   Hematological: Does not bruise/bleed easily.   Psychiatric/Behavioral: Negative for agitation and confusion.      SAGE:   SAGE-7 SCORE 10/29/2018   Total Score 0     PHQ9:  PHQ-9 SCORE 8/9/2017 12/21/2017 10/29/2018   Total Score 0 0 0       I have personally reviewed the past medical history, past surgical history, medications, allergies, family and social history as listed below, on 10/29/2018.    Allergies   Allergen Reactions     Corn Syrup [Glucose]      Morphine Sulfate Nausea and Vomiting     Cannot take it in pill form, IV ok   Pancreatitis     Peanuts [Nuts]      Gold Au 198 [Gold] Rash       Current Outpatient Prescriptions   Medication Sig Dispense Refill     albuterol (PROAIR HFA/PROVENTIL HFA/VENTOLIN HFA) 108 (90 Base) MCG/ACT inhaler Inhale 2 puffs into the lungs 4 times daily  as needed for shortness of breath / dyspnea or wheezing 3 Inhaler 3     budesonide (PULMICORT) 0.5 MG/2ML neb solution Squirt entire vial into previously made marshal med saline bottle, mix, and irrigate each nostril until entire bottle empty.  Do this twice daily. 3 Box 11     [DISCONTINUED] albuterol (PROAIR HFA/PROVENTIL HFA/VENTOLIN HFA) 108 (90 Base) MCG/ACT Inhaler Inhale 2 puffs into the lungs 4 times daily as needed for shortness of breath / dyspnea or wheezing 1 Inhaler 3        Patient Active Problem List    Diagnosis Date Noted     Recurrent dislocation of left shoulder 10/29/2018     Priority: Medium     Allergic rhinitis due to pollen 02/05/2018     Priority: Medium     Reflux esophagitis 02/05/2018     Priority: Medium     Overview:   GE Reflux       Chronic ethmoidal sinusitis 08/09/2017     Priority: Medium     Chronic maxillary sinusitis 08/09/2017     Priority: Medium     Deviated nasal septum 08/09/2017     Priority: Medium     Elevated IgE level 08/09/2017     Priority: Medium     Multiple food allergies 08/09/2017     Priority: Medium     Sliding hiatal hernia 08/09/2017     Priority: Medium     Change in stool habits 07/14/2017     Priority: Medium     History of tear of ACL (anterior cruciate ligament) 07/14/2017     Priority: Medium     Mild intermittent asthma without complication 07/14/2017     Priority: Medium     Morbid obesity with BMI of 45.0-49.9, adult (H) 09/12/2016     Priority: Medium     SLAP (superior labrum from anterior to posterior) tear 04/17/2015     Priority: Medium     Cervical radiculopathy 09/11/2014     Priority: Medium     CARDIOVASCULAR SCREENING; LDL GOAL LESS THAN 160 09/14/2012     Priority: Medium     ADHD (attention deficit hyperactivity disorder) 09/14/2012     Priority: Medium     Previously managed by Psychiatry in [place], [place]       Seasonal allergic rhinitis 09/14/2012     Priority: Medium     GERD (gastroesophageal reflux disease) 09/14/2012     Priority: Medium      Chronic bilateral low back pain without sciatica 04/28/2011     Priority: Medium     Spondylolisthesis of lumbar region 02/16/2011     Priority: Medium     Anterior cruciate ligament sprain 10/02/2008     Priority: Medium     Overview:   IMO Update 10/11       Pain in joint, shoulder region 01/04/2008     Priority: Medium     Overview:   IMO Update 10/11       Superior glenoid labrum lesion 01/04/2008     Priority: Medium     Overview:   IMO Update 10/11       Attention deficit hyperactivity disorder (ADHD) 04/18/2006     Priority: Medium     Overview:   IMO Update 10 2016       Past Medical History:   Diagnosis Date     Attention-deficit hyperactivity disorder     No Comments Provided     Gastro-esophageal reflux disease without esophagitis     No Comments Provided     Moderate persistent asthma, uncomplicated     No Comments Provided     Superior glenoid labrum lesion of shoulder     04/17/2015     Past Surgical History:   Procedure Laterality Date     ARTHROSCOPIC RECONSTRUCTION ANTERIOR CRUCIATE LIGAMENT      12/2008,repair     ARTHROSCOPIC RECONSTRUCTION ANTERIOR CRUCIATE LIGAMENT      2012,revision     OTHER SURGICAL HISTORY      06/2004,581897,OTHER,Left,Fracture of left distal radius-torus fracture.     OTHER SURGICAL HISTORY      1/4/2011,LPT707,ENDOVENOUS ABLATION SAPHENOUS VEIN W/ LASER,Right     OTHER SURGICAL HISTORY      1/31/2018,19623.0,IN COLONOSCOPY BIOPSY SINGLE OR MULTIPLE     shoulder labral repair - Left  09/30/2016     TONSILLECTOMY      age 21     Social History     Social History     Marital status:      Spouse name: N/A     Number of children: N/A     Years of education: N/A     Social History Main Topics     Smoking status: Former Smoker     Packs/day: 0.00     Types: Cigars     Smokeless tobacco: Former User     Types: Chew      Comment: Quit smoking: used it 9/20/15     Alcohol use Yes      Comment: 8 a week      Drug use: No     Sexual activity: Yes     Partners: Female  "    Other Topics Concern     None     Social History Narrative    Parents are . Lives with mother in [place].  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    9/2016.     Family History   Problem Relation Age of Onset     HEART DISEASE Father      Heart Disease,Cardiac disease     Other - See Comments Father      back problems     Liver Cancer Father      Liver cancer,-- Hx Alcohol abuse     Other - See Comments Mother      Multiple orthopedic problems       EXAM:   Vitals:    10/29/18 0833   BP: 130/78   BP Location: Right arm   Patient Position: Sitting   Cuff Size: Adult Large   Pulse: 76   Weight: 311 lb (141.1 kg)       Current Pain Score: Moderate Pain (5)     BP Readings from Last 3 Encounters:   10/29/18 130/78   06/15/18 122/72   05/30/18 128/80      Wt Readings from Last 3 Encounters:   10/29/18 311 lb (141.1 kg)   06/15/18 296 lb (134.3 kg)   05/30/18 299 lb (135.6 kg)      Estimated body mass index is 48.71 kg/(m^2) as calculated from the following:    Height as of 12/21/17: 5' 7\" (1.702 m).    Weight as of this encounter: 311 lb (141.1 kg).     Physical Exam   Constitutional: He appears well-developed and well-nourished. No distress.   HENT:   Head: Normocephalic and atraumatic.   Eyes: No scleral icterus.   Neck: Neck supple.   Cardiovascular: Normal rate and regular rhythm.    Pulmonary/Chest: Effort normal. No respiratory distress. He has no wheezes.   Abdominal:   Morbid obesity   Musculoskeletal: He exhibits tenderness. He exhibits no deformity.   + left shoulder ROM limitation. Popping with extension/abduction.  Significant left elbow crepitus.   Lymphadenopathy:     He has no cervical adenopathy.   Neurological: He is alert. No cranial nerve deficit.   Skin: Skin is warm and dry.   Psychiatric: He has a normal mood and affect.      INVESTIGATIONS:  Results for orders placed or performed in visit on 05/30/18   XR Chest 2 Views    Narrative    PROCEDURE:  XR CHEST 2 VW    HISTORY: " ; Mild intermittent asthma with exacerbation, .    COMPARISON:  None.    FINDINGS:  The cardiomediastinal contours are normal.  The trachea is midline.  No focal consolidation, effusion or pneumothorax.  Minimal right  perihilar tram tracking is noted, suggesting mild bronchial wall  thickening.  No suspicious osseous lesion or subdiaphragmatic free air.      Impression    IMPRESSION:      Question subtle bronchial wall thickening, likely reflecting the known  diagnosis of asthma. No focal consolidation.    THERESA CLAYTON MD       ASSESSMENT AND PLAN:  Problem List Items Addressed This Visit        Nervous and Auditory    Pain in joint, shoulder region       Respiratory    Mild intermittent asthma without complication    Relevant Medications    albuterol (PROAIR HFA/PROVENTIL HFA/VENTOLIN HFA) 108 (90 Base) MCG/ACT inhaler       Digestive    Morbid obesity with BMI of 45.0-49.9, adult (H)       Musculoskeletal and Integumentary    SLAP (superior labrum from anterior to posterior) tear    Recurrent dislocation of left shoulder - Primary        -- Expected clinical course discussed    -- Medications and their side effects discussed    Patient Instructions   To help with weight loss and improve blood sugar control....    -- Try to avoid Carbohydrates as much as possible -- breads, pasta, baked goods, cakes, oatmeal, cold cereal, potatoes.   These are turned to sugar in one metabolic conversion, cause insulin secretion and increased fat deposition / weight gain.      -- Eat more lean meats, proteins, eggs, nuts, vegetables.       Albuterol inhaler refilled.     Okay to drop off forms for crossbow permit.     Return as needed for follow-up with Dr. Saha.    Clinic : [phone]  Appointment line: [phone]      Erasmo Saha MD  Internal Medicine  [facility]

## 2018-10-29 NOTE — MR AVS SNAPSHOT
After Visit Summary   10/29/2018    Marija Albright    MRN: [id number]           Patient Information     Date Of Birth          1990        Visit Information        Provider Department      10/29/2018 8:20 AM Erasmo Saha MD [facility]        Today's Diagnoses     Recurrent dislocation of left shoulder    -  1    Pain in joint of left shoulder        Superior glenoid labrum lesion of left shoulder, subsequent encounter        Mild intermittent asthma without complication        Morbid obesity with BMI of 45.0-49.9, adult (H)          Care Instructions    To help with weight loss and improve blood sugar control....    -- Try to avoid Carbohydrates as much as possible -- breads, pasta, baked goods, cakes, oatmeal, cold cereal, potatoes.   These are turned to sugar in one metabolic conversion, cause insulin secretion and increased fat deposition / weight gain.      -- Eat more lean meats, proteins, eggs, nuts, vegetables.       Albuterol inhaler refilled.     Okay to drop off forms for crossbow permit.     Return as needed for follow-up with Dr. Saha.    Clinic : [phone]  Appointment line: [phone]            Follow-ups after your visit        Who to contact     If you have questions or need follow up information about today's clinic visit or your schedule please contact [facility] directly at [phone].  Normal or non-critical lab and imaging results will be communicated to you by MyChart, letter or phone within 4 business days after the clinic has received the results. If you do not hear from us within 7 days, please contact the clinic through MyChart or phone. If you have a critical or abnormal lab result, we will notify you by phone as soon as possible.  Submit refill requests through [facility] or call your pharmacy and they will forward the refill request to us. Please allow 3 business days for your refill to be completed.           Additional Information About Your Visit        Care EveryWhere ID     This is your Care EveryWhere ID. This could be used by other organizations to access your [facility] medical records  [id number]        Your Vitals Were     Pulse BMI (Body Mass Index)                76 48.71 kg/m2           Blood Pressure from Last 3 Encounters:   10/29/18 130/78   06/15/18 122/72   05/30/18 128/80    Weight from Last 3 Encounters:   10/29/18 311 lb (141.1 kg)   06/15/18 296 lb (134.3 kg)   05/30/18 299 lb (135.6 kg)              Today, you had the following     No orders found for display         Where to get your medicines      These medications were sent to [facility], - [place], [place] - [address]  [address] , [place] [address]     Phone:  [phone]     albuterol 108 (90 Base) MCG/ACT inhaler          Primary Care Provider Office Phone # Fax #    Erasmo Saha [phone]       [address] [facility] [address]        Equal Access to Services     [facility]: Hadii aj Peraza, wakrishna mckenzie, qacj osorio, say de la rosa . So [facility] [phone].    ATENCIÓN: Si habla español, tiene a yao disposición servicios gratuitos de asistencia lingüística. [facility] [phone].    We comply with applicable federal civil rights laws and [place] laws. We do not discriminate on the basis of race, color, national origin, age, disability, sex, sexual orientation, or gender identity.            Thank you!     Thank you for choosing [facility]  for your care. Our goal is always to provide you with excellent care. Hearing back from our patients is one way we can continue to improve our services. Please take a few minutes to complete the written survey that you may receive in the mail after your visit with us. Thank you!             Your Updated Medication List - Protect others around  you: Learn how to safely use, store and throw away your medicines at www.disposemymeds.org.          This list is accurate as of 10/29/18  8:53 AM.  Always use your most recent med list.                   Brand Name Dispense Instructions for use Diagnosis    albuterol 108 (90 Base) MCG/ACT inhaler    PROAIR HFA/PROVENTIL HFA/VENTOLIN HFA    3 Inhaler    Inhale 2 puffs into the lungs 4 times daily as needed for shortness of breath / dyspnea or wheezing    Mild intermittent asthma without complication       budesonide 0.5 MG/2ML neb solution    PULMICORT    3 Box    Squirt entire vial into previously made marshal med saline bottle, mix, and irrigate each nostril until entire bottle empty.  Do this twice daily.    Chronic pansinusitis

## 2018-10-30 ASSESSMENT — ASTHMA QUESTIONNAIRES: ACT_TOTALSCORE: 25

## 2018-10-30 ASSESSMENT — ANXIETY QUESTIONNAIRES: GAD7 TOTAL SCORE: 0

## 2019-03-06 ENCOUNTER — OFFICE VISIT (OUTPATIENT)
Dept: INTERNAL MEDICINE | Facility: OTHER | Age: 29
End: 2019-03-06
Attending: INTERNAL MEDICINE
Payer: COMMERCIAL

## 2019-03-06 VITALS
SYSTOLIC BLOOD PRESSURE: 114 MMHG | WEIGHT: 315 LBS | BODY MASS INDEX: 50.35 KG/M2 | HEART RATE: 64 BPM | DIASTOLIC BLOOD PRESSURE: 70 MMHG | TEMPERATURE: 98.1 F | RESPIRATION RATE: 16 BRPM

## 2019-03-06 DIAGNOSIS — R30.0 DYSURIA: Primary | ICD-10-CM

## 2019-03-06 DIAGNOSIS — R11.0 NAUSEA: ICD-10-CM

## 2019-03-06 DIAGNOSIS — H44.002 INFECTION OF LEFT EYE: ICD-10-CM

## 2019-03-06 LAB
ALBUMIN UR-MCNC: NEGATIVE MG/DL
APPEARANCE UR: CLEAR
BILIRUB UR QL STRIP: NEGATIVE
COLOR UR AUTO: YELLOW
GLUCOSE UR STRIP-MCNC: NEGATIVE MG/DL
HGB UR QL STRIP: NEGATIVE
KETONES UR STRIP-MCNC: NEGATIVE MG/DL
LEUKOCYTE ESTERASE UR QL STRIP: NEGATIVE
NITRATE UR QL: NEGATIVE
PH UR STRIP: 6 PH (ref 5–9)
SOURCE: ABNORMAL
SP GR UR STRIP: >1.03 (ref 1–1.03)
UROBILINOGEN UR STRIP-ACNC: 0.2 EU/DL (ref 0.2–1)

## 2019-03-06 PROCEDURE — 99214 OFFICE O/P EST MOD 30 MIN: CPT | Performed by: INTERNAL MEDICINE

## 2019-03-06 PROCEDURE — G0463 HOSPITAL OUTPT CLINIC VISIT: HCPCS

## 2019-03-06 PROCEDURE — 81003 URINALYSIS AUTO W/O SCOPE: CPT | Performed by: INTERNAL MEDICINE

## 2019-03-06 RX ORDER — SULFACETAMIDE SODIUM 100 MG/ML
1-2 SOLUTION/ DROPS OPHTHALMIC
Qty: 5 ML | Refills: 0 | Status: SHIPPED | OUTPATIENT
Start: 2019-03-06 | End: 2019-03-06

## 2019-03-06 RX ORDER — ONDANSETRON 4 MG/1
4 TABLET, ORALLY DISINTEGRATING ORAL EVERY 8 HOURS PRN
Qty: 30 TABLET | Refills: 0 | Status: SHIPPED | OUTPATIENT
Start: 2019-03-06 | End: 2019-05-20

## 2019-03-06 ASSESSMENT — PATIENT HEALTH QUESTIONNAIRE - PHQ9
5. POOR APPETITE OR OVEREATING: NOT AT ALL
SUM OF ALL RESPONSES TO PHQ QUESTIONS 1-9: 0

## 2019-03-06 ASSESSMENT — ENCOUNTER SYMPTOMS
SHORTNESS OF BREATH: 0
CHILLS: 1
PHOTOPHOBIA: 0
DYSURIA: 1
CONFUSION: 0
NAUSEA: 1
FATIGUE: 1
CHEST TIGHTNESS: 0
EYE REDNESS: 1
VOMITING: 1
BRUISES/BLEEDS EASILY: 0
EYE PAIN: 1
ABDOMINAL PAIN: 1
BACK PAIN: 0
ADENOPATHY: 0
EYE ITCHING: 1
DIARRHEA: 1
WOUND: 0
HEMATURIA: 0
FREQUENCY: 0
FEVER: 1
EYE DISCHARGE: 1

## 2019-03-06 ASSESSMENT — ANXIETY QUESTIONNAIRES
GAD7 TOTAL SCORE: 0
1. FEELING NERVOUS, ANXIOUS, OR ON EDGE: NOT AT ALL
2. NOT BEING ABLE TO STOP OR CONTROL WORRYING: NOT AT ALL
IF YOU CHECKED OFF ANY PROBLEMS ON THIS QUESTIONNAIRE, HOW DIFFICULT HAVE THESE PROBLEMS MADE IT FOR YOU TO DO YOUR WORK, TAKE CARE OF THINGS AT HOME, OR GET ALONG WITH OTHER PEOPLE: NOT DIFFICULT AT ALL
5. BEING SO RESTLESS THAT IT IS HARD TO SIT STILL: NOT AT ALL
7. FEELING AFRAID AS IF SOMETHING AWFUL MIGHT HAPPEN: NOT AT ALL
3. WORRYING TOO MUCH ABOUT DIFFERENT THINGS: NOT AT ALL
6. BECOMING EASILY ANNOYED OR IRRITABLE: NOT AT ALL

## 2019-03-06 ASSESSMENT — PAIN SCALES - GENERAL: PAINLEVEL: NO PAIN (0)

## 2019-03-06 NOTE — PROGRESS NOTES
"Nursing Notes:   Radhika Asif LPN  3/6/2019  3:44 PM  Signed  Patient presents to the clinic for eye discoloration and crusted eyes over the past 3-4 days.  Patient indicates that he has been ill this past week with fatigue, vomiting and diarrhea this past week.    Chief Complaint   Patient presents with     Eye Problem       Initial /70 (BP Location: Right arm, Patient Position: Sitting, Cuff Size: Adult Regular)   Pulse 64   Temp 98.1  F (36.7  C) (Tympanic)   Resp 16   Wt 145.8 kg (321 lb 8 oz)   BMI 50.35 kg/m    Estimated body mass index is 50.35 kg/m  as calculated from the following:    Height as of 12/21/17: 1.702 m (5' 7\").    Weight as of this encounter: 145.8 kg (321 lb 8 oz).  Medication Reconciliation: complete    Radhika Asif LPN    Nursing note reviewed with patient.  Accuracy and completeness verified.   Mr. Albright is a 28 year old male who:  Patient presents with:  Eye Problem      ICD-10-CM    1. Dysuria R30.0 UA reflex to Microscopic and Culture     amoxicillin-clavulanate (AUGMENTIN) 875-125 MG tablet   2. Infection of left eye H44.002 DISCONTINUED: sulfacetamide (BLEPH-10) 10 % ophthalmic solution   3. Nausea R11.0 ondansetron (ZOFRAN-ODT) 4 MG ODT tab     HPI  Patient presents for evaluation of a few issues today.  He states that he ended up leaving hunting early because he was sick and had an eye infection and was having nausea and vomiting.    States that his eye was quite crusted and infected.  Had a lot of redness and swelling especially of the upper lid.    Nausea is getting better.  He has been able to start keeping some liquids and some crackers down.    He is worried about possible bladder infection.  He is been having some deeper pelvic burning almost dysuria type symptoms for the last month or so.  States that probably 10 or 15 times during his  career he fell asleep with his condom on.    + fell asleep with condom just before symptoms started. Monogamous " relationship with wife.     + has been having pelvic pain / pressure with urination for past 3-4 weeks.     He is worried about possible bladder infection versus prostate infection.  Check urinalysis today.    Given possibility of prostatitis, start Augmentin.    Functional Capacity: > 4 METS.   Review of Systems   Constitutional: Positive for chills, fatigue and fever.   HENT: Negative for congestion.    Eyes: Positive for pain, discharge, redness and itching. Negative for photophobia and visual disturbance.   Respiratory: Negative for chest tightness and shortness of breath.    Cardiovascular: Negative for chest pain.   Gastrointestinal: Positive for abdominal pain, diarrhea, nausea and vomiting.   Genitourinary: Positive for dysuria (+ deeper pelvic pressure with urination). Negative for frequency, hematuria, testicular pain and urgency.   Musculoskeletal: Negative for back pain.   Skin: Negative for rash and wound.   Allergic/Immunologic: Negative for immunocompromised state.   Neurological: Negative for syncope.   Hematological: Negative for adenopathy. Does not bruise/bleed easily.   Psychiatric/Behavioral: Negative for confusion.      SAGE:   SAGE-7 SCORE 10/29/2018 3/6/2019   Total Score 0 0     PHQ9:  PHQ-9 SCORE 12/21/2017 10/29/2018 3/6/2019   PHQ-9 Total Score 0 0 0       I have personally reviewed the past medical history, past surgical history, medications, allergies, family and social history as listed below.     Allergies   Allergen Reactions     Corn Syrup [Glucose]      Morphine Sulfate Nausea and Vomiting     Cannot take it in pill form, IV ok   Pancreatitis     Peanuts [Nuts]      Gold Au 198 [Gold] Rash       Current Outpatient Medications   Medication Sig Dispense Refill     albuterol (PROAIR HFA/PROVENTIL HFA/VENTOLIN HFA) 108 (90 Base) MCG/ACT inhaler Inhale 2 puffs into the lungs 4 times daily as needed for shortness of breath / dyspnea or wheezing 3 Inhaler 3     amoxicillin-clavulanate  (AUGMENTIN) 875-125 MG tablet Take 1 tablet by mouth 2 times daily for 10 days 20 tablet 0     budesonide (PULMICORT) 0.5 MG/2ML neb solution Squirt entire vial into previously made marshal med saline bottle, mix, and irrigate each nostril until entire bottle empty.  Do this twice daily. 3 Box 11     ondansetron (ZOFRAN-ODT) 4 MG ODT tab Take 1 tablet (4 mg) by mouth every 8 hours as needed for nausea 30 tablet 0        Patient Active Problem List    Diagnosis Date Noted     Recurrent dislocation of left shoulder 10/29/2018     Priority: Medium     Allergic rhinitis due to pollen 02/05/2018     Priority: Medium     Reflux esophagitis 02/05/2018     Priority: Medium     Overview:   GE Reflux       Chronic ethmoidal sinusitis 08/09/2017     Priority: Medium     Chronic maxillary sinusitis 08/09/2017     Priority: Medium     Deviated nasal septum 08/09/2017     Priority: Medium     Elevated IgE level 08/09/2017     Priority: Medium     Multiple food allergies 08/09/2017     Priority: Medium     Sliding hiatal hernia 08/09/2017     Priority: Medium     Change in stool habits 07/14/2017     Priority: Medium     History of tear of ACL (anterior cruciate ligament) 07/14/2017     Priority: Medium     Mild intermittent asthma without complication 07/14/2017     Priority: Medium     Morbid obesity with BMI of 45.0-49.9, adult (H) 09/12/2016     Priority: Medium     SLAP (superior labrum from anterior to posterior) tear 04/17/2015     Priority: Medium     Cervical radiculopathy 09/11/2014     Priority: Medium     CARDIOVASCULAR SCREENING; LDL GOAL LESS THAN 160 09/14/2012     Priority: Medium     ADHD (attention deficit hyperactivity disorder) 09/14/2012     Priority: Medium     Previously managed by Psychiatry in [place], [place]       Seasonal allergic rhinitis 09/14/2012     Priority: Medium     GERD (gastroesophageal reflux disease) 09/14/2012     Priority: Medium     Chronic bilateral low back pain without sciatica 04/28/2011      Priority: Medium     Spondylolisthesis of lumbar region 02/16/2011     Priority: Medium     Anterior cruciate ligament sprain 10/02/2008     Priority: Medium     Overview:   IMO Update 10/11       Pain in joint, shoulder region 01/04/2008     Priority: Medium     Overview:   IMO Update 10/11       Superior glenoid labrum lesion 01/04/2008     Priority: Medium     Overview:   IMO Update 10/11       Attention deficit hyperactivity disorder (ADHD) 04/18/2006     Priority: Medium     Overview:   IMO Update 10 2016       Past Medical History:   Diagnosis Date     Attention-deficit hyperactivity disorder     No Comments Provided     Gastro-esophageal reflux disease without esophagitis     No Comments Provided     Moderate persistent asthma, uncomplicated     No Comments Provided     Superior glenoid labrum lesion of shoulder     04/17/2015     Past Surgical History:   Procedure Laterality Date     ARTHROSCOPIC RECONSTRUCTION ANTERIOR CRUCIATE LIGAMENT      12/2008,repair     ARTHROSCOPIC RECONSTRUCTION ANTERIOR CRUCIATE LIGAMENT      2012,revision     OTHER SURGICAL HISTORY      06/2004,760485,OTHER,Left,Fracture of left distal radius-torus fracture.     OTHER SURGICAL HISTORY      1/4/2011,LPV864,ENDOVENOUS ABLATION SAPHENOUS VEIN W/ LASER,Right     OTHER SURGICAL HISTORY      1/31/2018,64723.0,DC COLONOSCOPY BIOPSY SINGLE OR MULTIPLE     shoulder labral repair - Left  09/30/2016     TONSILLECTOMY      age 21     Social History     Socioeconomic History     Marital status:      Spouse name: None     Number of children: None     Years of education: None     Highest education level: None   Occupational History     None   Social Needs     Financial resource strain: None     Food insecurity:     Worry: None     Inability: None     Transportation needs:     Medical: None     Non-medical: None   Tobacco Use     Smoking status: Former Smoker     Packs/day: 0.00     Types: Cigars     Smokeless tobacco: Former User      "Types: Chew     Tobacco comment: Quit smoking: used it 9/20/15   Substance and Sexual Activity     Alcohol use: Yes     Comment: 8 a week      Drug use: No     Sexual activity: Yes     Partners: Female   Lifestyle     Physical activity:     Days per week: None     Minutes per session: None     Stress: None   Relationships     Social connections:     Talks on phone: None     Gets together: None     Attends Anabaptism service: None     Active member of club or organization: None     Attends meetings of clubs or organizations: None     Relationship status: None     Intimate partner violence:     Fear of current or ex partner: None     Emotionally abused: None     Physically abused: None     Forced sexual activity: None   Other Topics Concern     Parent/sibling w/ CABG, MI or angioplasty before 65F 55M? Not Asked   Social History Narrative    Parents are . Lives with mother in [place].  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    9/2016.     Family History   Problem Relation Age of Onset     Heart Disease Father         Heart Disease,Cardiac disease     Other - See Comments Father         back problems     Liver Cancer Father         Liver cancer,-- Hx Alcohol abuse     Other - See Comments Mother         Multiple orthopedic problems       EXAM:   Vitals:    03/06/19 1529   BP: 114/70   BP Location: Right arm   Patient Position: Sitting   Cuff Size: Adult Regular   Pulse: 64   Resp: 16   Temp: 98.1  F (36.7  C)   TempSrc: Tympanic   Weight: 145.8 kg (321 lb 8 oz)       Current Pain Score: No Pain (0)     BP Readings from Last 3 Encounters:   03/06/19 114/70   10/29/18 130/78   06/15/18 122/72      Wt Readings from Last 3 Encounters:   03/06/19 145.8 kg (321 lb 8 oz)   10/29/18 141.1 kg (311 lb)   06/15/18 134.3 kg (296 lb)      Estimated body mass index is 50.35 kg/m  as calculated from the following:    Height as of 12/21/17: 1.702 m (5' 7\").    Weight as of this encounter: 145.8 kg (321 lb 8 oz). "     Physical Exam   Constitutional: He appears well-developed and well-nourished. No distress.   HENT:   Head: Normocephalic and atraumatic.   Eyes: No scleral icterus.   + left upper eyelid with some redness, swelling, mild erythema of conjunctiva.    Neck: Neck supple.   Cardiovascular: Normal rate and regular rhythm.   Pulmonary/Chest: Effort normal.   Abdominal: Soft. There is tenderness (+ mild suprapubic tenderness to palpation).   Musculoskeletal: He exhibits no deformity.   Lymphadenopathy:     He has no cervical adenopathy.   Neurological: He is alert.   Skin: Skin is warm and dry. No rash noted. He is not diaphoretic.   Psychiatric: He has a normal mood and affect.        Procedures   INVESTIGATIONS:  Results for orders placed or performed in visit on 03/06/19   UA reflex to Microscopic and Culture   Result Value Ref Range    Color Urine Yellow     Appearance Urine Clear     Glucose Urine Negative NEG^Negative mg/dL    Bilirubin Urine Negative NEG^Negative    Ketones Urine Negative NEG^Negative mg/dL    Specific Gravity Urine >1.030 (H) 1.000 - 1.030    Blood Urine Negative NEG^Negative    pH Urine 6.0 5.0 - 9.0 pH    Protein Albumin Urine Negative NEG^Negative mg/dL    Urobilinogen Urine 0.2 0.2 - 1.0 EU/dL    Nitrite Urine Negative NEG^Negative    Leukocyte Esterase Urine Negative NEG^Negative    Source Midstream Urine        ASSESSMENT AND PLAN:  Problem List Items Addressed This Visit     None      Visit Diagnoses     Dysuria    -  Primary    Relevant Medications    amoxicillin-clavulanate (AUGMENTIN) 875-125 MG tablet    Other Relevant Orders    UA reflex to Microscopic and Culture (Completed)    Infection of left eye        Nausea        Relevant Medications    ondansetron (ZOFRAN-ODT) 4 MG ODT tab        recommended sodium restriction  -- Expected clinical course discussed    -- Medications and their side effects discussed    Patient Instructions   1. Dysuria  - UA reflex to Microscopic and  Culture    START:   - amoxicillin-clavulanate (AUGMENTIN) 875-125 MG tablet; Take 1 tablet by mouth 2 times daily for 10 days  Dispense: 20 tablet; Refill: 0    2. Infection of left eye  -- treat with above.     3. Nausea  START:   - ondansetron (ZOFRAN-ODT) 4 MG ODT tab; Take 1 tablet (4 mg) by mouth every 8 hours as needed for nausea  Dispense: 30 tablet; Refill: 0      Return as needed for follow-up with Dr. Saha.    Clinic : [phone]  Appointment line: [phone]      Erasmo Saha MD  Internal Medicine  [facility]     Portions of this note were dictated using speech recognition software. The note has been proofread but errors in the text may have been overlooked. Please contact me if there are any concerns regarding the accuracy of the dictation.

## 2019-03-06 NOTE — PATIENT INSTRUCTIONS
1. Dysuria  - UA reflex to Microscopic and Culture    START:   - amoxicillin-clavulanate (AUGMENTIN) 875-125 MG tablet; Take 1 tablet by mouth 2 times daily for 10 days  Dispense: 20 tablet; Refill: 0    2. Infection of left eye  -- treat with above.     3. Nausea  START:   - ondansetron (ZOFRAN-ODT) 4 MG ODT tab; Take 1 tablet (4 mg) by mouth every 8 hours as needed for nausea  Dispense: 30 tablet; Refill: 0      Return as needed for follow-up with Dr. Saha.    Clinic : [phone]  Appointment line: [phone]

## 2019-03-06 NOTE — NURSING NOTE
"Patient presents to the clinic for eye discoloration and crusted eyes over the past 3-4 days.  Patient indicates that he has been ill this past week with fatigue, vomiting and diarrhea this past week.    Chief Complaint   Patient presents with     Eye Problem       Initial /70 (BP Location: Right arm, Patient Position: Sitting, Cuff Size: Adult Regular)   Pulse 64   Temp 98.1  F (36.7  C) (Tympanic)   Resp 16   Wt 145.8 kg (321 lb 8 oz)   BMI 50.35 kg/m   Estimated body mass index is 50.35 kg/m  as calculated from the following:    Height as of 12/21/17: 1.702 m (5' 7\").    Weight as of this encounter: 145.8 kg (321 lb 8 oz).  Medication Reconciliation: complete    Radhika Asif LPN    "

## 2019-03-07 ASSESSMENT — ASTHMA QUESTIONNAIRES: ACT_TOTALSCORE: 25

## 2019-03-07 ASSESSMENT — ANXIETY QUESTIONNAIRES: GAD7 TOTAL SCORE: 0

## 2019-05-20 ENCOUNTER — OFFICE VISIT (OUTPATIENT)
Dept: FAMILY MEDICINE | Facility: OTHER | Age: 29
End: 2019-05-20
Attending: NURSE PRACTITIONER
Payer: COMMERCIAL

## 2019-05-20 VITALS
WEIGHT: 311.2 LBS | OXYGEN SATURATION: 98 % | SYSTOLIC BLOOD PRESSURE: 120 MMHG | HEIGHT: 66 IN | HEART RATE: 86 BPM | BODY MASS INDEX: 50.01 KG/M2 | RESPIRATION RATE: 20 BRPM | DIASTOLIC BLOOD PRESSURE: 70 MMHG | TEMPERATURE: 98.2 F

## 2019-05-20 DIAGNOSIS — F90.2 ATTENTION DEFICIT HYPERACTIVITY DISORDER (ADHD), COMBINED TYPE: Primary | ICD-10-CM

## 2019-05-20 PROCEDURE — 99213 OFFICE O/P EST LOW 20 MIN: CPT | Performed by: NURSE PRACTITIONER

## 2019-05-20 PROCEDURE — G0463 HOSPITAL OUTPT CLINIC VISIT: HCPCS

## 2019-05-20 RX ORDER — DEXTROAMPHETAMINE SACCHARATE, AMPHETAMINE ASPARTATE, DEXTROAMPHETAMINE SULFATE AND AMPHETAMINE SULFATE 1.25; 1.25; 1.25; 1.25 MG/1; MG/1; MG/1; MG/1
TABLET ORAL
Qty: 98 TABLET | Refills: 0 | Status: SHIPPED | OUTPATIENT
Start: 2019-05-20 | End: 2019-05-22 | Stop reason: DRUGHIGH

## 2019-05-20 ASSESSMENT — PATIENT HEALTH QUESTIONNAIRE - PHQ9: SUM OF ALL RESPONSES TO PHQ QUESTIONS 1-9: 9

## 2019-05-20 ASSESSMENT — MIFFLIN-ST. JEOR: SCORE: 2334.34

## 2019-05-20 ASSESSMENT — PAIN SCALES - GENERAL: PAINLEVEL: NO PAIN (0)

## 2019-05-20 NOTE — NURSING NOTE
"Chief Complaint   Patient presents with     Behavioral Problem     medication         Initial /70   Pulse 86   Temp 98.2  F (36.8  C) (Temporal)   Resp 20   Ht 1.676 m (5' 6\")   Wt 141.2 kg (311 lb 3.2 oz)   SpO2 98%   BMI 50.23 kg/m   Estimated body mass index is 50.23 kg/m  as calculated from the following:    Height as of this encounter: 1.676 m (5' 6\").    Weight as of this encounter: 141.2 kg (311 lb 3.2 oz).    Medication Reconciliation: complete      Norma J. Gosselin, LPN  "

## 2019-05-20 NOTE — LETTER
My Asthma Action Plan  Name: Marija Albright   YOB: 1990  Date: 5/20/2019   My doctor: Nydia Arias NP   My clinic[facility]        My Control Medicine: Budesonide (Pulmicort) nebulizer solution -  0.5mg/2ml 1 vial in nebulizer twice daily  My Rescue Medicine: Albuterol (Proair/Ventolin/Proventil) inhaler 2 puffs into the lungs 4 times daily as needed for shortness of breath/dyspnea or wheezing   My Asthma Severity: mild persistent  Avoid your asthma triggers: animal dander, mold and emotions               GREEN ZONE   Good Control    I feel good    No cough or wheeze    Can work, sleep and play without asthma symptoms       Take your asthma control medicine every day.     1. If exercise triggers your asthma, take your rescue medication    15 minutes before exercise or sports, and    During exercise if you have asthma symptoms  2. Spacer to use with inhaler: If you have a spacer, make sure to use it with your inhaler             YELLOW ZONE Getting Worse  I have ANY of these:    I do not feel good    Cough or wheeze    Chest feels tight    Wake up at night   1. Keep taking your Green Zone medications  2. Start taking your rescue medicine:    every 20 minutes for up to 1 hour. Then every 4 hours for 24-48 hours.  3. If you stay in the Yellow Zone for more than 12-24 hours, contact your doctor.  4. If you do not return to the Green Zone in 12-24 hours or you get worse, start taking your oral steroid medicine if prescribed by your provider.           RED ZONE Medical Alert - Get Help  I have ANY of these:    I feel awful    Medicine is not helping    Breathing getting harder    Trouble walking or talking    Nose opens wide to breathe       1. Take your rescue medicine NOW  2. If your provider has prescribed an oral steroid medicine, start taking it NOW  3. Call your doctor NOW  4. If you are still in the Red Zone after 20 minutes and you have not reached your doctor:    Take  your rescue medicine again and    Call 911 or go to the emergency room right away    See your regular doctor within 2 weeks of an Emergency Room or Urgent Care visit for follow-up treatment.          Annual Reminders:  Meet with Asthma Educator,  Flu Shot in the Fall, consider Pneumonia Vaccination for patients with asthma (aged 19 and older).    Pharmacy:    [facility] - [place], [place]  [facility] - [place], [place] - [address]  [id number] - [place], [place] - [address]                      Asthma Triggers  How To Control Things That Make Your Asthma Worse    Triggers are things that make your asthma worse.  Look at the list below to help you find your triggers and what you can do about them.  You can help prevent asthma flare-ups by staying away from your triggers.      Trigger                                                          What you can do   Cigarette Smoke  Tobacco smoke can make asthma worse. Do not allow smoking in your home, car or around you.  Be sure no one smokes at a child s day care or school.  If you smoke, ask your health care provider for ways to help you quit.  Ask family members to quit too.  Ask your health care provider for a referral to Quit Plan to help you quit smoking, or call 1-529-029-PLAN.     Colds, Flu, Bronchitis  These are common triggers of asthma. Wash your hands often.  Don t touch your eyes, nose or mouth.  Get a flu shot every year.     Dust Mites  These are tiny bugs that live in cloth or carpet. They are too small to see. Wash sheets and blankets in hot water every week.   Encase pillows and mattress in dust mite proof covers.  Avoid having carpet if you can. If you have carpet, vacuum weekly.   Use a dust mask and HEPA vacuum.   Pollen and Outdoor Mold  Some people are allergic to trees, grass, or weed pollen, or molds. Try to keep your windows closed.  Limit time out doors when pollen count is high.   Ask you  health care provider about taking medicine during allergy season.     Animal Dander  Some people are allergic to skin flakes, urine or saliva from pets with fur or feathers. Keep pets with fur or feathers out of your home.    If you can t keep the pet outdoors, then keep the pet out of your bedroom.  Keep the bedroom door closed.  Keep pets off cloth furniture and away from stuffed toys.     Mice, Rats, and Cockroaches  Some people are allergic to the waste from these pests.   Cover food and garbage.  Clean up spills and food crumbs.  Store grease in the refrigerator.   Keep food out of the bedroom.   Indoor Mold  This can be a trigger if your home has high moisture. Fix leaking faucets, pipes, or other sources of water.   Clean moldy surfaces.  Dehumidify basement if it is damp and smelly.   Smoke, Strong Odors, and Sprays  These can reduce air quality. Stay away from strong odors and sprays, such as perfume, powder, hair spray, paints, smoke incense, paint, cleaning products, candles and new carpet.   Exercise or Sports  Some people with asthma have this trigger. Be active!  Ask your doctor about taking medicine before sports or exercise to prevent symptoms.    Warm up for 5-10 minutes before and after sports or exercise.     Other Triggers of Asthma  Cold air:  Cover your nose and mouth with a scarf.  Sometimes laughing or crying can be a trigger.  Some medicines and food can trigger asthma.

## 2019-05-20 NOTE — PROGRESS NOTES
Subjective     Marija Albright is a 28 year old male who presents to clinic today for the following health issues:    HPI   ADHD  Marija is here today for discussion of ADHD symptoms, possible medication management. Has had a significant history of ADHD, was on Adderrall 30mg XL twice a day for many years.       Amount of exercise or physical activity: None    Problems taking medications regularly: NA    Medication side effects: none    Diet: regular (no restrictions)      {  Patient Active Problem List   Diagnosis     CARDIOVASCULAR SCREENING; LDL GOAL LESS THAN 160     ADHD (attention deficit hyperactivity disorder)     Seasonal allergic rhinitis     GERD (gastroesophageal reflux disease)     Allergic rhinitis due to pollen     Anterior cruciate ligament sprain     Attention deficit hyperactivity disorder (ADHD)     Cervical radiculopathy     Change in stool habits     Chronic bilateral low back pain without sciatica     Chronic ethmoidal sinusitis     Chronic maxillary sinusitis     Deviated nasal septum     History of tear of ACL (anterior cruciate ligament)     Elevated IgE level     Mild intermittent asthma without complication     Morbid obesity with BMI of 45.0-49.9, adult (H)     Multiple food allergies     Pain in joint, shoulder region     Reflux esophagitis     SLAP (superior labrum from anterior to posterior) tear     Sliding hiatal hernia     Spondylolisthesis of lumbar region     Superior glenoid labrum lesion     Recurrent dislocation of left shoulder     Past Surgical History:   Procedure Laterality Date     ARTHROSCOPIC RECONSTRUCTION ANTERIOR CRUCIATE LIGAMENT      12/2008,repair     ARTHROSCOPIC RECONSTRUCTION ANTERIOR CRUCIATE LIGAMENT      2012,revision     OTHER SURGICAL HISTORY      06/2004,108409,OTHER,Left,Fracture of left distal radius-torus fracture.     OTHER SURGICAL HISTORY      1/4/2011,EVF572,ENDOVENOUS ABLATION SAPHENOUS VEIN W/ LASER,Right     OTHER SURGICAL HISTORY       1/31/2018,11848.0,[place] COLONOSCOPY BIOPSY SINGLE OR MULTIPLE     shoulder labral repair - Left  09/30/2016     TONSILLECTOMY      age 21       Social History     Tobacco Use     Smoking status: Former Smoker     Packs/day: 0.00     Types: Cigars     Smokeless tobacco: Former User     Types: Chew     Tobacco comment: Quit smoking: used it 9/20/15   Substance Use Topics     Alcohol use: Yes     Comment: 8 a week      Family History   Problem Relation Age of Onset     Heart Disease Father         Heart Disease,Cardiac disease     Other - See Comments Father         back problems     Liver Cancer Father         Liver cancer,-- Hx Alcohol abuse     Other - See Comments Mother         Multiple orthopedic problems         Current Outpatient Medications   Medication Sig Dispense Refill     albuterol (PROAIR HFA/PROVENTIL HFA/VENTOLIN HFA) 108 (90 Base) MCG/ACT inhaler Inhale 2 puffs into the lungs 4 times daily as needed for shortness of breath / dyspnea or wheezing 3 Inhaler 3     amphetamine-dextroamphetamine (ADDERALL) 5 MG tablet Take 5mg twice a day by mouth. Increase to 10mg in the am and 5 mg in the afternoon on day 7. On day 14, increase to 10mg twice a day. On day 21, increase to 15mg in the am and 10mg in the afternoon. 98 tablet 0     budesonide (PULMICORT) 0.5 MG/2ML neb solution Squirt entire vial into previously made marshal med saline bottle, mix, and irrigate each nostril until entire bottle empty.  Do this twice daily. 3 Box 11     Allergies   Allergen Reactions     Corn Syrup [Glucose]      Morphine Sulfate Nausea and Vomiting     Cannot take it in pill form, IV ok   Pancreatitis     Peanuts [Nuts]      Gold Au 198 [Gold] Rash       Reviewed and updated as needed this visit by Provider  Tobacco  Allergies  Meds  Problems  Med Hx  Surg Hx  Fam Hx  Soc Hx          Review of Systems   ROS COMP: CONSTITUTIONAL:POSITIVE  for weight gain  INTEGUMENTARY/SKIN: NEGATIVE for worrisome rashes, moles or  "lesions  EYES: NEGATIVE for vision changes or irritation  ENT/MOUTH: NEGATIVE for ear, mouth and throat problems  RESP: NEGATIVE for significant cough or SOB  CV: NEGATIVE for chest pain, palpitations or peripheral edema  GI: NEGATIVE for nausea, abdominal pain, heartburn, or change in bowel habits  : NEGATIVE for frequency, dysuria, or hematuria  MUSCULOSKELETAL: NEGATIVE for significant arthralgias or myalgia  NEURO: NEGATIVE for weakness, dizziness or paresthesias  ENDOCRINE: NEGATIVE for temperature intolerance, skin/hair changes  HEME: NEGATIVE for bleeding problems  PSYCHIATRIC: POSITIVE forconcentration difficulty, obsessive thoughts and psychomotor agitation      Objective    /70   Pulse 86   Temp 98.2  F (36.8  C) (Temporal)   Resp 20   Ht 1.676 m (5' 6\")   Wt 141.2 kg (311 lb 3.2 oz)   SpO2 98%   BMI 50.23 kg/m    Body mass index is 50.23 kg/m .  Physical Exam   GENERAL: alert, no distress and obese  RESP: lungs clear to auscultation - no rales, rhonchi or wheezes  CV: regular rate and rhythm, normal S1 S2, no S3 or S4, no murmur, click or rub, no peripheral edema and peripheral pulses strong    Diagnostic Test Results:  Labs reviewed in Epic  none         Assessment & Plan     1. Attention deficit hyperactivity disorder (ADHD), combined type  Long-standing issue for patient, was on meds in the past for this as a child and into early adulthood. Began a job that requires him to travel extensively and it became too difficult to continue on medications as he never knew where he was going to be, was not able to get refills. Stopped meds about 10 years ago. Since that time, has had difficulty keeping a job, finishing projects around the house, difficulty in relationship with wife, significant weight gain. He would like to try medication again in order to get his life back in order and lose some weight. He finds it difficult to get through every day, states that he has a million thoughts in his " "head at any given time and not enough concentration to complete any task. Will start low with 5mg BID, increase by 5mg weekly. Return to clinic in 1 month. Labs ordered today, will return at another time to complete these.   - amphetamine-dextroamphetamine (ADDERALL) 5 MG tablet; Take 5mg twice a day by mouth. Increase to 10mg in the am and 5 mg in the afternoon on day 7. On day 14, increase to 10mg twice a day. On day 21, increase to 15mg in the am and 10mg in the afternoon.  Dispense: 98 tablet; Refill: 0  - CBC W PLT No Diff; Future  - Comprehensive Metabolic Panel; Future  - Thyrotropin GH; Future  - Hemoglobin A1c; Future  - Lipid Profile; Future     BMI:   Estimated body mass index is 50.23 kg/m  as calculated from the following:    Height as of this encounter: 1.676 m (5' 6\").    Weight as of this encounter: 141.2 kg (311 lb 3.2 oz).   Weight management plan: Discussed healthy diet and exercise guidelines. Consider bariatric referral.     Nydia Arias NP  [facility] AND \[facility] [place] [facility]\""      "

## 2019-05-21 ENCOUNTER — TRANSFERRED RECORDS (OUTPATIENT)
Dept: HEALTH INFORMATION MANAGEMENT | Facility: OTHER | Age: 29
End: 2019-05-21

## 2019-05-21 ASSESSMENT — ASTHMA QUESTIONNAIRES: ACT_TOTALSCORE: 21

## 2019-05-22 DIAGNOSIS — F90.2 ATTENTION DEFICIT HYPERACTIVITY DISORDER (ADHD), COMBINED TYPE: Primary | ICD-10-CM

## 2019-05-22 DIAGNOSIS — F90.2 ATTENTION DEFICIT HYPERACTIVITY DISORDER (ADHD), COMBINED TYPE: ICD-10-CM

## 2019-05-22 RX ORDER — DEXTROAMPHETAMINE SACCHARATE, AMPHETAMINE ASPARTATE, DEXTROAMPHETAMINE SULFATE AND AMPHETAMINE SULFATE 1.25; 1.25; 1.25; 1.25 MG/1; MG/1; MG/1; MG/1
10 TABLET ORAL 2 TIMES DAILY
Qty: 28 TABLET | Refills: 0 | Status: SHIPPED | OUTPATIENT
Start: 2019-06-05 | End: 2019-05-24 | Stop reason: DRUGHIGH

## 2019-05-22 RX ORDER — DEXTROAMPHETAMINE SACCHARATE, AMPHETAMINE ASPARTATE, DEXTROAMPHETAMINE SULFATE AND AMPHETAMINE SULFATE 1.25; 1.25; 1.25; 1.25 MG/1; MG/1; MG/1; MG/1
TABLET ORAL
Qty: 35 TABLET | Refills: 0 | Status: SHIPPED | OUTPATIENT
Start: 2019-06-12 | End: 2019-05-24 | Stop reason: DRUGHIGH

## 2019-05-22 RX ORDER — DEXTROAMPHETAMINE SACCHARATE, AMPHETAMINE ASPARTATE, DEXTROAMPHETAMINE SULFATE AND AMPHETAMINE SULFATE 1.25; 1.25; 1.25; 1.25 MG/1; MG/1; MG/1; MG/1
5 TABLET ORAL 2 TIMES DAILY
Qty: 14 TABLET | Refills: 0 | Status: SHIPPED | OUTPATIENT
Start: 2019-05-22 | End: 2019-10-11

## 2019-05-22 RX ORDER — DEXTROAMPHETAMINE SACCHARATE, AMPHETAMINE ASPARTATE, DEXTROAMPHETAMINE SULFATE AND AMPHETAMINE SULFATE 1.25; 1.25; 1.25; 1.25 MG/1; MG/1; MG/1; MG/1
TABLET ORAL
Qty: 21 TABLET | Refills: 0 | Status: SHIPPED | OUTPATIENT
Start: 2019-05-29 | End: 2019-05-24 | Stop reason: DRUGHIGH

## 2019-05-22 RX ORDER — DEXTROAMPHETAMINE SACCHARATE, AMPHETAMINE ASPARTATE, DEXTROAMPHETAMINE SULFATE AND AMPHETAMINE SULFATE 1.25; 1.25; 1.25; 1.25 MG/1; MG/1; MG/1; MG/1
TABLET ORAL
Qty: 98 TABLET | Refills: 0 | Status: CANCELLED | OUTPATIENT
Start: 2019-05-22

## 2019-05-22 NOTE — TELEPHONE ENCOUNTER
Adderall needs to be three different Rx's printed.  Pharmacy won't fill the way it is written. [facility]. Call patient to  when done.  Norma J. Gosselin LPN......  5/22/2019   1:57 PM

## 2019-05-23 NOTE — TELEPHONE ENCOUNTER
Spoke with patient after verifying last name and birth date, informed him of Nydia Arias NP note.   Belkys Villegas LPN 5/23/2019 9:01 AM

## 2019-05-24 DIAGNOSIS — F90.2 ATTENTION DEFICIT HYPERACTIVITY DISORDER (ADHD), COMBINED TYPE: Primary | ICD-10-CM

## 2019-05-24 RX ORDER — DEXTROAMPHETAMINE SACCHARATE, AMPHETAMINE ASPARTATE, DEXTROAMPHETAMINE SULFATE AND AMPHETAMINE SULFATE 3.75; 3.75; 3.75; 3.75 MG/1; MG/1; MG/1; MG/1
15 TABLET ORAL EVERY MORNING
Qty: 7 TABLET | Refills: 0 | Status: SHIPPED | OUTPATIENT
Start: 2019-05-24 | End: 2019-05-24 | Stop reason: DRUGHIGH

## 2019-05-24 RX ORDER — DEXTROAMPHETAMINE SACCHARATE, AMPHETAMINE ASPARTATE, DEXTROAMPHETAMINE SULFATE AND AMPHETAMINE SULFATE 3.75; 3.75; 3.75; 3.75 MG/1; MG/1; MG/1; MG/1
15 TABLET ORAL EVERY MORNING
Qty: 7 TABLET | Refills: 0 | Status: SHIPPED | OUTPATIENT
Start: 2019-06-14 | End: 2019-05-24 | Stop reason: DRUGHIGH

## 2019-05-24 RX ORDER — DEXTROAMPHETAMINE SACCHARATE, AMPHETAMINE ASPARTATE, DEXTROAMPHETAMINE SULFATE AND AMPHETAMINE SULFATE 2.5; 2.5; 2.5; 2.5 MG/1; MG/1; MG/1; MG/1
10 TABLET ORAL 2 TIMES DAILY
Qty: 21 TABLET | Refills: 0 | Status: SHIPPED | OUTPATIENT
Start: 2019-06-07 | End: 2019-10-11

## 2019-05-24 RX ORDER — DEXTROAMPHETAMINE SACCHARATE, AMPHETAMINE ASPARTATE, DEXTROAMPHETAMINE SULFATE AND AMPHETAMINE SULFATE 3.75; 3.75; 3.75; 3.75 MG/1; MG/1; MG/1; MG/1
15 TABLET ORAL EVERY MORNING
Qty: 7 TABLET | Refills: 0 | Status: SHIPPED | OUTPATIENT
Start: 2019-06-21 | End: 2019-07-05

## 2019-05-24 RX ORDER — DEXTROAMPHETAMINE SACCHARATE, AMPHETAMINE ASPARTATE, DEXTROAMPHETAMINE SULFATE AND AMPHETAMINE SULFATE 2.5; 2.5; 2.5; 2.5 MG/1; MG/1; MG/1; MG/1
TABLET ORAL
Qty: 11 TABLET | Refills: 0 | Status: SHIPPED | OUTPATIENT
Start: 2019-05-31 | End: 2019-07-05

## 2019-05-24 NOTE — PROGRESS NOTES
Called and spoke to pharmacist, insurance allows only for 2 tablets of any given strength to be filled per day. Scripts rewritten to allow for this. Further scripts written and canceled for error in start date.

## 2019-06-17 ENCOUNTER — TELEPHONE (OUTPATIENT)
Dept: INTERNAL MEDICINE | Facility: OTHER | Age: 29
End: 2019-06-17

## 2019-06-17 NOTE — TELEPHONE ENCOUNTER
Called patient and verified name and date of birth. He state he would like a refill of his adderall. Informed him of need to be seen for this medication. Offered to send to schedule and he states he will call back at a later time.    Loraine Jolly LPN on 6/17/2019 at 10:52 AM

## 2019-06-17 NOTE — TELEPHONE ENCOUNTER
Patient would like a medication refill without being seen. If not possible how soon could he get a work in for med check?    Harika Davis on 6/17/2019 at 10:28 AM

## 2019-07-03 ENCOUNTER — TELEPHONE (OUTPATIENT)
Dept: FAMILY MEDICINE | Facility: OTHER | Age: 29
End: 2019-07-03

## 2019-07-03 NOTE — TELEPHONE ENCOUNTER
Patient has an appointment with you for a medication renewal on 7/8/2019 at 2:15 but would like to be seen on 7/5/2019 if at all possible    Please call with a work in appointment or to advise    Thank you

## 2019-07-05 ENCOUNTER — OFFICE VISIT (OUTPATIENT)
Dept: FAMILY MEDICINE | Facility: OTHER | Age: 29
End: 2019-07-05
Attending: NURSE PRACTITIONER
Payer: COMMERCIAL

## 2019-07-05 VITALS
HEIGHT: 66 IN | DIASTOLIC BLOOD PRESSURE: 88 MMHG | OXYGEN SATURATION: 96 % | TEMPERATURE: 97.7 F | RESPIRATION RATE: 18 BRPM | WEIGHT: 308.6 LBS | SYSTOLIC BLOOD PRESSURE: 134 MMHG | HEART RATE: 82 BPM | BODY MASS INDEX: 49.6 KG/M2

## 2019-07-05 DIAGNOSIS — Z62.810 HISTORY OF SEXUAL ABUSE IN CHILDHOOD: ICD-10-CM

## 2019-07-05 DIAGNOSIS — F41.1 GENERALIZED ANXIETY DISORDER: ICD-10-CM

## 2019-07-05 DIAGNOSIS — F90.2 ATTENTION DEFICIT HYPERACTIVITY DISORDER (ADHD), COMBINED TYPE: ICD-10-CM

## 2019-07-05 PROCEDURE — 99213 OFFICE O/P EST LOW 20 MIN: CPT | Performed by: NURSE PRACTITIONER

## 2019-07-05 PROCEDURE — G0463 HOSPITAL OUTPT CLINIC VISIT: HCPCS

## 2019-07-05 RX ORDER — DEXTROAMPHETAMINE SACCHARATE, AMPHETAMINE ASPARTATE, DEXTROAMPHETAMINE SULFATE AND AMPHETAMINE SULFATE 3.75; 3.75; 3.75; 3.75 MG/1; MG/1; MG/1; MG/1
15 TABLET ORAL 2 TIMES DAILY
Qty: 60 TABLET | Refills: 0 | Status: SHIPPED | OUTPATIENT
Start: 2019-07-05 | End: 2019-10-11

## 2019-07-05 ASSESSMENT — PAIN SCALES - GENERAL: PAINLEVEL: NO PAIN (0)

## 2019-07-05 ASSESSMENT — MIFFLIN-ST. JEOR: SCORE: 2322.55

## 2019-07-05 NOTE — NURSING NOTE
"Chief Complaint   Patient presents with     RECHECK     ADHD         Initial /88   Pulse 82   Temp 97.7  F (36.5  C) (Temporal)   Resp 18   Ht 1.676 m (5' 6\")   Wt 140 kg (308 lb 9.6 oz)   SpO2 96%   BMI 49.81 kg/m   Estimated body mass index is 49.81 kg/m  as calculated from the following:    Height as of this encounter: 1.676 m (5' 6\").    Weight as of this encounter: 140 kg (308 lb 9.6 oz).    Medication Reconciliation: complete      Norma J. Gosselin, LPN  "
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
## 2019-10-11 ENCOUNTER — OFFICE VISIT (OUTPATIENT)
Dept: INTERNAL MEDICINE | Facility: OTHER | Age: 29
End: 2019-10-11
Attending: INTERNAL MEDICINE
Payer: COMMERCIAL

## 2019-10-11 VITALS
RESPIRATION RATE: 16 BRPM | HEART RATE: 60 BPM | SYSTOLIC BLOOD PRESSURE: 120 MMHG | DIASTOLIC BLOOD PRESSURE: 72 MMHG | WEIGHT: 315 LBS | BODY MASS INDEX: 50.62 KG/M2 | TEMPERATURE: 96.6 F | HEIGHT: 66 IN

## 2019-10-11 DIAGNOSIS — F90.2 ATTENTION DEFICIT HYPERACTIVITY DISORDER (ADHD), COMBINED TYPE: Primary | ICD-10-CM

## 2019-10-11 DIAGNOSIS — J45.20 MILD INTERMITTENT ASTHMA WITHOUT COMPLICATION: ICD-10-CM

## 2019-10-11 PROBLEM — M24.412 RECURRENT DISLOCATION OF LEFT SHOULDER: Status: RESOLVED | Noted: 2018-10-29 | Resolved: 2019-10-11

## 2019-10-11 PROBLEM — J32.0 CHRONIC MAXILLARY SINUSITIS: Status: RESOLVED | Noted: 2017-08-09 | Resolved: 2019-10-11

## 2019-10-11 PROBLEM — Z87.828 HISTORY OF TEAR OF ACL (ANTERIOR CRUCIATE LIGAMENT): Status: RESOLVED | Noted: 2017-07-14 | Resolved: 2019-10-11

## 2019-10-11 PROBLEM — K21.00 REFLUX ESOPHAGITIS: Status: RESOLVED | Noted: 2018-02-05 | Resolved: 2019-10-11

## 2019-10-11 PROBLEM — J32.2 CHRONIC ETHMOIDAL SINUSITIS: Status: RESOLVED | Noted: 2017-08-09 | Resolved: 2019-10-11

## 2019-10-11 PROBLEM — Z62.810 HISTORY OF SEXUAL ABUSE IN CHILDHOOD: Status: RESOLVED | Noted: 2019-07-05 | Resolved: 2019-10-11

## 2019-10-11 PROCEDURE — 99214 OFFICE O/P EST MOD 30 MIN: CPT | Performed by: INTERNAL MEDICINE

## 2019-10-11 PROCEDURE — G0463 HOSPITAL OUTPT CLINIC VISIT: HCPCS

## 2019-10-11 RX ORDER — IPRATROPIUM BROMIDE AND ALBUTEROL SULFATE 2.5; .5 MG/3ML; MG/3ML
1 SOLUTION RESPIRATORY (INHALATION) EVERY 6 HOURS PRN
Qty: 3 BOX | Refills: 3 | Status: SHIPPED | OUTPATIENT
Start: 2019-10-11 | End: 2021-05-21

## 2019-10-11 RX ORDER — DEXTROAMPHETAMINE SACCHARATE, AMPHETAMINE ASPARTATE, DEXTROAMPHETAMINE SULFATE AND AMPHETAMINE SULFATE 3.75; 3.75; 3.75; 3.75 MG/1; MG/1; MG/1; MG/1
15 TABLET ORAL 2 TIMES DAILY
Qty: 60 TABLET | Refills: 0 | Status: SHIPPED | OUTPATIENT
Start: 2019-10-11 | End: 2020-05-27

## 2019-10-11 RX ORDER — DEXTROAMPHETAMINE SACCHARATE, AMPHETAMINE ASPARTATE, DEXTROAMPHETAMINE SULFATE AND AMPHETAMINE SULFATE 3.75; 3.75; 3.75; 3.75 MG/1; MG/1; MG/1; MG/1
15 TABLET ORAL 2 TIMES DAILY
Qty: 60 TABLET | Refills: 0 | Status: SHIPPED | OUTPATIENT
Start: 2019-12-10 | End: 2020-05-27

## 2019-10-11 RX ORDER — ALBUTEROL SULFATE 90 UG/1
2 AEROSOL, METERED RESPIRATORY (INHALATION) 4 TIMES DAILY PRN
Qty: 3 INHALER | Refills: 3 | Status: SHIPPED | OUTPATIENT
Start: 2019-10-11 | End: 2021-05-21

## 2019-10-11 RX ORDER — DEXTROAMPHETAMINE SACCHARATE, AMPHETAMINE ASPARTATE, DEXTROAMPHETAMINE SULFATE AND AMPHETAMINE SULFATE 3.75; 3.75; 3.75; 3.75 MG/1; MG/1; MG/1; MG/1
15 TABLET ORAL 2 TIMES DAILY
Qty: 60 TABLET | Refills: 0 | Status: SHIPPED | OUTPATIENT
Start: 2019-11-10 | End: 2020-05-27

## 2019-10-11 ASSESSMENT — ENCOUNTER SYMPTOMS
HEMATURIA: 0
PALPITATIONS: 0
BRUISES/BLEEDS EASILY: 0
SHORTNESS OF BREATH: 0
DIARRHEA: 0
ABDOMINAL PAIN: 0
WHEEZING: 0
LIGHT-HEADEDNESS: 0
DYSURIA: 0
FEVER: 0
NAUSEA: 0
ARTHRALGIAS: 0
CONFUSION: 0
DIZZINESS: 0
CHILLS: 0
MYALGIAS: 0
COUGH: 0
AGITATION: 0
VOMITING: 0
WOUND: 0

## 2019-10-11 ASSESSMENT — PAIN SCALES - GENERAL: PAINLEVEL: MODERATE PAIN (4)

## 2019-10-11 ASSESSMENT — MIFFLIN-ST. JEOR: SCORE: 2435.95

## 2019-10-11 NOTE — PATIENT INSTRUCTIONS
Medications refilled.     Glad your adderall has helped with focus and your job.     Glad your asthma is doing well.     Return in approximately 3 month(s), or sooner as needed for follow-up with Dr. Saha.  -- med refills.     Clinic : [phone]  Appointment line: [phone]

## 2019-10-11 NOTE — PROGRESS NOTES
Nursing Notes:   Fátima Rapp, LPN  10/11/2019  3:39 PM  Signed  Chief Complaint   Patient presents with     Recheck Medication   Patient presents to the clinic today for a med recheck.  Medication Reconciliation: completed   Fátima Rapp LPN  10/11/2019 3:24 PM   Nursing note reviewed with patient.  Accuracy and completeness verified.   Mr. Albright is a 28 y [place] links ear old male who:  Patient presents with:  Recheck Medication      ICD-10-CM    1. Attention deficit hyperactivity disorder (ADHD), combined type F90.2 amphetamine-dextroamphetamine (ADDERALL) 15 MG tablet     amphetamine-dextroamphetamine (ADDERALL) 15 MG tablet     amphetamine-dextroamphetamine (ADDERALL) 15 MG tablet   2. Mild intermittent asthma without complication J45.20 albuterol (PROAIR HFA/PROVENTIL HFA/VENTOLIN HFA) 108 (90 Base) MCG/ACT inhaler     ipratropium - albuterol 0.5 mg/2.5 mg/3 mL (DUONEB) 0.5-2.5 (3) MG/3ML neb solution     order for DME     HPI  Patient presents for medication management follow-up appointment.    He has been to a couple different providers over the years and has gone through psychiatry management of his medications.  He was off Adderall for about 3 years and then slowly was having dose titration up to his current dosing of 15 mg twice a day.  He has been doing very well with this.  He can start a project and finish a project he does not have multiple projects running at the same time anymore and none of them being completed.  He is very happy with this.  He has been much easier for him to get his work done.  He would like to continue with his current dosing of 15 mg twice daily.  Med list updated.  Prescriptions refilled.    Mild intermittent asthma, has some troubles in the winter.  He would like a refill of his albuterol inhaler and wants to get a refill of his DuoNeb nebulizer solution and need, his old one broke.  Prescriptions printed.    Functional Capacity: > 4 METS.   Review of Systems    Constitutional: Negative for chills and fever.   HENT: Negative for congestion and hearing loss.    Eyes: Negative for visual disturbance.   Respiratory: Negative for cough, shortness of breath and wheezing.    Cardiovascular: Negative for chest pain and palpitations.   Gastrointestinal: Negative for abdominal pain, diarrhea, nausea and vomiting.   Endocrine: Negative for cold intolerance and heat intolerance.   Genitourinary: Negative for dysuria and hematuria.   Musculoskeletal: Negative for arthralgias and myalgias.   Skin: Negative for rash and wound.   Allergic/Immunologic: Negative for immunocompromised state.   Neurological: Negative for dizziness and light-headedness.   Hematological: Does not bruise/bleed easily.   Psychiatric/Behavioral: Negative for agitation and confusion.        + ADHD is well treated with current adderall dosing.        Problem List/PMH: reviewed in EMR, and made relevant updates today.  Medications: reviewed in EMR, and made relevant updates today.  Allergies: reviewed in EMR, and made relevant updates today.  Allergies   Allergen Reactions     Corn Syrup [Glucose]      Morphine Sulfate Nausea and Vomiting     Cannot take it in pill form, IV ok   Pancreatitis     Peanuts [Nuts]      Gold Au 198 [Gold] Rash        Current Outpatient Medications   Medication Sig Dispense Refill     albuterol (PROAIR HFA/PROVENTIL HFA/VENTOLIN HFA) 108 (90 Base) MCG/ACT inhaler Inhale 2 puffs into the lungs 4 times daily as needed for shortness of breath / dyspnea or wheezing --- use Generic 3 Inhaler 3     amphetamine-dextroamphetamine (ADDERALL) 15 MG tablet Take 1 tablet (15 mg) by mouth 2 times daily 60 tablet 0     [START ON 11/10/2019] amphetamine-dextroamphetamine (ADDERALL) 15 MG tablet Take 1 tablet (15 mg) by mouth 2 times daily 60 tablet 0     [START ON 12/10/2019] amphetamine-dextroamphetamine (ADDERALL) 15 MG tablet Take 1 tablet (15 mg) by mouth 2 times daily 60 tablet 0     ipratropium -  "albuterol 0.5 mg/2.5 mg/3 mL (DUONEB) 0.5-2.5 (3) MG/3ML neb solution Take 1 vial (3 mLs) by nebulization every 6 hours as needed for shortness of breath / dyspnea or wheezing 3 Box 3     order for DME Equipment being ordered: Nebulizer and supplies 1 each 0     budesonide (PULMICORT) 0.5 MG/2ML neb solution Squirt entire vial into previously made marshal med saline bottle, mix, and irrigate each nostril until entire bottle empty.  Do this twice daily. 3 Box 11        I reviewed family and social history and made relevant updates today.  Social History     Tobacco Use     Smoking status: Former Smoker     Packs/day: 0.00     Types: Cigars     Smokeless tobacco: Former User     Types: Chew     Tobacco comment: Quit smoking: used it 9/20/15   Substance Use Topics     Alcohol use: Yes     Comment: 8 a week      Drug use: No      Family History   Problem Relation Age of Onset     Heart Disease Father         Heart Disease,Cardiac disease     Other - See Comments Father         back problems     Liver Cancer Father         Liver cancer,-- Hx Alcohol abuse     Chronic Obstructive Pulmonary Disease Father         + oxygen dependent --- hx tobacco abuse since 13 yo     Other - See Comments Mother         Multiple orthopedic problems       EXAM:   Vitals:    10/11/19 1525   BP: 120/72   BP Location: Right arm   Patient Position: Sitting   Cuff Size: Adult Large   Pulse: 60   Resp: 16   Temp: 96.6  F (35.9  C)   TempSrc: Tympanic   Weight: (!) 151.3 kg (333 lb 9.6 oz)   Height: 1.676 m (5' 6\")       Current Pain Score: Moderate Pain (4)     BP Readings from Last 3 Encounters:   10/11/19 120/72   07/05/19 134/88   05/20/19 120/70      Wt Readings from Last 3 Encounters:   10/11/19 (!) 151.3 kg (333 lb 9.6 oz)   07/05/19 140 kg (308 lb 9.6 oz)   05/20/19 141.2 kg (311 lb 3.2 oz)      Estimated body mass index is 53.84 kg/m  as calculated from the following:    Height as of this encounter: 1.676 m (5' 6\").    Weight as of this " encounter: 151.3 kg (333 lb 9.6 oz).     Physical Exam  Constitutional:       General: He is not in acute distress.     Appearance: He is well-developed. He is not diaphoretic.   HENT:      Head: Normocephalic and atraumatic.   Eyes:      General: No scleral icterus.     Conjunctiva/sclera: Conjunctivae normal.   Neck:      Musculoskeletal: Neck supple.   Cardiovascular:      Rate and Rhythm: Normal rate and regular rhythm.   Pulmonary:      Effort: Pulmonary effort is normal.      Breath sounds: Normal breath sounds.   Abdominal:      Palpations: Abdomen is soft.      Tenderness: There is no tenderness.   Musculoskeletal:         General: No deformity.   Lymphadenopathy:      Cervical: No cervical adenopathy.   Skin:     General: Skin is warm and dry.      Findings: No rash.   Neurological:      General: No focal deficit present.      Mental Status: He is alert.   Psychiatric:         Mood and Affect: Mood normal.         Behavior: Behavior normal.          Procedures   INVESTIGATIONS:  Results for orders placed or performed in visit on 03/06/19   UA reflex to Microscopic and Culture   Result Value Ref Range    Color Urine Yellow     Appearance Urine Clear     Glucose Urine Negative NEG^Negative mg/dL    Bilirubin Urine Negative NEG^Negative    Ketones Urine Negative NEG^Negative mg/dL    Specific Gravity Urine >1.030 (H) 1.000 - 1.030    Blood Urine Negative NEG^Negative    pH Urine 6.0 5.0 - 9.0 pH    Protein Albumin Urine Negative NEG^Negative mg/dL    Urobilinogen Urine 0.2 0.2 - 1.0 EU/dL    Nitrite Urine Negative NEG^Negative    Leukocyte Esterase Urine Negative NEG^Negative    Source Midstream Urine        ASSESSMENT AND PLAN:  Problem List Items Addressed This Visit        Respiratory    Mild intermittent asthma without complication    Relevant Medications    albuterol (PROAIR HFA/PROVENTIL HFA/VENTOLIN HFA) 108 (90 Base) MCG/ACT inhaler    ipratropium - albuterol 0.5 mg/2.5 mg/3 mL (DUONEB) 0.5-2.5 (3)  MG/3ML neb solution    order for DME       Behavioral    ADHD (attention deficit hyperactivity disorder) - Primary    Relevant Medications    amphetamine-dextroamphetamine (ADDERALL) 15 MG tablet    amphetamine-dextroamphetamine (ADDERALL) 15 MG tablet (Start on 11/10/2019)    amphetamine-dextroamphetamine (ADDERALL) 15 MG tablet (Start on 12/10/2019)        reviewed diet, exercise and weight control, recommended sodium restriction  -- Expected clinical course discussed    -- Medications and their side effects discussed    Patient Instructions   Medications refilled.     Glad your adderall has helped with focus and your job.     Glad your asthma is doing well.     Return in approximately 3 month(s), or sooner as needed for follow-up with Dr. Saha.  -- med refills.     Clinic : [phone]  Appointment line: [phone]     Erasmo Saha MD  Internal Medicine  [facility]     Portions of this note were dictated using speech recognition software. The note has been proofread but errors in the text may have been overlooked. Please contact me if there are any concerns regarding the accuracy of the dictation.

## 2019-10-11 NOTE — NURSING NOTE
Chief Complaint   Patient presents with     Recheck Medication   Patient presents to the clinic today for a med recheck.  Medication Reconciliation: completed   Fátima Rapp LPN  10/11/2019 3:24 PM

## 2019-10-12 ASSESSMENT — ASTHMA QUESTIONNAIRES: ACT_TOTALSCORE: 22

## 2020-01-06 NOTE — PROGRESS NOTES
"Nursing Notes:   Radhika Asif LPN  1/14/2020 11:56 AM  Signed  Patient presents to the clinic for medication management.  Last administration of Adderall was today around 1000.  Patient would like to avoid signing a contract today if possible.      Chief Complaint   Patient presents with     Recheck Medication       Initial /76 (BP Location: Right arm, Patient Position: Sitting, Cuff Size: Adult Large)   Pulse 72   Temp 96.6  F (35.9  C) (Tympanic)   Resp 18   Wt 149.5 kg (329 lb 8 oz)   SpO2 96%   BMI 53.18 kg/m    Estimated body mass index is 53.18 kg/m  as calculated from the following:    Height as of 10/11/19: 1.676 m (5' 6\").    Weight as of this encounter: 149.5 kg (329 lb 8 oz).  Medication Reconciliation: complete    Radhika Asif LPN          Nursing note reviewed with patient.  Accuracy and completeness verified.   Mr. Albright is a 29 year old male who:  Patient presents with:  Recheck Medication      ICD-10-CM    1. Attention deficit hyperactivity disorder (ADHD), combined type F90.2 REVIEW OF HEALTH MAINTENANCE PROTOCOL ORDERS     lisdexamfetamine (VYVANSE) 30 MG capsule     lisdexamfetamine (VYVANSE) 30 MG capsule     lisdexamfetamine (VYVANSE) 30 MG capsule   2. Mild intermittent asthma without complication J45.20    3. Morbid obesity with BMI of 45.0-49.9, adult (H) E66.01     Z68.42      HPI  As been on ADHD meds since about age 10. Was on 80 mg daily of Adderall.   ER Adderall - didn't absorb / work, but the short acting meds helped. Kept trying to simplify his regimen.     Was off from about age 24 - 6 months ago. Was off for 4-5 years. Had more problems with attention, couldn't focus on tasks.     Takes 15 mg BID - most of the time.    Has been doing okay with 15 mg in AM and possibly even lower dose of 5 mg in the afternoon.     --Plan to switch over to Vyvanse today.  Uncertain if this is covered through insurance.  We will start with 30 mg daily.  If needed we can reduce " dosing.    + still weight lifting, losing inches.     Functional Capacity: > 4 METS.   Review of Systems   Constitutional: Negative for chills and fever.   HENT: Negative for congestion and hearing loss.    Eyes: Negative for visual disturbance.   Respiratory: Negative for cough, shortness of breath and wheezing.    Cardiovascular: Negative for chest pain and palpitations.   Gastrointestinal: Negative for abdominal pain, diarrhea, nausea and vomiting.   Endocrine: Negative for cold intolerance and heat intolerance.   Genitourinary: Negative for dysuria and hematuria.   Musculoskeletal: Negative for arthralgias and myalgias.   Skin: Negative for rash and wound.   Allergic/Immunologic: Negative for immunocompromised state.   Neurological: Negative for dizziness and light-headedness.   Hematological: Does not bruise/bleed easily.   Psychiatric/Behavioral: Negative for agitation and confusion.        + ADHD has been well treated with adderall.... changed to Vyvanse 1/14/2020.        Problem List/PMH: reviewed in EMR, and made relevant updates today.  Medications: reviewed in EMR, and made relevant updates today.  Allergies: reviewed in EMR, and made relevant updates today.  I reviewed family and social history and made relevant updates today.  Social History     Tobacco Use     Smoking status: Former Smoker     Packs/day: 0.00     Types: Cigars     Smokeless tobacco: Former User     Types: Chew     Tobacco comment: Quit smoking: used it 9/20/15   Substance Use Topics     Alcohol use: Not Currently     Drug use: No      Family History   Problem Relation Age of Onset     Heart Disease Father         Heart Disease,Cardiac disease     Other - See Comments Father         back problems     Liver Cancer Father         Liver cancer,-- Hx Alcohol abuse     Chronic Obstructive Pulmonary Disease Father         + oxygen dependent --- hx tobacco abuse since 15 yo     Other - See Comments Mother         Multiple orthopedic problems  "      EXAM:   Vitals:    01/14/20 1142   BP: 134/76   BP Location: Right arm   Patient Position: Sitting   Cuff Size: Adult Large   Pulse: 72   Resp: 18   Temp: 96.6  F (35.9  C)   TempSrc: Tympanic   SpO2: 96%   Weight: 149.5 kg (329 lb 8 oz)       Current Pain Score: Severe Pain (7)     BP Readings from Last 3 Encounters:   01/14/20 134/76   10/11/19 120/72   07/05/19 134/88      Wt Readings from Last 3 Encounters:   01/14/20 149.5 kg (329 lb 8 oz)   10/11/19 (!) 151.3 kg (333 lb 9.6 oz)   07/05/19 140 kg (308 lb 9.6 oz)      Estimated body mass index is 53.18 kg/m  as calculated from the following:    Height as of 10/11/19: 1.676 m (5' 6\").    Weight as of this encounter: 149.5 kg (329 lb 8 oz).     Physical Exam  Constitutional:       General: He is not in acute distress.     Appearance: He is well-developed. He is not diaphoretic.   HENT:      Head: Normocephalic and atraumatic.   Eyes:      General: No scleral icterus.     Conjunctiva/sclera: Conjunctivae normal.   Neck:      Musculoskeletal: Neck supple.   Cardiovascular:      Rate and Rhythm: Normal rate and regular rhythm.   Pulmonary:      Effort: Pulmonary effort is normal.      Breath sounds: Normal breath sounds.   Abdominal:      Palpations: Abdomen is soft.      Tenderness: There is no abdominal tenderness.      Comments: Abdominal obesity   Musculoskeletal:         General: No deformity.   Lymphadenopathy:      Cervical: No cervical adenopathy.   Skin:     General: Skin is warm and dry.      Findings: No rash.   Neurological:      General: No focal deficit present.      Mental Status: He is alert.   Psychiatric:         Mood and Affect: Mood normal.         Behavior: Behavior normal.         Procedures   INVESTIGATIONS:  No results found for any visits on 01/14/20.    ASSESSMENT AND PLAN:  Problem List Items Addressed This Visit        Respiratory    Mild intermittent asthma without complication       Digestive    Morbid obesity with BMI of " 45.0-49.9, adult (H)    Relevant Medications    lisdexamfetamine (VYVANSE) 30 MG capsule    lisdexamfetamine (VYVANSE) 30 MG capsule (Start on 2/14/2020)    lisdexamfetamine (VYVANSE) 30 MG capsule (Start on 3/16/2020)       Behavioral    ADHD (attention deficit hyperactivity disorder) - Primary    Relevant Medications    lisdexamfetamine (VYVANSE) 30 MG capsule    lisdexamfetamine (VYVANSE) 30 MG capsule (Start on 2/14/2020)    lisdexamfetamine (VYVANSE) 30 MG capsule (Start on 3/16/2020)    Other Relevant Orders    REVIEW OF HEALTH MAINTENANCE PROTOCOL ORDERS (Completed)        reviewed diet, exercise and weight control  -- Expected clinical course discussed    -- Medications and their side effects discussed    Patient Instructions   1. Attention deficit hyperactivity disorder (ADHD), combined type  - REVIEW OF HEALTH MAINTENANCE PROTOCOL ORDERS    Transition off the Adderall.....     START:   - lisdexamfetamine (VYVANSE) 30 MG capsule; Take 1 capsule (30 mg) by mouth daily --- start when out of Adderall ---  Dispense: 30 capsule; Refill: 0  - lisdexamfetamine (VYVANSE) 30 MG capsule; Take 1 capsule (30 mg) by mouth daily  Dispense: 30 capsule; Refill: 0  - lisdexamfetamine (VYVANSE) 30 MG capsule; Take 1 capsule (30 mg) by mouth daily  Dispense: 30 capsule; Refill: 0    2. Mild intermittent asthma without complication  -- glad your breathing is a little better today.     3. Morbid obesity with BMI of 45.0-49.9, adult (H)    To help with weight loss and improve blood sugar control....    -- Try to avoid Carbohydrates as much as possible -- breads, pasta, baked goods, cakes, oatmeal, cold cereal, potatoes.   These are turned to sugar in one metabolic conversion, cause insulin secretion and increased fat deposition / weight gain.      -- Eat more lean meats, proteins, eggs, nuts, vegetables.         Erasmo Saha MD   Internal Medicine  [facility]     Portions of this note were dictated  using speech recognition software. The note has been proofread but errors in the text may have been overlooked. Please contact me if there are any concerns regarding the accuracy of the dictation.

## 2020-01-14 ENCOUNTER — OFFICE VISIT (OUTPATIENT)
Dept: INTERNAL MEDICINE | Facility: OTHER | Age: 30
End: 2020-01-14
Attending: INTERNAL MEDICINE
Payer: COMMERCIAL

## 2020-01-14 VITALS
HEART RATE: 72 BPM | WEIGHT: 315 LBS | OXYGEN SATURATION: 96 % | DIASTOLIC BLOOD PRESSURE: 76 MMHG | RESPIRATION RATE: 18 BRPM | SYSTOLIC BLOOD PRESSURE: 134 MMHG | BODY MASS INDEX: 53.18 KG/M2 | TEMPERATURE: 96.6 F

## 2020-01-14 DIAGNOSIS — F90.2 ATTENTION DEFICIT HYPERACTIVITY DISORDER (ADHD), COMBINED TYPE: Primary | ICD-10-CM

## 2020-01-14 DIAGNOSIS — E66.01 MORBID OBESITY WITH BMI OF 45.0-49.9, ADULT (H): ICD-10-CM

## 2020-01-14 DIAGNOSIS — J45.20 MILD INTERMITTENT ASTHMA WITHOUT COMPLICATION: ICD-10-CM

## 2020-01-14 PROBLEM — G89.29 CHRONIC PAIN OF LEFT WRIST: Status: ACTIVE | Noted: 2019-07-19

## 2020-01-14 PROBLEM — M25.532 CHRONIC PAIN OF LEFT WRIST: Status: ACTIVE | Noted: 2019-07-19

## 2020-01-14 PROCEDURE — G0463 HOSPITAL OUTPT CLINIC VISIT: HCPCS

## 2020-01-14 PROCEDURE — 99214 OFFICE O/P EST MOD 30 MIN: CPT | Performed by: INTERNAL MEDICINE

## 2020-01-14 RX ORDER — DEXTROAMPHETAMINE SACCHARATE, AMPHETAMINE ASPARTATE, DEXTROAMPHETAMINE SULFATE AND AMPHETAMINE SULFATE 3.75; 3.75; 3.75; 3.75 MG/1; MG/1; MG/1; MG/1
15 TABLET ORAL 2 TIMES DAILY
Qty: 60 TABLET | Refills: 0 | Status: CANCELLED | OUTPATIENT
Start: 2020-01-14

## 2020-01-14 RX ORDER — LISDEXAMFETAMINE DIMESYLATE 30 MG/1
30 CAPSULE ORAL DAILY
Qty: 30 CAPSULE | Refills: 0 | Status: SHIPPED | OUTPATIENT
Start: 2020-03-16 | End: 2020-05-27

## 2020-01-14 RX ORDER — LISDEXAMFETAMINE DIMESYLATE 30 MG/1
30 CAPSULE ORAL DAILY
Qty: 30 CAPSULE | Refills: 0 | Status: SHIPPED | OUTPATIENT
Start: 2020-01-14 | End: 2020-05-27

## 2020-01-14 RX ORDER — LISDEXAMFETAMINE DIMESYLATE 30 MG/1
30 CAPSULE ORAL DAILY
Qty: 30 CAPSULE | Refills: 0 | Status: SHIPPED | OUTPATIENT
Start: 2020-02-14 | End: 2020-05-27

## 2020-01-14 ASSESSMENT — ENCOUNTER SYMPTOMS
SHORTNESS OF BREATH: 0
BRUISES/BLEEDS EASILY: 0
DYSURIA: 0
COUGH: 0
ARTHRALGIAS: 0
WHEEZING: 0
DIZZINESS: 0
MYALGIAS: 0
CHILLS: 0
ABDOMINAL PAIN: 0
VOMITING: 0
FEVER: 0
CONFUSION: 0
PALPITATIONS: 0
DIARRHEA: 0
AGITATION: 0
HEMATURIA: 0
NAUSEA: 0
WOUND: 0
LIGHT-HEADEDNESS: 0

## 2020-01-14 ASSESSMENT — ANXIETY QUESTIONNAIRES
6. BECOMING EASILY ANNOYED OR IRRITABLE: NOT AT ALL
3. WORRYING TOO MUCH ABOUT DIFFERENT THINGS: NOT AT ALL
IF YOU CHECKED OFF ANY PROBLEMS ON THIS QUESTIONNAIRE, HOW DIFFICULT HAVE THESE PROBLEMS MADE IT FOR YOU TO DO YOUR WORK, TAKE CARE OF THINGS AT HOME, OR GET ALONG WITH OTHER PEOPLE: NOT DIFFICULT AT ALL
GAD7 TOTAL SCORE: 0
2. NOT BEING ABLE TO STOP OR CONTROL WORRYING: NOT AT ALL
1. FEELING NERVOUS, ANXIOUS, OR ON EDGE: NOT AT ALL
5. BEING SO RESTLESS THAT IT IS HARD TO SIT STILL: NOT AT ALL
7. FEELING AFRAID AS IF SOMETHING AWFUL MIGHT HAPPEN: NOT AT ALL

## 2020-01-14 ASSESSMENT — PATIENT HEALTH QUESTIONNAIRE - PHQ9
5. POOR APPETITE OR OVEREATING: NOT AT ALL
SUM OF ALL RESPONSES TO PHQ QUESTIONS 1-9: 0

## 2020-01-14 ASSESSMENT — PAIN SCALES - GENERAL: PAINLEVEL: SEVERE PAIN (7)

## 2020-01-14 NOTE — NURSING NOTE
"Patient presents to the clinic for medication management.  Last administration of Adderall was today around 1000.  Patient would like to avoid signing a contract today if possible.      Chief Complaint   Patient presents with     Recheck Medication       Initial /76 (BP Location: Right arm, Patient Position: Sitting, Cuff Size: Adult Large)   Pulse 72   Temp 96.6  F (35.9  C) (Tympanic)   Resp 18   Wt 149.5 kg (329 lb 8 oz)   SpO2 96%   BMI 53.18 kg/m   Estimated body mass index is 53.18 kg/m  as calculated from the following:    Height as of 10/11/19: 1.676 m (5' 6\").    Weight as of this encounter: 149.5 kg (329 lb 8 oz).  Medication Reconciliation: complete    Radhika Asif LPN          "

## 2020-01-14 NOTE — PATIENT INSTRUCTIONS
1. Attention deficit hyperactivity disorder (ADHD), combined type  - REVIEW OF HEALTH MAINTENANCE PROTOCOL ORDERS    Transition off the Adderall.....     START:   - lisdexamfetamine (VYVANSE) 30 MG capsule; Take 1 capsule (30 mg) by mouth daily --- start when out of Adderall ---  Dispense: 30 capsule; Refill: 0  - lisdexamfetamine (VYVANSE) 30 MG capsule; Take 1 capsule (30 mg) by mouth daily  Dispense: 30 capsule; Refill: 0  - lisdexamfetamine (VYVANSE) 30 MG capsule; Take 1 capsule (30 mg) by mouth daily  Dispense: 30 capsule; Refill: 0    2. Mild intermittent asthma without complication  -- glad your breathing is a little better today.     3. Morbid obesity with BMI of 45.0-49.9, adult (H)    To help with weight loss and improve blood sugar control....    -- Try to avoid Carbohydrates as much as possible -- breads, pasta, baked goods, cakes, oatmeal, cold cereal, potatoes.   These are turned to sugar in one metabolic conversion, cause insulin secretion and increased fat deposition / weight gain.      -- Eat more lean meats, proteins, eggs, nuts, vegetables.

## 2020-01-15 ASSESSMENT — ASTHMA QUESTIONNAIRES: ACT_TOTALSCORE: 10

## 2020-01-15 ASSESSMENT — ANXIETY QUESTIONNAIRES: GAD7 TOTAL SCORE: 0

## 2020-01-20 ENCOUNTER — OFFICE VISIT (OUTPATIENT)
Dept: FAMILY MEDICINE | Facility: OTHER | Age: 30
End: 2020-01-20
Attending: PHYSICIAN ASSISTANT
Payer: COMMERCIAL

## 2020-01-20 VITALS
HEART RATE: 123 BPM | DIASTOLIC BLOOD PRESSURE: 90 MMHG | OXYGEN SATURATION: 97 % | BODY MASS INDEX: 50.62 KG/M2 | TEMPERATURE: 96.7 F | SYSTOLIC BLOOD PRESSURE: 122 MMHG | RESPIRATION RATE: 18 BRPM | HEIGHT: 66 IN | WEIGHT: 315 LBS

## 2020-01-20 DIAGNOSIS — H93.12 TINNITUS, LEFT: ICD-10-CM

## 2020-01-20 DIAGNOSIS — R20.0 LEFT FACIAL NUMBNESS: Primary | ICD-10-CM

## 2020-01-20 LAB
CRP SERPL-MCNC: 0.3 MG/L
ERYTHROCYTE [SEDIMENTATION RATE] IN BLOOD BY WESTERGREN METHOD: 7 MM/H (ref 1–10)

## 2020-01-20 PROCEDURE — 85652 RBC SED RATE AUTOMATED: CPT | Mod: ZL | Performed by: PHYSICIAN ASSISTANT

## 2020-01-20 PROCEDURE — G0463 HOSPITAL OUTPT CLINIC VISIT: HCPCS

## 2020-01-20 PROCEDURE — 36415 COLL VENOUS BLD VENIPUNCTURE: CPT | Mod: ZL | Performed by: PHYSICIAN ASSISTANT

## 2020-01-20 PROCEDURE — 86140 C-REACTIVE PROTEIN: CPT | Mod: ZL | Performed by: PHYSICIAN ASSISTANT

## 2020-01-20 PROCEDURE — 99213 OFFICE O/P EST LOW 20 MIN: CPT | Performed by: PHYSICIAN ASSISTANT

## 2020-01-20 PROCEDURE — 86618 LYME DISEASE ANTIBODY: CPT | Mod: ZL | Performed by: PHYSICIAN ASSISTANT

## 2020-01-20 RX ORDER — PREDNISONE 20 MG/1
40 TABLET ORAL DAILY
Qty: 10 TABLET | Refills: 0 | Status: SHIPPED | OUTPATIENT
Start: 2020-01-20 | End: 2021-05-25

## 2020-01-20 RX ORDER — VALACYCLOVIR HYDROCHLORIDE 1 G/1
1000 TABLET, FILM COATED ORAL 3 TIMES DAILY
Qty: 21 TABLET | Refills: 0 | Status: SHIPPED | OUTPATIENT
Start: 2020-01-20 | End: 2020-05-27

## 2020-01-20 ASSESSMENT — ENCOUNTER SYMPTOMS
LIGHT-HEADEDNESS: 0
FACIAL ASYMMETRY: 0
HEADACHES: 0
DIZZINESS: 0
PARESTHESIAS: 0
NUMBNESS: 1
RESPIRATORY NEGATIVE: 1
MUSCULOSKELETAL NEGATIVE: 1
CARDIOVASCULAR NEGATIVE: 1
WEAKNESS: 0
CONSTITUTIONAL NEGATIVE: 1

## 2020-01-20 ASSESSMENT — PAIN SCALES - GENERAL: PAINLEVEL: NO PAIN (0)

## 2020-01-20 ASSESSMENT — MIFFLIN-ST. JEOR: SCORE: 2371.07

## 2020-01-20 NOTE — NURSING NOTE
Patient presents to the clinic for an ER follow up. Patient states he still feels like there is wind burn on the left side of his face as well as the ringing in his ear. Patient is wondering if he doesn't have Zoster sine herpete, patient would like antibiotics and predsoine as treatment.  Medication Reconciliation Completed.    Pankaj Hsu LPN  1/20/2020 2:58 PM
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
## 2020-01-22 LAB — B BURGDOR IGG+IGM SER QL: 0.02 (ref 0–0.89)

## 2020-05-05 ENCOUNTER — PATIENT OUTREACH (OUTPATIENT)
Dept: CARE COORDINATION | Facility: CLINIC | Age: 30
End: 2020-05-05

## 2020-05-05 NOTE — PROGRESS NOTES
Clinic Care Coordination Contact  [facility]/Voicemail       Clinical Data: Care Coordinator Outreach  Outreach attempted x 1.  Left message on patient's voicemail with call back information and requested return call. No call back, closing out encounter.   Clemencia Mock RN on 5/5/2020 at 1:29 PM

## 2020-05-26 ENCOUNTER — TELEPHONE (OUTPATIENT)
Dept: INTERNAL MEDICINE | Facility: OTHER | Age: 30
End: 2020-05-26

## 2020-05-26 DIAGNOSIS — F90.2 ATTENTION DEFICIT HYPERACTIVITY DISORDER (ADHD), COMBINED TYPE: ICD-10-CM

## 2020-05-26 RX ORDER — LISDEXAMFETAMINE DIMESYLATE 30 MG/1
30 CAPSULE ORAL DAILY
Qty: 30 CAPSULE | Refills: 0 | Status: CANCELLED | OUTPATIENT
Start: 2020-05-26

## 2020-05-26 NOTE — TELEPHONE ENCOUNTER
Spoke to patient.  He states that he would like a refill of his Vyvanse 30mg.  He states he hasn't taken them for about 1-2 months because of the coronavirus, and he was only on them for 1 month for his trial run. So far he likes the 30mg dosage.  He is starting a new job soon, in which he will be traveling quite a bit.  Offered patient a telephone visit but he would like to know if Erasmo Saha MD would refill his Vyvanse without an appointment.  Ora Rangel LPN 5/26/2020   2:54 PM

## 2020-05-26 NOTE — TELEPHONE ENCOUNTER
Patient placed with Erasmo Saha MD tomorrow at 0800 for medication refill.      Radhika Pérez LPN 5/26/2020 3:57 PM

## 2020-05-26 NOTE — TELEPHONE ENCOUNTER
Should have an appointment for controlled medications. There are some same day spots or open green spots left with providers for today.   Erasmo Saha MD

## 2020-05-27 ENCOUNTER — VIRTUAL VISIT (OUTPATIENT)
Dept: INTERNAL MEDICINE | Facility: OTHER | Age: 30
End: 2020-05-27
Attending: INTERNAL MEDICINE
Payer: COMMERCIAL

## 2020-05-27 VITALS — BODY MASS INDEX: 52.78 KG/M2 | WEIGHT: 315 LBS

## 2020-05-27 DIAGNOSIS — F90.2 ATTENTION DEFICIT HYPERACTIVITY DISORDER (ADHD), COMBINED TYPE: ICD-10-CM

## 2020-05-27 DIAGNOSIS — R20.0 LEFT FACIAL NUMBNESS: ICD-10-CM

## 2020-05-27 DIAGNOSIS — H93.12 TINNITUS, LEFT: ICD-10-CM

## 2020-05-27 PROCEDURE — 99213 OFFICE O/P EST LOW 20 MIN: CPT | Mod: 95 | Performed by: INTERNAL MEDICINE

## 2020-05-27 RX ORDER — LISDEXAMFETAMINE DIMESYLATE 30 MG/1
30 CAPSULE ORAL DAILY
Qty: 30 CAPSULE | Refills: 0 | Status: SHIPPED | OUTPATIENT
Start: 2020-07-28 | End: 2020-08-27

## 2020-05-27 RX ORDER — LISDEXAMFETAMINE DIMESYLATE 30 MG/1
30 CAPSULE ORAL DAILY
Qty: 30 CAPSULE | Status: CANCELLED | OUTPATIENT
Start: 2020-05-27

## 2020-05-27 RX ORDER — LISDEXAMFETAMINE DIMESYLATE 30 MG/1
30 CAPSULE ORAL DAILY
Qty: 30 CAPSULE | Refills: 0 | Status: SHIPPED | OUTPATIENT
Start: 2020-05-27 | End: 2020-06-26

## 2020-05-27 RX ORDER — DEXTROAMPHETAMINE SACCHARATE, AMPHETAMINE ASPARTATE, DEXTROAMPHETAMINE SULFATE AND AMPHETAMINE SULFATE 3.75; 3.75; 3.75; 3.75 MG/1; MG/1; MG/1; MG/1
15 TABLET ORAL 2 TIMES DAILY
Qty: 60 TABLET | Status: CANCELLED | OUTPATIENT
Start: 2020-05-27

## 2020-05-27 RX ORDER — LISDEXAMFETAMINE DIMESYLATE 30 MG/1
30 CAPSULE ORAL DAILY
Qty: 30 CAPSULE | Refills: 0 | Status: SHIPPED | OUTPATIENT
Start: 2020-06-27 | End: 2020-07-27

## 2020-05-27 RX ORDER — PREDNISONE 20 MG/1
40 TABLET ORAL DAILY
Status: CANCELLED | OUTPATIENT
Start: 2020-05-27

## 2020-05-27 ASSESSMENT — ANXIETY QUESTIONNAIRES
2. NOT BEING ABLE TO STOP OR CONTROL WORRYING: NOT AT ALL
1. FEELING NERVOUS, ANXIOUS, OR ON EDGE: NOT AT ALL
3. WORRYING TOO MUCH ABOUT DIFFERENT THINGS: NOT AT ALL
7. FEELING AFRAID AS IF SOMETHING AWFUL MIGHT HAPPEN: NOT AT ALL
GAD7 TOTAL SCORE: 0
6. BECOMING EASILY ANNOYED OR IRRITABLE: NOT AT ALL
IF YOU CHECKED OFF ANY PROBLEMS ON THIS QUESTIONNAIRE, HOW DIFFICULT HAVE THESE PROBLEMS MADE IT FOR YOU TO DO YOUR WORK, TAKE CARE OF THINGS AT HOME, OR GET ALONG WITH OTHER PEOPLE: NOT DIFFICULT AT ALL
5. BEING SO RESTLESS THAT IT IS HARD TO SIT STILL: NOT AT ALL

## 2020-05-27 ASSESSMENT — ENCOUNTER SYMPTOMS
BRUISES/BLEEDS EASILY: 0
ABDOMINAL PAIN: 0
BACK PAIN: 0
SHORTNESS OF BREATH: 0
CHILLS: 0
COUGH: 0
WOUND: 0
HEMATURIA: 0
FEVER: 0
CONFUSION: 0
NUMBNESS: 1

## 2020-05-27 ASSESSMENT — PATIENT HEALTH QUESTIONNAIRE - PHQ9
5. POOR APPETITE OR OVEREATING: NOT AT ALL
SUM OF ALL RESPONSES TO PHQ QUESTIONS 1-9: 0

## 2020-05-27 ASSESSMENT — PAIN SCALES - GENERAL: PAINLEVEL: NO PAIN (0)

## 2020-05-27 NOTE — PROGRESS NOTES
"Telephone Visit: discussion about medication management with Vyvanse vs Adderall.  Radhika Pérez LPN 5/27/2020 10:02 AM    Marija Albright is a 29 year old male who is being evaluated via a billable telephone visit.      The patient has been notified of following:     \"This telephone visit will be conducted via a call between you and your physician/provider. We have found that certain health care needs can be provided without the need for a physical exam.  This service lets us provide the care you need with a short phone conversation.  If a prescription is necessary we can send it directly to your pharmacy.  If lab work is needed we can place an order for that and you can then stop by our lab to have the test done at a later time.    Telephone visits are billed at different rates depending on your insurance coverage. During this emergency period, for some insurers they may be billed the same as an in-person visit.  Please reach out to your insurance provider with any questions.    If during the course of the call the physician/provider feels a telephone visit is not appropriate, you will not be charged for this service.\"    Patient has given verbal consent for Telephone visit?  Yes    What phone number would you like to be contacted at? [phone]    How would you like to obtain your AVS? Mail a copy    Subjective     Marija Albright is a 29 year old male who presents via phone visit today for the following health issues:    HPI  Reviewed and updated as needed this visit by Provider  Tobacco  Allergies  Meds  Problems  Med Hx  Surg Hx  Fam Hx         Review of Systems   Constitutional: Negative for chills and fever.   HENT: Positive for tinnitus. Negative for congestion.    Respiratory: Negative for cough and shortness of breath.    Cardiovascular: Negative for chest pain.   Gastrointestinal: Negative for abdominal pain.   Genitourinary: Negative for hematuria.   Musculoskeletal: Negative for back pain. "   Skin: Negative for wound.   Neurological: Positive for numbness (resolving, now with minimal left facial numbness ).   Hematological: Does not bruise/bleed easily.   Psychiatric/Behavioral: Negative for confusion.        + ADHD  + Overnight last night his friend attempted suicide again.  This was a third attempt.  We discussed at length ways to try getting him some help.  He does not have any insurance.  Discussed [facility] as well as [facility] for help.  Information provided.  He will contact his friend doing work on trying to get him some additional care and follow-up.           Objective   Reported vitals:  Wt 148.3 kg (327 lb)   BMI 52.78 kg/m     healthy, alert, active and no distress  PSYCH: Alert and oriented times 3; coherent speech, normal   rate and volume, able to articulate logical thoughts, able   to abstract reason, no tangential thoughts, no hallucinations   or delusions  His affect is normal  RESP: No cough, no audible wheezing, able to talk in full sentences  Remainder of exam unable to be completed due to telephone visits    Diagnostic Test Results:  Labs reviewed in Epic        Assessment/Plan:    ICD-10-CM    1. Attention deficit hyperactivity disorder (ADHD), combined type  F90.2 lisdexamfetamine (VYVANSE) 30 MG capsule     lisdexamfetamine (VYVANSE) 30 MG capsule     lisdexamfetamine (VYVANSE) 30 MG capsule   2. Left facial numbness  R20.0    3. Tinnitus, left  H93.12      ADHD, did very well with Vyvanse, once daily dosing was much more effective than the Adderall which required twice daily dosing.  He would like to continue with Vyvanse.  Needs refills.  Did well with previous dosing, continue 30 mg daily.    He was up late last night helping his friend, who had attempted suicide for a third time.  See above.  Additional resource information reviewed and provided to the patient regarding his friend.  He will contact [facility]/first [facility] for help as well as look at getting him  into [facility] for additional evaluation and treatment.    Return in about 12 weeks (around 8/19/2020) for -Video/Telephone Visit - Med Refills Controlled RX.    Phone call duration:  15 minutes    Erasmo Saha MD

## 2020-05-27 NOTE — NURSING NOTE
Telephone Visit: discussion about medication management with Vyvanse vs Adderall.        aRdhika Pérez LPN 5/27/2020 10:02 AM

## 2020-05-28 ASSESSMENT — ASTHMA QUESTIONNAIRES: ACT_TOTALSCORE: 25

## 2020-05-28 ASSESSMENT — ANXIETY QUESTIONNAIRES: GAD7 TOTAL SCORE: 0

## 2021-05-05 ENCOUNTER — TELEPHONE (OUTPATIENT)
Dept: INTERNAL MEDICINE | Facility: OTHER | Age: 31
End: 2021-05-05

## 2021-05-05 NOTE — TELEPHONE ENCOUNTER
Patient states that he has not taken his med for about 6-8 months now, since he know his body, and it wasn't working.  Patient did not mention what med this was.Patient made an appointment to see PCP but now would just like a telephone call.      Thank you   Renata Atkins on 5/5/2021 at 12:56 PM

## 2021-05-21 ENCOUNTER — TELEPHONE (OUTPATIENT)
Dept: INTERNAL MEDICINE | Facility: OTHER | Age: 31
End: 2021-05-21

## 2021-05-25 ENCOUNTER — OFFICE VISIT (OUTPATIENT)
Dept: INTERNAL MEDICINE | Facility: OTHER | Age: 31
End: 2021-05-25
Attending: INTERNAL MEDICINE
Payer: COMMERCIAL

## 2021-05-25 ENCOUNTER — TELEPHONE (OUTPATIENT)
Dept: INTERNAL MEDICINE | Facility: OTHER | Age: 31
End: 2021-05-25

## 2021-05-25 VITALS
TEMPERATURE: 98.3 F | OXYGEN SATURATION: 96 % | SYSTOLIC BLOOD PRESSURE: 128 MMHG | BODY MASS INDEX: 52.49 KG/M2 | DIASTOLIC BLOOD PRESSURE: 78 MMHG | WEIGHT: 315 LBS | HEART RATE: 87 BPM | RESPIRATION RATE: 16 BRPM

## 2021-05-25 DIAGNOSIS — Z71.85 VACCINE COUNSELING: ICD-10-CM

## 2021-05-25 DIAGNOSIS — Z00.00 ANNUAL PHYSICAL EXAM: Primary | ICD-10-CM

## 2021-05-25 DIAGNOSIS — L23.7 CONTACT DERMATITIS DUE TO POISON IVY: ICD-10-CM

## 2021-05-25 DIAGNOSIS — J32.4 CHRONIC PANSINUSITIS: ICD-10-CM

## 2021-05-25 DIAGNOSIS — F90.9 ATTENTION DEFICIT HYPERACTIVITY DISORDER (ADHD), UNSPECIFIED ADHD TYPE: ICD-10-CM

## 2021-05-25 DIAGNOSIS — J45.20 MILD INTERMITTENT ASTHMA WITHOUT COMPLICATION: ICD-10-CM

## 2021-05-25 PROCEDURE — G0463 HOSPITAL OUTPT CLINIC VISIT: HCPCS

## 2021-05-25 PROCEDURE — 99395 PREV VISIT EST AGE 18-39: CPT | Performed by: INTERNAL MEDICINE

## 2021-05-25 RX ORDER — ALBUTEROL SULFATE 90 UG/1
2 AEROSOL, METERED RESPIRATORY (INHALATION) 4 TIMES DAILY PRN
Qty: 18 G | Refills: 11 | Status: SHIPPED | OUTPATIENT
Start: 2021-05-25 | End: 2024-01-31

## 2021-05-25 RX ORDER — IPRATROPIUM BROMIDE AND ALBUTEROL SULFATE 2.5; .5 MG/3ML; MG/3ML
1 SOLUTION RESPIRATORY (INHALATION) EVERY 6 HOURS PRN
Qty: 150 ML | Refills: 3 | Status: SHIPPED | OUTPATIENT
Start: 2021-05-25 | End: 2024-01-31

## 2021-05-25 RX ORDER — BUDESONIDE 0.5 MG/2ML
INHALANT ORAL
Qty: 90 ML | Refills: 3 | Status: SHIPPED | OUTPATIENT
Start: 2021-05-25 | End: 2024-01-31

## 2021-05-25 RX ORDER — DEXTROAMPHETAMINE SACCHARATE, AMPHETAMINE ASPARTATE, DEXTROAMPHETAMINE SULFATE AND AMPHETAMINE SULFATE 2.5; 2.5; 2.5; 2.5 MG/1; MG/1; MG/1; MG/1
10 TABLET ORAL 2 TIMES DAILY
Qty: 60 TABLET | Refills: 0 | Status: SHIPPED | OUTPATIENT
Start: 2021-05-25 | End: 2021-07-15

## 2021-05-25 RX ORDER — PREDNISONE 20 MG/1
40 TABLET ORAL DAILY PRN
Qty: 20 TABLET | Refills: 0 | Status: SHIPPED | OUTPATIENT
Start: 2021-05-25 | End: 2024-01-23

## 2021-05-25 ASSESSMENT — PATIENT HEALTH QUESTIONNAIRE - PHQ9
SUM OF ALL RESPONSES TO PHQ QUESTIONS 1-9: 14
5. POOR APPETITE OR OVEREATING: NEARLY EVERY DAY

## 2021-05-25 ASSESSMENT — ENCOUNTER SYMPTOMS
LIGHT-HEADEDNESS: 0
SHORTNESS OF BREATH: 0
WOUND: 0
VOMITING: 0
PALPITATIONS: 0
ARTHRALGIAS: 0
ABDOMINAL PAIN: 0
HEMATURIA: 0
NAUSEA: 0
MYALGIAS: 0
CONFUSION: 0
DIARRHEA: 0
FEVER: 0
AGITATION: 0
DIZZINESS: 0
WHEEZING: 0
BRUISES/BLEEDS EASILY: 0
CHILLS: 0
DYSURIA: 0
COUGH: 0

## 2021-05-25 ASSESSMENT — ANXIETY QUESTIONNAIRES
5. BEING SO RESTLESS THAT IT IS HARD TO SIT STILL: MORE THAN HALF THE DAYS
3. WORRYING TOO MUCH ABOUT DIFFERENT THINGS: NEARLY EVERY DAY
6. BECOMING EASILY ANNOYED OR IRRITABLE: MORE THAN HALF THE DAYS
1. FEELING NERVOUS, ANXIOUS, OR ON EDGE: SEVERAL DAYS
IF YOU CHECKED OFF ANY PROBLEMS ON THIS QUESTIONNAIRE, HOW DIFFICULT HAVE THESE PROBLEMS MADE IT FOR YOU TO DO YOUR WORK, TAKE CARE OF THINGS AT HOME, OR GET ALONG WITH OTHER PEOPLE: EXTREMELY DIFFICULT
2. NOT BEING ABLE TO STOP OR CONTROL WORRYING: MORE THAN HALF THE DAYS
GAD7 TOTAL SCORE: 15
7. FEELING AFRAID AS IF SOMETHING AWFUL MIGHT HAPPEN: MORE THAN HALF THE DAYS

## 2021-05-25 NOTE — TELEPHONE ENCOUNTER
Patient was scheduled with Dr. Felix on 7/6/21. He was also scheduled with Dr. Saha on 6/24/21. He would like to discuss with Dr. Saha.      Kelly Hernandez on 5/25/2021 at 4:51 PM

## 2021-05-25 NOTE — PROGRESS NOTES
Mr. Albright is a 30 year old male who presents to the clinic for evaluation of the following problems:      ICD-10-CM    1. Annual physical exam  Z00.00    2. Attention deficit hyperactivity disorder (ADHD), unspecified ADHD type  F90.9 MENTAL HEALTH REFERRAL  - Adult; Psychiatry; Psychiatry; Other: [facility] [phone]; We will contact you to schedule the appointment or please call with any questions     amphetamine-dextroamphetamine (ADDERALL) 10 MG tablet   3. Chronic pansinusitis  J32.4 budesonide (PULMICORT) 0.5 MG/2ML neb solution   4. Mild intermittent asthma without complication  J45.20 albuterol (PROAIR HFA/PROVENTIL HFA/VENTOLIN HFA) 108 (90 Base) MCG/ACT inhaler     ipratropium - albuterol 0.5 mg/2.5 mg/3 mL (DUONEB) 0.5-2.5 (3) MG/3ML neb solution   5. Contact dermatitis due to poison ivy  L23.7 predniSONE (DELTASONE) 20 MG tablet   6. Vaccine counseling  Z71.89      HPI  Patient presents for annual physical as well as follow-up multiple issues.    He has ADHD and was diagnosed years ago.  Reports that he was on very high doses of stimulants growing up and thinks that he probably was prescribed way too much medication.  He has been off of all stimulants now for many months.  His last prescription was for Vyvanse.  He has been trying to figure out the proper dosing of his medication to help with his ADHD and figures that it is somewhere in the 10 to 15 mg/day of Adderall.  He needs to do some counseling again.  He no longer has transportation and would like to stay local at the clinic.  Mental health referral placed.  Med management advised.    History of chronic pansinusitis, budesonide/Pulmicort nebulizer solution used with [facility] pot was refilled.    Gets frequent poison ivy rash almost every summer and uses prednisone as needed.  Needs refills.    Remittent asthma, without complication.  Rarely uses DuoNeb but they are very effective when needed.  Uses albuterol inhaler as needed.  Needs  "refills.    Vaccine counseling completed today.    Functional Capacity: > or about 4 METS.   Review of Systems   Constitutional: Negative for chills and fever.   HENT: Negative for congestion and hearing loss.    Eyes: Negative for visual disturbance.   Respiratory: Negative for cough, shortness of breath and wheezing.    Cardiovascular: Negative for chest pain and palpitations.   Gastrointestinal: Negative for abdominal pain, diarrhea, nausea and vomiting.   Endocrine: Negative for cold intolerance and heat intolerance.   Genitourinary: Negative for dysuria and hematuria.   Musculoskeletal: Negative for arthralgias and myalgias.   Skin: Negative for rash and wound.   Allergic/Immunologic: Negative for immunocompromised state.   Neurological: Negative for dizziness and light-headedness.   Hematological: Does not bruise/bleed easily.   Psychiatric/Behavioral: Negative for agitation and confusion.        + ADHD - thinks 10-15 mg per day of adderall might be the effective dose. Has been off everything for months now. Last used Vyvanse.     + situational depression - now .    Therapy was helping, but now can't go, No car anymore.      Reviewed and updated as needed this visit by Provider   Tobacco  Allergies  Meds  Problems  Med Hx  Surg Hx  Fam Hx          EXAM:   Vitals:    05/25/21 1549   BP: 128/78   BP Location: Right arm   Patient Position: Sitting   Cuff Size: Adult Large   Pulse: 87   Resp: 16   Temp: 98.3  F (36.8  C)   TempSrc: Tympanic   SpO2: 96%   Weight: 147.5 kg (325 lb 3.2 oz)       Current Pain Score: Data Unavailable     BP Readings from Last 3 Encounters:   05/25/21 128/78   01/20/20 (!) 122/90   01/14/20 134/76      Wt Readings from Last 3 Encounters:   05/25/21 147.5 kg (325 lb 3.2 oz)   05/27/20 148.3 kg (327 lb)   01/20/20 145.3 kg (320 lb 6.4 oz)      Estimated body mass index is 52.49 kg/m  as calculated from the following:    Height as of 1/20/20: 1.676 m (5' 6\").    Weight as of " this encounter: 147.5 kg (325 lb 3.2 oz).     Physical Exam  Constitutional:       General: He is not in acute distress.     Appearance: He is well-developed. He is obese. He is not diaphoretic.   HENT:      Head: Normocephalic and atraumatic.   Eyes:      General: No scleral icterus.     Conjunctiva/sclera: Conjunctivae normal.   Neck:      Musculoskeletal: Neck supple.   Cardiovascular:      Rate and Rhythm: Normal rate and regular rhythm.      Pulses: Normal pulses.   Pulmonary:      Effort: Pulmonary effort is normal.      Breath sounds: Normal breath sounds.   Abdominal:      Palpations: Abdomen is soft.      Tenderness: There is no abdominal tenderness.   Musculoskeletal:         General: No deformity.   Lymphadenopathy:      Cervical: No cervical adenopathy.   Skin:     General: Skin is warm and dry.      Findings: No rash.   Neurological:      Mental Status: He is alert and oriented to person, place, and time. Mental status is at baseline.      Comments: + some tangential thought processes   Psychiatric:         Mood and Affect: Mood normal.         Behavior: Behavior normal.        Procedures   INVESTIGATIONS:  - Labs reviewed in [facility]     ASSESSMENT AND PLAN:  Problem List Items Addressed This Visit        Respiratory    Mild intermittent asthma without complication    Relevant Medications    budesonide (PULMICORT) 0.5 MG/2ML neb solution    albuterol (PROAIR HFA/PROVENTIL HFA/VENTOLIN HFA) 108 (90 Base) MCG/ACT inhaler    ipratropium - albuterol 0.5 mg/2.5 mg/3 mL (DUONEB) 0.5-2.5 (3) MG/3ML neb solution       Behavioral    ADHD (attention deficit hyperactivity disorder)    Relevant Medications    amphetamine-dextroamphetamine (ADDERALL) 10 MG tablet    Other Relevant Orders    MENTAL HEALTH REFERRAL  - Adult; Psychiatry; Psychiatry; Other: [facility] [phone]; We will contact you to schedule the appointment or please call with any questions      Other Visit Diagnoses     Annual physical exam     -  Primary    Chronic pansinusitis        Relevant Medications    budesonide (PULMICORT) 0.5 MG/2ML neb solution    predniSONE (DELTASONE) 20 MG tablet    albuterol (PROAIR HFA/PROVENTIL HFA/VENTOLIN HFA) 108 (90 Base) MCG/ACT inhaler    ipratropium - albuterol 0.5 mg/2.5 mg/3 mL (DUONEB) 0.5-2.5 (3) MG/3ML neb solution    Contact dermatitis due to poison ivy        Relevant Medications    budesonide (PULMICORT) 0.5 MG/2ML neb solution    predniSONE (DELTASONE) 20 MG tablet    albuterol (PROAIR HFA/PROVENTIL HFA/VENTOLIN HFA) 108 (90 Base) MCG/ACT inhaler    ipratropium - albuterol 0.5 mg/2.5 mg/3 mL (DUONEB) 0.5-2.5 (3) MG/3ML neb solution    Vaccine counseling            reviewed diet, exercise and weight control  -- Expected clinical course discussed    -- Medications and their side effects discussed    There are no Patient Instructions on file for this visit.  Erasmo Saha MD  Internal Medicine  [facility]

## 2021-05-26 ASSESSMENT — ANXIETY QUESTIONNAIRES: GAD7 TOTAL SCORE: 15

## 2021-05-27 NOTE — TELEPHONE ENCOUNTER
Will be good to see him on 6/24 and review medications / dosing at that time.   This is also a controlled medication. Needs appointments for refills.     Then - Long-term, he will need to get his medications from Dr. Felix for his ADHD.     Erasmo Saha MD

## 2021-05-27 NOTE — TELEPHONE ENCOUNTER
Patient has the appointment on the 24th with Erasmo Saha MD because he is going to run out of medication before he sees Charles on the 6th.   Patient would like know if he needs the appointment with Erasmo Saha MD or can the medication just have a temporary refill then. Jayshree Church LPN .............5/27/2021  8:36 AM

## 2021-07-13 ENCOUNTER — TELEPHONE (OUTPATIENT)
Dept: INTERNAL MEDICINE | Facility: OTHER | Age: 31
End: 2021-07-13

## 2021-07-13 DIAGNOSIS — F90.9 ATTENTION DEFICIT HYPERACTIVITY DISORDER (ADHD), UNSPECIFIED ADHD TYPE: ICD-10-CM

## 2021-07-13 NOTE — TELEPHONE ENCOUNTER
JESSE katz-philipp requesting adhd medication refill until next appt. Please call. Thank you.  Anamaria Ayala

## 2021-07-14 RX ORDER — DEXTROAMPHETAMINE SACCHARATE, AMPHETAMINE ASPARTATE, DEXTROAMPHETAMINE SULFATE AND AMPHETAMINE SULFATE 2.5; 2.5; 2.5; 2.5 MG/1; MG/1; MG/1; MG/1
10 TABLET ORAL 2 TIMES DAILY
Qty: 60 TABLET | Refills: 0 | OUTPATIENT
Start: 2021-07-14

## 2021-07-14 NOTE — TELEPHONE ENCOUNTER
Patient has been scheduled for a telephone visit for 3:40pm on 7/15/21.    Ayse Cardona on 7/14/2021 at 11:29 AM

## 2021-07-14 NOTE — TELEPHONE ENCOUNTER
Please schedule Clinic Appointment or Virtual visit for Controlled med refills.    -- okay to double-book the phone appointment at 3:40 tomorrow ( 7/15) if that works for patient.     Erasmo Saha MD

## 2021-07-15 ENCOUNTER — VIRTUAL VISIT (OUTPATIENT)
Dept: INTERNAL MEDICINE | Facility: OTHER | Age: 31
End: 2021-07-15
Attending: INTERNAL MEDICINE
Payer: COMMERCIAL

## 2021-07-15 DIAGNOSIS — F90.9 ATTENTION DEFICIT HYPERACTIVITY DISORDER (ADHD), UNSPECIFIED ADHD TYPE: ICD-10-CM

## 2021-07-15 DIAGNOSIS — F41.1 GENERALIZED ANXIETY DISORDER: ICD-10-CM

## 2021-07-15 DIAGNOSIS — E66.01 MORBID OBESITY WITH BMI OF 45.0-49.9, ADULT (H): Primary | ICD-10-CM

## 2021-07-15 PROCEDURE — 99214 OFFICE O/P EST MOD 30 MIN: CPT | Mod: 95 | Performed by: INTERNAL MEDICINE

## 2021-07-15 RX ORDER — DEXTROAMPHETAMINE SACCHARATE, AMPHETAMINE ASPARTATE, DEXTROAMPHETAMINE SULFATE AND AMPHETAMINE SULFATE 2.5; 2.5; 2.5; 2.5 MG/1; MG/1; MG/1; MG/1
10 TABLET ORAL 2 TIMES DAILY
Qty: 60 TABLET | Refills: 0 | Status: SHIPPED | OUTPATIENT
Start: 2021-07-15 | End: 2021-09-27

## 2021-07-15 ASSESSMENT — ENCOUNTER SYMPTOMS
BRUISES/BLEEDS EASILY: 0
WOUND: 0
CHILLS: 0
NUMBNESS: 0
FEVER: 0
CONFUSION: 0
ABDOMINAL PAIN: 0
HEMATURIA: 0
BACK PAIN: 0
SHORTNESS OF BREATH: 0
COUGH: 0

## 2021-07-15 ASSESSMENT — PATIENT HEALTH QUESTIONNAIRE - PHQ9: 5. POOR APPETITE OR OVEREATING: MORE THAN HALF THE DAYS

## 2021-07-15 ASSESSMENT — ANXIETY QUESTIONNAIRES
6. BECOMING EASILY ANNOYED OR IRRITABLE: SEVERAL DAYS
7. FEELING AFRAID AS IF SOMETHING AWFUL MIGHT HAPPEN: SEVERAL DAYS
2. NOT BEING ABLE TO STOP OR CONTROL WORRYING: MORE THAN HALF THE DAYS
1. FEELING NERVOUS, ANXIOUS, OR ON EDGE: NEARLY EVERY DAY
3. WORRYING TOO MUCH ABOUT DIFFERENT THINGS: MORE THAN HALF THE DAYS
IF YOU CHECKED OFF ANY PROBLEMS ON THIS QUESTIONNAIRE, HOW DIFFICULT HAVE THESE PROBLEMS MADE IT FOR YOU TO DO YOUR WORK, TAKE CARE OF THINGS AT HOME, OR GET ALONG WITH OTHER PEOPLE: SOMEWHAT DIFFICULT
GAD7 TOTAL SCORE: 12
5. BEING SO RESTLESS THAT IT IS HARD TO SIT STILL: SEVERAL DAYS

## 2021-07-15 NOTE — NURSING NOTE
Chief Complaint   Patient presents with     Medication Therapy Management         Medication Reconciliation: complete    FOOD SECURITY SCREENING QUESTIONS  Hunger Vital Signs:  Within the past 12 months we worried whether our food would run out before we got money to buy more. Never  Within the past 12 months the food we bought just didn't last and we didn't have money to get more. Never  Nicol Joseph LPN 7/15/2021 4:06 PM

## 2021-07-15 NOTE — PROGRESS NOTES
Marija is a 30 year old who is being evaluated via a billable telephone visit.      What phone number would you like to be contacted at? [phone]  How would you like to obtain your AVS? Mail a copy      Subjective   Marija is a 30 year old who presents for the following health issues       ICD-10-CM    1. Morbid obesity with BMI of 45.0-49.9, adult (H)  E66.01     Z68.42    2. Attention deficit hyperactivity disorder (ADHD), unspecified ADHD type  F90.9 amphetamine-dextroamphetamine (ADDERALL) 10 MG tablet   3. Generalized anxiety disorder  F41.1      Missed 2 appointments, due to no car / transportation.   Rescheduled with psych, for further controlled med management.  Patient has appointment in 3 to 4 weeks.    ADHD, longstanding history.  Now back on Adderall.  Has not had the Adderall now for a few days.  Was doing much better with 10 mg twice daily dosing.  He is wondering about possibly switching to extended release but this looks like it might need a prior approval/PA.  At this point he would like to discontinue the 10 mg twice daily of Adderall and follow-up with his mental health specialist who he will be establishing care within 3 to 4 weeks.    He has generalized anxiety disorder.  This has been better lately.  His mother was injured recently and has been able to focus on taking care of her and helping her out at home.  He was in a bit of a depressed mood due to some situational depressive events.    Obesity, discussed need for reduced oral caloric intake.  Regular exercise.  Reduce carbohydrate intake.      HPI     Review of Systems   Constitutional: Negative for chills and fever.   HENT: Positive for tinnitus. Negative for congestion.    Respiratory: Negative for cough and shortness of breath.    Cardiovascular: Negative for chest pain.   Gastrointestinal: Negative for abdominal pain.   Genitourinary: Negative for hematuria.   Musculoskeletal: Negative for back pain.   Skin: Negative for wound.    Neurological: Negative for numbness.   Hematological: Does not bruise/bleed easily.   Psychiatric/Behavioral: Negative for confusion.        + ADHD            Objective           Vitals:  No vitals were obtained today due to virtual visit.    Physical Exam   healthy, alert and no distress  PSYCH: Alert and oriented times 3; coherent speech, normal   rate and volume, able to articulate logical thoughts, able   to abstract reason, no tangential thoughts, no hallucinations   or delusions  His affect is normal  RESP: No cough, no audible wheezing, able to talk in full sentences  Remainder of exam unable to be completed due to telephone visits        Phone call duration: 7 minutes

## 2021-07-16 ASSESSMENT — ASTHMA QUESTIONNAIRES: ACT_TOTALSCORE: 21

## 2021-07-16 ASSESSMENT — ANXIETY QUESTIONNAIRES: GAD7 TOTAL SCORE: 12

## 2021-08-09 ENCOUNTER — OFFICE VISIT (OUTPATIENT)
Dept: PSYCHIATRY | Facility: OTHER | Age: 31
End: 2021-08-09
Attending: PSYCHIATRY & NEUROLOGY
Payer: COMMERCIAL

## 2021-08-09 VITALS
HEART RATE: 64 BPM | SYSTOLIC BLOOD PRESSURE: 128 MMHG | OXYGEN SATURATION: 98 % | TEMPERATURE: 97.1 F | BODY MASS INDEX: 48.91 KG/M2 | DIASTOLIC BLOOD PRESSURE: 80 MMHG | WEIGHT: 311.6 LBS | RESPIRATION RATE: 15 BRPM | HEIGHT: 67 IN

## 2021-08-09 DIAGNOSIS — T88.7XXA MEDICATION SIDE EFFECTS: ICD-10-CM

## 2021-08-09 DIAGNOSIS — F90.9 ATTENTION DEFICIT HYPERACTIVITY DISORDER (ADHD), UNSPECIFIED ADHD TYPE: ICD-10-CM

## 2021-08-09 DIAGNOSIS — F39 MOOD DISORDER (H): ICD-10-CM

## 2021-08-09 DIAGNOSIS — F41.9 ANXIETY: Primary | ICD-10-CM

## 2021-08-09 PROCEDURE — 99205 OFFICE O/P NEW HI 60 MIN: CPT | Performed by: PSYCHIATRY & NEUROLOGY

## 2021-08-09 PROCEDURE — G0463 HOSPITAL OUTPT CLINIC VISIT: HCPCS

## 2021-08-09 PROCEDURE — 99417 PROLNG OP E/M EACH 15 MIN: CPT | Performed by: PSYCHIATRY & NEUROLOGY

## 2021-08-09 RX ORDER — LORAZEPAM 2 MG/1
2 TABLET ORAL 2 TIMES DAILY PRN
Qty: 60 TABLET | Refills: 3 | Status: SHIPPED | OUTPATIENT
Start: 2021-08-09 | End: 2021-09-27

## 2021-08-09 RX ORDER — QUETIAPINE FUMARATE 25 MG/1
25 TABLET, FILM COATED ORAL 2 TIMES DAILY
Qty: 60 TABLET | Refills: 4 | Status: SHIPPED | OUTPATIENT
Start: 2021-08-09 | End: 2021-09-27

## 2021-08-09 RX ORDER — DEXTROAMPHETAMINE SACCHARATE, AMPHETAMINE ASPARTATE, DEXTROAMPHETAMINE SULFATE AND AMPHETAMINE SULFATE 2.5; 2.5; 2.5; 2.5 MG/1; MG/1; MG/1; MG/1
10 TABLET ORAL 2 TIMES DAILY
Qty: 60 TABLET | Refills: 0 | Status: SHIPPED | OUTPATIENT
Start: 2021-08-09 | End: 2024-01-31

## 2021-08-09 RX ORDER — DIVALPROEX SODIUM 500 MG/1
500 TABLET, DELAYED RELEASE ORAL
Qty: 60 TABLET | Refills: 4 | Status: SHIPPED | OUTPATIENT
Start: 2021-08-09 | End: 2022-05-17

## 2021-08-09 ASSESSMENT — MIFFLIN-ST. JEOR: SCORE: 2334.1

## 2021-08-09 ASSESSMENT — ANXIETY QUESTIONNAIRES
1. FEELING NERVOUS, ANXIOUS, OR ON EDGE: NEARLY EVERY DAY
5. BEING SO RESTLESS THAT IT IS HARD TO SIT STILL: NEARLY EVERY DAY
6. BECOMING EASILY ANNOYED OR IRRITABLE: SEVERAL DAYS
IF YOU CHECKED OFF ANY PROBLEMS ON THIS QUESTIONNAIRE, HOW DIFFICULT HAVE THESE PROBLEMS MADE IT FOR YOU TO DO YOUR WORK, TAKE CARE OF THINGS AT HOME, OR GET ALONG WITH OTHER PEOPLE: EXTREMELY DIFFICULT
7. FEELING AFRAID AS IF SOMETHING AWFUL MIGHT HAPPEN: SEVERAL DAYS
3. WORRYING TOO MUCH ABOUT DIFFERENT THINGS: NEARLY EVERY DAY
2. NOT BEING ABLE TO STOP OR CONTROL WORRYING: NEARLY EVERY DAY
GAD7 TOTAL SCORE: 17

## 2021-08-09 ASSESSMENT — PATIENT HEALTH QUESTIONNAIRE - PHQ9
SUM OF ALL RESPONSES TO PHQ QUESTIONS 1-9: 25
5. POOR APPETITE OR OVEREATING: NEARLY EVERY DAY

## 2021-08-09 ASSESSMENT — PAIN SCALES - GENERAL: PAINLEVEL: NO PAIN (0)
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
## 2021-08-09 NOTE — PATIENT INSTRUCTIONS
Seroquel (quetiapine) 25 mg, 50 mg:  Take this at bedtime  Start with 25 mg,  If not sleeping well, increase by 25 mg a night until good sleep,  Maximum for now 150 mg    Depakote (divalproex sodium) 500 mg:  Start with 1 tablet at bedtime for 2-3 days,  And increased to 2 tablets a day (all at once or divided)  After 1 week of 2 tablets a day, get your blood drawn before taking any Depakote that day

## 2021-08-10 ASSESSMENT — ANXIETY QUESTIONNAIRES: GAD7 TOTAL SCORE: 17

## 2021-08-11 ENCOUNTER — OFFICE VISIT (OUTPATIENT)
Dept: FAMILY MEDICINE | Facility: OTHER | Age: 31
End: 2021-08-11
Attending: FAMILY MEDICINE
Payer: COMMERCIAL

## 2021-08-11 VITALS
RESPIRATION RATE: 20 BRPM | SYSTOLIC BLOOD PRESSURE: 124 MMHG | WEIGHT: 310 LBS | DIASTOLIC BLOOD PRESSURE: 80 MMHG | BODY MASS INDEX: 49.29 KG/M2 | HEART RATE: 92 BPM | OXYGEN SATURATION: 98 % | TEMPERATURE: 97.3 F

## 2021-08-11 DIAGNOSIS — Z83.49 FAMILY HISTORY OF THYROID DISEASE: ICD-10-CM

## 2021-08-11 DIAGNOSIS — R30.0 DYSURIA: Primary | ICD-10-CM

## 2021-08-11 DIAGNOSIS — Z83.3 FAMILY HISTORY OF DIABETES MELLITUS: ICD-10-CM

## 2021-08-11 DIAGNOSIS — E66.01 MORBID OBESITY (H): ICD-10-CM

## 2021-08-11 DIAGNOSIS — Z51.81 ENCOUNTER FOR THERAPEUTIC DRUG MONITORING: ICD-10-CM

## 2021-08-11 LAB
ALBUMIN UR-MCNC: NEGATIVE MG/DL
APPEARANCE UR: CLEAR
BILIRUB UR QL STRIP: NEGATIVE
C TRACH DNA SPEC QL PROBE+SIG AMP: NEGATIVE
COLOR UR AUTO: ABNORMAL
GLUCOSE UR STRIP-MCNC: NEGATIVE MG/DL
HGB UR QL STRIP: NEGATIVE
KETONES UR STRIP-MCNC: 10 MG/DL
LEUKOCYTE ESTERASE UR QL STRIP: NEGATIVE
N GONORRHOEA DNA SPEC QL NAA+PROBE: NEGATIVE
NITRATE UR QL: NEGATIVE
PH UR STRIP: 7 [PH] (ref 5–9)
SP GR UR STRIP: 1.02 (ref 1–1.03)
UROBILINOGEN UR STRIP-MCNC: NORMAL MG/DL

## 2021-08-11 PROCEDURE — G0463 HOSPITAL OUTPT CLINIC VISIT: HCPCS | Mod: 25

## 2021-08-11 PROCEDURE — G0463 HOSPITAL OUTPT CLINIC VISIT: HCPCS

## 2021-08-11 PROCEDURE — 87491 CHLMYD TRACH DNA AMP PROBE: CPT | Mod: ZL | Performed by: FAMILY MEDICINE

## 2021-08-11 PROCEDURE — 81003 URINALYSIS AUTO W/O SCOPE: CPT | Mod: ZL | Performed by: FAMILY MEDICINE

## 2021-08-11 PROCEDURE — 99214 OFFICE O/P EST MOD 30 MIN: CPT | Performed by: FAMILY MEDICINE

## 2021-08-11 ASSESSMENT — PAIN SCALES - GENERAL: PAINLEVEL: NO PAIN (0)

## 2021-08-11 NOTE — NURSING NOTE
"Patient presents to the clinic for follow up with needing lab work.      FOOD SECURITY SCREENING QUESTIONS  Hunger Vital Signs:  Within the past 12 months we worried whether our food would run out before we got money to buy more. Never  Within the past 12 months the food we bought just didn't last and we didn't have money to get more. Never    Advance Care Directive on file? no  Advance Care Directive provided to patient? Yes.    Chief Complaint   Patient presents with     Clinic Care Coordination - Follow-up     kidney test       Initial /80 (BP Location: Right arm, Patient Position: Sitting, Cuff Size: Adult Large)   Pulse 92   Temp 97.3  F (36.3  C) (Temporal)   Resp 20   Wt 140.6 kg (310 lb)   SpO2 98%   BMI 49.29 kg/m   Estimated body mass index is 49.29 kg/m  as calculated from the following:    Height as of 8/9/21: 1.689 m (5' 6.5\").    Weight as of this encounter: 140.6 kg (310 lb).  Medication Reconciliation: complete    Radhika Pérez LPN       "

## 2021-08-11 NOTE — PROGRESS NOTES
"Nursing Notes:   Radhika Pérez LPN  8/11/2021 11:35 AM  Sign at exiting of workspace  Patient presents to the clinic for follow up with needing lab work.      FOOD SECURITY SCREENING QUESTIONS  Hunger Vital Signs:  Within the past 12 months we worried whether our food would run out before we got money to buy more. Never  Within the past 12 months the food we bought just didn't last and we didn't have money to get more. Never    Advance Care Directive on file? no  Advance Care Directive provided to patient? Yes.    Chief Complaint   Patient presents with     Clinic Care Coordination - Follow-up     kidney test       Initial /80 (BP Location: Right arm, Patient Position: Sitting, Cuff Size: Adult Large)   Pulse 92   Temp 97.3  F (36.3  C) (Temporal)   Resp 20   Wt 140.6 kg (310 lb)   SpO2 98%   BMI 49.29 kg/m   Estimated body mass index is 49.29 kg/m  as calculated from the following:    Height as of 8/9/21: 1.689 m (5' 6.5\").    Weight as of this encounter: 140.6 kg (310 lb).  Medication Reconciliation: complete    Radhika Pérez LPN            Assessment & Plan       ICD-10-CM    1. Dysuria  R30.0 UA reflex to Microscopic     GC/Chlamydia by PCR     UA reflex to Microscopic     GC/Chlamydia by PCR   2. Encounter for therapeutic drug monitoring  Z51.81 Comprehensive Metabolic Panel   3. Family history of thyroid disease  Z83.49    4. Morbid obesity (H)  E66.01 TSH Reflex GH     CANCELED: TSH Reflex GH   5. Family history of diabetes mellitus  Z83.3 Hemoglobin A1c     TSH with free T4 reflex     We reviewed his current symptoms and concerns.  Kidney stone could cause dysuria as could UTI or STD.  Obtained urine for GC and chlamydia as well as UA.  Both returned negative.    Given medications used, certainly reasonable to obtain a CMP.  Offered to draw today, but recommend waiting and he could have labs drawn when he is due to have a Depakote level checked in more than 1 week from today.  He agrees " "with the plan to wait on labs.    Has a family history of thyroid disease and diabetes.  Given obesity, recommend checking these labs as well.    Return week of August 23 to obtain TSH, A1c, CMP and valproic acid level.          BMI:   Estimated body mass index is 49.29 kg/m  as calculated from the following:    Height as of 8/9/21: 1.689 m (5' 6.5\").    Weight as of this encounter: 140.6 kg (310 lb).   Weight management plan: Discussed healthy diet and exercise guidelines      31 minutes spent on the date of the encounter doing chart review, review of test results, interpretation of tests, patient visit and documentation     Ankit Herrera MD     [facility]      SUBJECTIVE:  30 year old male presents for concerns about his kidneys.    Occasional dysuria.  Sometimes has pain in the back.  He is wondering if his kidneys are okay.  No previous kidney stones. Past rare gross hematuria, but never worked up.    He is seeing psychiatry for mental health.  Recently placed on Depakote.  Has taken it for only 2 days, but has found it effective.    Is obese, but he lifts weights and reports generally eating a low-carb diet.  He attempted to actually gain weight (\"dirty weight gain\") by eating carbohydrates in the past.      REVIEW OF SYSTEMS:    Pertinent items are noted in HPI.    Current Outpatient Medications   Medication Sig Dispense Refill     albuterol (PROAIR HFA/PROVENTIL HFA/VENTOLIN HFA) 108 (90 Base) MCG/ACT inhaler Inhale 2 puffs into the lungs 4 times daily as needed for shortness of breath / dyspnea or wheezing --- use Generic 18 g 11     amphetamine-dextroamphetamine (ADDERALL) 10 MG tablet Take 1 tablet (10 mg) by mouth 2 times daily 60 tablet 0     amphetamine-dextroamphetamine (ADDERALL) 10 MG tablet Take 1 tablet (10 mg) by mouth 2 times daily 60 tablet 0     budesonide (PULMICORT) 0.5 MG/2ML neb solution Squirt entire vial into previously made marshal med saline bottle, mix, and " irrigate each nostril until entire bottle empty.  Do this twice daily. 90 mL 3     divalproex sodium delayed-release (DEPAKOTE) 500 MG DR tablet Take 1 tablet (500 mg) by mouth 2 times daily 60 tablet 4     ipratropium - albuterol 0.5 mg/2.5 mg/3 mL (DUONEB) 0.5-2.5 (3) MG/3ML neb solution Take 1 vial (3 mLs) by nebulization every 6 hours as needed for shortness of breath / dyspnea or wheezing 150 mL 3     LORazepam (ATIVAN) 2 MG tablet Take 1 tablet (2 mg) by mouth 2 times daily as needed for anxiety Use 1/2 - 1 tablet as needed 60 tablet 3     order for DME Equipment being ordered: Nebulizer and supplies 1 each 0     predniSONE (DELTASONE) 20 MG tablet Take 2 tablets (40 mg) by mouth daily as needed - for poison ivy 20 tablet 0     QUEtiapine (SEROQUEL) 25 MG tablet Take 1 tablet (25 mg) by mouth 2 times daily 60 tablet 4     Allergies   Allergen Reactions     Corn Syrup [Glucose] GI Disturbance     Morphine Sulfate Nausea and Vomiting     Cannot take it in pill form, IV ok   Pancreatitis     Peanuts [Nuts] GI Disturbance     Internal cuts-GERD     Gold Au 198 [Gold] Rash       OBJECTIVE:  /80 (BP Location: Right arm, Patient Position: Sitting, Cuff Size: Adult Large)   Pulse 92   Temp 97.3  F (36.3  C) (Temporal)   Resp 20   Wt 140.6 kg (310 lb)   SpO2 98%   BMI 49.29 kg/m      EXAM:  General Appearance: Alert. No acute distress  Chest/Respiratory Exam: Clear to auscultation bilaterally  Cardiovascular Exam: Regular rate and rhythm. S1, S2, no murmur, gallop, or rubs.  Extremities: 2+ pedal pulses.  No lower extremity edema.  Psychiatric: Normal affect and mentation      Results for orders placed or performed in visit on 08/11/21   UA reflex to Microscopic     Status: Abnormal   Result Value Ref Range    Color Urine Light Yellow Colorless, Straw, Light Yellow, Yellow    Appearance Urine Clear Clear    Glucose Urine Negative Negative mg/dL    Bilirubin Urine Negative Negative    Ketones Urine 10  (A)  Negative mg/dL    Specific Gravity Urine 1.020 1.000 - 1.030    Blood Urine Negative Negative    pH Urine 7.0 5.0 - 9.0    Protein Albumin Urine Negative Negative mg/dL    Urobilinogen Urine Normal Normal, 2.0 mg/dL    Nitrite Urine Negative Negative    Leukocyte Esterase Urine Negative Negative    Narrative    Microscopic not indicated   GC/Chlamydia by PCR     Status: Normal    Specimen: Urine, Voided   Result Value Ref Range    Chlamydia Trachomatis Negative Negative    Neisseria gonorrhoeae Negative Negative    Narrative    Assay performed using [employer] real-time, reverse-transcriptase PCR.

## 2021-08-11 NOTE — PATIENT INSTRUCTIONS
Ordered labs, you can draw after 8/23  Schedule with diagnostic lab    We will call with urine results

## 2021-09-27 ENCOUNTER — OFFICE VISIT (OUTPATIENT)
Dept: PSYCHIATRY | Facility: OTHER | Age: 31
End: 2021-09-27
Attending: PSYCHIATRY & NEUROLOGY
Payer: COMMERCIAL

## 2021-09-27 VITALS
TEMPERATURE: 97.6 F | WEIGHT: 305 LBS | DIASTOLIC BLOOD PRESSURE: 80 MMHG | OXYGEN SATURATION: 96 % | HEART RATE: 66 BPM | SYSTOLIC BLOOD PRESSURE: 110 MMHG | BODY MASS INDEX: 48.49 KG/M2 | RESPIRATION RATE: 16 BRPM

## 2021-09-27 DIAGNOSIS — G47.00 INSOMNIA, UNSPECIFIED TYPE: Primary | ICD-10-CM

## 2021-09-27 DIAGNOSIS — F41.9 ANXIETY: ICD-10-CM

## 2021-09-27 DIAGNOSIS — F39 MOOD DISORDER (H): ICD-10-CM

## 2021-09-27 DIAGNOSIS — F90.9 ATTENTION DEFICIT HYPERACTIVITY DISORDER (ADHD), UNSPECIFIED ADHD TYPE: ICD-10-CM

## 2021-09-27 PROCEDURE — 99215 OFFICE O/P EST HI 40 MIN: CPT | Performed by: PSYCHIATRY & NEUROLOGY

## 2021-09-27 PROCEDURE — G0463 HOSPITAL OUTPT CLINIC VISIT: HCPCS

## 2021-09-27 PROCEDURE — 99417 PROLNG OP E/M EACH 15 MIN: CPT | Performed by: PSYCHIATRY & NEUROLOGY

## 2021-09-27 RX ORDER — LORAZEPAM 2 MG/1
2 TABLET ORAL 2 TIMES DAILY PRN
Qty: 60 TABLET | Refills: 3 | Status: SHIPPED | OUTPATIENT
Start: 2021-09-27 | End: 2022-05-17

## 2021-09-27 RX ORDER — TRAZODONE HYDROCHLORIDE 150 MG/1
150 TABLET ORAL AT BEDTIME
Qty: 30 TABLET | Refills: 4 | Status: SHIPPED | OUTPATIENT
Start: 2021-09-27 | End: 2024-01-31

## 2021-09-27 RX ORDER — BUSPIRONE HYDROCHLORIDE 15 MG/1
30 TABLET ORAL 2 TIMES DAILY
Qty: 60 TABLET | Refills: 4 | Status: SHIPPED | OUTPATIENT
Start: 2021-09-27 | End: 2024-01-31

## 2021-09-27 RX ORDER — DEXTROAMPHETAMINE SACCHARATE, AMPHETAMINE ASPARTATE, DEXTROAMPHETAMINE SULFATE AND AMPHETAMINE SULFATE 2.5; 2.5; 2.5; 2.5 MG/1; MG/1; MG/1; MG/1
10 TABLET ORAL 2 TIMES DAILY
Qty: 60 TABLET | Refills: 0 | Status: SHIPPED | OUTPATIENT
Start: 2021-09-27 | End: 2021-09-27

## 2021-09-27 RX ORDER — QUETIAPINE FUMARATE 100 MG/1
100 TABLET, FILM COATED ORAL AT BEDTIME
Qty: 30 TABLET | Refills: 4 | Status: SHIPPED | OUTPATIENT
Start: 2021-09-27 | End: 2024-01-31

## 2021-09-27 RX ORDER — QUETIAPINE FUMARATE 25 MG/1
25 TABLET, FILM COATED ORAL 2 TIMES DAILY
Qty: 60 TABLET | Refills: 4 | Status: SHIPPED | OUTPATIENT
Start: 2021-09-27 | End: 2024-01-31

## 2021-09-27 RX ORDER — DEXTROAMPHETAMINE SACCHARATE, AMPHETAMINE ASPARTATE, DEXTROAMPHETAMINE SULFATE AND AMPHETAMINE SULFATE 5; 5; 5; 5 MG/1; MG/1; MG/1; MG/1
20 TABLET ORAL 2 TIMES DAILY
Qty: 60 TABLET | Refills: 0 | Status: SHIPPED | OUTPATIENT
Start: 2021-09-27 | End: 2024-01-31

## 2021-09-27 ASSESSMENT — ANXIETY QUESTIONNAIRES
3. WORRYING TOO MUCH ABOUT DIFFERENT THINGS: SEVERAL DAYS
7. FEELING AFRAID AS IF SOMETHING AWFUL MIGHT HAPPEN: SEVERAL DAYS
6. BECOMING EASILY ANNOYED OR IRRITABLE: MORE THAN HALF THE DAYS
2. NOT BEING ABLE TO STOP OR CONTROL WORRYING: SEVERAL DAYS
GAD7 TOTAL SCORE: 14
IF YOU CHECKED OFF ANY PROBLEMS ON THIS QUESTIONNAIRE, HOW DIFFICULT HAVE THESE PROBLEMS MADE IT FOR YOU TO DO YOUR WORK, TAKE CARE OF THINGS AT HOME, OR GET ALONG WITH OTHER PEOPLE: EXTREMELY DIFFICULT
1. FEELING NERVOUS, ANXIOUS, OR ON EDGE: NEARLY EVERY DAY
5. BEING SO RESTLESS THAT IT IS HARD TO SIT STILL: NEARLY EVERY DAY

## 2021-09-27 ASSESSMENT — PAIN SCALES - GENERAL: PAINLEVEL: SEVERE PAIN (7)

## 2021-09-27 ASSESSMENT — PATIENT HEALTH QUESTIONNAIRE - PHQ9: 5. POOR APPETITE OR OVEREATING: NEARLY EVERY DAY

## 2021-09-27 NOTE — PATIENT INSTRUCTIONS
Adderall 20 mg:  Take twice a day (at least 4 hours between dosages) for ADHD  Take the second dose at least 5-6 hours before you want to be asleep    Seroquel (quetiapine) 25 mg, 100 mg:  Take every night to help fall asleep and to help keep your moods stable  Start with 1-2  25 mg tablets at bedtime  After several days, increase to 100 mg at bedtime  If necessary, you can increase to 100-125 mg every night    Trazodone 150 mg:  Take every night between dinner and bedtime to help stay asleep,  Start with 1/3 tablet (50 mg),  If you are not sleeping reasonably well through the night, increase by 1/3 tablet a night until reasonable sleep, maximum 150 mg (1 tablet)    BuSpar (buspirone) 15 mg:  Take twice a day every day to help decrease anxiety  Start with 1/2 tablet twice a day for 1 week,  Then increase to 1 tablet twice a day

## 2021-09-27 NOTE — NURSING NOTE
Patient presenting for follow up on his ADHD and anxiety  Medication Reconciliation: complete  Advanced Care Directive Reviewed.    Martita Sanchez LPN  9/27/2021 1:48 PM

## 2021-09-27 NOTE — PROGRESS NOTES
Psychiatric Progress Note/Visit  September 27, 2021    Identifying Data:  This is a 30-year-old man seen for follow-up psychiatric medication management visit for treatment of mood disorder, anxiety, insomnia and ADHD    Interval History:  He was seen on August 9, 2021 for a psychiatric evaluation.  At that time he was having labile moods and depression and had been experiencing significant anxiety since his recent divorce.  We decided on trials of Depakote and bedtime Seroquel, as well as continuing his twice a day 10 mg immediate release Adderall.  In addition we continued his Ativan at a maximum dosage of 2 mg twice a day as needed.  Today he reports that he did very well with his Ativan, feeling that it helped decrease his anxiety significantly.  However he reported that he stopped taking Depakote because he began to experience visual side effects.  It also appears that he did not get full prescriptions for Seroquel and ran out of it too soon.  Also he reports that he was taking his Adderall at 2 tablets twice a day with very good response, but also ran out of that very quickly.    Mental Status Exam:  Anxiety remains moderate, depression has decreased slightly, in the moderate to marked range.  He was not crying during today's session and reported less lability.  Remainder of MSE is essentially unchanged from August 9, 2021.    Current Psychiatric Medications:  Ativan 2 mg twice a day as needed  Seroquel 100 mg at night  Adderall 10-20 mg twice a day    Lab Tests/Results:  His weight today was 305 pounds.  His weight on August 11 was 310 pounds.  His weight on August 9, 2021 was 311 pounds and 9.6 ounces.  Laboratory studies were not ordered previously and are not being ordered today, but will be ordered after he stabilizes on his Seroquel dosage.    Diagnosis:  Mood disorder, unspecified  Anxiety, unspecified  ADHD  Insomnia    Impression/Assessment:  He was not able to tolerate Depakote, due to adverse  reaction.  He appeared to have a good response to Seroquel at bedtime, but ran out of his medication.  He reports a very good response to a higher dose of Adderall and denied any problems.  He continues to have significant anxiety, that is relieved temporarily by as needed Ativan, but we discussed the need to find approaches that will help decrease anxiety without relying on routine Ativan.    Plan:  1.  Trial of increased Adderall at 20 mg twice a day for ADHD  2.  Continue Seroquel 100-100 mg at bedtime for sleep initiation and mood stabilization  3.  Trial of trazodone, 50 to 150 mg every evening, for sleep maintenance  4.  Trial of BuSpar, 15 mg twice a day for anxiety  5.  Discontinue depakote  6.  He is encouraged to restart individual psychotherapy.  He reports that he previously worked with Jesús Sanchez at [facility] and will recontact them.  7.  Follow-up with Dr. Felix in 1 month    Time spent on day of visit 79 minutes-13 minutes (8:03 AM through 8:16 AM)  review of EMR prior to visit, 53 minutes (1:56 PM through 2:49 PM)  face-to-face meeting with patient, including discussion of risk/benefits, alternatives and possible side effects to medication, detailed verbal and written instructions on medication dosage adjustments, counseling on above issues, and prescription of medications in the EMR, 13 minutes (2:49 PM through 3:02 PM)  documentation in the EMR      David Felix MD

## 2021-09-28 ASSESSMENT — ANXIETY QUESTIONNAIRES: GAD7 TOTAL SCORE: 14

## 2022-02-07 ENCOUNTER — OFFICE VISIT (OUTPATIENT)
Dept: PSYCHIATRY | Facility: OTHER | Age: 32
End: 2022-02-07
Attending: PSYCHIATRY & NEUROLOGY
Payer: COMMERCIAL

## 2022-02-07 VITALS
SYSTOLIC BLOOD PRESSURE: 120 MMHG | TEMPERATURE: 97.9 F | HEART RATE: 66 BPM | RESPIRATION RATE: 12 BRPM | BODY MASS INDEX: 49.67 KG/M2 | DIASTOLIC BLOOD PRESSURE: 80 MMHG | OXYGEN SATURATION: 98 % | WEIGHT: 312.4 LBS

## 2022-02-07 DIAGNOSIS — F90.9 ATTENTION DEFICIT HYPERACTIVITY DISORDER (ADHD), UNSPECIFIED ADHD TYPE: ICD-10-CM

## 2022-02-07 DIAGNOSIS — F41.9 ANXIETY: Primary | ICD-10-CM

## 2022-02-07 PROCEDURE — 99215 OFFICE O/P EST HI 40 MIN: CPT | Performed by: PSYCHIATRY & NEUROLOGY

## 2022-02-07 PROCEDURE — G0463 HOSPITAL OUTPT CLINIC VISIT: HCPCS

## 2022-02-07 RX ORDER — GUANFACINE 2 MG/1
2 TABLET ORAL 2 TIMES DAILY
Qty: 60 TABLET | Refills: 4 | Status: SHIPPED | OUTPATIENT
Start: 2022-02-07 | End: 2022-05-17

## 2022-02-07 RX ORDER — DEXTROAMPHETAMINE SACCHARATE, AMPHETAMINE ASPARTATE, DEXTROAMPHETAMINE SULFATE AND AMPHETAMINE SULFATE 5; 5; 5; 5 MG/1; MG/1; MG/1; MG/1
20 TABLET ORAL 2 TIMES DAILY
Qty: 60 TABLET | Refills: 0 | Status: SHIPPED | OUTPATIENT
Start: 2022-02-07 | End: 2024-01-31

## 2022-02-07 RX ORDER — BUSPIRONE HYDROCHLORIDE 15 MG/1
15 TABLET ORAL 2 TIMES DAILY
Qty: 60 TABLET | Refills: 4 | Status: SHIPPED | OUTPATIENT
Start: 2022-02-07 | End: 2024-01-31

## 2022-02-07 RX ORDER — LORAZEPAM 2 MG/1
2 TABLET ORAL 2 TIMES DAILY PRN
Qty: 60 TABLET | Refills: 5 | Status: SHIPPED | OUTPATIENT
Start: 2022-02-07 | End: 2024-01-23

## 2022-02-07 ASSESSMENT — PAIN SCALES - GENERAL: PAINLEVEL: SEVERE PAIN (7)

## 2022-02-07 NOTE — NURSING NOTE
Marija Albright is a 31 year old male presenting for a follow up of his ADHD.  Medication Reconciliation: unable or not appropriate to perform  Advanced Care Directive Reviewed.    Martita Sanchez LPN  2/7/2022 10:18 AM

## 2022-02-07 NOTE — PROGRESS NOTES
Psychiatric Progress Note/Visit  02/07/2022    Identifying Data:  This is a 31-year-old man seen for follow-up psychiatric medication management visit for treatment of mood disorder, anxiety, ADHD and insomnia    Interval History:  He was seen most recently on September 27, 2021.  At that time he reported that he responded well to Ativan, but stopped taking Depakote because he was getting visual side effects.  He reported that he was doing well on Adderall 20 mg twice a day for his ADHD.  We decided to add trazodone and BuSpar and to continue taking Seroquel at bedtime.  Today he tells me that he has been out of all of his medicines for about 2 months.  He reports that he could not tolerate trazodone or Seroquel, because he felt too sedated the next day on both of them.  He does not remember trying BuSpar as we directed and we talked about reordering that today.  He reported that he has been relying on his Ativan, even to help fall asleep.  We discussed the need to find a better way to deal with anxiety and sleep and to work to make Ativan a once in a while medication, not a daily medicine.  He also reports that he started therapy, but does not feel he can continue to see a therapist while he is trying to get established in a job.    Mental Status Exam:  Anxiety remains moderate, depression has decreased somewhat, currently moderate.  Lability continues to decrease significantly.  Remainder of MSE is essentially unchanged from September 27, 2021.    Current Psychiatric Medications:  Adderall 20 mg twice a day  Ativan 2 mg up to twice a day    Lab Tests/Results:  His weight today was 312 pounds and 4 ounces.  Weight on September 27, 2021 was 305 pounds, weight on August 11 was 310 pounds, weight on August 9, 2021 was 311 pounds and 9.6 ounces.    Diagnosis:  Mood disorder, unspecified  Anxiety, unspecified  ADHD  Insomnia  Rule out PTSD    Impression/Assessment:  He has been inconsistent in taking his medications,  but has been doing fairly well without significant treatment intervention.  He discussed wanting to try something other than Adderall and we talked about adding guanfacine, that can be taken with or without the Adderall for his ADHD.  In addition we discussed a trial of BuSpar for anxiety.    Plan:  1.   Trial of guanfacine, 1-2 mg twice a day for ADHD  2.   Trial of BuSpar, 15 mg twice a day for anxiety  3.   Continue immediate release Adderall 20 mg twice a day for ADHD  4.   Continue Ativan as needed for breakthrough anxiety  5.   Follow-up with Dr. Felix in 1 month or the next available appointment after that    Time spent on day of visit 56 minutes-11 minutes (7:33 AM through 7:44 AM)  review of EMR prior to visit, 36 minutes (10:15 AM through 10:51 AM)  face-to-face meeting with patient, including discussion of risk/benefits, alternatives and possible side effects to medication, detailed verbal and written instructions on medication dosage adjustments, counseling on above issues, and prescription of medications in the EMR, 9 minutes (10:51 AM through 11 AM)  documentation in the EMR      David Felix MD

## 2022-02-07 NOTE — PATIENT INSTRUCTIONS
tenex (guanfacine) 2 mg: for ADHD,  Alone or in combination with a stimulant  Start with 1/2 tablet (1 mg) in am for 3-4 + days,  Then increase to 1/2 tablet twice a day (2nd dosage around 4 pm) for 1 week,  Then increase to 1 tablet (2 mg) twice a day

## 2022-03-02 ENCOUNTER — HOSPITAL ENCOUNTER (OUTPATIENT)
Dept: GENERAL RADIOLOGY | Facility: OTHER | Age: 32
End: 2022-03-02
Attending: NURSE PRACTITIONER
Payer: COMMERCIAL

## 2022-03-02 ENCOUNTER — OFFICE VISIT (OUTPATIENT)
Dept: FAMILY MEDICINE | Facility: OTHER | Age: 32
End: 2022-03-02
Attending: NURSE PRACTITIONER
Payer: COMMERCIAL

## 2022-03-02 VITALS
BODY MASS INDEX: 48.33 KG/M2 | RESPIRATION RATE: 18 BRPM | WEIGHT: 304 LBS | HEART RATE: 80 BPM | SYSTOLIC BLOOD PRESSURE: 132 MMHG | OXYGEN SATURATION: 97 % | DIASTOLIC BLOOD PRESSURE: 88 MMHG | TEMPERATURE: 98.5 F

## 2022-03-02 DIAGNOSIS — R05.3 PERSISTENT COUGH FOR 3 WEEKS OR LONGER: ICD-10-CM

## 2022-03-02 DIAGNOSIS — J45.41 MODERATE PERSISTENT ASTHMA WITH ACUTE EXACERBATION: Primary | ICD-10-CM

## 2022-03-02 DIAGNOSIS — R06.02 SHORTNESS OF BREATH: ICD-10-CM

## 2022-03-02 DIAGNOSIS — Z71.1 CONCERN ABOUT URINARY TRACT DISEASE WITHOUT DIAGNOSIS: ICD-10-CM

## 2022-03-02 DIAGNOSIS — J06.9 VIRAL URI WITH COUGH: ICD-10-CM

## 2022-03-02 DIAGNOSIS — R07.1 PAINFUL BREATHING: ICD-10-CM

## 2022-03-02 LAB
ALBUMIN SERPL-MCNC: 4.6 G/DL (ref 3.5–5.7)
ALBUMIN UR-MCNC: 10 MG/DL
ALP SERPL-CCNC: 54 U/L (ref 34–104)
ALT SERPL W P-5'-P-CCNC: 20 U/L (ref 7–52)
ANION GAP SERPL CALCULATED.3IONS-SCNC: 6 MMOL/L (ref 3–14)
APPEARANCE UR: CLEAR
AST SERPL W P-5'-P-CCNC: 18 U/L (ref 13–39)
BASOPHILS # BLD AUTO: 0 10E3/UL (ref 0–0.2)
BASOPHILS NFR BLD AUTO: 0 %
BILIRUB SERPL-MCNC: 0.5 MG/DL (ref 0.3–1)
BILIRUB UR QL STRIP: NEGATIVE
BUN SERPL-MCNC: 11 MG/DL (ref 7–25)
CALCIUM SERPL-MCNC: 9.1 MG/DL (ref 8.6–10.3)
CHLORIDE BLD-SCNC: 109 MMOL/L (ref 98–107)
CO2 SERPL-SCNC: 27 MMOL/L (ref 21–31)
COLOR UR AUTO: YELLOW
CREAT SERPL-MCNC: 1.2 MG/DL (ref 0.7–1.3)
EOSINOPHIL # BLD AUTO: 0.1 10E3/UL (ref 0–0.7)
EOSINOPHIL NFR BLD AUTO: 1 %
ERYTHROCYTE [DISTWIDTH] IN BLOOD BY AUTOMATED COUNT: 13 % (ref 10–15)
GFR SERPL CREATININE-BSD FRML MDRD: 84 ML/MIN/1.73M2
GLUCOSE BLD-MCNC: 94 MG/DL (ref 70–105)
GLUCOSE UR STRIP-MCNC: NEGATIVE MG/DL
HCT VFR BLD AUTO: 43.1 % (ref 40–53)
HGB BLD-MCNC: 14.8 G/DL (ref 13.3–17.7)
HGB UR QL STRIP: NEGATIVE
HYALINE CASTS: 1 /LPF
IMM GRANULOCYTES # BLD: 0 10E3/UL
IMM GRANULOCYTES NFR BLD: 0 %
KETONES UR STRIP-MCNC: 20 MG/DL
LEUKOCYTE ESTERASE UR QL STRIP: NEGATIVE
LYMPHOCYTES # BLD AUTO: 1.9 10E3/UL (ref 0.8–5.3)
LYMPHOCYTES NFR BLD AUTO: 24 %
MCH RBC QN AUTO: 30 PG (ref 26.5–33)
MCHC RBC AUTO-ENTMCNC: 34.3 G/DL (ref 31.5–36.5)
MCV RBC AUTO: 87 FL (ref 78–100)
MONOCYTES # BLD AUTO: 0.5 10E3/UL (ref 0–1.3)
MONOCYTES NFR BLD AUTO: 6 %
MUCOUS THREADS #/AREA URNS LPF: PRESENT /LPF
NEUTROPHILS # BLD AUTO: 5.4 10E3/UL (ref 1.6–8.3)
NEUTROPHILS NFR BLD AUTO: 69 %
NITRATE UR QL: NEGATIVE
NRBC # BLD AUTO: 0 10E3/UL
NRBC BLD AUTO-RTO: 0 /100
PH UR STRIP: 7.5 [PH] (ref 5–9)
PLATELET # BLD AUTO: 204 10E3/UL (ref 150–450)
POTASSIUM BLD-SCNC: 4 MMOL/L (ref 3.5–5.1)
PROT SERPL-MCNC: 7.1 G/DL (ref 6.4–8.9)
RBC # BLD AUTO: 4.94 10E6/UL (ref 4.4–5.9)
RBC URINE: <1 /HPF
SODIUM SERPL-SCNC: 142 MMOL/L (ref 134–144)
SP GR UR STRIP: 1.02 (ref 1–1.03)
UROBILINOGEN UR STRIP-MCNC: 3 MG/DL
WBC # BLD AUTO: 7.8 10E3/UL (ref 4–11)
WBC URINE: 1 /HPF

## 2022-03-02 PROCEDURE — 82040 ASSAY OF SERUM ALBUMIN: CPT | Mod: ZL | Performed by: NURSE PRACTITIONER

## 2022-03-02 PROCEDURE — 71046 X-RAY EXAM CHEST 2 VIEWS: CPT

## 2022-03-02 PROCEDURE — 99214 OFFICE O/P EST MOD 30 MIN: CPT | Performed by: NURSE PRACTITIONER

## 2022-03-02 PROCEDURE — 36415 COLL VENOUS BLD VENIPUNCTURE: CPT | Mod: ZL | Performed by: NURSE PRACTITIONER

## 2022-03-02 PROCEDURE — 85025 COMPLETE CBC W/AUTO DIFF WBC: CPT | Mod: ZL | Performed by: NURSE PRACTITIONER

## 2022-03-02 PROCEDURE — 80053 COMPREHEN METABOLIC PANEL: CPT | Mod: ZL | Performed by: NURSE PRACTITIONER

## 2022-03-02 PROCEDURE — G0463 HOSPITAL OUTPT CLINIC VISIT: HCPCS

## 2022-03-02 PROCEDURE — 81003 URINALYSIS AUTO W/O SCOPE: CPT | Mod: ZL | Performed by: NURSE PRACTITIONER

## 2022-03-02 RX ORDER — AZITHROMYCIN 250 MG/1
TABLET, FILM COATED ORAL
Qty: 6 TABLET | Refills: 0 | Status: SHIPPED | OUTPATIENT
Start: 2022-03-02 | End: 2022-03-07

## 2022-03-02 ASSESSMENT — PAIN SCALES - GENERAL: PAINLEVEL: EXTREME PAIN (9)
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
## 2022-03-02 NOTE — NURSING NOTE
"Chief Complaint   Patient presents with     Cough     cough, sinus congestion - symptoms for 3 weeks       Initial /88 (BP Location: Left arm, Patient Position: Sitting, Cuff Size: Adult Large)   Pulse 80   Temp 98.5  F (36.9  C) (Tympanic)   Resp 18   Wt 137.9 kg (304 lb)   SpO2 97%   BMI 48.33 kg/m   Estimated body mass index is 48.33 kg/m  as calculated from the following:    Height as of 8/9/21: 1.689 m (5' 6.5\").    Weight as of this encounter: 137.9 kg (304 lb).  Medication Reconciliation: complete    Radhika Franco LPN    "

## 2022-03-03 NOTE — PATIENT INSTRUCTIONS
Prednisone take 2 tabs daily x 3 days    Azithromycin daily x 5 days - antibiotic    Continue inhaler but limit to 3 to 4 times a day    Continue nebulizer but limit to 2 times per day

## 2022-04-13 ENCOUNTER — TELEPHONE (OUTPATIENT)
Dept: INTERNAL MEDICINE | Facility: OTHER | Age: 32
End: 2022-04-13
Payer: COMMERCIAL

## 2022-04-13 NOTE — TELEPHONE ENCOUNTER
The patient called stating that he needs a form filled out for window tinting as needed medically.  We made an appointment in May for this.  He may not need this and he made need to have this done by an eye doctor.  Please advise.  He also thought he may want to keep this appointment to see Dr Saha for something else.

## 2022-04-18 NOTE — TELEPHONE ENCOUNTER
Called and informed patient of the need to make an eye doctor appt.  ...Argelia Rice LPN on 4/18/2022 at 2:38 PM

## 2022-04-20 ENCOUNTER — TELEPHONE (OUTPATIENT)
Dept: PSYCHIATRY | Facility: OTHER | Age: 32
End: 2022-04-20

## 2022-04-20 ENCOUNTER — OFFICE VISIT (OUTPATIENT)
Dept: PSYCHIATRY | Facility: OTHER | Age: 32
End: 2022-04-20
Attending: PSYCHIATRY & NEUROLOGY
Payer: COMMERCIAL

## 2022-04-20 VITALS
RESPIRATION RATE: 20 BRPM | HEART RATE: 72 BPM | DIASTOLIC BLOOD PRESSURE: 86 MMHG | OXYGEN SATURATION: 98 % | SYSTOLIC BLOOD PRESSURE: 136 MMHG | BODY MASS INDEX: 49.6 KG/M2 | TEMPERATURE: 97.5 F | WEIGHT: 312 LBS

## 2022-04-20 DIAGNOSIS — F41.9 ANXIETY: Primary | ICD-10-CM

## 2022-04-20 DIAGNOSIS — F90.9 ATTENTION DEFICIT HYPERACTIVITY DISORDER (ADHD), UNSPECIFIED ADHD TYPE: ICD-10-CM

## 2022-04-20 PROCEDURE — G0463 HOSPITAL OUTPT CLINIC VISIT: HCPCS | Performed by: PSYCHIATRY & NEUROLOGY

## 2022-04-20 PROCEDURE — 99215 OFFICE O/P EST HI 40 MIN: CPT | Performed by: PSYCHIATRY & NEUROLOGY

## 2022-04-20 RX ORDER — BUSPIRONE HYDROCHLORIDE 30 MG/1
30 TABLET ORAL 2 TIMES DAILY
Qty: 60 TABLET | Refills: 5 | Status: SHIPPED | OUTPATIENT
Start: 2022-04-20 | End: 2024-01-31

## 2022-04-20 RX ORDER — DEXTROAMPHETAMINE SACCHARATE, AMPHETAMINE ASPARTATE MONOHYDRATE, DEXTROAMPHETAMINE SULFATE AND AMPHETAMINE SULFATE 5; 5; 5; 5 MG/1; MG/1; MG/1; MG/1
40 CAPSULE, EXTENDED RELEASE ORAL DAILY
Qty: 60 CAPSULE | Refills: 0 | Status: SHIPPED | OUTPATIENT
Start: 2022-04-20 | End: 2022-04-22

## 2022-04-20 ASSESSMENT — ANXIETY QUESTIONNAIRES
1. FEELING NERVOUS, ANXIOUS, OR ON EDGE: NEARLY EVERY DAY
2. NOT BEING ABLE TO STOP OR CONTROL WORRYING: MORE THAN HALF THE DAYS
5. BEING SO RESTLESS THAT IT IS HARD TO SIT STILL: MORE THAN HALF THE DAYS
4. TROUBLE RELAXING: MORE THAN HALF THE DAYS
GAD7 TOTAL SCORE: 12
GAD7 TOTAL SCORE: 12
7. FEELING AFRAID AS IF SOMETHING AWFUL MIGHT HAPPEN: SEVERAL DAYS
6. BECOMING EASILY ANNOYED OR IRRITABLE: SEVERAL DAYS
3. WORRYING TOO MUCH ABOUT DIFFERENT THINGS: SEVERAL DAYS
7. FEELING AFRAID AS IF SOMETHING AWFUL MIGHT HAPPEN: SEVERAL DAYS
GAD7 TOTAL SCORE: 12

## 2022-04-20 ASSESSMENT — PATIENT HEALTH QUESTIONNAIRE - PHQ9
SUM OF ALL RESPONSES TO PHQ QUESTIONS 1-9: 7
SUM OF ALL RESPONSES TO PHQ QUESTIONS 1-9: 7
10. IF YOU CHECKED OFF ANY PROBLEMS, HOW DIFFICULT HAVE THESE PROBLEMS MADE IT FOR YOU TO DO YOUR WORK, TAKE CARE OF THINGS AT HOME, OR GET ALONG WITH OTHER PEOPLE: SOMEWHAT DIFFICULT

## 2022-04-20 NOTE — NURSING NOTE
Patient presents today for follow up on ADHD.    Medication Reconciliation Complete    Molly Rucker LPN  4/20/2022 1:25 PM

## 2022-04-20 NOTE — PATIENT INSTRUCTIONS
BuSpar (buspirone) 30 mg:  Increase to 2/3 tablet (20 mg) twice a day for 1-2 weeks,  If anxiety is not starting to decrease, increase to 1 tablet (30 mg) twice a day  This does not need to be taken on a rigid schedule and it could be taken all at once if that works better    Adderall XR (mixed amphetamine) 20 mg:  Take 2 capsules (40 mg) in the morning  This is equivalent to taking 20 mg in the morning of immediate release Adderall and then taking another 20 mg about 4 hours later

## 2022-04-20 NOTE — TELEPHONE ENCOUNTER
Patient states he wants a call back regarding new medication prescribed today.    Kelly Hernandez on 4/20/2022 at 3:09 PM

## 2022-04-20 NOTE — PROGRESS NOTES
"Psychiatric Progress Note/Visit  April 20, 2022    Identifying Data:  This is a 31-year-old man seen for follow-up psychiatric medication management visit for treatment of mood disorder, anxiety, ADHD and insomnia    Interval History:  He was seen most recently on February 7, 2022.  At that time he had been out of all his medications for about 2 months earlier.  He reported that he was getting too much sedation from trazodone and Seroquel and stopped using them.  He was using his Ativan fairly regularly and seeing a therapist, but did not know if he would be able to continue seeing a therapist while he was looking for work.  We decided to add guanfacine 1 to 2 mg twice a day for ADHD and BuSpar up to 15 mg twice a day for anxiety.  Today he reports that \"anxiety is not that well controlled\" on BuSpar 15 mg twice a day.  He does not think that the guanfacine has been helpful and he feels that it may be sedating.  He also discussed that he has difficult dreams about 2-3 times a week and we discussed recommendation to get a new therapist and work on his issues and his dreams in therapy.    Mental Status Exam:  Anxiety remains moderate, depression has decreased slightly, currently mild to moderate.  Lability has continued to decrease significantly and does not appear to be a current problem.  Remainder of MSE is essentially unchanged from February 7, 2022.    Current Psychiatric Medications:  BuSpar 15 mg twice a day  Guanfacine 2 mg twice a day (being tapered off of and discontinued)  Immediate release Adderall 20 mg twice a day  Ativan 1-2 mg as needed    Lab Tests/Results:  His weight today was 312 pounds, weight on February 7, 2022 was 312 pounds and 4 ounces, weight on September 27, 2021 was 305 pounds, weight on August 11 was 310 pounds, weight on August 9, 2021 was 311 pounds and 9.6 ounces.  Laboratory studies were not ordered previously and none are being ordered today.    Diagnosis:  Mood disorder, " unspecified  ADHD  Anxiety, unspecified  Insomnia  Rule out PTSD    Impression/Assessment:  He has had an insufficient response to BuSpar 15 mg twice a day to decrease anxiety.  We discussed increasing this to clinical effectiveness or a maximum of 60 mg twice a day.  He does not appear to be benefiting from the addition of guanfacine and may be having side effects.  He was instructed on strategies to taper off this over the course of 2 weeks or more.  At this time he is very open to considering starting psychotherapy again and we discussed strategies to help him assess the effectiveness and to improve communication in therapy.  He was given a list of local clinics and providers.    Plan:  1.   Taper off of guanfacine  2.   Increase BuSpar to 20-30 mg twice a day for anxiety  3.   Trial of 40 mg extended release Adderall in the morning to replace immediate release Adderall 20 mg twice a day for ADHD  4.   Continue as needed Ativan for breakthrough anxiety  5.   Restart individual psychotherapy  6.   Follow-up with Dr. Felix in 1 month    Time spent on day of visit 58 minutes- 10 minutes (8 AM through 8:10 AM) review of EMR prior to visit, 37 minutes (1:35 PM through 2:12 PM) face-to-face meeting with patient, including discussion of risk/benefits, alternatives and possible side effects to medication, detailed verbal and written instructions on medication dosage adjustments, counseling on above issues, and prescription of medications in the EMR, 11 minutes (2:12 PM through 2:23 PM) documentation in the EMR      David Felix MD    Answers for HPI/ROS submitted by the patient on 4/20/2022  If you checked off any problems, how difficult have these problems made it for you to do your work, take care of things at home, or get along with other people?: Somewhat difficult  PHQ9 TOTAL SCORE: 7  SAGE 7 TOTAL SCORE: 12

## 2022-04-21 ASSESSMENT — PATIENT HEALTH QUESTIONNAIRE - PHQ9: SUM OF ALL RESPONSES TO PHQ QUESTIONS 1-9: 7

## 2022-04-21 ASSESSMENT — ANXIETY QUESTIONNAIRES: GAD7 TOTAL SCORE: 12

## 2022-04-22 DIAGNOSIS — F90.9 ATTENTION DEFICIT HYPERACTIVITY DISORDER (ADHD), UNSPECIFIED ADHD TYPE: ICD-10-CM

## 2022-04-22 RX ORDER — DEXTROAMPHETAMINE SACCHARATE, AMPHETAMINE ASPARTATE MONOHYDRATE, DEXTROAMPHETAMINE SULFATE AND AMPHETAMINE SULFATE 5; 5; 5; 5 MG/1; MG/1; MG/1; MG/1
40 CAPSULE, EXTENDED RELEASE ORAL DAILY
Qty: 60 CAPSULE | Refills: 0 | Status: SHIPPED | OUTPATIENT
Start: 2022-04-22 | End: 2024-01-31

## 2022-04-22 NOTE — TELEPHONE ENCOUNTER
Called patient, patient states the pharmacy never received  the aderral from his recent visit with [facility], would like this resent to [facility].  Please advise. [facility] out.  ...Argelia Rice LPN on 4/22/2022 at 10:53 AM

## 2022-05-17 ENCOUNTER — OFFICE VISIT (OUTPATIENT)
Dept: INTERNAL MEDICINE | Facility: OTHER | Age: 32
End: 2022-05-17
Attending: INTERNAL MEDICINE
Payer: COMMERCIAL

## 2022-05-17 VITALS
OXYGEN SATURATION: 97 % | BODY MASS INDEX: 49.5 KG/M2 | DIASTOLIC BLOOD PRESSURE: 82 MMHG | SYSTOLIC BLOOD PRESSURE: 138 MMHG | RESPIRATION RATE: 20 BRPM | HEART RATE: 64 BPM | HEIGHT: 66 IN | WEIGHT: 308 LBS | TEMPERATURE: 97.1 F

## 2022-05-17 DIAGNOSIS — K21.00 GASTROESOPHAGEAL REFLUX DISEASE WITH ESOPHAGITIS WITHOUT HEMORRHAGE: ICD-10-CM

## 2022-05-17 DIAGNOSIS — G43.109 MIGRAINE WITH AURA AND WITHOUT STATUS MIGRAINOSUS, NOT INTRACTABLE: Primary | ICD-10-CM

## 2022-05-17 PROCEDURE — G0463 HOSPITAL OUTPT CLINIC VISIT: HCPCS

## 2022-05-17 PROCEDURE — 99213 OFFICE O/P EST LOW 20 MIN: CPT | Performed by: INTERNAL MEDICINE

## 2022-05-17 ASSESSMENT — ENCOUNTER SYMPTOMS
SORE THROAT: 1
SHORTNESS OF BREATH: 0
NAUSEA: 1
HEADACHES: 1
FEVER: 0
VOMITING: 1
CHILLS: 0
COUGH: 0

## 2022-05-17 ASSESSMENT — PAIN SCALES - GENERAL: PAINLEVEL: EXTREME PAIN (8)

## 2022-05-17 NOTE — NURSING NOTE
"Chief Complaint   Patient presents with     Discuss window tint for car         Initial /82 (BP Location: Right arm, Patient Position: Sitting, Cuff Size: Adult Large)   Pulse 64   Temp 97.1  F (36.2  C) (Temporal)   Resp 20   Ht 1.681 m (5' 6.2\")   Wt 139.7 kg (308 lb)   SpO2 97%   BMI 49.41 kg/m   Estimated body mass index is 49.41 kg/m  as calculated from the following:    Height as of this encounter: 1.681 m (5' 6.2\").    Weight as of this encounter: 139.7 kg (308 lb).       FOOD SECURITY SCREENING QUESTIONS:    The next two questions are to help us understand your food security.  If you are feeling you need any assistance in this area, we have resources available to support you today.    Hunger Vital Signs:  Within the past 12 months we worried whether our food would run out before we got money to buy moreoften  Within the past 12 months the food we bought just didn't last and we didn't have money to get more. Often    Advance Care Directive on file? no      Medication reconciliation complete.      Jared Alonzo,on 5/17/2022 at 3:38 PM        "

## 2022-05-17 NOTE — LETTER
May 17, 2022      Marija Albright  [address]  [facility] [address]        To Whom It May Concern,     Please allow up to 80% window tinting of  and passenger windows to prevent migraines while driving.       Sincerely,        Erasmo Saha MD

## 2022-05-17 NOTE — PATIENT INSTRUCTIONS
Start some Maalox or Pepcid... to help with esophageal pain for the next couple weeks, until it heals up.     Letter printed for migraine prevention while driving.     Return in approximately 6 months, or sooner as needed for follow-up with Dr. Saha.  - Annual Follow-up / Physical     Clinic : [phone]  Appointment line: [phone]

## 2022-05-17 NOTE — PROGRESS NOTES
"Assessment & Plan       ICD-10-CM    1. Migraine with aura and without status migrainosus, not intractable  G43.109    2. Gastroesophageal reflux disease with esophagitis without hemorrhage  K21.00      Patient comes in due to triggering of migraines with bright lights or too much light in his eyes.  He would like to try getting the side windows of his car tinted down to 80%.  Letter printed.  States that he typically will still wear sunglasses and does not plan on having the front window tinted at all.    Heartburn and reflux.  Has been having more issues recently.  Had fairly violent nausea and vomiting over the weekend he thinks he probably had food poisoning.  Throat is still sore.  He does have antacid at home but has not used any recently.  Recommend restarting use.      Encouraged weight loss and regular exercise.     Return in about 6 months (around 11/17/2022) for Annual Physical Exam.    Erasmo Saha MD  [facility]     Jamie Dutton is a 31 year old who presents for the following health issues discuss window tint    History of Present Illness         Patient refused to questionnaire.     Review of Systems   Constitutional: Negative for chills and fever.   HENT: Positive for sore throat. Negative for congestion and mouth sores.    Respiratory: Negative for cough and shortness of breath.    Cardiovascular: Negative for chest pain.   Gastrointestinal: Positive for nausea and vomiting.        N/V - Violent over the weekend. ? Food poisoning. Still with sore throat.   Neurological: Positive for headaches (Migraines, occasionally with driving (when too bright) needs darkened side glass.).          Objective    /82 (BP Location: Right arm, Patient Position: Sitting, Cuff Size: Adult Large)   Pulse 64   Temp 97.1  F (36.2  C) (Temporal)   Resp 20   Ht 1.681 m (5' 6.2\")   Wt 139.7 kg (308 lb)   SpO2 97%   BMI 49.41 kg/m    Body mass index is 49.41 kg/m .  Physical " Exam  Constitutional:       General: He is not in acute distress.     Appearance: He is well-developed. He is obese. He is not diaphoretic.   HENT:      Head: Normocephalic and atraumatic.      Mouth/Throat:      Mouth: Mucous membranes are moist.      Pharynx: Oropharynx is clear. No oropharyngeal exudate or posterior oropharyngeal erythema.   Eyes:      General: No scleral icterus.     Conjunctiva/sclera: Conjunctivae normal.   Cardiovascular:      Rate and Rhythm: Normal rate and regular rhythm.      Pulses: Normal pulses.   Pulmonary:      Effort: Pulmonary effort is normal.      Breath sounds: Normal breath sounds.   Abdominal:      Palpations: Abdomen is soft.      Tenderness: There is no abdominal tenderness.   Musculoskeletal:         General: No deformity.      Cervical back: Neck supple.   Lymphadenopathy:      Cervical: No cervical adenopathy.   Skin:     General: Skin is warm and dry.      Findings: No rash.   Neurological:      Mental Status: He is alert. Mental status is at baseline.      Comments: + some tangential thought processes   Psychiatric:         Mood and Affect: Mood normal.         Behavior: Behavior normal.

## 2023-05-19 ENCOUNTER — NURSE TRIAGE (OUTPATIENT)
Dept: INTERNAL MEDICINE | Facility: OTHER | Age: 33
End: 2023-05-19
Payer: COMMERCIAL

## 2023-05-19 NOTE — TELEPHONE ENCOUNTER
Patient has a severe sore throat and his jaw is swelled has no transportation to get here.  He is requesting to get something for it.  We were going to set up a phone visit, but nothing available.  Please call      Nadeen Dozier on 5/19/2023 at 1:35 PM

## 2023-05-19 NOTE — TELEPHONE ENCOUNTER
"Patient needs to be seen today. He has no transportation and no one to call to bring him in. Contacted his insurance who contacted surrounding counties and no one could accommodate a same day medical transport. Advised patient of access to gas cards/taxi voucher. He will attempt to arrange transport with that information in mind. He does not want to use the ambulance as a taxi. Advised patient he should not wait to be seen given the severity and infectious nature of his symptoms. He states understanding of the potential risks and complications of waiting to be seen. Sharee Tuttle RN on 5/19/2023 at 3:30 PM      Reason for Disposition    SEVERE sore throat pain    Earache also present    Answer Assessment - Initial Assessment Questions  1. ONSET: \"When did the throat start hurting?\" (Hours or days ago)       2 days ago  2. SEVERITY: \"How bad is the sore throat?\" (Scale 1-10; mild, moderate or severe)    - MILD (1-3):  doesn't interfere with eating or normal activities    - MODERATE (4-7): interferes with eating some solids and normal activities    - SEVERE (8-10):  excruciating pain, interferes with most normal activities    - SEVERE DYSPHAGIA: can't swallow liquids, drooling      severe  3. STREP EXPOSURE: \"Has there been any exposure to strep within the past week?\" If Yes, ask: \"What type of contact occurred?\"       unsure  4.  VIRAL SYMPTOMS: \"Are there any symptoms of a cold, such as a runny nose, cough, hoarse voice or red eyes?\"       In AM feels like lock jaw, can barely move it, ears draining x1 month  5. FEVER: \"Do you have a fever?\" If Yes, ask: \"What is your temperature, how was it measured, and when did it start?\"      denies  6. PUS ON THE TONSILS: \"Is there pus on the tonsils in the back of your throat?\"      Does not have tonsils  7. OTHER SYMPTOMS: \"Do you have any other symptoms?\" (e.g., difficulty breathing, headache, rash)      Hurts to swallow  8. PREGNANCY: \"Is there any chance you are " "pregnant?\" \"When was your last menstrual period?\"      no    Protocols used: SORE THROAT-A-OH    "

## 2023-05-22 NOTE — TELEPHONE ENCOUNTER
It appears patient was not seen this weekend and has no appointment in clinic as discussed. Message left to return call to the Unit 3 RN. Sharee Tuttle, RN on 5/22/2023 at 8:29 AM

## 2023-05-23 NOTE — TELEPHONE ENCOUNTER
Unable to reach patient with additional attempt. This encounter closed. Sharee Tuttle RN on 5/23/2023 at 2:32 PM

## 2024-01-09 NOTE — TELEPHONE ENCOUNTER
Addended by: OBINNA MELENDEZ on: 1/9/2024 03:19 PM     Modules accepted: Orders     Please call Marija and let him know I have the scripts ready for him to  at unit 1 window. There are 4 scripts, with specific dates listed.

## 2024-01-23 ENCOUNTER — OFFICE VISIT (OUTPATIENT)
Dept: FAMILY MEDICINE | Facility: OTHER | Age: 34
End: 2024-01-23
Payer: COMMERCIAL

## 2024-01-23 ENCOUNTER — TELEPHONE (OUTPATIENT)
Dept: INTERNAL MEDICINE | Facility: OTHER | Age: 34
End: 2024-01-23
Payer: COMMERCIAL

## 2024-01-23 VITALS
SYSTOLIC BLOOD PRESSURE: 148 MMHG | WEIGHT: 315 LBS | BODY MASS INDEX: 50.62 KG/M2 | TEMPERATURE: 99.7 F | HEART RATE: 82 BPM | HEIGHT: 66 IN | DIASTOLIC BLOOD PRESSURE: 102 MMHG | OXYGEN SATURATION: 97 % | RESPIRATION RATE: 16 BRPM

## 2024-01-23 DIAGNOSIS — K04.7 DENTAL INFECTION: ICD-10-CM

## 2024-01-23 DIAGNOSIS — R50.9 FEVER IN ADULT: Primary | ICD-10-CM

## 2024-01-23 DIAGNOSIS — K08.89 TOOTH PAIN: ICD-10-CM

## 2024-01-23 PROCEDURE — G0463 HOSPITAL OUTPT CLINIC VISIT: HCPCS

## 2024-01-23 PROCEDURE — 99213 OFFICE O/P EST LOW 20 MIN: CPT | Performed by: NURSE PRACTITIONER

## 2024-01-23 RX ORDER — IBUPROFEN 600 MG/1
600 TABLET, FILM COATED ORAL EVERY 6 HOURS PRN
Qty: 30 TABLET | Refills: 0 | Status: ON HOLD | OUTPATIENT
Start: 2024-01-23 | End: 2024-03-25

## 2024-01-23 RX ORDER — TRAMADOL HYDROCHLORIDE 50 MG/1
50 TABLET ORAL EVERY 6 HOURS PRN
Qty: 6 TABLET | Refills: 0 | Status: SHIPPED | OUTPATIENT
Start: 2024-01-23 | End: 2024-01-26

## 2024-01-23 ASSESSMENT — ENCOUNTER SYMPTOMS
FATIGUE: 1
FEVER: 1

## 2024-01-23 ASSESSMENT — PAIN SCALES - GENERAL: PAINLEVEL: WORST PAIN (10)

## 2024-01-23 NOTE — TELEPHONE ENCOUNTER
Attempt to call the patient to notify that because he has not been seen for this in over a year, patient would need appointment with DJS in clinic to get a refill of this medication. Patient number provided is out of service.    Sherrill Vega LPN on 1/23/2024 at 11:47 AM   Ext. [phone]

## 2024-01-23 NOTE — NURSING NOTE
"Pt presents to  for dental pain. Pt rates pain 10+. Pt states he has problem with his mouth for a while.     Chief Complaint   Patient presents with    Dental Pain    Fever       FOOD SECURITY SCREENING QUESTIONS  Hunger Vital Signs:  Within the past 12 months we worried whether our food would run out before we got money to buy more. Never  Within the past 12 months the food we bought just didn't last and we didn't have money to get more. Never  Clemencia Deafannyon 1/23/2024 12:22 PM      Initial BP (!) 148/102 (BP Location: Left arm, Patient Position: Sitting, Cuff Size: Adult Large)   Pulse 82   Temp 99.7  F (37.6  C) (Tympanic)   Resp 16   Ht 1.681 m (5' 6.2\")   Wt 147.4 kg (325 lb)   SpO2 97%   BMI 52.14 kg/m   Estimated body mass index is 52.14 kg/m  as calculated from the following:    Height as of this encounter: 1.681 m (5' 6.2\").    Weight as of this encounter: 147.4 kg (325 lb).  Medication Reconciliation: complete    Clemencia Holley    "

## 2024-01-23 NOTE — TELEPHONE ENCOUNTER
Patient presented to the window wanting his prescriptions transferred to [facility].  He is hoping to have his Adderall filled today.  Brianna Giang on 1/23/2024 at 11:14 AM

## 2024-01-23 NOTE — PROGRESS NOTES
Marija Albright  1990    ASSESSMENT/PLAN:   1. Fever in adult  with  2. Tooth pain  and  3. Dental infection  - traMADol (ULTRAM) 50 MG tablet; Take 1 tablet (50 mg) by mouth every 6 hours as needed for severe pain (dental pain)  Dispense: 6 tablet; Refill: 0  - ibuprofen (ADVIL/MOTRIN) 600 MG tablet; Take 1 tablet (600 mg) by mouth every 6 hours as needed for moderate pain  Dispense: 30 tablet; Refill: 0  - amoxicillin-clavulanate (AUGMENTIN) 875-125 MG tablet; Take 1 tablet by mouth 2 times daily for 10 days  Dispense: 20 tablet; Refill: 0    She has history of abnormal dentition with acute dental pain for the past several weeks.  He does have low-grade fever today in clinic, 99.7.  I am unable to palpate any gingival or dental abscess.  He does have several cracked and eroded teeth, more specifically left lower molars.  I recommend antibiotic treatment for acute left lower molar dental infection.  Prescription for Augmentin twice daily for 10 days.  Patient is concerned regarding pain.   He does have sensitivities with eating, and is currently not been able to eat or drink much.Education provided to patient that once infection is treated, pain usually improves.  Urged him to complete full course of antibiotic treatment.  Prescription for ibuprofen every 6 hours as needed for severe pain sent to pharmacy.  Prescription for tramadol 50 mg 1 tab every 6 hours for severe pain, 6 tablets, sent to pharmacy to use in the evening for breakthrough pain.  Patient denies any history of drug use.  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  PDMP checked.  No red flags.  Strongly encourage follow-up with dentist as soon as possible.    Patient agrees with plan of care and verbalizes understating. AVS printed. Patient education provided verbally and written instructions provided as requested. Patient made aware of emergent signs and symptoms to monitor for and when to seek additional care/follow up.  "    SUBJECTIVE:   CHIEF COMPLAINT/ REASON FOR VISIT  Patient presents with:  Dental Pain  Fever     HISTORY OF PRESENT ILLNESS  Marija Albright is a pleasant 33 year old male presents [facility] with concerns for possible dental infection.  Patient states he has had abnormal dentition for the past 10 years.  Currently is having left lower molar dental pain, this has been present for the past several weeks.  He states he waited too long to be seen and evaluated.  He cannot take the pain anymore.  He states he has several cavities and needs to be seen by dentist.  He is struggling to find a dentist that will take on new patients.  He is having left facial pain.  He states his cheek and mouth feels swollen.  He is not having pain relieved by Tylenol and ibuprofen.  No fever at home.    I have reviewed the nursing notes.  I have reviewed allergies, medication list, problem list, and past medical history.    REVIEW OF SYSTEMS  Review of Systems   Constitutional:  Positive for fatigue and fever.   HENT:  Positive for dental problem.    All other systems reviewed and are negative.       VITAL SIGNS  Vitals:    01/23/24 1219   BP: (!) 148/102   BP Location: Left arm   Patient Position: Sitting   Cuff Size: Adult Large   Pulse: 82   Resp: 16   Temp: 99.7  F (37.6  C)   TempSrc: Tympanic   SpO2: 97%   Weight: 147.4 kg (325 lb)   Height: 1.681 m (5' 6.2\")      Body mass index is 52.14 kg/m .    OBJECTIVE:   PHYSICAL EXAM  Physical Exam  Vitals reviewed.   Constitutional:       Appearance: Normal appearance. He is not ill-appearing or toxic-appearing.   HENT:      Head: Normocephalic and atraumatic.      Left Ear: Tympanic membrane, ear canal and external ear normal.      Nose: Nose normal. No rhinorrhea.      Mouth/Throat:      Lips: Pink.      Mouth: Mucous membranes are moist.      Dentition: Abnormal dentition. Dental tenderness, dental caries and gum lesions present. No gingival swelling or dental abscesses. "      Pharynx: Uvula midline. No posterior oropharyngeal erythema.      Tonsils: No tonsillar exudate.     Eyes:      Conjunctiva/sclera: Conjunctivae normal.   Neck:      Comments: Left-sided cervical adenopathy and tenderness  Cardiovascular:      Rate and Rhythm: Normal rate and regular rhythm.      Pulses: Normal pulses.      Heart sounds: Normal heart sounds. No murmur heard.  Pulmonary:      Effort: Pulmonary effort is normal.      Breath sounds: Normal breath sounds. No wheezing or rhonchi.   Lymphadenopathy:      Cervical: Cervical adenopathy present.   Neurological:      General: No focal deficit present.      Mental Status: He is alert and oriented to person, place, and time.   Psychiatric:         Mood and Affect: Mood normal.         Behavior: Behavior normal.         Thought Content: Thought content normal.         Judgment: Judgment normal.        DIAGNOSTICS  No results found for any visits on 01/23/24.     Molly Medina NP  [facility] & [facility]

## 2024-01-24 ENCOUNTER — TELEPHONE (OUTPATIENT)
Dept: INTERNAL MEDICINE | Facility: OTHER | Age: 34
End: 2024-01-24
Payer: COMMERCIAL

## 2024-01-24 DIAGNOSIS — K04.7 DENTAL INFECTION: ICD-10-CM
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
## 2024-01-24 NOTE — TELEPHONE ENCOUNTER
DJS-Reason for call: Medication or medication refill    Name of medication requested: Tramadol    How many days of medication do you have left? 1    What pharmacy do you use? [facility]    Preferred method for responding to this message: Telephone Call    Phone number patient can be reached at: Home number on file [phone] (home)    If we cannot reach you directly, may we leave a detailed response at the number you provided? Yes
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
## 2024-01-24 NOTE — TELEPHONE ENCOUNTER
Requested Prescriptions   Pending Prescriptions Disp Refills    traMADol (ULTRAM) 50 MG tablet 6 tablet 0     Sig: Take 1 tablet (50 mg) by mouth every 6 hours as needed for severe pain (dental pain)       There is no refill protocol information for this order     Last Prescription Date:     traMADol (ULTRAM) 50 MG tablet 6 tablet 0 1/23/2024 1/26/2024 No   Sig - Route: Take 1 tablet (50 mg) by mouth every 6 hours as needed for severe pain (dental pain) - Oral   [facility] - [place], [place] - [address]     Future Office visit:             Next 5 appointments (look out 90 days)      Feb 02, 2024  3:00 PM  (Arrive by 2:45 PM)  Office Visit with Erasmo Saha MD  [facility] [address]   [place] [address]  [phone]          Per Last [facility] OV note:Prescription for tramadol 50 mg 1 tab every 6 hours for severe pain, 6 tablets, sent to pharmacy to use in the evening for breakthrough pain.  Patient denies any history of drug use.  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  PDMP checked.  No red flags. Strongly encourage follow-up with dentist as soon as possible.    Please advise if appointment is needed before 2/2, as this was prescribed in the [facility].     Unable to complete prescription refill per RN Medication Refill Policy.     Clemencia Yu RN .............. 1/24/2024  12:02 PM

## 2024-01-25 RX ORDER — TRAMADOL HYDROCHLORIDE 50 MG/1
50 TABLET ORAL EVERY 6 HOURS PRN
Qty: 6 TABLET | Refills: 0 | OUTPATIENT
Start: 2024-01-25
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
## 2024-01-30 ENCOUNTER — OFFICE VISIT (OUTPATIENT)
Dept: INTERNAL MEDICINE | Facility: OTHER | Age: 34
End: 2024-01-30
Attending: INTERNAL MEDICINE
Payer: COMMERCIAL

## 2024-01-30 ENCOUNTER — HOSPITAL ENCOUNTER (EMERGENCY)
Facility: OTHER | Age: 34
Discharge: PSYCHIATRIC OR SUBSTANCE USE FACILITY | End: 2024-01-31
Attending: PHYSICIAN ASSISTANT | Admitting: PHYSICIAN ASSISTANT
Payer: COMMERCIAL

## 2024-01-30 VITALS
BODY MASS INDEX: 52.48 KG/M2 | OXYGEN SATURATION: 97 % | TEMPERATURE: 98 F | HEIGHT: 65 IN | WEIGHT: 315 LBS | SYSTOLIC BLOOD PRESSURE: 138 MMHG | RESPIRATION RATE: 20 BRPM | HEART RATE: 84 BPM | DIASTOLIC BLOOD PRESSURE: 78 MMHG

## 2024-01-30 VITALS
HEIGHT: 65 IN | DIASTOLIC BLOOD PRESSURE: 98 MMHG | BODY MASS INDEX: 52.48 KG/M2 | TEMPERATURE: 98.6 F | SYSTOLIC BLOOD PRESSURE: 155 MMHG | RESPIRATION RATE: 16 BRPM | OXYGEN SATURATION: 98 % | HEART RATE: 79 BPM | WEIGHT: 315 LBS

## 2024-01-30 DIAGNOSIS — F22: ICD-10-CM

## 2024-01-30 DIAGNOSIS — E66.01 CLASS 3 SEVERE OBESITY DUE TO EXCESS CALORIES WITH SERIOUS COMORBIDITY AND BODY MASS INDEX (BMI) OF 50.0 TO 59.9 IN ADULT (H): ICD-10-CM

## 2024-01-30 DIAGNOSIS — F23 ACUTE PSYCHOSIS (H): Primary | ICD-10-CM

## 2024-01-30 DIAGNOSIS — E66.813 CLASS 3 SEVERE OBESITY DUE TO EXCESS CALORIES WITH SERIOUS COMORBIDITY AND BODY MASS INDEX (BMI) OF 50.0 TO 59.9 IN ADULT (H): ICD-10-CM

## 2024-01-30 DIAGNOSIS — F43.10 POSTTRAUMATIC STRESS DISORDER: ICD-10-CM

## 2024-01-30 DIAGNOSIS — F29 PSYCHOSIS (H): ICD-10-CM

## 2024-01-30 DIAGNOSIS — G43.109 MIGRAINE WITH AURA AND WITHOUT STATUS MIGRAINOSUS, NOT INTRACTABLE: ICD-10-CM

## 2024-01-30 DIAGNOSIS — F41.1 GENERALIZED ANXIETY DISORDER: ICD-10-CM

## 2024-01-30 DIAGNOSIS — F22 DELUSIONAL DISORDER (H): ICD-10-CM

## 2024-01-30 PROBLEM — R44.1 VISUAL HALLUCINATIONS: Status: ACTIVE | Noted: 2024-01-30

## 2024-01-30 PROBLEM — F20.3 UNDIFFERENTIATED SCHIZOPHRENIA (H): Status: ACTIVE | Noted: 2024-01-30

## 2024-01-30 PROCEDURE — 250N000013 HC RX MED GY IP 250 OP 250 PS 637: Performed by: PHYSICIAN ASSISTANT

## 2024-01-30 PROCEDURE — 250N000013 HC RX MED GY IP 250 OP 250 PS 637: Performed by: EMERGENCY MEDICINE

## 2024-01-30 PROCEDURE — 99284 EMERGENCY DEPT VISIT MOD MDM: CPT | Performed by: PHYSICIAN ASSISTANT

## 2024-01-30 PROCEDURE — 99215 OFFICE O/P EST HI 40 MIN: CPT | Performed by: INTERNAL MEDICINE

## 2024-01-30 PROCEDURE — G0463 HOSPITAL OUTPT CLINIC VISIT: HCPCS | Mod: 27

## 2024-01-30 PROCEDURE — 99285 EMERGENCY DEPT VISIT HI MDM: CPT | Performed by: PHYSICIAN ASSISTANT

## 2024-01-30 RX ORDER — TRAMADOL HYDROCHLORIDE 50 MG/1
50 TABLET ORAL EVERY 6 HOURS PRN
Qty: 6 TABLET | Refills: 0 | OUTPATIENT
Start: 2024-01-30

## 2024-01-30 RX ORDER — QUETIAPINE FUMARATE 25 MG/1
25 TABLET, FILM COATED ORAL AT BEDTIME
Status: DISCONTINUED | OUTPATIENT
Start: 2024-01-30 | End: 2024-01-31 | Stop reason: HOSPADM

## 2024-01-30 RX ORDER — TRAMADOL HYDROCHLORIDE 50 MG/1
50 TABLET ORAL EVERY 4 HOURS PRN
Status: DISCONTINUED | OUTPATIENT
Start: 2024-01-30 | End: 2024-01-31 | Stop reason: HOSPADM

## 2024-01-30 RX ORDER — QUETIAPINE FUMARATE 25 MG/1
100 TABLET, FILM COATED ORAL AT BEDTIME
Status: DISCONTINUED | OUTPATIENT
Start: 2024-01-30 | End: 2024-01-30

## 2024-01-30 RX ORDER — TRAMADOL HYDROCHLORIDE 50 MG/1
50 TABLET ORAL EVERY 6 HOURS PRN
Status: DISCONTINUED | OUTPATIENT
Start: 2024-01-30 | End: 2024-01-30

## 2024-01-30 RX ORDER — BUSPIRONE HYDROCHLORIDE 10 MG/1
30 TABLET ORAL 2 TIMES DAILY
Qty: 24 TABLET | Refills: 0 | Status: DISCONTINUED | OUTPATIENT
Start: 2024-01-30 | End: 2024-01-30

## 2024-01-30 RX ORDER — TRAZODONE HYDROCHLORIDE 50 MG/1
50 TABLET, FILM COATED ORAL AT BEDTIME
Status: DISCONTINUED | OUTPATIENT
Start: 2024-01-30 | End: 2024-01-31 | Stop reason: HOSPADM

## 2024-01-30 RX ORDER — TRAZODONE HYDROCHLORIDE 50 MG/1
150 TABLET, FILM COATED ORAL AT BEDTIME
Status: DISCONTINUED | OUTPATIENT
Start: 2024-01-30 | End: 2024-01-30

## 2024-01-30 RX ORDER — ALBUTEROL SULFATE 90 UG/1
2 AEROSOL, METERED RESPIRATORY (INHALATION) EVERY 4 HOURS PRN
Status: DISCONTINUED | OUTPATIENT
Start: 2024-01-30 | End: 2024-01-31 | Stop reason: HOSPADM

## 2024-01-30 RX ORDER — QUETIAPINE FUMARATE 25 MG/1
25 TABLET, FILM COATED ORAL 2 TIMES DAILY
Status: DISCONTINUED | OUTPATIENT
Start: 2024-01-30 | End: 2024-01-31 | Stop reason: HOSPADM

## 2024-01-30 RX ORDER — OLANZAPINE 10 MG/2ML
10 INJECTION, POWDER, FOR SOLUTION INTRAMUSCULAR DAILY PRN
Status: DISCONTINUED | OUTPATIENT
Start: 2024-01-30 | End: 2024-01-31 | Stop reason: HOSPADM

## 2024-01-30 RX ADMIN — AMOXICILLIN AND CLAVULANATE POTASSIUM 1 TABLET: 875; 125 TABLET, FILM COATED ORAL at 15:13

## 2024-01-30 RX ADMIN — TRAMADOL HYDROCHLORIDE 50 MG: 50 TABLET, COATED ORAL at 23:10

## 2024-01-30 RX ADMIN — TRAMADOL HYDROCHLORIDE 50 MG: 50 TABLET, COATED ORAL at 18:22

## 2024-01-30 ASSESSMENT — ASTHMA QUESTIONNAIRES
QUESTION_1 LAST FOUR WEEKS HOW MUCH OF THE TIME DID YOUR ASTHMA KEEP YOU FROM GETTING AS MUCH DONE AT WORK, SCHOOL OR AT HOME: ALL OF THE TIME
QUESTION_5 LAST FOUR WEEKS HOW WOULD YOU RATE YOUR ASTHMA CONTROL: NOT CONTROLLED AT ALL
ACT_TOTALSCORE: 6
QUESTION_3 LAST FOUR WEEKS HOW OFTEN DID YOUR ASTHMA SYMPTOMS (WHEEZING, COUGHING, SHORTNESS OF BREATH, CHEST TIGHTNESS OR PAIN) WAKE YOU UP AT NIGHT OR EARLIER THAN USUAL IN THE MORNING: FOUR OR MORE NIGHTS A WEEK
ACT_TOTALSCORE: 6
QUESTION_2 LAST FOUR WEEKS HOW OFTEN HAVE YOU HAD SHORTNESS OF BREATH: ONCE A DAY
QUESTION_4 LAST FOUR WEEKS HOW OFTEN HAVE YOU USED YOUR RESCUE INHALER OR NEBULIZER MEDICATION (SUCH AS ALBUTEROL): THREE OR MORE TIMES PER DAY

## 2024-01-30 ASSESSMENT — PATIENT HEALTH QUESTIONNAIRE - PHQ9
SUM OF ALL RESPONSES TO PHQ QUESTIONS 1-9: 11
10. IF YOU CHECKED OFF ANY PROBLEMS, HOW DIFFICULT HAVE THESE PROBLEMS MADE IT FOR YOU TO DO YOUR WORK, TAKE CARE OF THINGS AT HOME, OR GET ALONG WITH OTHER PEOPLE: NOT DIFFICULT AT ALL
SUM OF ALL RESPONSES TO PHQ QUESTIONS 1-9: 11

## 2024-01-30 ASSESSMENT — ACTIVITIES OF DAILY LIVING (ADL)
ADLS_ACUITY_SCORE: 35

## 2024-01-30 ASSESSMENT — ENCOUNTER SYMPTOMS
HYPERACTIVE: 1
WEAKNESS: 1
BACK PAIN: 1
LEG PAIN: 1

## 2024-01-30 ASSESSMENT — ANXIETY QUESTIONNAIRES
7. FEELING AFRAID AS IF SOMETHING AWFUL MIGHT HAPPEN: NEARLY EVERY DAY
4. TROUBLE RELAXING: NEARLY EVERY DAY
8. IF YOU CHECKED OFF ANY PROBLEMS, HOW DIFFICULT HAVE THESE MADE IT FOR YOU TO DO YOUR WORK, TAKE CARE OF THINGS AT HOME, OR GET ALONG WITH OTHER PEOPLE?: NOT DIFFICULT AT ALL
GAD7 TOTAL SCORE: 20
GAD7 TOTAL SCORE: 20
3. WORRYING TOO MUCH ABOUT DIFFERENT THINGS: NEARLY EVERY DAY
2. NOT BEING ABLE TO STOP OR CONTROL WORRYING: NEARLY EVERY DAY
1. FEELING NERVOUS, ANXIOUS, OR ON EDGE: NEARLY EVERY DAY
7. FEELING AFRAID AS IF SOMETHING AWFUL MIGHT HAPPEN: NEARLY EVERY DAY
5. BEING SO RESTLESS THAT IT IS HARD TO SIT STILL: NEARLY EVERY DAY
6. BECOMING EASILY ANNOYED OR IRRITABLE: MORE THAN HALF THE DAYS
GAD7 TOTAL SCORE: 20
IF YOU CHECKED OFF ANY PROBLEMS ON THIS QUESTIONNAIRE, HOW DIFFICULT HAVE THESE PROBLEMS MADE IT FOR YOU TO DO YOUR WORK, TAKE CARE OF THINGS AT HOME, OR GET ALONG WITH OTHER PEOPLE: NOT DIFFICULT AT ALL

## 2024-01-30 ASSESSMENT — PAIN SCALES - GENERAL: PAINLEVEL: WORST PAIN (10)

## 2024-01-30 NOTE — ED TRIAGE NOTES
Pt here from the clinic, pt went to clinic to see Dr. Saha today for a dental infection, pt is rambling and having distracted thoughts, pt has mental health issues and was sent her for a DEC assessment, pt denies being suicidal and reports that he hasn't been on his meds x 6 months, VSS, pt reports ongoing facial and dental problems, pt into EMS, DEC assessment was placed and will not occur till 1700     Triage Assessment (Adult)       Row Name 01/30/24 1219          Triage Assessment    Airway WDL WDL        Respiratory WDL    Respiratory WDL WDL        Skin Circulation/Temperature WDL    Skin Circulation/Temperature WDL WDL        Cardiac WDL    Cardiac WDL WDL        Peripheral/Neurovascular WDL    Peripheral Neurovascular WDL WDL        Cognitive/Neuro/Behavioral WDL    Cognitive/Neuro/Behavioral WDL WDL

## 2024-01-30 NOTE — PROGRESS NOTES
Assessment & Plan     ICD-10-CM    1. Acute psychosis (H)  F23       2. Delusional disorder, multiple episodes currently in acute episode (H)  F22       3. Posttraumatic stress disorder  F43.10       4. Generalized anxiety disorder  F41.1       5. Migraine with aura and without status migrainosus, not intractable  G43.109       6. Class 3 severe obesity due to excess calories with serious comorbidity and body mass index (BMI) of 50.0 to 59.9 in adult (H)  E66.01     Z68.43         Patient presents for concerns of mental health.  He appears to have acute psychosis.  He is talking randomly about some of the billionaire's in the world, Jacques there are businesses are not working or integrated in society and that his mental health paperwork is all locked up and confidential until 2052.  He is talking nonsensically at times about how things are heavy and difficult, and then pointing to the 4 corners of a piece of paper.  States that he is seeing up to 25 different shapes, denies auditory hallucinations at this time.  Indicates that the shapes are not currently present.  At 1 point in time was taking Seroquel, as well as ADHD medications.  I am not fully aware of his underlying mental health diagnosis.    He does have family history of bipolar disorder.  Uncertain if there is any schizophrenia.    He is having delusions, and is currently having acute psychosis.    Reports that he has generalized anxiety, PTSD.  Reports history of migraines.    He is overweight with BMI of 54.  He has gained a lot of weight since I saw him last.  He was last in the office with me in May 2022.    I went and spoke with psychiatry to talk about general evaluation options, treatment options.  At this time they recommend bringing him to the emergency room for DEC assessment, likely needs inpatient admission to initiate medication management.  Likely needs additional evaluation to determine specific underlying etiology to his mental health,  "unless this was able to be completed previously.    I subsequently went and spoke with the ER provider who accepted patient for further evaluation and management.      Total time: 43 minutes spent on the date of the encounter doing chart review, history and exam, documentation and further activities as noted above.     Erasmo Saha MD  [facility]    Review of Systems   Genitourinary:  Positive for pelvic pain.   Musculoskeletal:  Positive for back pain.   Neurological:  Positive for weakness.   Psychiatric/Behavioral:  Negative for self-injury and suicidal ideas. The patient is hyperactive.          Jamie Dutton is a 33 year old, presenting for the following health issues:  Recheck Medication, mental health , and Back Pain        1/30/2024    11:22 AM   Additional Questions   Roomed by Jared JEAN BAPTISTE / SANTY   Accompanied by n/a     History of Present Illness       Reason for visit:  Medication check, back pain, mental health issues    He eats 4 or more servings of fruits and vegetables daily.He consumes 6 sweetened beverage(s) daily.He exercises with enough effort to increase his heart rate 9 or less minutes per day.  He exercises with enough effort to increase his heart rate 3 or less days per week. He is missing 7 dose(s) of medications per week.  He is not taking prescribed medications regularly due to other.         Objective    /78   Pulse 84   Temp 98  F (36.7  C) (Temporal)   Resp 20   Ht 1.651 m (5' 5\")   Wt 148.8 kg (328 lb)   SpO2 97%   BMI 54.58 kg/m    Body mass index is 54.58 kg/m .  Physical Exam  Psychiatric:         Attention and Perception: He does not perceive auditory or visual hallucinations.         Mood and Affect: Mood is anxious.         Speech: Speech is rapid and pressured and tangential.         Behavior: Behavior is agitated and hyperactive.         Thought Content: Thought content is paranoid and delusional. Thought content does not include homicidal " or suicidal ideation. Thought content does not include homicidal or suicidal plan.         Judgment: Judgment is impulsive.              Signed Electronically by: Erasmo Saha MD

## 2024-01-30 NOTE — ED NOTES
"Pt is requesting an update.   Informed pt that we are planning on having him stay in our ER for 12 hours due to his confusion.   Informed him that we'd like to repeat the DEC in the AM.  Pt stated \"well that's not happening\".   Asked pt why and he stated \"I got shit to do\".   Informed pt that we would have his provider come talk to him about options.   Primary nurse and provider notified.   Mckenna Linda RN on 1/30/2024 at 5:17 PM    "

## 2024-01-30 NOTE — COMMUNITY RESOURCES LIST (ENGLISH)
01/30/2024   [facility]  N/A  For questions about this resource list or additional care needs, please contact your primary care clinic or care manager.  Phone: [phone]   Email: N/A   Address: [address] [place] [id number]   Hours: N/A        Food and Nutrition       Food pantry  1  [facility] Food Shelf Distance: 20.78 miles      Pickup   [address]  [place] [address]  Language: English  Hours: Thu 10:00 AM - 1:00 PM  Fees: Free   Phone: (437) 709-8851 Email: francis@[employer] Website: https://www.[employer] ASRiverAreaFoodShelf/     2  [facility] Distance: 23.04 miles      In-Person   [address] [place] [address]  Language: English  Hours: Mon - Thu 11:00 AM - 3:30 PM  Fees: Free   Phone: (228) 958-6785 Email: info@[employer] Website: https://[employer]     SNAP application assistance  3  [facility] [employer] [facility] Distance: 21.5 miles      In-Person, Phone/Virtual   [address] [place] [address]  Language: English  Hours: Mon - Fri 8:00 AM - 4:30 PM  Fees: Free   Phone: (106) 104-8186 Website: https://www.[employer]/partner/irjqujsqf-qzgcsulx-yrtpbuqmpod-[place]-[facility]     Soup kitchen or free meals  4  [facility] Distance: 22.38 miles      [facility]   [address] [place] [address]  Language: English  Hours: Wed 5:00 PM - 6:00 PM  Fees: Free   Phone: (486) 818-9561 Website: https://[email or web address]/          Transportation       Free or low-cost transportation  5  [facility] [employer] [facility] - [employer] - Free or Low-cost Transportation Distance: 21.5 miles      In-Person   [address] [place] [address]  Language: English  Hours: Mon - Fri 8:00 AM - 4:30 PM  Fees: Free   Phone: (516) 553-9680 Website:  https://www.[employer].bepretty/partner/gllsltpge-zbgbkgze-ipxltenyshk-agency-aeoa-grand-rapids          Important Numbers & Websites       Emergency Services   911  City Services   311  Poison Control   (155) 171-4623  Suicide Prevention Lifeline   (166) 707-5678 (TALK)  Child Abuse Hotline   (998) 105-6633 (4-A-Child)  Sexual Assault Hotline   (523) 711-4526 (HOPE)  National Runaway Safeline   (182) 261-8306 (RUNAWAY)  All-Options Talkline   (783) 720-9887  Substance Abuse Referral   (898) 382-1071 (HELP)

## 2024-01-30 NOTE — CONSULTS
"Diagnostic Evaluation Consultation  Crisis Assessment    Patient Name: Marija Albright  Age:  33 year old  Legal Sex: male  Gender Identity: male  Pronouns:   Race: White  Ethnicity: Not  or   Language: English      Patient was assessed: Virtual: [employer] Crisis Assessment Start Time: 1532 Crisis Assessment Stop Time: 1602  Patient [facility]: [facility]                                 Referral Data and Chief Complaint  Marija Albright presents to the ED by  self. Patient is presenting to the ED for the following concerns: Health stressors, Paranoia.   Factors that make the mental health crisis life threatening or complex are:  Pt has been experiencing delusional behavior and is highly tangential throughout the interview..      Informed Consent and Assessment Methods  Explained the crisis assessment process, including applicable information disclosures and limits to confidentiality, assessed understanding of the process, and obtained consent to proceed with the assessment.  Assessment methods included conducting a formal interview with patient, review of medical records, collaboration with medical staff, and obtaining relevant collateral information from family and community providers when available.  : done     Patient response to interventions: needs reinforcement, no evidence of understanding  Coping skills were attempted to reduce the crisis:  pt agreeable to come into the ED     History of the Crisis   Patient is a 33 year old male with a history of ADHD, PTSD, and SAGE who comes into the hospital referred by his PCP due to concerns of psychosis and delusions. Pt had gone into this primary care doctor to get some forms completed for disability. During this meeting the doctor has noticed the pt appearing to be paranoid and had concerns of the pt's current level of stability and coherence. Pt reports that he had his \"old \" bring him to the hospital and paid " "him \"$10 in order for him to remain sober.\"   When asking the pt what brought him into the hosiptal, he replies that he \"carries a lot of things with me\" and doesn't feel he's capable of explaining much beyond this in case \"they find out.\" Pt then goes on to say concerns about an area above his eye, where he refuses to disclose too much information other than \"a bullet and harpoon.\" He then says that if \"spacecrafts were shaped like raindrops and chiseled ice, an arctic storm would occur.\"     He shares ideas around serial killers and how they are able to take a \"spy medication\" in order to keep himself healthy. Writer makes attempts to speak about pt's family connections and support system, yet he states that his mother was \"possibly terminated by ted when I was [age] years old.\" He does report past traumatic episodes throughout his lifetime, yet is unable to provide specifics or details in fear of this information being leaked to \"the outside.\"    Brief Psychosocial History  Family:  , Children no  Support System:  Other (specify) (\"No one\")  Employment Status:  unemployed  Source of Income:  none  Financial Environmental Concerns:  none  Current Hobbies:  outdoor activities  Barriers in Personal Life:       Significant Clinical History  Current Anxiety Symptoms:  excessive worry, racing thoughts  Current Depression/Trauma:  crying or feels like crying, sadness, hopelessness, impaired decision making  Current Somatic Symptoms:  excessive worry, racing thoughts  Current Psychosis/Thought Disturbance:  flight of ideas, hyperverbal  Current Eating Symptoms:     Chemical Use History:  Alcohol: None  Benzodiazepines: None  Opiates: None  Cocaine: None  Marijuana: None  Other Use: None   Past diagnosis:  ADHD, PTSD, Anxiety Disorder  Family history:  No known history of mental health or chemical health concerns  Past treatment:  Psychiatric Medication Management, Inpatient Hospitalization  Details of most " recent treatment:  Pt last connected with psychiatry in April 2022  Other relevant history:          Collateral Information  Is there collateral information: No     Collateral information name, relationship, phone number:       What happened today:       What is different about patient's functioning:       Concern about alcohol/drug use:      What do you think the patient needs:      Has patient made comments about wanting to kill themselves/others:      If d/c is recommended, can they take part in safety/aftercare planning:       Additional collateral information:        Risk Assessment  [place] Suicide Severity Rating Scale Full Clinical Version:  Suicidal Ideation  Q1 Wish to be Dead (Lifetime): Yes  Q2 Non-Specific Active Suicidal Thoughts (Lifetime): Yes  3. Active Suicidal Ideation with any Methods (Not Plan) Without Intent to Act (Lifetime): Yes  Q4 Active Suicidal Ideation with Some Intent to Act, Without Specific Plan (Lifetime): No  Q5 Active Suicidal Ideation with Specific Plan and Intent (Lifetime): No  Q6 Suicide Behavior (Lifetime): no     Suicidal Behavior (Lifetime)  Actual Attempt (Lifetime): No  Has subject engaged in non-suicidal self-injurious behavior? (Lifetime): No  Interrupted Attempts (Lifetime): No  Aborted or Self-Interrupted Attempt (Lifetime): No  Preparatory Acts or Behavior (Lifetime): No    [place] Suicide Severity Rating Scale Recent:   Suicidal Ideation (Recent)  Q1 Wished to be Dead (Past Month): no  Q2 Suicidal Thoughts (Past Month): no  Intensity of Ideation (Recent)  Most Severe Ideation Rating (Past 1 Month): 1  Frequency (Past 1 Month): Less than once a week  Duration (Past 1 Month): Fleeting, few seconds or minutes  Controllability (Past 1 Month): Does not attempt to control thoughts  Deterrents (Past 1 Month): Does not apply  Reasons for Ideation (Past 1 Month): Does not apply  Suicidal Behavior (Recent)  Actual Attempt (Past 3 Months): No  Has subject engaged in  non-suicidal self-injurious behavior? (Past 3 Months): No  Interrupted Attempts (Past 3 Months): No  Aborted or Self-Interrupted Attempt (Past 3 Months): No  Preparatory Acts or Behavior (Past 3 Months): No    Environmental or Psychosocial Events: unemployment/underemployment, neither working nor attending school  Protective Factors: Protective Factors: supportive ongoing medical and mental health care relationships    Does the patient have thoughts of harming others? Feels Like Hurting Others: no  Previous Attempt to Hurt Others: no  Current presentation: Confused  Is the patient engaging in sexually inappropriate behavior?: no  Duty to warn initiated: no    Is the patient engaging in sexually inappropriate behavior?  no        Mental Status Exam   Affect: Appropriate, Dramatic  Appearance: Appropriate  Attention Span/Concentration: Attentive  Eye Contact: Engaged    Fund of Knowledge: Appropriate, Delayed   Language /Speech Content: Fluent  Language /Speech Volume: Normal  Language /Speech Rate/Productions: Normal, Hyperverbal, Pressured  Recent Memory: Intact  Remote Memory: Intact  Mood: Normal, Anxious  Orientation to Person: Yes   Orientation to Place: Yes  Orientation to Time of Day: Yes  Orientation to Date: Yes     Situation (Do they understand why they are here?): Yes  Psychomotor Behavior: Hyperactive  Thought Content: Delusions, Paranoia  Thought Form: Intact     Mini-Cog Assessment  Number of Words Recalled:    Clock-Drawing Test:     Three Item Recall:    Mini-Cog Total Score:       Medication  Psychotropic medications:   Medication Orders - Psychiatric (From admission, onward)      Start     Dose/Rate Route Frequency Ordered Stop    01/30/24 2200  traZODone (DESYREL) tablet 50 mg         50 mg Oral AT BEDTIME 01/30/24 1334      01/30/24 2200  QUEtiapine (SEROquel) tablet 25 mg         25 mg Oral AT BEDTIME 01/30/24 1334      01/30/24 1730  QUEtiapine (SEROquel) tablet 25 mg         25 mg Oral 2 TIMES  DAILY 01/30/24 1322      01/30/24 1730  busPIRone (BUSPAR) tablet 15 mg         15 mg Oral 2 TIMES DAILY 01/30/24 1334 02/03/24 9022             Current Care Team  Patient Care Team:  Erasmo Saha MD as PCP - General (Internal Medicine)  Erasmo Saha MD as Assigned PCP    Diagnosis  Patient Active Problem List   Diagnosis Code    Class 3 severe obesity due to excess calories with serious comorbidity and body mass index (BMI) of 50.0 to 59.9 in adult (H) E66.01, Z68.43    ADHD (attention deficit hyperactivity disorder) F90.9    Seasonal allergic rhinitis J30.2    GERD (gastroesophageal reflux disease) K21.9    Allergic rhinitis due to pollen J30.1    Anterior cruciate ligament sprain S83.519A    Cervical radiculopathy M54.12    Change in stool habits R19.4    Chronic bilateral low back pain without sciatica M54.50, G89.29    Deviated nasal septum J34.2    Elevated IgE level R76.8    Mild intermittent asthma without complication J45.20    Multiple food allergies Z91.018    Sliding hiatal hernia K44.9    Spondylolisthesis of lumbar region M43.16    Generalized anxiety disorder F41.1    Chronic pain of left wrist M25.532, G89.29    Migraine with aura and without status migrainosus, not intractable G43.109    Acute psychosis (H) F23    Posttraumatic stress disorder F43.10    Delusional disorder, multiple episodes currently in acute episode (H) F22    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 Upon meeting with him the doctor felt concerned with the pt's presentation and referred him to the hospital to be evaluated. Pt has difficulty tracking throughout the entire interview and has difficulty staying focused on the questions being asked. Pt is quite  "hyperfocused on events from the past and often brings in ideas around the governement and information that he's not willing to share due to \"safety concerns.\" Pt will often speak in vague terms and fails to stay within the scope of the conversation, yet instead will bring in multiple different ideas ranging from outer space, past government events, and current health concerns.     It was difficult to complete a formal suicide screening due to pt's limited ability to stay focused and track the questions around safety. As a result of this, we will hold pt in observation and be met with extended care tomorrow to see if pt shows any sign of cleaning and ability to better engage in a more formal assessment. Pt is able to deny any intent for self harm at this time.                Patient coping skills attempted to reduce the crisis:  pt agreeable to come into the ED    Disposition  Recommended disposition: Observation        Reviewed case and recommendations with attending provider. Attending Name: Dr. Mallory       Attending concurs with disposition: yes       Patient and/or validated legal guardian concurs with disposition:   yes       Final disposition:  observation    Legal status on admission:      Assessment Details   Total duration spent with the patient: 30 min     CPT code(s) utilized: 97936 - Psychotherapy for Crisis - 60 (30-74*) min    [address] Psychotherapist  [facility] - Triage & Transition Services  Callback: [phone]                "

## 2024-01-30 NOTE — NURSING NOTE
"Chief Complaint   Patient presents with    Recheck Medication    mental health     Back Pain       Initial BP (!) 140/78 (BP Location: Right arm, Patient Position: Sitting, Cuff Size: Adult Large)   Pulse 84   Temp 98  F (36.7  C) (Temporal)   Resp 20   Ht 1.651 m (5' 5\")   Wt 148.8 kg (328 lb)   SpO2 97%   BMI 54.58 kg/m   Estimated body mass index is 54.58 kg/m  as calculated from the following:    Height as of this encounter: 1.651 m (5' 5\").    Weight as of this encounter: 148.8 kg (328 lb).  Medication Review: complete    The next two questions are to help us understand your food security.  If you are feeling you need any assistance in this area, we have resources available to support you today.          1/30/2024   SDOH- Food Insecurity   Within the past 12 months, did you worry that your food would run out before you got money to buy more? Y   Within the past 12 months, did the food you bought just not last and you didn t have money to get more? Y         Health Care Directive:  Patient does not have a Health Care Directive or Living Will: Discussed advance care planning with patient; however, patient declined at this time.    Jared Sandoval      "
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
## 2024-01-31 ENCOUNTER — HOSPITAL ENCOUNTER (INPATIENT)
Facility: HOSPITAL | Age: 34
LOS: 55 days | Discharge: HOME | End: 2024-03-26
Attending: PSYCHIATRY & NEUROLOGY | Admitting: STUDENT IN AN ORGANIZED HEALTH CARE EDUCATION/TRAINING PROGRAM
Payer: COMMERCIAL

## 2024-01-31 ENCOUNTER — TELEPHONE (OUTPATIENT)
Dept: BEHAVIORAL HEALTH | Facility: CLINIC | Age: 34
End: 2024-01-31

## 2024-01-31 DIAGNOSIS — F31.2 SEVERE MANIC BIPOLAR 1 DISORDER WITH PSYCHOTIC BEHAVIOR (H): Primary | ICD-10-CM

## 2024-01-31 PROBLEM — F29 PSYCHOSIS (H): Status: ACTIVE | Noted: 2024-01-31

## 2024-01-31 LAB
ALBUMIN UR-MCNC: NEGATIVE MG/DL
AMPHETAMINES UR QL SCN: ABNORMAL
APPEARANCE UR: CLEAR
BARBITURATES UR QL SCN: ABNORMAL
BENZODIAZ UR QL SCN: ABNORMAL
BILIRUB UR QL STRIP: NEGATIVE
BZE UR QL SCN: ABNORMAL
CANNABINOIDS UR QL SCN: ABNORMAL
COLOR UR AUTO: YELLOW
FENTANYL UR QL: ABNORMAL
GLUCOSE UR STRIP-MCNC: NEGATIVE MG/DL
HGB UR QL STRIP: NEGATIVE
KETONES UR STRIP-MCNC: NEGATIVE MG/DL
LEUKOCYTE ESTERASE UR QL STRIP: NEGATIVE
MUCOUS THREADS #/AREA URNS LPF: PRESENT /LPF
NITRATE UR QL: NEGATIVE
OPIATES UR QL SCN: ABNORMAL
PCP QUAL URINE (ROCHE): ABNORMAL
PH UR STRIP: 5.5 [PH] (ref 5–9)
RBC URINE: <1 /HPF
SP GR UR STRIP: >=1.03 (ref 1–1.03)
UROBILINOGEN UR STRIP-MCNC: NORMAL MG/DL
WBC URINE: 1 /HPF

## 2024-01-31 PROCEDURE — 81001 URINALYSIS AUTO W/SCOPE: CPT | Performed by: PHYSICIAN ASSISTANT

## 2024-01-31 PROCEDURE — 250N000013 HC RX MED GY IP 250 OP 250 PS 637: Performed by: PHYSICIAN ASSISTANT

## 2024-01-31 PROCEDURE — 250N000013 HC RX MED GY IP 250 OP 250 PS 637: Performed by: EMERGENCY MEDICINE

## 2024-01-31 PROCEDURE — 124N000001 HC R&B MH

## 2024-01-31 PROCEDURE — 250N000013 HC RX MED GY IP 250 OP 250 PS 637

## 2024-01-31 PROCEDURE — 99245 OFF/OP CONSLTJ NEW/EST HI 55: CPT | Mod: 95 | Performed by: NURSE PRACTITIONER

## 2024-01-31 PROCEDURE — 99417 PROLNG OP E/M EACH 15 MIN: CPT | Mod: 95 | Performed by: NURSE PRACTITIONER

## 2024-01-31 PROCEDURE — 80307 DRUG TEST PRSMV CHEM ANLYZR: CPT | Performed by: PHYSICIAN ASSISTANT

## 2024-01-31 RX ORDER — LANOLIN ALCOHOL/MO/W.PET/CERES
3 CREAM (GRAM) TOPICAL
Status: DISCONTINUED | OUTPATIENT
Start: 2024-01-31 | End: 2024-03-26 | Stop reason: HOSPADM

## 2024-01-31 RX ORDER — MAGNESIUM HYDROXIDE/ALUMINUM HYDROXICE/SIMETHICONE 120; 1200; 1200 MG/30ML; MG/30ML; MG/30ML
30 SUSPENSION ORAL EVERY 4 HOURS PRN
Status: DISCONTINUED | OUTPATIENT
Start: 2024-01-31 | End: 2024-03-26 | Stop reason: HOSPADM

## 2024-01-31 RX ORDER — OLANZAPINE 10 MG/1
10 TABLET ORAL 3 TIMES DAILY PRN
Status: DISCONTINUED | OUTPATIENT
Start: 2024-01-31 | End: 2024-03-11

## 2024-01-31 RX ORDER — ACETAMINOPHEN 325 MG/1
650 TABLET ORAL EVERY 4 HOURS PRN
Status: DISCONTINUED | OUTPATIENT
Start: 2024-01-31 | End: 2024-03-26 | Stop reason: HOSPADM

## 2024-01-31 RX ORDER — IBUPROFEN 800 MG/1
800 TABLET, FILM COATED ORAL 2 TIMES DAILY PRN
Status: DISCONTINUED | OUTPATIENT
Start: 2024-01-31 | End: 2024-02-01

## 2024-01-31 RX ORDER — TRAMADOL HYDROCHLORIDE 50 MG/1
50 TABLET ORAL EVERY 6 HOURS PRN
Status: ON HOLD | COMMUNITY
End: 2024-03-25

## 2024-01-31 RX ORDER — HYDROXYZINE HYDROCHLORIDE 25 MG/1
25 TABLET, FILM COATED ORAL EVERY 4 HOURS PRN
Status: DISCONTINUED | OUTPATIENT
Start: 2024-01-31 | End: 2024-03-26 | Stop reason: HOSPADM

## 2024-01-31 RX ORDER — OLANZAPINE 10 MG/2ML
10 INJECTION, POWDER, FOR SOLUTION INTRAMUSCULAR 3 TIMES DAILY PRN
Status: DISCONTINUED | OUTPATIENT
Start: 2024-01-31 | End: 2024-03-11

## 2024-01-31 RX ADMIN — AMOXICILLIN AND CLAVULANATE POTASSIUM 1 TABLET: 875; 125 TABLET, FILM COATED ORAL at 08:09

## 2024-01-31 RX ADMIN — AMOXICILLIN AND CLAVULANATE POTASSIUM 1 TABLET: 875; 125 TABLET, FILM COATED ORAL at 20:26

## 2024-01-31 RX ADMIN — TRAMADOL HYDROCHLORIDE 50 MG: 50 TABLET, COATED ORAL at 08:13

## 2024-01-31 RX ADMIN — IBUPROFEN 800 MG: 800 TABLET ORAL at 20:26

## 2024-01-31 RX ADMIN — TRAMADOL HYDROCHLORIDE 50 MG: 50 TABLET, COATED ORAL at 14:59

## 2024-01-31 ASSESSMENT — ACTIVITIES OF DAILY LIVING (ADL)
ADLS_ACUITY_SCORE: 28
ADLS_ACUITY_SCORE: 35
DRESS: SCRUBS (BEHAVIORAL HEALTH)
ADLS_ACUITY_SCORE: 35
ORAL_HYGIENE: INDEPENDENT
ADLS_ACUITY_SCORE: 28
ADLS_ACUITY_SCORE: 35
HYGIENE/GROOMING: INDEPENDENT
ADLS_ACUITY_SCORE: 35
ADLS_ACUITY_SCORE: 35
ADLS_ACUITY_SCORE: 28
ADLS_ACUITY_SCORE: 35
LAUNDRY: UNABLE TO COMPLETE

## 2024-01-31 ASSESSMENT — COLUMBIA-SUICIDE SEVERITY RATING SCALE - C-SSRS
SUICIDE, SINCE LAST CONTACT: NO
6. HAVE YOU EVER DONE ANYTHING, STARTED TO DO ANYTHING, OR PREPARED TO DO ANYTHING TO END YOUR LIFE?: NO
ATTEMPT SINCE LAST CONTACT: NO
1. SINCE LAST CONTACT, HAVE YOU WISHED YOU WERE DEAD OR WISHED YOU COULD GO TO SLEEP AND NOT WAKE UP?: NO
TOTAL  NUMBER OF INTERRUPTED ATTEMPTS SINCE LAST CONTACT: NO
TOTAL  NUMBER OF ABORTED OR SELF INTERRUPTED ATTEMPTS SINCE LAST CONTACT: NO
2. HAVE YOU ACTUALLY HAD ANY THOUGHTS OF KILLING YOURSELF?: NO

## 2024-01-31 NOTE — ED NOTES
Pt was on the phone with 911, security notified and PD arrived. Pt was angry he was on a mental health hold and stated he was being held against his will and rights as a person. Patient was explained to the process of a mental health assessment and hold. Patient was still upset and did not believe he could be held in the ED. Pt was never verbally or physically aggressive with any staff or PD present.

## 2024-01-31 NOTE — LETTER
[facility]  [address] [address]  [phone]      2024    Marija Albright  [address]  [address]  [place] [address]  [phone] (home)     : 1990      Dear The Honorable Tete Quevedo,    My name is Bernardo Martinez MD. I am the lead inpatient psychiatrist for the behavioral health unit at [facility] located in [place], [place].  I am intimately familiar with the details of one of our current patient's Marija Albright (: 1990) who is currently undergoing the commitment process through [facility].   It has come to my attention that our on-site provider, Mayra Varma NP, is being requested to attend the in-person commitment hearing scheduled for 23 at 13:30.    Unfortunately, Ms. Varma will not be able to attend in person.   During the pandemic, our processes for court have changed and we have been testifying over Zoom.  This is consistent with every inpatient behavioral health unit across the [facility], including throughout the [facility] as well as [facility], both of which I have held employment.  Prior to the pandemic, if in-person testimony was required for an inpatient psychiatric provider, then we provided this via phone.  While the external and [facility] examiners may have the option to attend the hearing in-person, we do not have that capability for our inpatient psychiatric providers as they are needed to be on-site physically in the hospital in the event of an emergency for the other psychiatric patients on the unit (restraints, seclusions, codes, rapid responses, etc).  As our hospital is located in [facility] where healthcare resources are limited already, we also do not have the ability to relieve our providers in situations like this to accommodate secondary to staffing restraints.  There are simply too limited resources and the sheer volume of in-person  testimony would hinder our ability to provide quality care.         With that being said, we are more than willing and able to attend the hearing virtually via zoom or telephone.       Thank you in advance for your understanding and flexibility with this.     Respectfully,          Bernardo Martinez MD  Lead Inpatient Psychiatrist  [facility]
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
## 2024-01-31 NOTE — PROGRESS NOTES
IP MH Referral Acuity Rating Score (RARS)    LMHP complete at referral to IP MH, with DEC; and, daily while awaiting IP MH placement. Call score to PPS.  CRITERIA SCORING   New 72 HH and Involuntary for IP MH (not adolescent) 1/1   Boarding over 24 hours 1/1   Vulnerable adult at least 55+ with multiple co morbidities; or, Patient age 11 or under 0/1   Suicide ideation without relief of precipitating factors 0/1   Current plan for suicide 0/1   Current plan for homicide 0/1   Imminent risk or actual attempt to seriously harm another without relief of factors precipitating the attempt 0/1   Severe dysfunction in daily living (ex: complete neglect for self care, extreme disruption in vegetative function, extreme deterioration in social interactions) 1/1   Recent (last 2 weeks) or current physical aggression in the ED 0/1   Restraints or seclusion episode in ED 0/1   Verbal aggression, agitation, yelling, etc., while in the ED 1/1   Active psychosis with psychomotor agitation or catatonia 1/1   Need for constant or near constant redirection (from leaving, from others, etc).  1/1   Intrusive or disruptive behaviors 0/1   TOTAL Acuity Total Score: 6

## 2024-01-31 NOTE — ED NOTES
Pt. Offered scheduled seroquel and trazadone. Pt. Refuses  stating that they are old prescriptions and he no longer takes them.
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
## 2024-01-31 NOTE — ED PROVIDER NOTES
Care of patient turned over to myself at change of shift.  They recommended observation overnight, if his paranoid and psychotic behavior was due to ingestion of some sort it will hopefully clear by morning.  If not they would continue to look for inpatient mental health placement.  Patient has had an uneventful night, care patient will be turned over to dayshift at this time.    (F29) Psychosis (H)      (F22) Delusional disorder (H)         Omari Reynolds MD  01/31/24 0682

## 2024-01-31 NOTE — ED NOTES
"Nurse asked pt if he would like staff to call [facility] to do a check on his animals at home. Pt. Declined and stated that \"they will just owe me 3 days electricity and whatever emotional trauma.\" He stated that they couldn't even get into his place and that this nurse is conflicted because what they are asking is illegal. Pt. States to not worry \"that it is the  job\". He states that he would have \"volunteered for a 2 week hold if he could have brought a bag and belongings.\"    "

## 2024-01-31 NOTE — ED PROVIDER NOTES
01/31/24   7:00 AM    I am accepting the care of this patient from Dr Reynolds at change of shift.  Marija Albright is a 34 yo male here from [facility] with psychosis. He arrived at clinic and was brought to the ED. He refused all labs. It was decided that he would sleep overnight and we would look at him this morning, with the possibility that the psychosis may clear if he had been using drugs.  No known history of drug abuse.    ED Course    1:29 PM   DEC recommends inpatient care for this patient.     3:34 PM  He has been accepted to [facility].      Diagnosis    (F29) Psychosis (H)    (F22) Delusional disorder (H)        Plan: transfer           Meet Albright MD  01/31/24 [id number]

## 2024-01-31 NOTE — ED NOTES
Zyprexa IM pulled from [facility] and prepped, but not given. Black box. Unable to waste in [facility]. Maintaining active order in [facility] for PRN administration.

## 2024-01-31 NOTE — PHARMACY-ADMISSION MEDICATION HISTORY
"Pharmacy Intern Admission Medication History    Admission medication history is complete. The information provided in this note is only as accurate as the sources available at the time of the update.    Information Source(s): Patient and CareEverywhere/SureScripts via in-person    Pertinent Information: Patient friendly and talkative but very un-focused. States that he only takes two medications currently one for pain and an antibiotic. States \"I know I'm supposed to be on stuff for *points to head* but me and my doctor have not been on the same page\" Thinks he has dilaudid at home, he is likely confusing it for his tramadol. What's Rxs sent to [facility] retail d/t troubles getting a  to pick them up. Was interrupted by DEC assessment.     Left Ibuprofen, Tramadol, Augmentin on medlist as these are the most recently filled per surescripts and best match the patient's testimony      Changes made to PTA medication list:  Added: Tramadol   Deleted: albuterol, adderall, budesonide, buspar, duoneb, quetiapine, trazodone >1 yr old, not taking per patient and chart review   Changed: NONE    Medication Affordability:  Not including over the counter (OTC) medications, was there a time in the past 3 months when you did not take your medications as prescribed because of cost?: Unable to Assess interrupted by DEC assessment    Allergies reviewed with patient and updates made in EHR: Unable to assess interrupted by DEC assessment.      Medication History Completed By: Reynold Gomez RPH 1/31/2024 10:01 AM    PTA Med List   Medication Sig Last Dose    amoxicillin-clavulanate (AUGMENTIN) 875-125 MG tablet Take 1 tablet by mouth 2 times daily for 10 days Past Week    ibuprofen (ADVIL/MOTRIN) 600 MG tablet Take 1 tablet (600 mg) by mouth every 6 hours as needed for moderate pain Past Week    traMADol (ULTRAM) 50 MG tablet Take 50 mg by mouth every 6 hours as needed for severe pain Past Week       "

## 2024-01-31 NOTE — TELEPHONE ENCOUNTER
1:38 PM: Intake called [facility] and provided [facility] with MRN for review.    2:13: Writer was informed by [facility] that Pt been accepted for placement at [address]/[place].    3:15 PM: Pt placed in queue and added to admit board. Indicia completed.     3:17 PM: Intake called [facility] to provide disposition and number for report.

## 2024-01-31 NOTE — PROGRESS NOTES
"Triage and Transition Services Extended Care Reassessment     Patient: Marija goes by \"Marija,\" uses he/him pronouns  Date of Service: January 31, 2024  Site of Service: [facility]                               Patient was seen yes  Mode of Assessment: Virtual: AmWell     Reason for Reassessment: paranoia    History of Patient's Original Emergency Room Encounter: Patient is a 33 year old male with a history of ADHD, PTSD, and SAGE who comes into the hospital referred by his PCP due to concerns of psychosis and delusions. Pt had gone into this primary care doctor to get some forms completed for disability. During this meeting the doctor has noticed the pt appearing to be paranoid and had concerns of the pt's current level of stability and coherence. Pt reports that he had his \"old \" bring him to the hospital and paid him \"$10 in order for him to remain sober.\"   When asking the pt what brought him into the \[facility]\""iptal, he replies that he \"carries a lot of things with me\" and doesn't feel he's capable of explaining much beyond this in case \"they find out.\" Pt then goes on to say concerns about an area above his eye, where he refuses to disclose too much information other than \"a bullet and harpoon.\" He then says that if \"spacecrafts were shaped like raindrops and chiseled ice, an arctic storm would occur.\" He shares ideas around serial killers and how they are able to take a \"spy medication\" in order to keep himself healthy. Writer makes attempts to speak about pt's family connections and support system, yet he states that his mother was \"possibly terminated by ted when I was [age] years old.\" He does report past traumatic episodes throughout his lifetime, yet is unable to provide specifics or details in fear of this information being leaked to \"the outside.\"    Current Patient Presentation: Writer met with the patient this morning.  Mr Albright demonstrates concern for paranoid " delusional  thoughts. He is concerned about his medical record and medical conditions being compromised. He speaks  about government involvement and that much of his life is confidential and that he does not want to put his  safety at risk giving more details at this time. His is disorganized and tangential in his speech. He makes  movements with his body such as hitting his shoulders and using sign language when using telehealth to  communicate with the government. He is declining medication support for symptoms of psychosis or inpatient  mental health as recommended by psychiatry. He reports a distrust of medical providers and that he plans to sabine  the hospital for wrongful detainment. He reports that he is living independently and not able to work currently  and is unable to discuss how he is meeting his basic needs.    Presentation Summary: Mr Albright demonstrates concern for paranoid delusional  thoughts. He is concerned about his medical record and medical conditions being compromised. He speaks  about government involvement and that much of his life is confidential and that he does not want to put his  safety at risk giving more details at this time. His is disorganized and tangential in his speech. He makes  movements with his body such as hitting his shoulders and using sign language when using telehealth to  communicate with the government. He is declining medication support for symptoms of psychosis or inpatient  mental health as recommended by psychiatry. He reports a distrust of medical providers and that he plans to sabine  the hospital for wrongful detainment. He reports that he is living independently and not able to work currently  and is unable to discuss how he is meeting his basic needs.    Changes Observed Since Initial Assessment: increase in presenting symptoms    Therapeutic Interventions Provided: Introduced and explored accumulating positives., Explored motivation for behavioral change.,  Engaged in cognitive restructuring/ reframing, looked at common cognitive distortions and challenged negative thoughts.    Current Symptoms: racing thoughts, excessive worry irritable   displaces blame, hyperverbal, distractability, high risk behavior, flight of ideas, auditory hallucinations, visual hallucinations      Mental Status Exam   Affect: Dramatic  Appearance: Appropriate  Attention Span/Concentration: Attentive  Eye Contact: Intense    Fund of Knowledge: Delayed   Language /Speech Content: Fluent  Language /Speech Volume: Normal  Language /Speech Rate/Productions: Hyperverbal, Pressured  Recent Memory: Variable  Remote Memory: Intact  Mood: Irritable  Orientation to Person: Yes   Orientation to Place: Yes  Orientation to Time of Day: Yes  Orientation to Date: Yes     Situation (Do they understand why they are here?): No  Psychomotor Behavior: Hyperactive  Thought Content: Delusions, Paranoia, Hallucinations  Thought Form: Goal Directed    Treatment Objective(s) Addressed: rapport building, identifying an appropriate aftercare plan, assessing safety, identifying additional supports, exploring obstacles to safety in the community    Patient Response to Interventions: unacceptance expressed    Progress Towards Goals:  Patient Reports Symptoms Are: ongoing  Patient Progress Toward Goals: is not making progress  Comment: Continue to encourage medication and therapeutic interventions  Next Step to Work Toward Discharge: symptom stabilization, patient ability to engage in safety planning  Symptom Stabilization Comment: Mr. Albright should have a decrease in psychosis prior to discharge and be able to safely engaging in future planning and safety planning.  Ability to Engage Comment: Continue to encouarge Mr. Albright to participate in care and assessments.    Case Management: Case Management Included: completing or following up on referrals  Details on completing or following up on referrals: Added Mr. Albright  "to [facility] patient placement IP mental health waiting list.  Summary of Interaction: Updated [facility] patient placement of change in recommendation from observation to inpatient mental health hospitalization. Pt has been accepted to [facility] mental health.    C-SSRS Since Last Contact:   1. Wish to be Dead (Since Last Contact): No  2. Non-Specific Active Suicidal Thoughts (Since Last Contact): No     Actual Attempt (Since Last Contact): No  Has subject engaged in non-suicidal self-injurious behavior? (Since Last Contact): No  Interrupted Attempts (Since Last Contact): No  Aborted or Self-Interrupted Attempt (Since Last Contact): No  Preparatory Acts or Behavior (Since Last Contact): No  Suicide (Since Last Contact): No     Calculated C-SSRS Risk Score (Since Last Contact): No Risk Indicated    Plan: Final Disposition / Recommended Care Path: inpatient mental health  Plan for Care reviewed with assigned Medical Provider: yes  Plan for Care Team Review: provider, RN  Comments: Dr. Albrgiht  Patient and/or validated legal guardian concurs: yes  Clinical Substantiation: Pt comes into the ED after going into primary care clinic to have disability forms signed by his doctor. Upon meeting with him the doctor felt concerned with the pt's presentation and referred him to the hospital to be evaluated. Pt has difficulty tracking throughout the entire interview and has difficulty staying focused on the questions being asked. Pt is quite hyperfocused on events from the past and often brings in ideas around the governement and information that he's not willing to share due to \"safety concerns.\" Pt will often speak in vague terms and fails to stay within the scope of the conversation, yet instead will bring in multiple different ideas ranging from outer space, past government events, and current health concerns. It was difficult to complete a formal suicide screening due to pt's limited ability to stay focused and track the questions " around safety. As a result of this, we will hold pt in observation and be met with extended care tomorrow to see if pt shows any sign of cleaning and ability to better engage in a more formal assessment. Pt is able to deny any intent for self harm at this time. He continues to be delusional and parnoid. He is using sign language during telehealth to communicate to the government. He indicates that he is not at liberty to talk about things he does as it could put himself or others are risk.  He is recommended for IP mental health. He was added to the  waiting list and accepted to [facility] for IP admission. He is on a 72 hour hold that concludes on 2/2/24 at 2020. Commitment Pre-Petition documents have been sent to [facility] this afternoon.    Legal Status: Legal Status at Admission: 72 Hour Hold  72 Hour Hold - Date/Time Initiated: 01/30/24 2020  72 Hour Hold - Date/Time Ends: 2/2/24 2020    Session Status: Time session started: 1146  Time session ended: 1200  Session Duration (minutes): 14 minutes  Session Number: 0  Anticipated number of sessions or this episode of care: 1    Session Start Time: 1146  Session Stop Time: 1200  CPT codes: Non-Billable  Time Spent: 14 minutes      CPT code(s) utilized: Non-Billable    Diagnosis:   Patient Active Problem List   Diagnosis Code    Class 3 severe obesity due to excess calories with serious comorbidity and body mass index (BMI) of 50.0 to 59.9 in adult (H) E66.01, Z68.43    ADHD (attention deficit hyperactivity disorder) F90.9    Seasonal allergic rhinitis J30.2    GERD (gastroesophageal reflux disease) K21.9    Allergic rhinitis due to pollen J30.1    Anterior cruciate ligament sprain S83.519A    Cervical radiculopathy M54.12    Change in stool habits R19.4    Chronic bilateral low back pain without sciatica M54.50, G89.29    Deviated nasal septum J34.2    Elevated IgE level R76.8    Mild intermittent asthma without complication J45.20    Multiple food allergies  Z91.018    Sliding hiatal hernia K44.9    Spondylolisthesis of lumbar region M43.16    Generalized anxiety disorder F41.1    Chronic pain of left wrist M25.532, G89.29    Migraine with aura and without status migrainosus, not intractable G43.109    Acute psychosis (H) F23    Posttraumatic stress disorder F43.10    Delusional disorder, multiple episodes currently in acute episode (H) F22    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

## 2024-01-31 NOTE — PROGRESS NOTES
DATE:January 31, 2024  NAME: Marija Albright  YOB: 1990  UNIT: [facility]     Current legal status is: under 72 Hour Hold expiring 2/2/24    72hr-Hold Started: 1/30/24 at 2020   72hr-Hold Conclude: 2/2/24 at 2020    [facility] of Responsibility:  [place]  Was this confirmed by the [facility]: Yes-Kalani     Rationale for Pre-Petition Screening Request: Mr Albright demonstrates concern for paranoid delusional thoughts. He is concerned about his medical record and medical conditions being compromised. He speaks about government involvement and that much of his life is confidential and that he does not want to put his safety at risk giving more details at this time. His is disorganized and tangential in his speech. He makes movements with his body such as hitting his shoulders and using sign language when using telehealth to communicate with the government. He is declining medication support for symptoms of psychosis or inpatient  mental health as recommended by psychiatry. He reports a distrust of medical providers and that he plans to sabine the hospital for wrongful detainment. He reports that he is living independently and not able to work currently and is unable to discuss how he is meeting his basic needs.    Type of Commitment Requested: Mentally Ill     Pre-petition screening paperwork has been sent to [facility] Kalani [phone]  via Encrypted E-mail [email or web address] and includes:  PREPETITIONFORMS: Examiner's Statement (they do not require any additional documentation)     Supplemental documentation has also been send to the [facility] including: Patient Facesheet, Patient hospital encounter notes, Patient 72 hour hold order, Patient MAR, and Patient Labs    Pre-Petitions forms have been sent to Intake and Stat to be scanned to the EMR:  Yes    Verbal report given to [facility] commitment intake team via phone. Writer spoke with Kalani at [facility] Intake.     JAGJIT GERARDO,  LATHA, WILLIE, Psychotherapist  Extended Care- Triage & Transition Services  Callback: [phone]

## 2024-01-31 NOTE — TELEPHONE ENCOUNTER
2:13 PM PPS received call from [facility] that patient was accepted RAMA/Juan nurse report [phone] after 3:30 PM. PPS following notified.

## 2024-02-01 PROBLEM — J45.20 MILD INTERMITTENT ASTHMA WITHOUT COMPLICATION: Status: ACTIVE | Noted: 2017-07-14

## 2024-02-01 PROCEDURE — 99222 1ST HOSP IP/OBS MODERATE 55: CPT | Performed by: NURSE PRACTITIONER

## 2024-02-01 PROCEDURE — 124N000001 HC R&B MH

## 2024-02-01 PROCEDURE — 250N000013 HC RX MED GY IP 250 OP 250 PS 637

## 2024-02-01 PROCEDURE — 99223 1ST HOSP IP/OBS HIGH 75: CPT | Mod: AI | Performed by: NURSE PRACTITIONER

## 2024-02-01 RX ORDER — QUETIAPINE FUMARATE 100 MG/1
100 TABLET, FILM COATED ORAL AT BEDTIME
Status: DISCONTINUED | OUTPATIENT
Start: 2024-02-01 | End: 2024-02-08

## 2024-02-01 RX ORDER — ALBUTEROL SULFATE 90 UG/1
2 AEROSOL, METERED RESPIRATORY (INHALATION) EVERY 6 HOURS PRN
Status: DISCONTINUED | OUTPATIENT
Start: 2024-02-01 | End: 2024-03-26 | Stop reason: HOSPADM

## 2024-02-01 RX ORDER — CLONIDINE HYDROCHLORIDE 0.1 MG/1
0.1 TABLET ORAL 2 TIMES DAILY PRN
Status: DISCONTINUED | OUTPATIENT
Start: 2024-02-01 | End: 2024-03-26 | Stop reason: HOSPADM

## 2024-02-01 RX ADMIN — AMOXICILLIN AND CLAVULANATE POTASSIUM 1 TABLET: 875; 125 TABLET, FILM COATED ORAL at 08:26

## 2024-02-01 RX ADMIN — IBUPROFEN 800 MG: 800 TABLET ORAL at 08:26

## 2024-02-01 ASSESSMENT — ACTIVITIES OF DAILY LIVING (ADL)
LAUNDRY: UNABLE TO COMPLETE
ADLS_ACUITY_SCORE: 28
HYGIENE/GROOMING: INDEPENDENT
ADLS_ACUITY_SCORE: 28
DRESS: SCRUBS (BEHAVIORAL HEALTH);INDEPENDENT
ADLS_ACUITY_SCORE: 28
ORAL_HYGIENE: INDEPENDENT
ADLS_ACUITY_SCORE: 28

## 2024-02-01 NOTE — PLAN OF CARE
"Social Service Psychosocial Assessment    Unable to complete due to pt unable to answer.  Presenting Problem: Pt admitted for psychosis and delusions.     Marital Status:     Spouse / Children: No children    Psychiatric TX HX: This is pt first time on the unit, [facility] Vizcarra as a kid.    Suicide Risk Assessment: Pt not admitted for SI, Pt denies SI today.    Access to Lethal Means (explain): Unknown    Family Psych HX: Hx of bipolar disorder      A & Ox: x4      Medication Adherence: See H&P    Medical Issues: See H&P      Visual -Motor Functioning: Good    Communication Skills /Needs: Good    Ethnicity: White      Spirituality/Jainism Affiliation: Unknown    Clergy Request: No      History: None reported      Living Situation: Unknown     ADL s: Independent       Education: Unknown    Financial Situation: Unknown    Occupation: Unemployed     Leisure & Recreation: Outdoor activity     Childhood History: Unknown     Trauma Abuse HX: Unknown    Relationship / Sexuality: Unknown    Substance Use/ Abuse: Utox positive for marijuana, \"kicked alcohol ass\"    Chemical Dependency Treatment HX: Unknown    Legal Issues: Unknown    Significant Life Events: Unknown    Strengths: Ability to communicate needs, in a safe environment, has insurance    Challenges /Limitation: Poor coping skills, current mental health symptoms, non- complaint with taking medications, lack of insight    Patient Support Contact (Include name, relationship, number, and summary of conversation): Pt has no MARLY signed.      Interventions:         Community-Based Programs- would benefit     Medical/Dental Care- PCP- Erasmo Saha- \"Fired\"    CD Evaluation/Rule 25/Aftercare-    Medication Management- Dr. Marley \"Fired\"    Individual Therapy- Would benefit    Insurance Coverage- Medicaid    Commit/Nieves Screening- Filed on through [facility], Pt refused the phone call.    Suicide Risk Assessment- Pt not admitted for SI, Pt denies SI " today.    High Risk Safety Plan- Talk to supports; Call crisis lines; Go to local ER if feeling suicidal.    FRANKY Li  2/1/2024  8:25 AM
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
## 2024-02-01 NOTE — PLAN OF CARE
"  Problem: Adult Behavioral Health Plan of Care  Goal: Patient-Specific Goal (Individualization)  Description: Patient will sleep 6 to 8 hours per night  Patient will eat at least 50% of meals  Patient will attend at least 50% of groups  Patient will comply with recommendations of treatment team  Patient will remain medication compliant        Outcome: Progressing     Problem: Thought Process Alteration  Goal: Optimal Thought Clarity  Outcome: Progressing     Face to face shift report received from Jaye STRONG. Rounding completed, pt observed.     Pt is labile this shift. Pt does not answer writer assessment questions. Pt is guarded and appears paranoid.Pt will only take his medications as long as he is able to look at and open the packages on his own. Pt makes the statement \"There are lots of police records under my name. Shit is gonna get real.\" Pt does say he does not need to be here. Pt complaints of pain and said its the whole side of his left face.     Due to concerns of pt behavior while NP talking with pt, pt moved to a room in the MHICU without issues.    Face to face report will be communicated to oncoming RN.    Radhika Albright RN  2/1/2024  12:54 PM   "

## 2024-02-01 NOTE — PLAN OF CARE
"ADMISSION NOTE    Reason for admission psychosis.  Safety concerns none.  Risk for or history of violence none.   Full skin assessment: completed- WNL    Patient arrived on unit from [facility] accompanied by Security and transport on 1/31/2024  5:24 PM.   Status on arrival: calm and cooperative.    BP (!) 179/104   Pulse 71   Temp 98.3  F (36.8  C) (Tympanic)   Resp 16   Ht 1.651 m (5' 5\")   Wt 144.5 kg (318 lb 9.6 oz)   SpO2 96%   BMI 53.02 kg/m    Patient given tour of unit and Welcome to  unit papers given to patient, wanding completed, belongings inventoried, and admission assessment completed.   Patient's legal status on arrival is 72 hour hold. Appropriate legal rights discussed with and copy given to patient. Patient Bill of Rights discussed with and copy given to patient.   Patient denies SI, HI, and thoughts of self harm and contracts for safety while on unit.      Patient cooperative with admission assessment but needed some encouragement to give up belongings to staff.  Rambling and nonsensical in conversation.  Denies SI/HI.  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  When asked if he has a hx of violence patient states \"I'm highly trained.\"  He went on to clarify that he would not hurt anyone but would step in if he saw someone else getting hurt.  Patient does not understand why he is in the hospital and refused to sign any paperwork.  Reports he went into the hospital due to a dental infection.  Insistent that he needs to have his Tramadol restarted or he will not be able to sleep.  On call NP did restart antibiotic and ordered Ibuprofen 800mg.  Patient out in the lounge most of the evening and social with peers.  Attended groups.  Will continue to monitor.  Face to face end of shift report communicated to night shift RN.     Alexa Resendiz RN  1/31/2024  10:30 PM        Alexa Resendiz RN  1/31/2024  10:11 PM        Problem: Thought Process " Alteration  Goal: Optimal Thought Clarity  Outcome: Progressing     Problem: Adult Behavioral Health Plan of Care  Goal: Patient-Specific Goal (Individualization)  Description: Patient will sleep 6 to 8 hours per night  Patient will eat at least 50% of meals  Patient will attend at least 50% of groups  Patient will comply with recommendations of treatment team  Patient will remain medication compliant        Outcome: Progressing

## 2024-02-01 NOTE — PROGRESS NOTES
Updated [facility] PPS Worker about patient move to [facility].     Directed PPS worker to contact the unit directly as they are managing care at this time.     [facility] Kalani [phone]  E-mail [email or web address]     JAGJIT GERARDO

## 2024-02-01 NOTE — PROGRESS NOTES
Tried to give pt his civil commitment rights and he did not want to take them. Copy placed in chart.

## 2024-02-01 NOTE — PROGRESS NOTES
01/31/24 1756   Patient Belongings   Patient Belongings locker;sent to security per site process;remains with patient   Patient Belongings Remaining with Patient jewelry   Patient Belongings Put in Hospital Secure Location (Security or Locker, etc.) cash/credit card;cell phone/electronics;clothing;keys;money (see comment);shoes   Belongings Search Yes   Clothing Search Yes   Comment Grey socks, red striped boxers, black t shirt, blue sweater, tan pants, black coat, green boots, green socks, miscellaneous papers, 2 cans of starry, red pen, blue lighter     List items sent to safe: Key chain with numerous keys and fobs, [place] license, [place] card, Medical insurance card, 1 Usha, $257.00 cash ( 2-$100, 2-$20, 1-$10, 1-$5, 2-$1), Black cell phone in black otter box- small chip on back outer camera lens cover.  All other belongings put in assigned cubby in belongings room.       I have reviewed my belongings list on admission and verify that it is correct.     Patient signature_______________________________    Second staff witness (if patient unable to sign) ______________________________       I have received all my belongings at discharge.    Patient signature________________________________    Sal  1/31/2024  6:05 PM
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
## 2024-02-01 NOTE — MEDICATION SCRIBE - ADMISSION MEDICATION HISTORY
Medication Scribe Admission Medication History    Admission medication history is complete. The information provided in this note is only as accurate as the sources available at the time of the update.    Information Source(s): Patient, CareEverywhere/SureScripts, and med review performed by [facility] at [facility]  via in-person    Pertinent Information:   Patient opening imaginary doors during review and very difficult to keep on subject. Easily aggravated about medications. See medication review performed by [facility] at transferring facility for more information.  Augmentin- 2 tablets left in personal bottle and 2 doses given while in the ER. Prescribed 1/23/24 for dental infection.   IBU- pt reports he is not supposed to take due to kidney issues.     Changes made to PTA medication list:  Added: None  Deleted: None  Changed: None    Allergies reviewed with patient and updates made in EHR: no    Medication History Completed By: Marga Oliva 2/1/2024 10:37 AM    PTA Med List   Medication Sig Note Last Dose    amoxicillin-clavulanate (AUGMENTIN) 875-125 MG tablet Take 1 tablet by mouth 2 times daily for 10 days 2/1/2024: 2 tablets remaining in patient's personal pill bottle and 2 doses given in the ER.  1/31/2024 at 0809 ER    traMADol (ULTRAM) 50 MG tablet Take 50 mg by mouth every 6 hours as needed for severe pain Filled for 6 tablets 1/23/24, ONE TABLET REMAINING.  1/31/2024 at 1459 ER

## 2024-02-01 NOTE — DISCHARGE INSTRUCTIONS
Behavioral Discharge Planning and Instructions    Summary: This is a 33 year old male with a PMH of unspecified mood disorder, ADHD, SAGE, insomnia who was brought to the ED from his PCP office after exhibiting psychotic symptoms     Main Diagnosis:    Bipolar 1 disorder, current episode manic with psychotic features      Health Care Follow-up:     [facility]   [address],   [place] [address]  [phone]  Psychiatry: Dr. David Felix- April 23rd @ 9:15am - arrive @ 9:00am unit 2.  PCP: Dr. Erasmo Slade- May 30th @ @ 11:05am unit 3   Injection: Will be on April 23rd @ your psychiatry appointment    Attend all scheduled appointments with your outpatient providers. Call at least 24 hours in advance if you need to reschedule an appointment to ensure continued access to your outpatient providers.     Major Treatments, Procedures and Findings:  You were provided with: a psychiatric assessment, assessed for medical stability, medication evaluation and/or management, group therapy, family therapy, individual therapy, CD evaluation/assessment, milieu management, and medical interventions    Symptoms to Report: feeling more aggressive, increased confusion, losing more sleep, mood getting worse, or thoughts of suicide    Early warning signs can include: increased depression or anxiety sleep disturbances increased thoughts or behaviors of suicide or self-harm  increased unusual thinking, such as paranoia or hearing voices    Safety and Wellness:  Take all medicines as directed.  Make no changes unless your doctor suggests them.      Follow treatment recommendations.  Refrain from alcohol and non-prescribed drugs.  Ask your support system to help you reduce your access to items that could harm yourself or others. Items could include:  Firearms  Medicines (both prescribed and over-the-counter)  Knives and other sharp objects  Ropes and like materials  Car keys  If there is a concern for  "safety, call 911. If there is a concern for safety, call 911.    Resources:   Crisis Intervention: 874.545.5059 or 211-100-3227 (TTY: 106.332.4870).  Call anytime for help.  National [place] on Mental Illness (www.[place].mara.org): 966.463.6846 or 381-102-8340.  [place] Association for Children's Mental Health (www.mac.org): 182.733.8538.  Alcoholics Anonymous (www.alcoholics-anonymous.org): Check your phone book for your local chapter.  Suicide Awareness Voices of Education (SAVE) (www.save.org): 502-110-LOZV (6217)  National Suicide Prevention Line (www.mentalhealthmn.org): 573-676-ZDXG (0399)  Mental Health Consumer/Survivor Network of [place] (www.mhcsn.net): 467.491.5900 or 973-845-1145  Mental Health Association of [place] (www.mentalhealth.org): 675.816.5479 or 831-373-1159  Self- Management and Recovery Training., SMART-- Toll free: 285.775.6701  www.[employer].panpan  Text 4 Life: txt \"LIFE\" to 26338 for immediate support and crisis intervention  Crisis text line: Text \"[place]\" to 779593. Free, confidential, 24/7.  Crisis Intervention: 854.346.8911 or 189-392-7768. Call anytime for help.     General Medication Instructions:   See your medication sheet(s) for instructions.   Take all medicines as directed.  Make no changes unless your doctor suggests them.   Go to all your doctor visits.  Be sure to have all your required lab tests. This way, your medicines can be refilled on time.  Do not use any drugs not prescribed by your doctor.  Avoid alcohol.    Advance Directives:   Scanned document on file with [place]? No scanned doc  Is document scanned? Pt unable to confirm  Honoring Choices Your Rights Handout: Informed and given  Was more information offered? Pt declined    The Treatment team has appreciated the opportunity to work with you. If you have any questions or concerns about your recent admission, you can contact the unit which can receive your call 24 hours a day, 7 days a week. They will be able to get in touch with a " Provider if needed. The unit number is [phone] .
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
## 2024-02-02 PROCEDURE — 99233 SBSQ HOSP IP/OBS HIGH 50: CPT | Performed by: NURSE PRACTITIONER

## 2024-02-02 PROCEDURE — 124N000001 HC R&B MH

## 2024-02-02 ASSESSMENT — ACTIVITIES OF DAILY LIVING (ADL)
ADLS_ACUITY_SCORE: 28
ADLS_ACUITY_SCORE: 28
HYGIENE/GROOMING: INDEPENDENT
ADLS_ACUITY_SCORE: 28
LAUNDRY: UNABLE TO COMPLETE
ORAL_HYGIENE: INDEPENDENT
ADLS_ACUITY_SCORE: 28
DRESS: INDEPENDENT
ADLS_ACUITY_SCORE: 28

## 2024-02-02 NOTE — PROGRESS NOTES
"Pt's mother Lindy called but was not on the MARLY. She just asked if a message could be given to him. Mother stated \" I love you,and I am praying for you.\" Writer took her number and stated staff will let pt know and an encourage MARLY. Staff will call if pt signs in the future.     Lindy (Mother) [phone]  "

## 2024-02-02 NOTE — PLAN OF CARE
"Face to face end of shift report received from Randall THAKKAR RN. Rounding completed. Patient observed in bed, awake.     Pt remains in the MHICU due to increased paranoia and unpredictable behaviors. Pt has been isolative to his room. He is very guarded and paranoid of this writer. He doesn't participate in most of the nursing assessment. He denied pain and stated he didn't sleep well. He requested \"Get that food out of my room. I didn't order any of that.\" This writer requested pt fill out his lunch menu so he could get the food that he likes and placed it on his bed. Pt jumped back in bed and stated \"Get that off my bed.\" This writer filled out pt menu for him with his requests- \"Chicken sandwich with caesar dressing. Closed bottle of water with crystal light, but I need to open it and mix it myself.\" This writer cleaned pt food off nightstand- questioned if he would like to keep his milk \"No, someone opened that already. That is not okay.\" Informed pt that while in the ICU staff open the milk prior to giving it to the pt to avoid it being thrown at staff. Pt stated \"Do not open my milk. As nurses that is a risk you all are going to have to take.\" This writer questioned if pt needed anything else and he declined and exited pt room. He continued to lay in bed afterwards.     Peer was yelling out in his room and [facility] where another peer had her back turned to him. Pt was observed posturing aggressively towards her though he was a ways away from her. Observed this gesture from the nurse's station via the camera.     Pt then requested \"I need to update who can contact me.\" Provided pt with MARLY which he wrote \"The whole world may know my medical I give up my Hepa rights.\" Pt then stated \"I have an appointment today at [facility]. How come you people haven't gotten me there.\" Informed him that he is inpatient and he will need to reschedule the appointment. Pt then stated \"I don't even have " "insurance. How is that supposed to work?\" Pt dismissed self and returned to his room. This writer called [facility] to cancel appointment- they reported that it had been canceled on 1/30/24 already- did not set up another appointment as pt discharge date is unknown.     Pt remains in bed for the rest of the afternoon. Steady gait-no falls. Frequent rounding.       Problem: Adult Behavioral Health Plan of Care  Goal: Patient-Specific Goal (Individualization)  Description: Patient will sleep 6 to 8 hours per night  Patient will eat at least 50% of meals  Patient will attend at least 50% of groups  Patient will comply with recommendations of treatment team  Patient will remain medication compliant    2/2- Pt is not to wean today due to increased paranoia, posturing at staff, and unpredictable behaviors. Treatment team to assess daily.     Outcome: Not Progressing     Problem: Thought Process Alteration  Goal: Optimal Thought Clarity  Outcome: Not Progressing       Katlyn Jensen RN  2/2/2024  9:03 AM    "

## 2024-02-02 NOTE — PLAN OF CARE
On 2/1/2024 I was stripping Marija's bed after he was moved to a new room when I found a walkie talkie/scanner in a sweatshirt at the head of his bed. I informed my preceptor, and gave her the item. We both informed the charge nurse.
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
## 2024-02-02 NOTE — PROGRESS NOTES
Writer updated [facility] Kalani [phone] on pts transfer to IP. Kalani is aware, no needs.     Extended Care  [phone]

## 2024-02-02 NOTE — PLAN OF CARE
SHIFT SUMMARY:  Observed resting with eyes closed, respirations even, unlabored and WNL. Able to reposition self and make needs known. 15-minute checks in place. Slept approximately 6 hours.     Problem: Adult Behavioral Health Plan of Care  Goal: Patient-Specific Goal (Individualization)  Description: Patient will sleep 6 to 8 hours per night  Patient will eat at least 50% of meals  Patient will attend at least 50% of groups  Patient will comply with recommendations of treatment team  Patient will remain medication compliant    2/1 no wean at this time, treatment team and provider to assess daily.        Outcome: Progressing   Goal Outcome Evaluation:

## 2024-02-02 NOTE — PROGRESS NOTES
"Nieves petition filed with [facility]. Filed a 2nd petition.     [facility] stated that they \"screened him out\" and that the  was told that he was being discharged from the hospital. Provider updated.   "

## 2024-02-03 PROCEDURE — 124N000001 HC R&B MH

## 2024-02-03 PROCEDURE — 99233 SBSQ HOSP IP/OBS HIGH 50: CPT | Performed by: NURSE PRACTITIONER

## 2024-02-03 ASSESSMENT — ACTIVITIES OF DAILY LIVING (ADL)
DRESS: INDEPENDENT
ADLS_ACUITY_SCORE: 28
HYGIENE/GROOMING: INDEPENDENT
ADLS_ACUITY_SCORE: 28
LAUNDRY: UNABLE TO COMPLETE
ORAL_HYGIENE: INDEPENDENT
ADLS_ACUITY_SCORE: 28
ADLS_ACUITY_SCORE: 28

## 2024-02-03 NOTE — PLAN OF CARE
"Face to face end of shift report received from Radha DUPONT RN. Rounding completed. Patient observed in bed, awake.     Pt remains in the MHICU due to increased paranoia and unpredictable behaviors. Pt very irritable this morning with staff. He continues to be extremely paranoid and guarded. Staff enter his room and he jumps out of bed and runs out into the MHICU lounge. Pt does not participate in nursing assessment. He is very paranoid of his food if already open- pt demanded new yogurt which was provided for him then he refused it. Pt questions unit assistant  if he is able to come out of the MHICU today- informed him this needs to be discussed with his provider, but currently he is not allowed to wean.     Pt states \"I am going to need to call my  through [facility]. It is my right. I am not being detained here or a prisoner.\" Questioned pt who his  is so staff could provide a number for him and he responded \"The number is 911.\"  Questioned why he needs to contact 911 and that it is only for emergency purposes. Pt then stated \"It is an emergency. I am not a detainee.\" Staff again informed pt that he would not be allowed to call 911 and he stated \"Okay, so you're telling me that you are denying my right to call 911. This is the first denial then am I correct.\" Pt dismissed self to his room afterwards. Pt quickly came back out of his room when the doctor was on the IPad demanding for him to call 911 to him as well as another provider that was present in the MHICU. Pt then stated again \"so this is my second denial that you won't allow me to call 911.\" Pt again returned to his room.    Pt did not have any more interactions with staff and continued to lay in bed ignoring all staff. She is able to make her needs known. Frequent rounding.     Problem: Adult Behavioral Health Plan of Care  Goal: Patient-Specific Goal (Individualization)  Description: Patient will sleep 6 to 8 hours per night  Patient will " eat at least 50% of meals  Patient will attend at least 50% of groups  Patient will comply with recommendations of treatment team  Patient will remain medication compliant    2/3- Pt is not to wean today due to increased paranoia, posturing at staff, and unpredictable behaviors. Treatment team to assess daily.     Outcome: Not Progressing     Problem: Thought Process Alteration  Goal: Optimal Thought Clarity  Outcome: Not Progressing        Katlyn Jensen RN  2/3/2024  9:21 AM

## 2024-02-03 NOTE — PROGRESS NOTES
"[facility] PSYCHIATRY  PROGRESS NOTE     SUBJECTIVE     Marija remains extremely paranoid, he refuses to speak to me in his room and gestures to the hallway. He states that he needs to call 911 to speak to his . Did review that if he knew his 's name, that staff could look up the number, though he would not be allowed to call 911. \"Are you withholding my civilian rights? It's my right to call however I want\". Patient remains very guarded, replying that he will not answer my question, or will just stare and not answer at all. Patient then ends the interview by holding up his hand, then walking back into his room. He gets a few steps then turns around and asks writer and UA present \"did you just say you were going to blast me?!\" Affect is intense. Assured patient that no one had said anything to him like that. He replies \"are you a good liar or a bad liar?!\" Nothing further had been said to patient, so likely he is having auditory hallucinations. Patient is noted on camera to be making gestures towards camera, as if communicating. Patient refused the Seroquel scheduled at bedtime and continues to refuse any offer of PRN medication for paranoia/agitation. Petition for commitment and Nieves have been filed.        MEDICATIONS   Scheduled Meds:    QUEtiapine  100 mg Oral At Bedtime     PRN Meds:.acetaminophen, albuterol, alum & mag hydroxide-simethicone, Benzocaine-Menthol-Zinc Cl, cloNIDine, hydrOXYzine HCl, melatonin, nicotine, OLANZapine **OR** OLANZapine     ALLERGIES   Allergies   Allergen Reactions     Corn Syrup [Glucose] GI Disturbance     Morphine Sulfate Nausea and Vomiting     Cannot take it in pill form, IV ok   Pancreatitis     Peanuts [Nuts] GI Disturbance     Internal cuts-GERD     Gold Au 198 [Gold] Rash        MENTAL STATUS EXAM   Vitals: BP (!) 179/104   Pulse 71   Temp 98.3  F (36.8  C) (Tympanic)   Resp 16   Ht 1.651 m (5' 5\")   Wt 144.5 kg (318 lb 9.6 oz)   SpO2 96%   BMI " "53.02 kg/m      Appearance:  awake, alert, dressed in hospital scrubs, appeared as age stated, and disheveled   Attitude:  guarded, irritable  Eye Contact:  intense  Mood: irritable, labile  Affect:  mood congruent  Speech:  pressured  Psychomotor Behavior:  no evidence of tardive dyskinesia, dystonia, or tics  Thought Process:  disorganized, tangential  Associations:  no loose associations  Thought Content:  denies SI/HI, denies hallucinations, remains grandiose, has delusions and paranoia present  Insight:  poor  Judgment:  poor  Oriented to:  time, person, and place  Attention Span and Concentration:  limited  Recent and Remote Memory:  fair  Fund of Knowledge: appropriate  Muscle Strength and Tone: normal  Gait and Station: Normal       LABS   No results found for this or any previous visit (from the past 24 hour(s)).      IMPRESSION     This is a 33 year old male with a PMH of unspecified mood disorder, ADHD, SAGE, insomnia who was brought to the ED from his PCP office after exhibiting psychotic symptoms. Patient was found to be disorganized and delusional. He was placed on a hold and petition for commitment was filed in ED prior to transfer to behavioral health. Today patient is irritable. He answers most questions with :that's confidential\". He does mention the [place], FBI, and government being involved. During our interaction he uses sign language when staring at the camera in the ceiling as if communicating to someone. He reports that he will not take any medication, because it is not real and he does not trust it. Writer and SW did attempt to explain the commitment process, however patient refused to take the paperwork offered and instead ended the interview, stating that that we needed to watch out for the [place], FBI, and secret service. Will schedule Seroquel at bedtime for tonight as patient had been on this in the past, though I suspect he will refuse. If continuing to refuse medication, will likely need to " file [facility] petition tomorrow.        DIAGNOSES     Bipolar 1 disorder, current episode manic with psychotic features        PLAN     Location: Unit 5  Legal Status: Orders Placed This Encounter      Legal status 72 Hour Hold      Emergency Hospitalization Hold (72 Hr Hold)    Petition for commitment and harris filed through [facility]    Safety Assessment:    Behavioral Orders   Procedures     Code 1 - Restrict to Unit     Routine Programming     As clinically indicated     Status 15     Every 15 minutes.      PTA psychotropic medications stopped:     -no PTA meds    PTA psychotropic medications continued/changed:     -no PTA meds    New medications tried and stopped:     -None    New medications initiated:     -standard unit PRN medications  -Seroquel 100 mg at bedtime- has been on in the past though will likely refuse    Today's Changes:    - filed [facility] petition, pt refusing all medication    Programming: Patient will be treated in a therapeutic milieu with appropriate individual and group therapies. Education will be provided on diagnoses, medications, and treatments.     Medical diagnoses:  Per medicine    Consult: None  Tests: will check baseline labs once more agreeable    Anticipated LOS: > 7 days  Disposition: home with services vs structured setting such as IR       ATTESTATION      Mayra Varma NP

## 2024-02-03 NOTE — PLAN OF CARE
Face to face report received from Ottoniel STRONG. Pt. Observed.    Problem: Adult Behavioral Health Plan of Care  Goal: Patient-Specific Goal (Individualization)  Description: Patient will sleep 6 to 8 hours per night  Patient will eat at least 50% of meals  Patient will attend at least 50% of groups  Patient will comply with recommendations of treatment team  Patient will remain medication compliant    2/2- Pt is not to wean today due to increased paranoia, posturing at staff, and unpredictable behaviors. Treatment team to assess daily.       Outcome: Progressing   Pt has been in bed with eyes closed and regular respirations x 7 hours this noc shift. 15 minute and PRN checks all night. No complaints offered.       Face to face end of shift report communicated to oncoming RN.     Radha Santos RN  2/3/2024

## 2024-02-03 NOTE — PLAN OF CARE
Face to face end of shift report received from Katlyn WOOD RN. Rounding completed and patient observed in his room in the ICU. No requests at this time.     Goal Outcome Evaluation: Patient declined to talk to this writer. He was dismissive. He declined needing anything and declined all symptoms. He ate dinner well and he is able to make needs known. He refused to take any meds and refused to take his HS scheduled Seroquel. He isolated to his room. He is malodorous and declined to shower.     Face to face end of shift report communicated to oncoming RN.        Problem: Adult Behavioral Health Plan of Care  Goal: Patient-Specific Goal (Individualization)  Description: Patient will sleep 6 to 8 hours per night  Patient will eat at least 50% of meals  Patient will attend at least 50% of groups  Patient will comply with recommendations of treatment team  Patient will remain medication compliant    2/2- Pt is not to wean today due to increased paranoia, posturing at staff, and unpredictable behaviors. Treatment team to assess daily.           Outcome: Not Progressing     Problem: Thought Process Alteration  Goal: Optimal Thought Clarity  Outcome: Not Progressing

## 2024-02-04 PROCEDURE — 124N000001 HC R&B MH

## 2024-02-04 PROCEDURE — 99232 SBSQ HOSP IP/OBS MODERATE 35: CPT | Performed by: NURSE PRACTITIONER

## 2024-02-04 ASSESSMENT — ACTIVITIES OF DAILY LIVING (ADL)
DRESS: INDEPENDENT
ADLS_ACUITY_SCORE: 28
LAUNDRY: UNABLE TO COMPLETE
ADLS_ACUITY_SCORE: 28
ORAL_HYGIENE: INDEPENDENT
HYGIENE/GROOMING: INDEPENDENT
ADLS_ACUITY_SCORE: 28

## 2024-02-04 NOTE — PLAN OF CARE
"Face to face report received from Ottoniel STRONG. Pt. Observed.    Problem: Adult Behavioral Health Plan of Care  Goal: Patient-Specific Goal (Individualization)  Description: Patient will sleep 6 to 8 hours per night  Patient will eat at least 50% of meals  Patient will attend at least 50% of groups  Patient will comply with recommendations of treatment team  Patient will remain medication compliant    2/3- Pt is not to wean today due to increased paranoia, posturing at staff, and unpredictable behaviors. Treatment team to assess daily.           Outcome: Progressing   Pt has been in bed with eyes closed and regular respirations x 1.5 hours this noc shift. 15 minute and PRN checks all night. Pt. To nurses station @ approximately 0030 requesting things from his belongings. Pt. Updated this is against policy. Pt. Reporting \"I need the phone to call 911. You are violating my civil rights.\" Pt. Offered grievance. Pt. Declining at this time. Pt. Walked back to room and layed on his bed.       Face to face end of shift report communicated to oncoming RN.     Radha Santos RN  2/4/2024  6:17 AM      "

## 2024-02-04 NOTE — PLAN OF CARE
"Face to face end of shift report received from Katlyn WOOD RN. Rounding completed and patient observed lying in bed bed awake in the MHICU. No requests at this time.     Goal Outcome Evaluation: Patient has been dismissive. When entering his room to talk to him, patient got up quickly out of bed and walked straight at this writer and then out the door of his room. He didn't slow down when trying to get his attention. He walked to the far end of the MHICU and continued talk at this writer. He asked, \"why are you talking to me from so far away.\" This writer reminded him he had walked away and wanting to be respectful of his boundaries did not get too close to him. Patient's mother had called and wanted a message given to patient. She said to tell patient she loves him and misses him and that his dog \"Bob\" also misses him. This writer relayed the message and patient said, \"how do you know I have a dog?\" Patient was told he could use the phone to call his mom when he was ready. He then started on how his \"rights\" have been taken away from him because he is being \"detained\" at the hospital. Patient demanded to have the phone to call his . When asked if he needed a 's number written down he replied he didn't because his 's number is 911. This writer informed him he couldn't not call 911 unless there was an emergency but would be happy to get his 's number or a number to the non-emergency police number. Patient became irritated and said this writer was \"fired\" and needed to leave the MHICU.        20:30 Update: Patient   refused his HS dose of Seroquel. He also told staff he believed the only food he could eat was from a sealed container and he wanted 4 fruit cups. This writer attempted to meet with patient and talk about all the options he has for sealed items. He said to this writer, \"did I ever ask you to get involved? After a few more attempts to talk about food items he would be safe to eat, " "patient said, Did you just say you hurt my mom?\" Patient was not redirectable and only became more irritable with this writer. After this writer left the MHICU, patient went to lay down on his bed.     Face to face end of shift report communicated to oncoming RN.           Problem: Adult Behavioral Health Plan of Care  Goal: Patient-Specific Goal (Individualization)  Description: Patient will sleep 6 to 8 hours per night  Patient will eat at least 50% of meals  Patient will attend at least 50% of groups  Patient will comply with recommendations of treatment team  Patient will remain medication compliant    2/3- Pt is not to wean today due to increased paranoia, posturing at staff, and unpredictable behaviors. Treatment team to assess daily.           Outcome: Not Progressing     Problem: Thought Process Alteration  Goal: Optimal Thought Clarity  Outcome: Not Progressing                "

## 2024-02-04 NOTE — PROGRESS NOTES
"[facility] PSYCHIATRY  PROGRESS NOTE     SUBJECTIVE     Marija will only meet with me in the hallway and does not want me to enter his room. He continues to make odd hand gestures during our interaction. He states that he is \"the same as yesterday\". He again insists that he needs to call 911 to talk to his , even though staff have offered to help him look up the number for his . Patient notes that he has \"fired\" all of his doctors and nurses, so there should be no further need for him to be here. He has been refusing all offers of medication for anxiety or agitation. Did attempt to discuss with patient that someone from the [facility] will likely be calling him tomorrow to screen him, though he holds up his hand as if to stop me from saying anymore, then turns around and walks back into his room. His affect does remain somewhat intense, though he has not been threatening. He continues to request foods that are sealed due to paranoia. He does appear preoccupied.     In review of notes from yesterday, patient was noted to be irritable. At one point in his conversation with his nurse he suddenly asked her \"did you just say you hurt my mom?\"        MEDICATIONS   Scheduled Meds:    QUEtiapine  100 mg Oral At Bedtime     PRN Meds:.acetaminophen, albuterol, alum & mag hydroxide-simethicone, Benzocaine-Menthol-Zinc Cl, cloNIDine, hydrOXYzine HCl, melatonin, nicotine, OLANZapine **OR** OLANZapine     ALLERGIES   Allergies   Allergen Reactions     Corn Syrup [Glucose] GI Disturbance     Morphine Sulfate Nausea and Vomiting     Cannot take it in pill form, IV ok   Pancreatitis     Peanuts [Nuts] GI Disturbance     Internal cuts-GERD     Gold Au 198 [Gold] Rash        MENTAL STATUS EXAM   Vitals: BP (!) 179/104   Pulse 71   Temp 98.3  F (36.8  C) (Tympanic)   Resp 16   Ht 1.651 m (5' 5\")   Wt 144.5 kg (318 lb 9.6 oz)   SpO2 96%   BMI 53.02 kg/m      Appearance:  awake, alert, dressed in hospital scrubs, " "appeared as age stated, and disheveled   Attitude:  guarded, irritable  Eye Contact:  intense  Mood: irritable, labile  Affect:  mood congruent  Speech:  pressured  Psychomotor Behavior:  no evidence of tardive dyskinesia, dystonia, or tics  Thought Process:  disorganized, tangential  Associations:  no loose associations  Thought Content:  denies SI/HI, denies hallucinations, remains grandiose, has delusions and paranoia present  Insight:  poor  Judgment:  poor  Oriented to:  time, person, and place  Attention Span and Concentration:  limited  Recent and Remote Memory:  fair  Fund of Knowledge: appropriate  Muscle Strength and Tone: normal  Gait and Station: Normal       LABS   No results found for this or any previous visit (from the past 24 hour(s)).      IMPRESSION     This is a 33 year old male with a PMH of unspecified mood disorder, ADHD, SAGE, insomnia who was brought to the ED from his PCP office after exhibiting psychotic symptoms. Patient was found to be disorganized and delusional. He was placed on a hold and petition for commitment was filed in ED prior to transfer to behavioral health. Today patient is irritable. He answers most questions with :that's confidential\". He does mention the [place], FBI, and government being involved. During our interaction he uses sign language when staring at the camera in the ceiling as if communicating to someone. He reports that he will not take any medication, because it is not real and he does not trust it. Writer and SW did attempt to explain the commitment process, however patient refused to take the paperwork offered and instead ended the interview, stating that that we needed to watch out for the [place], FBI, and secret service. Will schedule Seroquel at bedtime for tonight as patient had been on this in the past, though I suspect he will refuse. If continuing to refuse medication, will likely need to file Nieves petition tomorrow.        DIAGNOSES     Bipolar 1 disorder, " current episode manic with psychotic features        PLAN     Location: Unit 5  Legal Status: Orders Placed This Encounter      Legal status 72 Hour Hold      Emergency Hospitalization Hold (72 Hr Hold)    Petition for commitment and harris filed through [facility]    Safety Assessment:    Behavioral Orders   Procedures     Code 1 - Restrict to Unit     Routine Programming     As clinically indicated     Status 15     Every 15 minutes.      PTA psychotropic medications stopped:     -no PTA meds    PTA psychotropic medications continued/changed:     -no PTA meds    New medications tried and stopped:     -None    New medications initiated:     -standard unit PRN medications  -Seroquel 100 mg at bedtime- has been on in the past though will likely refuse    Today's Changes:    - filed Harris petition, pt refusing all medication    Programming: Patient will be treated in a therapeutic milieu with appropriate individual and group therapies. Education will be provided on diagnoses, medications, and treatments.     Medical diagnoses:  Per medicine    Consult: None  Tests: will check baseline labs once more agreeable    Anticipated LOS: > 7 days  Disposition: home with services vs structured setting such as IRTS       ATTESTATION      Mayra Varma NP

## 2024-02-04 NOTE — PLAN OF CARE
"Face to face end of shift report received from Radha DUPONT RN. Rounding completed. Patient observed in bed, awake.     Pt continues to isolative to his room. He is still guarded and paranoid of staff, but seems to be a little less than previous days with him. Pt continues to not participate in nursing assessment- when asked how he slept he stated that he can't sleep because he is being detained. Pt requesting to to contact his  or  stating there is no reason that he should be detained here and he still has his civilian rights. This writer questions the name and he stated that he would just call 911. Again, reminded pt that we can't call 911. He stated that he is being held against his will. Informed pt that he is on a 72 hour hold because they are filing for commitment. Pt appeared to undersatand information. Informed him that it was Sunday and that SW will be back tomorrow to further discuss commitment status. Pt became irritable with this writer stating \"You need to have better control of the time.\" Pt dismissed self and returned to his bed at that time. Pt did not interact anymore with any staff or peers. Encouraged to shower. Steady gait- no falls. Frequent rounding.    Problem: Adult Behavioral Health Plan of Care  Goal: Patient-Specific Goal (Individualization)  Description: Patient will sleep 6 to 8 hours per night  Patient will eat at least 50% of meals  Patient will attend at least 50% of groups  Patient will comply with recommendations of treatment team  Patient will remain medication compliant    2/4- Pt is not to wean today due to increased paranoia, posturing at staff, and unpredictable behaviors. Treatment team to assess daily.     Outcome: Progressing     Problem: Thought Process Alteration  Goal: Optimal Thought Clarity  Outcome: Progressing       Katlyn Jensen RN  2/4/2024  9:14 AM    "

## 2024-02-05 PROCEDURE — 99232 SBSQ HOSP IP/OBS MODERATE 35: CPT | Performed by: NURSE PRACTITIONER

## 2024-02-05 PROCEDURE — 124N000001 HC R&B MH

## 2024-02-05 ASSESSMENT — ACTIVITIES OF DAILY LIVING (ADL)
LAUNDRY: UNABLE TO COMPLETE
DRESS: SCRUBS (BEHAVIORAL HEALTH)
ADLS_ACUITY_SCORE: 28
ORAL_HYGIENE: INDEPENDENT
ADLS_ACUITY_SCORE: 28
DRESS: INDEPENDENT
ADLS_ACUITY_SCORE: 28
ADLS_ACUITY_SCORE: 28
ORAL_HYGIENE: INDEPENDENT
ADLS_ACUITY_SCORE: 28
HYGIENE/GROOMING: INDEPENDENT
HYGIENE/GROOMING: INDEPENDENT
ADLS_ACUITY_SCORE: 28
ADLS_ACUITY_SCORE: 28
LAUNDRY: UNABLE TO COMPLETE
ADLS_ACUITY_SCORE: 28
ADLS_ACUITY_SCORE: 28

## 2024-02-05 NOTE — PLAN OF CARE
"Face to face report received from Ottoniel STRONG. Pt. Observed.    Problem: Adult Behavioral Health Plan of Care  Goal: Patient-Specific Goal (Individualization)  Description: Patient will sleep 6 to 8 hours per night  Patient will eat at least 50% of meals  Patient will attend at least 50% of groups  Patient will comply with recommendations of treatment team  Patient will remain medication compliant    2/4- Pt is not to wean today due to increased paranoia, posturing at staff, and unpredictable behaviors. Treatment team to assess daily.       Outcome: Progressing   Pt has been in bed with eyes closed and regular respirations for a total of 7 hours this noc shift. 15 minute and PRN checks all night.   Pt. To nurses station at approximately 0450 requesting water. Pt. Brought paper cup and this writer to pour from water bottle. Pt. Demanding this writer hand him unopened water bottle. Pt. Updated it is against policy. Pt. Reporting \"Everyone else just gives me the water bottle.\" Pt. Is in agreement to drink water from a cup. Pt. Opens his mouth and dumps full cup of 12 oz of water in and swallows in one gulp. Pt. Repeats this one more time. Then returns to bed.       Face to face end of shift report communicated to dean STRONG.     Radha Santos RN  2/5/2024        "

## 2024-02-05 NOTE — PROGRESS NOTES
[facility] mental health supervisor called and and SW spoke with him regarding pt and why we re-petitioned. Read off some of the notes and explained that pt has continued to not take medications. [facility] requested updated notes from the weekend, notes sent.

## 2024-02-05 NOTE — PROGRESS NOTES
"[facility] PSYCHIATRY  PROGRESS NOTE     DELMER Dutton remains paranoid, does not allow me to enter his room instead gesturing that I should remain in the doorway when talking to him. He has been refusing medication and refusing vital signs. He has mainly been eating/drinking items at meal times that are fully sealed due to paranoia. He notes that he does not trust anyone here. He denies any hallucinations though does appear preoccupied and will make odd statements to staff such as \"I want my hair back from you!\" \"You know what I'm talking about\". He is still intense in interactions with staff. He is malodorous and has not showered. He asks if he can have his cell phone, he notes that it is his civilian right to have his phone. Did attempt to review unit policies, however he does become upset with this. He ends the interview by abruptly turning around and going back in his room. He is currently being screened for commitment.        MEDICATIONS   Scheduled Meds:    QUEtiapine  100 mg Oral At Bedtime     PRN Meds:.acetaminophen, albuterol, alum & mag hydroxide-simethicone, Benzocaine-Menthol-Zinc Cl, cloNIDine, hydrOXYzine HCl, melatonin, nicotine, OLANZapine **OR** OLANZapine     ALLERGIES   Allergies   Allergen Reactions     Corn Syrup [Glucose] GI Disturbance     Morphine Sulfate Nausea and Vomiting     Cannot take it in pill form, IV ok   Pancreatitis     Peanuts [Nuts] GI Disturbance     Internal cuts-GERD     Gold Au 198 [Gold] Rash        MENTAL STATUS EXAM   Vitals: BP (!) 179/104   Pulse 71   Temp 98.3  F (36.8  C) (Tympanic)   Resp 16   Ht 1.651 m (5' 5\")   Wt 144.5 kg (318 lb 9.6 oz)   SpO2 96%   BMI 53.02 kg/m      Appearance:  awake, alert, dressed in hospital scrubs, appeared as age stated, and disheveled   Attitude:  guarded, irritable  Eye Contact:  intense  Mood: irritable, labile  Affect:  mood congruent  Speech:  pressured  Psychomotor Behavior:  no evidence of tardive dyskinesia, " "dystonia, or tics  Thought Process:  disorganized, tangential  Associations:  no loose associations  Thought Content:  denies SI/HI, denies hallucinations, remains grandiose, has delusions and paranoia present  Insight:  poor  Judgment:  poor  Oriented to:  time, person, and place  Attention Span and Concentration:  limited  Recent and Remote Memory:  fair  Fund of Knowledge: appropriate for education  Muscle Strength and Tone: normal  Gait and Station: Normal       LABS   No results found for this or any previous visit (from the past 24 hour(s)).      IMPRESSION     This is a 33 year old male with a PMH of unspecified mood disorder, ADHD, SAGE, insomnia who was brought to the ED from his PCP office after exhibiting psychotic symptoms. Patient was found to be disorganized and delusional. He was placed on a hold and petition for commitment was filed in ED prior to transfer to behavioral health. Today patient is irritable. He answers most questions with :that's confidential\". He does mention the [place], FBI, and government being involved. During our interaction he uses sign language when staring at the camera in the ceiling as if communicating to someone. He reports that he will not take any medication, because it is not real and he does not trust it. Writer and SW did attempt to explain the commitment process, however patient refused to take the paperwork offered and instead ended the interview, stating that that we needed to watch out for the [place], FBI, and secret service. Will schedule Seroquel at bedtime for tonight as patient had been on this in the past, though I suspect he will refuse. If continuing to refuse medication, will likely need to file Nieves petition tomorrow.        DIAGNOSES     Bipolar 1 disorder, current episode manic with psychotic features        PLAN     Location: Unit 5  Legal Status: Orders Placed This Encounter      Legal status 72 Hour Hold      Emergency Hospitalization Hold (72 Hr " Hold)    Petition for commitment and harris filed through [facility]    Safety Assessment:    Behavioral Orders   Procedures     Code 1 - Restrict to Unit     Routine Programming     As clinically indicated     Status 15     Every 15 minutes.      PTA psychotropic medications stopped:     -no PTA meds    PTA psychotropic medications continued/changed:     -no PTA meds    New medications tried and stopped:     -None    New medications initiated:     -standard unit PRN medications  -Seroquel 100 mg at bedtime- has been on in the past though will likely refuse    Today's Changes:    - filed [facility] petition, pt refusing all medication    Programming: Patient will be treated in a therapeutic milieu with appropriate individual and group therapies. Education will be provided on diagnoses, medications, and treatments.     Medical diagnoses:  Per medicine    Consult: None  Tests: will check baseline labs once more agreeable    Anticipated LOS: > 7 days  Disposition: home with services vs structured setting such as IR       TREATMENT TEAM CARE PLAN     Progress: Continued symptoms. Ongoing paranoia, delusions. Likely AH. Refusing meds, vitals, will only eat sealed foods    Continued Stay Criteria/Rationale: Continued symptoms without sufficient improvement/resolution.    Medical/Physical: See above.    Precautions: See above.     Plan: Continue inpatient care with unit support and medication management.    Rationale for change in precautions or plan: NA due to no change.    Participants: Mayra Varma NP, Nursing, SW, OT.    The patient's care was discussed with the treatment team and chart notes were reviewed.        ATTESTATION      Mayra Varma NP

## 2024-02-05 NOTE — PLAN OF CARE
Problem: Adult Behavioral Health Plan of Care  Goal: Patient-Specific Goal (Individualization)  Description: Patient will sleep 6 to 8 hours per night  Patient will eat at least 50% of meals  Patient will attend at least 50% of groups  Patient will comply with recommendations of treatment team  Patient will remain medication compliant    2/5- Pt is not to wean today due to increased paranoia, posturing at staff, and unpredictable behaviors. Treatment team to assess daily.           Outcome: Progressing     Problem: Thought Process Alteration  Goal: Optimal Thought Clarity  Outcome: Not Progressing     Patient remains in MHICU due to unpredictable behavior.  Illogical and delusional in conversation.  Continues to be grandiose.  Believes that he is here to make a citizens arrest.  Needed verbal redirection to move away from another patients room when staff were trying to work with other patient.  Patient was cooperative with cues from staff and remained polite.  No scheduled or PRN medications given.  No complaints of pain.  Vs WNL.  Care continued to next shift.  Precepting with LIGIA Vo.

## 2024-02-05 NOTE — PLAN OF CARE
"Face to face end of shift report received from Katlyn WOOD RN. Rounding completed and patient observed lying in bed awake in the MHICU. No requests at this time.     Goal Outcome Evaluation: Patient was dismissive as previous. He remembered this writer from yesterday and said, \"I already fired you once.\" He refused to talk to this writer more. He refused his HS scheduled meds. He did not raise his voice when communicating but was intimidating and sarcastic when he talked. He did not shower and is malodorous. If asked questions from staff he usually replies that he is being detained here against his will and needs to be released. When this writer checked in on him later in the shift to ask if he needed anything, he replied \"I want my hair back from you!\" He refused to give anymore information and said, \"you know what I'm talking about.\" Patient only ate 25% of dinner.     Face to face end of shift report communicated to oncoming RN.      Problem: Adult Behavioral Health Plan of Care  Goal: Patient-Specific Goal (Individualization)  Description: Patient will sleep 6 to 8 hours per night  Patient will eat at least 50% of meals  Patient will attend at least 50% of groups  Patient will comply with recommendations of treatment team  Patient will remain medication compliant    2/4- Pt is not to wean today due to increased paranoia, posturing at staff, and unpredictable behaviors. Treatment team to assess daily.           Outcome: Not Progressing     Problem: Thought Process Alteration  Goal: Optimal Thought Clarity  Outcome: Not Progressing                     "

## 2024-02-06 PROCEDURE — 124N000001 HC R&B MH

## 2024-02-06 PROCEDURE — 99233 SBSQ HOSP IP/OBS HIGH 50: CPT | Performed by: NURSE PRACTITIONER

## 2024-02-06 ASSESSMENT — ACTIVITIES OF DAILY LIVING (ADL)
ADLS_ACUITY_SCORE: 28
DRESS: SCRUBS (BEHAVIORAL HEALTH)
ADLS_ACUITY_SCORE: 28
LAUNDRY: UNABLE TO COMPLETE
ADLS_ACUITY_SCORE: 28
HYGIENE/GROOMING: INDEPENDENT
ORAL_HYGIENE: INDEPENDENT
ADLS_ACUITY_SCORE: 28
ADLS_ACUITY_SCORE: 28
HYGIENE/GROOMING: INDEPENDENT
ADLS_ACUITY_SCORE: 28
ORAL_HYGIENE: INDEPENDENT
ADLS_ACUITY_SCORE: 28
ADLS_ACUITY_SCORE: 28
DRESS: SCRUBS (BEHAVIORAL HEALTH);INDEPENDENT
ADLS_ACUITY_SCORE: 28

## 2024-02-06 NOTE — PLAN OF CARE
"Patient was agreeable to speak with his mother on the phone today.  He refused to handle the phone and asked this writer to put it on speaker and hold the phone up to him.  Patient asked his mother to give him her social security number to jarett that it was her.  He asked her to call , millionaires, and the owners of [employer] to help him with getting out of the hospital.  His mother asured him that she would call everyone and that all the people in her family would be coming up to help him.  Patient's mother stated multiple times that she doesn't think he needs to be in the hospital and he will have reasons for a lawsuit when he gets out and he needs to be writing down everything we do.  Patient also made statements that staff are giving him things he is allergic to and that he has fired all of his physicians.  At the end of the call  when patient asked his mother to contact [employer], she stated \"why would I do that?\"  Patient then stated \"that's not my mother\" and refused to talk anymore.  This writer asked patient if he would be willing to put his mother on a release of information so that staff can talk with her but he refused.      "

## 2024-02-06 NOTE — PROGRESS NOTES
"[facility] PSYCHIATRY  PROGRESS NOTE     SUBJECTIVE     Marija continues to not want to staff to enter his room when speaking with him. He agrees to speak to me in the hallway of the MHICU. Patient asks when he will be able to leave, did attempt to discuss that [facility] was supporting the commitment and that he would be receiving paperwork regarding this from the . \"What ? I don't have a . I don't consent to having a . I've fired all of my social workers and doctors and nurses\". He then raises his hand to stop writer from replying, turns around, and go back into his room and shuts the door. He continues to remain highly paranoid of staff. He spoke to his mother on the phone yesterday, though refused to hold the phone and then accused mother of not being his real mother because she would not agree to call the owner of [employer]. He has been refusing all offers of medication. He will have preliminary hearing on Friday 2/9.       MEDICATIONS   Scheduled Meds:    QUEtiapine  100 mg Oral At Bedtime     PRN Meds:.acetaminophen, albuterol, alum & mag hydroxide-simethicone, Benzocaine-Menthol-Zinc Cl, cloNIDine, hydrOXYzine HCl, melatonin, nicotine, OLANZapine **OR** OLANZapine     ALLERGIES   Allergies   Allergen Reactions     Corn Syrup [Glucose] GI Disturbance     Morphine Sulfate Nausea and Vomiting     Cannot take it in pill form, IV ok   Pancreatitis     Peanuts [Nuts] GI Disturbance     Internal cuts-GERD     Gold Au 198 [Gold] Rash        MENTAL STATUS EXAM   Vitals: BP (!) 179/104   Pulse 71   Temp 98.3  F (36.8  C) (Tympanic)   Resp 16   Ht 1.651 m (5' 5\")   Wt 144.5 kg (318 lb 9.6 oz)   SpO2 96%   BMI 53.02 kg/m      Appearance:  awake, alert, dressed in hospital scrubs, appeared as age stated, and disheveled   Attitude:  guarded, irritable  Eye Contact:  intense  Mood: irritable, labile  Affect:  mood congruent  Speech:  pressured  Psychomotor Behavior:  " "no evidence of tardive dyskinesia, dystonia, or tics  Thought Process:  disorganized, tangential  Associations:  no loose associations  Thought Content:  denies SI/HI, denies hallucinations, remains grandiose, has delusions and paranoia present  Insight:  poor  Judgment:  poor  Oriented to:  time, person, and place  Attention Span and Concentration:  limited  Recent and Remote Memory:  fair  Fund of Knowledge: appropriate for education  Muscle Strength and Tone: normal  Gait and Station: Normal       LABS   No results found for this or any previous visit (from the past 24 hour(s)).      IMPRESSION     This is a 33 year old male with a PMH of unspecified mood disorder, ADHD, SAGE, insomnia who was brought to the ED from his PCP office after exhibiting psychotic symptoms. Patient was found to be disorganized and delusional. He was placed on a hold and petition for commitment was filed in ED prior to transfer to behavioral health. Today patient is irritable. He answers most questions with :that's confidential\". He does mention the [place], FBI, and government being involved. During our interaction he uses sign language when staring at the camera in the ceiling as if communicating to someone. He reports that he will not take any medication, because it is not real and he does not trust it. Writer and SW did attempt to explain the commitment process, however patient refused to take the paperwork offered and instead ended the interview, stating that that we needed to watch out for the [place], FBI, and secret service. Will schedule Seroquel at bedtime for tonight as patient had been on this in the past, though I suspect he will refuse. If continuing to refuse medication, will likely need to file Nieves petition tomorrow.        DIAGNOSES     Bipolar 1 disorder, current episode manic with psychotic features        PLAN     Location: Unit 5  Legal Status: Orders Placed This Encounter      Legal status Court Hold    Petition for " commitment and harris filed through [facility]    Safety Assessment:    Behavioral Orders   Procedures     Code 1 - Restrict to Unit     Routine Programming     As clinically indicated     Status 15     Every 15 minutes.      PTA psychotropic medications stopped:     -no PTA meds    PTA psychotropic medications continued/changed:     -no PTA meds    New medications tried and stopped:     -None    New medications initiated:     -standard unit PRN medications  -Seroquel 100 mg at bedtime- has been on in the past though will likely refuse    Today's Changes:    - filed Harris petition, pt refusing all medication  - confinement hearing on 2/9    Programming: Patient will be treated in a therapeutic milieu with appropriate individual and group therapies. Education will be provided on diagnoses, medications, and treatments.     Medical diagnoses:  Per medicine    Consult: None  Tests: will check baseline labs once more agreeable    Anticipated LOS: > 7 days  Disposition: home with services vs structured setting such as IRTS       ATTESTATION      Mayra Varma NP

## 2024-02-06 NOTE — PROGRESS NOTES
[facility] reached out and they are in support of civil commitment and they are just waiting for the hold order to be signed.     [facility] emailed back and stated that the [facility] is supportive of the civil commitment, they are just waiting for the  to sign the hold order.     Court hold came in, confinement hearing is 2/9/24 @ 11:30am.

## 2024-02-06 NOTE — PLAN OF CARE
Problem: Adult Behavioral Health Plan of Care  Goal: Patient-Specific Goal (Individualization)  Description: Patient will sleep 6 to 8 hours per night  Patient will eat at least 50% of meals  Patient will attend at least 50% of groups  Patient will comply with recommendations of treatment team  Patient will remain medication compliant    2/5- Pt is not to wean today due to increased paranoia, posturing at staff, and unpredictable behaviors. Treatment team to assess daily.           Outcome: Progressing  Note:     1100 Patient is in room quiet and withdrawn. Patient does not want staff to enter room and refuses to engage with staff. Staff offered patient any thing that he might need and he denied that he did. Patient also states he can only eat pears due to his allergy to corn syrup and nuts. Patient assisted as able with dietary limits. Patient interactions with other patients and staff is somewhat threatening at times but does not actually threaten or harm anything or anyone.    1430 Patient remains in room. Continues to not engage with staff or patients. No complaints at this time.    Face to face end of shift report communicated to 3-11 shift RN.     Jodie Degroot RN  2/6/2024  3:01 PM          Problem: Thought Process Alteration  Goal: Optimal Thought Clarity  Outcome: Progressing   Goal Outcome Evaluation:    Plan of Care Reviewed With: patient

## 2024-02-06 NOTE — PLAN OF CARE
"  Problem: Adult Behavioral Health Plan of Care  Goal: Patient-Specific Goal (Individualization)  Description: Patient will sleep 6 to 8 hours per night  Patient will eat at least 50% of meals  Patient will attend at least 50% of groups  Patient will comply with recommendations of treatment team  Patient will remain medication compliant    2/5- Pt is not to wean today due to increased paranoia, posturing at staff, and unpredictable behaviors. Treatment team to assess daily.     Outcome: Progressing     Problem: Thought Process Alteration  Goal: Optimal Thought Clarity  Outcome: Progressing    Face to face shift report received from previously assigned nurse. Rounding completed, pt observed in his room resting in bed. Patient is to remain in the MHICU due to the need for decreased stimulation and unpredictable behaviors.     Patient refused to participate in nursing assessment this evening. Claims he is \"not mentally ill\". Patient continues to be isolative and withdrawn and does not like staff entering his room. Patient continues to be paranoid and preoccupied with contacting the higher up billionaires to get him out of here as his \"civil rights are being violated\".    Staff went to offer HS medications, when offered, patient stated he does not want us to be offering him the medication. Patient questioned writer, when trying to explain that the medication is ordered for him by the provider that has been seeing him, patient insisted that he does not have a provider here and he hasn't had one since he fired his last doctor. Patient then began questioning writers nursing license, stating that he does not believe that his nurses are actual nurses, stating they are \"imposters\". Patient then dismissed writer. No further needs at this time.     Face to face report communicated to oncdolly STRONG.    Gilda Beebe RN  2/6/2024  4:59 PM       "

## 2024-02-06 NOTE — PLAN OF CARE
Face to face end of shift report received from Gilda STRONG. Rounding completed. Patient observed in bed appears asleep.   Problem: Adult Behavioral Health Plan of Care  Goal: Patient-Specific Goal (Individualization)  Description: Patient will sleep 6 to 8 hours per night  Patient will eat at least 50% of meals  Patient will attend at least 50% of groups  Patient will comply with recommendations of treatment team  Patient will remain medication compliant    2/5- Pt is not to wean today due to increased paranoia, posturing at staff, and unpredictable behaviors. Treatment team to assess daily.     Outcome: Progressing     Problem: Thought Process Alteration  Goal: Optimal Thought Clarity  Outcome: Progressing   Goal Outcome Evaluation:         No observed episodes of SIB this shift. Patient slept (4.5) hours. Face to face end of shift report communicated to oncoming shift.     John Badillo RN  2/6/2024  0607 AM

## 2024-02-06 NOTE — PLAN OF CARE
"  Problem: Adult Behavioral Health Plan of Care  Goal: Patient-Specific Goal (Individualization)  Description: Patient will sleep 6 to 8 hours per night  Patient will eat at least 50% of meals  Patient will attend at least 50% of groups  Patient will comply with recommendations of treatment team  Patient will remain medication compliant    2/5- Pt is not to wean today due to increased paranoia, posturing at staff, and unpredictable behaviors. Treatment team to assess daily.   Outcome: Progressing     Problem: Thought Process Alteration  Goal: Optimal Thought Clarity  Outcome: Progressing    Face to face shift report received from previously assigned nurse. Rounding completed, pt observed sitting on his bed in his room.    Patient is met with in the [facility] lounge. Patient would not answer assessment questions. Patient remains paranoid and insists his time here is against his civilian rights. Writer and staff tried to explain the commitment process with the patient, in which he became argumentative and was unable to understand the court hold process. Patient insisted he get his phone so he can call his . Reminded patient that he can get phone numbers off his phone, but cannot use his phone for any other use. Patient stated he needed to send his  a video, explained to him the process. Patient did not seem to understand.     Patient had a phone call with his mother - please see note by LIGIA Liu regarding this phone call.     Patient has been isolative in his room, paranoid about staff walking into his room and insisting he needed to clean the floor after staff walked on it. Patient continues to argue that he is wrongfully being held here and does not belong here. Patient repeatedly argumentative with staff regarding this subject.     Patient refused HS medication, stating \"I do not take pills\" and \"I was on an antibiotic but they took that away from me\". Patient declined further needs at this time.     Face " to face report communicated to oncoming RN.    Gilda Beebe RN  2/5/2024  6:02 PM

## 2024-02-07 PROCEDURE — 124N000001 HC R&B MH

## 2024-02-07 PROCEDURE — 99233 SBSQ HOSP IP/OBS HIGH 50: CPT | Mod: 95 | Performed by: STUDENT IN AN ORGANIZED HEALTH CARE EDUCATION/TRAINING PROGRAM

## 2024-02-07 ASSESSMENT — ACTIVITIES OF DAILY LIVING (ADL)
ADLS_ACUITY_SCORE: 28

## 2024-02-07 NOTE — PROGRESS NOTES
"Pt refused to take the court hold paperwork. Extra copy placed in his chart, pt asked \"have you ever lied, have you shot anyone\"     Re-faxed Nieves petition to [facility].  "

## 2024-02-07 NOTE — PLAN OF CARE
"  Problem: Adult Behavioral Health Plan of Care  Goal: Patient-Specific Goal (Individualization)  Description: Patient will sleep 6 to 8 hours per night  Patient will eat at least 50% of meals  Patient will attend at least 50% of groups  Patient will comply with recommendations of treatment team  Patient will remain medication compliant    2/5- Pt is not to wean today due to increased paranoia, posturing at staff, and unpredictable behaviors. Treatment team to assess daily.     Outcome: Not Progressing  Note: 15:40: Received end of shift report from LIGIA Garcia. Pt lying in bed upon arrival-- Pt remains in MH-ICU, no wean @ this time r/t increased paranoia, bizarre behavior.     17:30: Co-RN notified this writer that pt's mother called et voices worries, frustrations  re: unknown location of son for 4 days, statements of \"needing son home\" \"He takes care of me, I have MS\" \"He doesn't need to be on the psych clement\" etc. Mother Aubrie was educated on MARLY process et was notified pt has not signed at this time. Will notify pt--   18:00: Poor food intake, ate prepackaged pears. Isolates self to room. Delusional/paranoid thought process-    19:30: This writer notified pt re: mother being worried et would like a return phone call--- Pt questions this writer \"Who said you can come in here?\" \"My rights are being violated\" \"I have the right for you not to be hear\"     22:00: Pt continues to isolate self in room, staff implementing frequent encouraging of snack intake. Did allow UA to enter room. Refuses to participate in nursing assessment or HS medication adminsitration.     Face to face end of shift report to be communicated to on-coming [facility] staff.     Charla Castro RN  2/7/2024  11:00 PM          Problem: Thought Process Alteration  Goal: Optimal Thought Clarity  Outcome: Progressing   Goal Outcome Evaluation:                        "

## 2024-02-07 NOTE — PROGRESS NOTES
"[facility] PSYCHIATRY  PROGRESS NOTE     SUBJECTIVE     Prior to interviewing the patient, I met with nursing and reviewed patient's clinical condition. We discussed clinical care both before and after the interview. I have reviewed the patient's clinical course by review of records including previous notes, labs, and vital signs.     Per nursing, the patient had the following behavioral events over the last 24-hours: none. Refusing medications. Psychotic.    On psychiatric interview, the patient was guarded first asking me to \"tell me where your degree is from.\" When I answered this the patient noted that \"he fired me\" and that he will be reporting the hospital and that we will have our time in court. He notes that we will be able to \"defend ourselves.\"     He notes that he is not interested in any treatment changes right now. He has been refusing Seroquel. He notes that he does not need medications.    He ended noted that he is not interested in conversing any longer.        MEDICATIONS   Scheduled Meds:    QUEtiapine  100 mg Oral At Bedtime     PRN Meds:.acetaminophen, albuterol, alum & mag hydroxide-simethicone, Benzocaine-Menthol-Zinc Cl, cloNIDine, hydrOXYzine HCl, melatonin, nicotine, OLANZapine **OR** OLANZapine     ALLERGIES   Allergies   Allergen Reactions     Corn Syrup [Glucose] GI Disturbance     Morphine Sulfate Nausea and Vomiting     Cannot take it in pill form, IV ok   Pancreatitis     Peanuts [Nuts] GI Disturbance     Internal cuts-GERD     Gold Au 198 [Gold] Rash        MENTAL STATUS EXAM   Vitals: BP (!) 179/104   Pulse 71   Temp 98.3  F (36.8  C) (Tympanic)   Resp 16   Ht 1.651 m (5' 5\")   Wt 144.5 kg (318 lb 9.6 oz)   SpO2 96%   BMI 53.02 kg/m      Appearance:  awake, alert, dressed in hospital scrubs, appeared as age stated, and disheveled   Attitude:  guarded, irritable  Eye Contact:  intense  Mood: irritable, labile  Affect:  mood congruent  Speech:  pressured  Psychomotor " "Behavior:  no evidence of tardive dyskinesia, dystonia, or tics  Thought Process:  disorganized, tangential  Associations:  no loose associations  Thought Content:  denies SI/HI, denies hallucinations, remains grandiose, has delusions and paranoia present  Insight:  poor  Judgment:  poor  Oriented to:  time, person, and place  Attention Span and Concentration:  limited  Recent and Remote Memory:  fair  Fund of Knowledge: appropriate for education  Muscle Strength and Tone: normal  Gait and Station: Normal       LABS   No results found for this or any previous visit (from the past 24 hour(s)).      IMPRESSION     This is a 33 year old male with a PMH of unspecified mood disorder, ADHD, SAGE, insomnia who was brought to the ED from his PCP office after exhibiting psychotic symptoms. Patient was found to be disorganized and delusional. He was placed on a hold and petition for commitment was filed in ED prior to transfer to behavioral health. Today patient is irritable. He answers most questions with :that's confidential\". He does mention the [place], FBI, and government being involved. During our interaction he uses sign language when staring at the camera in the ceiling as if communicating to someone. He reports that he will not take any medication, because it is not real and he does not trust it. Writer and SW did attempt to explain the commitment process, however patient refused to take the paperwork offered and instead ended the interview, stating that that we needed to watch out for the [place], FBI, and secret service. Will schedule Seroquel at bedtime for tonight as patient had been on this in the past, though I suspect he will refuse. If continuing to refuse medication, will likely need to file Nieves petition tomorrow.     Today: The patient continues to be manic and psychotic. Poor insight with him refusing to participate in care. Nieves filed with hearing on 2/20.       DIAGNOSES     Bipolar 1 disorder, current " episode manic with psychotic features        PLAN     Location: Unit 5  Legal Status: Orders Placed This Encounter      Legal status Court Hold    Petition for commitment and harris filed through [facility]    Safety Assessment:    Behavioral Orders   Procedures     Code 1 - Restrict to Unit     Routine Programming     As clinically indicated     Status 15     Every 15 minutes.      PTA psychotropic medications stopped:     -no PTA meds    PTA psychotropic medications continued/changed:     -no PTA meds    New medications tried and stopped:     -None    New medications initiated:     -standard unit PRN medications  -Seroquel 100 mg at bedtime- has been on in the past though will likely refuse    Today's Changes:    - None    Programming: Patient will be treated in a therapeutic milieu with appropriate individual and group therapies. Education will be provided on diagnoses, medications, and treatments.     Medical diagnoses:  Per medicine    Consult: None  Tests: will check baseline labs once more agreeable    Anticipated LOS: > 7 days  Disposition: home with services vs structured setting such as IR       ATTESTATION      Maximino Terry DO, MA  Psychiatrist     Video Visit: Patient has given verbal consent for video visit?: Yes  Type of Service: video visit for mental health treatment  [place] Visit: [place]  Originating Site (patient location): [facility]  Distant Site (provider location): Remote Location  Mode of Communication: Video Conference via [employer]  Time of Service: Date: 02/07/2024 Start: 1130 end: 1140

## 2024-02-07 NOTE — PLAN OF CARE
Face to face end of shift report received from Gilda STRONG. Rounding completed. Patient observed in bed appears asleep.     Problem: Adult Behavioral Health Plan of Care  Goal: Patient-Specific Goal (Individualization)  Description: Patient will sleep 6 to 8 hours per night  Patient will eat at least 50% of meals  Patient will attend at least 50% of groups  Patient will comply with recommendations of treatment team  Patient will remain medication compliant    2/5- Pt is not to wean today due to increased paranoia, posturing at staff, and unpredictable behaviors. Treatment team to assess daily.     Outcome: Progressing     Problem: Thought Process Alteration  Goal: Optimal Thought Clarity  Outcome: Progressing   Goal Outcome Evaluation:         No observed episodes of SIB this shift. Patient slept (6.75) hours. Face to face end of shift report communicated to oncoming shift.     John Badillo RN  2/7/2024  0616 AM

## 2024-02-07 NOTE — PLAN OF CARE
"Face to face report received from John STRONG. Pt. Observed.    Problem: Adult Behavioral Health Plan of Care  Goal: Patient-Specific Goal (Individualization)  Description: Patient will sleep 6 to 8 hours per night  Patient will eat at least 50% of meals  Patient will attend at least 50% of groups  Patient will comply with recommendations of treatment team  Patient will remain medication compliant    2/5- Pt is not to wean today due to increased paranoia, posturing at staff, and unpredictable behaviors. Treatment team to assess daily.     Outcome: Progressing  Pt. Refused to answer any assessment questions at this time. Withdrawn and isolating to room this day shift. Pt. Refusing to let staff in his room. Pt. Updated about room checks every 15 minutes and shiftily environmental's. Pt. Reports \"You can not come in my room I have civil rights.\" Pt. To door of his room with every 15 minute check. Pt. Declining to fill out his meal menu then demanding different food when his meals are brought to unit. Pt. Allowing environmental's this day shift.      Face to face end of shift report communicated to dean STRONG.     Radha Santos RN  2/7/2024  1:17 PM      "

## 2024-02-07 NOTE — PROGRESS NOTES
"Pt's court appointment  came to talk with pt. Pt would not let  into his room and talked in the lounge of the ICU. Pt requested court to be in person and that it be taken to jag court ( court) because \"this is not longer a civil matter.\"   "

## 2024-02-08 PROCEDURE — 124N000001 HC R&B MH

## 2024-02-08 PROCEDURE — 250N000013 HC RX MED GY IP 250 OP 250 PS 637

## 2024-02-08 PROCEDURE — 99232 SBSQ HOSP IP/OBS MODERATE 35: CPT | Mod: 95 | Performed by: STUDENT IN AN ORGANIZED HEALTH CARE EDUCATION/TRAINING PROGRAM

## 2024-02-08 RX ADMIN — OLANZAPINE 10 MG: 10 TABLET, FILM COATED ORAL at 09:06

## 2024-02-08 ASSESSMENT — ACTIVITIES OF DAILY LIVING (ADL)
ADLS_ACUITY_SCORE: 28
ADLS_ACUITY_SCORE: 28
ADLS_ACUITY_SCORE: 38
ADLS_ACUITY_SCORE: 28
LAUNDRY: UNABLE TO COMPLETE
ADLS_ACUITY_SCORE: 38
ADLS_ACUITY_SCORE: 38
ORAL_HYGIENE: PROMPTS
ADLS_ACUITY_SCORE: 28
DRESS: SCRUBS (BEHAVIORAL HEALTH);INDEPENDENT
ADLS_ACUITY_SCORE: 38
ADLS_ACUITY_SCORE: 38
HYGIENE/GROOMING: PROMPTS
ADLS_ACUITY_SCORE: 28
ADLS_ACUITY_SCORE: 38
ADLS_ACUITY_SCORE: 28

## 2024-02-08 NOTE — PLAN OF CARE
"Face to face end of shift report received from Charla STRONG. Rounding completed. Patient observed in bed appears asleep.     Problem: Adult Behavioral Health Plan of Care  Goal: Patient-Specific Goal (Individualization)  Description: Patient will sleep 6 to 8 hours per night  Patient will eat at least 50% of meals  Patient will attend at least 50% of groups  Patient will comply with recommendations of treatment team  Patient will remain medication compliant    2/5- Pt is not to wean today due to increased paranoia, posturing at staff, and unpredictable behaviors. Treatment team to assess daily.     Outcome: Progressing     Problem: Thought Process Alteration  Goal: Optimal Thought Clarity  Outcome: Progressing   Goal Outcome Evaluation:         0535 Patient up, given two bottles of sealed water per request. Bottles removed immediately after they were drank in front of staff. Patient questioning why he is here. Patient stated, \"everyone is conspiring and collaborating to keep me here\".     No observed episodes of SIB this shift. Patient slept (6.5) hours. Face to face end of shift report communicated to oncoming shift.     John Badillo RN  2/8/2024  0614 AM                    "

## 2024-02-08 NOTE — PLAN OF CARE
"Face to face report received from John STRONG. Pt. Observed.    Problem: Adult Behavioral Health Plan of Care  Goal: Patient-Specific Goal (Individualization)  Description: Patient will sleep 6 to 8 hours per night  Patient will eat at least 50% of meals  Patient will attend at least 50% of groups  Patient will comply with recommendations of treatment team  Patient will remain medication compliant    2/8- Pt is not to wean due to increased paranoia, and unpredictable behaviors. Treatment team to assess daily.   Outcome: Progressing  Pt. Refused to answer any assessment questions at this time. Replying with \"How are you educated to ask these questions? How many years of school did you have and how many credits is that?\" Withdrawn and isolating to room this day shift. Pt. Refusing to let staff in his room. Pt. \"You can not come in my room I have civil rights. I fired you.\" Pt. To door of his room with every 15 minute check. 0906 Pt. Administered PRN Zyprexa 10 mg po for increasing agitation, with effective results. Pt. Allowing environmental's this day shift.       Face to face end of shift report communicated to oncoming RN.     Radha Santos RN  2/8/2024  11:40 AM      "

## 2024-02-08 NOTE — PROGRESS NOTES
"[facility] PSYCHIATRY  PROGRESS NOTE     SUBJECTIVE     Prior to interviewing the patient, I met with nursing and reviewed patient's clinical condition. We discussed clinical care both before and after the interview. I have reviewed the patient's clinical course by review of records including previous notes, labs, and vital signs.     Per nursing, the patient had the following behavioral events over the last 24-hours: none. Refusing medications. Psychotic.    On psychiatric interview, the patient notes that he used to take different medications and they did not work well. He notes that he is not interested in being on medications, focusing on marijuana instead. He notes that the paperwork he received had the year \"2018 on it.\"     The patient refuses to take Seroquel. He is not interested in interested.    He became fixated again on what college I went to. He notes that it is of \"most importance.\"     He denies any acute concerns.       MEDICATIONS   Scheduled Meds:    QUEtiapine  100 mg Oral At Bedtime     PRN Meds:.acetaminophen, albuterol, alum & mag hydroxide-simethicone, Benzocaine-Menthol-Zinc Cl, cloNIDine, hydrOXYzine HCl, melatonin, nicotine, OLANZapine **OR** OLANZapine     ALLERGIES   Allergies   Allergen Reactions     Corn Syrup [Glucose] GI Disturbance     Morphine Sulfate Nausea and Vomiting     Cannot take it in pill form, IV ok   Pancreatitis     Peanuts [Nuts] GI Disturbance     Internal cuts-GERD     Gold Au 198 [Gold] Rash        MENTAL STATUS EXAM   Vitals: BP (!) 179/104   Pulse 71   Temp 98.3  F (36.8  C) (Tympanic)   Resp 16   Ht 1.651 m (5' 5\")   Wt 144.5 kg (318 lb 9.6 oz)   SpO2 96%   BMI 53.02 kg/m      Appearance:  awake, alert, dressed in hospital scrubs, appeared as age stated, and disheveled   Attitude:  guarded, irritable  Eye Contact:  intense  Mood: Irritable   Affect:  mood congruent  Speech:  pressured  Psychomotor Behavior:  no evidence of tardive dyskinesia, dystonia, " "or tics  Thought Process:  disorganized, tangential  Associations:  no loose associations  Thought Content:  denies SI/HI, denies hallucinations, remains grandiose, has delusions and paranoia present  Insight:  poor  Judgment:  poor  Oriented to:  time, person, and place  Attention Span and Concentration:  limited  Recent and Remote Memory:  fair  Fund of Knowledge: appropriate for education  Muscle Strength and Tone: normal  Gait and Station: Normal       LABS   No results found for this or any previous visit (from the past 24 hour(s)).      IMPRESSION     This is a 33 year old male with a PMH of unspecified mood disorder, ADHD, SAGE, insomnia who was brought to the ED from his PCP office after exhibiting psychotic symptoms. Patient was found to be disorganized and delusional. He was placed on a hold and petition for commitment was filed in ED prior to transfer to behavioral health. Today patient is irritable. He answers most questions with :that's confidential\". He does mention the [place], FBI, and government being involved. During our interaction he uses sign language when staring at the camera in the ceiling as if communicating to someone. He reports that he will not take any medication, because it is not real and he does not trust it. Writer and SW did attempt to explain the commitment process, however patient refused to take the paperwork offered and instead ended the interview, stating that that we needed to watch out for the [place], FBI, and secret service. Will schedule Seroquel at bedtime for tonight as patient had been on this in the past, though I suspect he will refuse. If continuing to refuse medication, will likely need to file Nieves petition tomorrow.     Today: The patient continues to be manic and psychotic. Poor insight with him refusing to participate in care. Removing Seroquel given continued refusal.        DIAGNOSES     Bipolar 1 disorder, current episode manic with psychotic features        PLAN "     Location: Unit 5  Legal Status: Orders Placed This Encounter      Legal status Court Hold    Petition for commitment and harris filed through [facility]    Safety Assessment:    Behavioral Orders   Procedures     Code 1 - Restrict to Unit     Routine Programming     As clinically indicated     Status 15     Every 15 minutes.      PTA psychotropic medications stopped:     -no PTA meds    PTA psychotropic medications continued/changed:     -no PTA meds    New medications tried and stopped:     -Seroquel 100 mg at bedtime due to him refusing     New medications initiated:     -standard unit PRN medications    Today's Changes:    -Stop Seroquel given not taking consistently     Programming: Patient will be treated in a therapeutic milieu with appropriate individual and group therapies. Education will be provided on diagnoses, medications, and treatments.     Medical diagnoses:  Per medicine    Consult: None  Tests: None    Anticipated LOS: > 7 days  Disposition: IRTS        ATTESTATION      Maximino Terry DO, MA  Psychiatrist     Video Visit: Patient has given verbal consent for video visit?: Yes  Type of Service: video visit for mental health treatment  Reason for [place] Visit: [place]  Originating Site (patient location): [facility]  Distant Site (provider location): Remote Location  Mode of Communication: Video Conference via [employer]  Time of Service: Date: 02/08/2024 Start: 930 end: 943

## 2024-02-08 NOTE — PLAN OF CARE
Problem: Adult Behavioral Health Plan of Care  Goal: Patient-Specific Goal (Individualization)  Description: Patient will sleep 6 to 8 hours per night  Patient will eat at least 50% of meals  Patient will attend at least 50% of groups  Patient will comply with recommendations of treatment team  Patient will remain medication compliant    2/5- Pt is not to wean today due to increased paranoia, posturing at staff, and unpredictable behaviors. Treatment team to assess daily.           Outcome: Progressing  Note: 15:40: Received end of shift report from LIGIA Garcia. Pt displaying s/s of sleep upon arrival--- MH-ICU; no wean @ this time.     22:30: Pt displaying s/s of quality sleep majority of shift, ate a total of 4 individual packaged pears et a couple juices. Presents with less paranoia---briefly came out of room to watch television for a couple minutes. Returned to room for bed.     Face to face end of shift report to be communicated to on-coming [facility] staff.     Charla Castro RN  2/8/2024  11:15 PM           Problem: Thought Process Alteration  Goal: Optimal Thought Clarity  Outcome: Progressing   Goal Outcome Evaluation:

## 2024-02-09 PROCEDURE — 124N000001 HC R&B MH

## 2024-02-09 PROCEDURE — 99232 SBSQ HOSP IP/OBS MODERATE 35: CPT | Performed by: NURSE PRACTITIONER

## 2024-02-09 ASSESSMENT — ACTIVITIES OF DAILY LIVING (ADL)
DRESS: SCRUBS (BEHAVIORAL HEALTH);INDEPENDENT
ORAL_HYGIENE: INDEPENDENT;PROMPTS
ADLS_ACUITY_SCORE: 38
DRESS: SCRUBS (BEHAVIORAL HEALTH)
ADLS_ACUITY_SCORE: 38
HYGIENE/GROOMING: INDEPENDENT;PROMPTS
HYGIENE/GROOMING: INDEPENDENT;PROMPTS
ADLS_ACUITY_SCORE: 38
LAUNDRY: UNABLE TO COMPLETE
ADLS_ACUITY_SCORE: 38
ORAL_HYGIENE: INDEPENDENT;PROMPTS
ADLS_ACUITY_SCORE: 38

## 2024-02-09 NOTE — PROGRESS NOTES
"[facility] PSYCHIATRY  PROGRESS NOTE     SUBJECTIVE     Prior to interviewing the patient, I met with nursing and reviewed patient's clinical condition. We discussed clinical care both before and after the interview. I have reviewed the patient's clinical course by review of records including previous notes, labs, and vital signs.     Per nursing, the patient had the following behavioral events over the last 24-hours: none. Refusing medications. Psychotic.    On psychiatric interview, the is found resting in bed. He initially refuses to answer any questions, though then asks \"so are you refusing to allow me to pay my rent?\" Attempted to ask patient how he typically pays his rent. Patient notes that he needs to personally \"put a 's check in the box\". Did review that if patient is able to pay pay bills over the phone or online, we could likely assist him in going so. He is encouraged to reach out to his landlord to discuss options. Patient declines to do this, noting that the only way he will pay his rent is in person. Did review that this would not be possible given his current hospitalization and upcoming court hearing, he then terminates the interview by gesturing with his hand and asking that I leave his room.        MEDICATIONS   Scheduled Meds:      PRN Meds:.acetaminophen, albuterol, alum & mag hydroxide-simethicone, Benzocaine-Menthol-Zinc Cl, cloNIDine, hydrOXYzine HCl, melatonin, nicotine, OLANZapine **OR** OLANZapine     ALLERGIES   Allergies   Allergen Reactions     Corn Syrup [Glucose] GI Disturbance     Morphine Sulfate Nausea and Vomiting     Cannot take it in pill form, IV ok   Pancreatitis     Peanuts [Nuts] GI Disturbance     Internal cuts-GERD     Gold Au 198 [Gold] Rash        MENTAL STATUS EXAM   Vitals: BP (!) 179/104   Pulse 71   Temp 98.3  F (36.8  C) (Tympanic)   Resp 16   Ht 1.651 m (5' 5\")   Wt 144.5 kg (318 lb 9.6 oz)   SpO2 96%   BMI 53.02 kg/m      Appearance:  awake, " "alert, dressed in hospital scrubs, appeared as age stated, and disheveled   Attitude:  guarded, irritable, dismissive  Eye Contact:  intense  Mood: Irritable   Affect:  mood congruent  Speech:  pressured  Psychomotor Behavior:  no evidence of tardive dyskinesia, dystonia, or tics  Thought Process:  disorganized, tangential  Associations:  no loose associations  Thought Content:  denies SI/HI, denies hallucinations, remains grandiose, has delusions and paranoia present  Insight:  poor  Judgment:  poor  Oriented to:  time, person, and place  Attention Span and Concentration:  limited  Recent and Remote Memory:  fair  Fund of Knowledge: appropriate for education  Muscle Strength and Tone: normal  Gait and Station: Normal       LABS   No results found for this or any previous visit (from the past 24 hour(s)).      IMPRESSION     This is a 33 year old male with a PMH of unspecified mood disorder, ADHD, SAGE, insomnia who was brought to the ED from his PCP office after exhibiting psychotic symptoms. Patient was found to be disorganized and delusional. He was placed on a hold and petition for commitment was filed in ED prior to transfer to behavioral health. Today patient is irritable. He answers most questions with :that's confidential\". He does mention the [place], FBI, and government being involved. During our interaction he uses sign language when staring at the camera in the ceiling as if communicating to someone. He reports that he will not take any medication, because it is not real and he does not trust it. Writer and SW did attempt to explain the commitment process, however patient refused to take the paperwork offered and instead ended the interview, stating that that we needed to watch out for the [place], FBI, and secret service. Will schedule Seroquel at bedtime for tonight as patient had been on this in the past, though I suspect he will refuse. If continuing to refuse medication, will likely need to file Nieves " petition tomorrow.        DIAGNOSES     Bipolar 1 disorder, current episode manic with psychotic features        PLAN     Location: Unit 5  Legal Status: Orders Placed This Encounter      Legal status Court Hold    Petition for commitment and harris filed through [facility]    Safety Assessment:    Behavioral Orders   Procedures     Code 1 - Restrict to Unit     Routine Programming     As clinically indicated     Status 15     Every 15 minutes.      PTA psychotropic medications stopped:     -no PTA meds    PTA psychotropic medications continued/changed:     -no PTA meds    New medications tried and stopped:     -Seroquel 100 mg at bedtime due to him refusing     New medications initiated:     -standard unit PRN medications    Today's Changes:    -no changes. Patient declining medication  -had court today, confined. Commitment and harris hearing- TBD    Programming: Patient will be treated in a therapeutic milieu with appropriate individual and group therapies. Education will be provided on diagnoses, medications, and treatments.     Medical diagnoses:  Per medicine    Consult: None  Tests: None    Anticipated LOS: > 7 days  Disposition: IRTS        ATTESTATION      Mayra Varma, CNP

## 2024-02-09 NOTE — PROGRESS NOTES
Pt had confinement hearing today. Pt is confined and his  is requesting an in person hearing for pt commitment hearing.     They are going to get an order signed and send it over with the next court hearing date.

## 2024-02-09 NOTE — PLAN OF CARE
Problem: Adult Behavioral Health Plan of Care  Goal: Patient-Specific Goal (Individualization)  Description: Patient will sleep 6 to 8 hours per night  Patient will eat at least 50% of meals  Patient will attend at least 50% of groups  Patient will comply with recommendations of treatment team  Patient will remain medication compliant    2/5- Pt is not to wean today due to increased paranoia, posturing at staff, and unpredictable behaviors. Treatment team to assess daily.           Outcome: Not Progressing     Problem: Thought Process Alteration  Goal: Optimal Thought Clarity  Outcome: Not Progressing   Goal Outcome Evaluation:         Pt. Slept until lunch, awake now, refusing to answer questions, is irritable with questions, slept 6.5 hours plus napped this morning, no wean due to unpredictable behaviors, ambulating in the room, will continue to monitor progress.    Face to face end of shift report will be communicated to oncoming afternoon shift RN.     Scarlett Hayes RN  2/9/2024  12:39 PM

## 2024-02-09 NOTE — PLAN OF CARE
Face to face end of shift report received from Charla STRONG. Rounding completed. Patient observed in bed appears asleep.     Problem: Adult Behavioral Health Plan of Care  Goal: Patient-Specific Goal (Individualization)  Description: Patient will sleep 6 to 8 hours per night  Patient will eat at least 50% of meals  Patient will attend at least 50% of groups  Patient will comply with recommendations of treatment team  Patient will remain medication compliant    2/5- Pt is not to wean today due to increased paranoia, posturing at staff, and unpredictable behaviors. Treatment team to assess daily.     Outcome: Progressing     Problem: Thought Process Alteration  Goal: Optimal Thought Clarity  Outcome: Progressing   Goal Outcome Evaluation:         No observed episodes of SIB this shift. Patient slept (6.5) hours. Face to face end of shift report communicated to oncoming shift.     John Badillo RN  2/9/2024  0627 AM

## 2024-02-09 NOTE — PLAN OF CARE
"SHIFT SUMMARY: Sleeping at the start of the shift. Remains in the MHICU related to disorganized, paranoid thought process. Not weaning at this time. Denied pain and declined to answer assessment questions.     Offered bottled water, Ensure, Gatorade, and packaged individual fruit cups, to which he responded \"You can get me [place]'s. I have a lot of bodily issues I'm not going to discuss.\" Would not verbally respond to further questions, only shaking his head \"yes\" or \"no.\" Afterwards, he refused further interaction.    Observed on camera eating and drinking one cup of pears and 4 packets of fabiola sauce. Requested, and was, given 2 more cups of fruit at snack time. Requested, and was given, one bottled water, which he guzzled. Declined an offer of a second bottle.    Problem: Adult Behavioral Health Plan of Care  Goal: Patient-Specific Goal (Individualization)  Description: Patient will sleep 6 to 8 hours per night  Patient will eat at least 50% of meals  Patient will attend at least 50% of groups  Patient will comply with recommendations of treatment team  Patient will remain medication compliant    2/5- Pt is not to wean today due to increased paranoia, posturing at staff, and unpredictable behaviors. Treatment team to assess daily.     Outcome: Not Progressing     Problem: Thought Process Alteration  Goal: Optimal Thought Clarity  Outcome: Not Progressing   Goal Outcome Evaluation:                        "

## 2024-02-10 PROCEDURE — 124N000001 HC R&B MH

## 2024-02-10 PROCEDURE — 99233 SBSQ HOSP IP/OBS HIGH 50: CPT | Mod: 95 | Performed by: STUDENT IN AN ORGANIZED HEALTH CARE EDUCATION/TRAINING PROGRAM

## 2024-02-10 ASSESSMENT — ACTIVITIES OF DAILY LIVING (ADL)
ADLS_ACUITY_SCORE: 38
ORAL_HYGIENE: INDEPENDENT
ADLS_ACUITY_SCORE: 28
DRESS: SCRUBS (BEHAVIORAL HEALTH);INDEPENDENT
ADLS_ACUITY_SCORE: 28
ADLS_ACUITY_SCORE: 38
ORAL_HYGIENE: INDEPENDENT;PROMPTS
ADLS_ACUITY_SCORE: 38
HYGIENE/GROOMING: INDEPENDENT;PROMPTS
ADLS_ACUITY_SCORE: 38
DRESS: SCRUBS (BEHAVIORAL HEALTH)
ADLS_ACUITY_SCORE: 38
ADLS_ACUITY_SCORE: 38
ADLS_ACUITY_SCORE: 28
ADLS_ACUITY_SCORE: 38
HYGIENE/GROOMING: INDEPENDENT

## 2024-02-10 NOTE — PROGRESS NOTES
"[facility] PSYCHIATRY  PROGRESS NOTE     SUBJECTIVE     Prior to interviewing the patient, I met with nursing and reviewed patient's clinical condition. We discussed clinical care both before and after the interview. I have reviewed the patient's clinical course by review of records including previous notes, labs, and vital signs.     Per nursing, the patient had the following behavioral events over the last 24-hours: none. Refusing medications. Psychotic.    On psychiatric interview, the is found resting in bed. He notes that it is necessary that we speak about his legal case. He notes that Marvin Arias was fired by him, one of his previous providers. He notes that he threatened a lawsuit for Dr. Felix due to him wanting to give him a \"frontal lobotomy.\" He notes I am not his doctor and he has no provider because he fired them all.    He notes that he would like like to be represented by someone that has clearance to discuss his case. He notes that it is of a sensitive nature.    Called mother due to her requesting call from provider. No release available. Provided support and answered general questions not about patient.       MEDICATIONS   Scheduled Meds:      PRN Meds:.acetaminophen, albuterol, alum & mag hydroxide-simethicone, Benzocaine-Menthol-Zinc Cl, cloNIDine, hydrOXYzine HCl, melatonin, nicotine, OLANZapine **OR** OLANZapine     ALLERGIES   Allergies   Allergen Reactions     Corn Syrup [Glucose] GI Disturbance     Morphine Sulfate Nausea and Vomiting     Cannot take it in pill form, IV ok   Pancreatitis     Peanuts [Nuts] GI Disturbance     Internal cuts-GERD     Gold Au 198 [Gold] Rash        MENTAL STATUS EXAM   Vitals: BP (!) 179/104   Pulse 71   Temp 98.3  F (36.8  C) (Tympanic)   Resp 16   Ht 1.651 m (5' 5\")   Wt 144.5 kg (318 lb 9.6 oz)   SpO2 96%   BMI 53.02 kg/m      Appearance:  awake, alert, dressed in hospital scrubs, appeared as age stated, and disheveled   Attitude:  guarded, " Pt with co pain to left lower buttocks area pt states has an abscess x few days. Pts finger stick in triage 175. "irritable, dismissive  Eye Contact:  intense  Mood: Irritable   Affect:  mood congruent  Speech:  pressured  Psychomotor Behavior:  no evidence of tardive dyskinesia, dystonia, or tics  Thought Process:  disorganized, tangential  Associations:  no loose associations  Thought Content:  denies SI/HI, denies hallucinations, remains grandiose, has delusions and paranoia present  Insight:  poor  Judgment:  poor  Oriented to:  time, person, and place  Attention Span and Concentration:  limited  Recent and Remote Memory:  fair  Fund of Knowledge: appropriate for education  Muscle Strength and Tone: normal  Gait and Station: Normal       LABS   No results found for this or any previous visit (from the past 24 hour(s)).      IMPRESSION     This is a 33 year old male with a PMH of unspecified mood disorder, ADHD, SAGE, insomnia who was brought to the ED from his PCP office after exhibiting psychotic symptoms. Patient was found to be disorganized and delusional. He was placed on a hold and petition for commitment was filed in ED prior to transfer to behavioral health. Today patient is irritable. He answers most questions with :that's confidential\". He does mention the [place], FBI, and government being involved. During our interaction he uses sign language when staring at the camera in the ceiling as if communicating to someone. He reports that he will not take any medication, because it is not real and he does not trust it. Writer and SW did attempt to explain the commitment process, however patient refused to take the paperwork offered and instead ended the interview, stating that that we needed to watch out for the [place], FBI, and secret service. Will schedule Seroquel at bedtime for tonight as patient had been on this in the past, though I suspect he will refuse. If continuing to refuse medication, will likely need to file Nieves petition tomorrow.     Today: Patient continues to be psychotic and in the MHICU. Awaiting Nieves " hearing to be able to administer medications.       DIAGNOSES     Bipolar 1 disorder, current episode manic with psychotic features        PLAN     Location: Unit 5  Legal Status: Orders Placed This Encounter      Legal status Court Hold    Petition for commitment and harris filed through [facility]. Date TBD of hearing.    Safety Assessment:    Behavioral Orders   Procedures     Code 1 - Restrict to Unit     Routine Programming     As clinically indicated     Status 15     Every 15 minutes.      PTA psychotropic medications stopped:     -no PTA meds    PTA psychotropic medications continued/changed:     -no PTA meds    New medications tried and stopped:     -Seroquel 100 mg at bedtime due to him refusing     New medications initiated:     -standard unit PRN medications    Today's Changes:    -no changes. Patient declining medication    Programming: Patient will be treated in a therapeutic milieu with appropriate individual and group therapies. Education will be provided on diagnoses, medications, and treatments.     Medical diagnoses:  Per medicine    Consult: None  Tests: None    Anticipated LOS: > 7 days  Disposition: IRTS        ATTESTATION      Video Visit: Patient has given verbal consent for video visit?: Yes  Type of Service: video visit for mental health treatment  Reason for [place] Visit: [place]  Originating Site (patient location): [facility]  Distant Site (provider location): Remote Location  Mode of Communication: Video Conference via [employer]  Time of Service: Date: 02/10/2024 Start: 815 end: 966

## 2024-02-10 NOTE — PLAN OF CARE
Problem: Adult Behavioral Health Plan of Care  Goal: Patient-Specific Goal (Individualization)  Description: Patient will sleep 6 to 8 hours per night  Patient will eat at least 50% of meals  Patient will attend at least 50% of groups  Patient will comply with recommendations of treatment team  Patient will remain medication compliant    2/5- Pt is not to wean today due to increased paranoia, posturing at staff, and unpredictable behaviors. Treatment team to assess daily.           Outcome: Progressing     Problem: Thought Process Alteration  Goal: Optimal Thought Clarity  Outcome: Progressing   Goal Outcome Evaluation:

## 2024-02-10 NOTE — PLAN OF CARE
Problem: Adult Behavioral Health Plan of Care  Goal: Patient-Specific Goal (Individualization)  Description: Patient will sleep 6 to 8 hours per night  Patient will eat at least 50% of meals  Patient will attend at least 50% of groups  Patient will comply with recommendations of treatment team  Patient will remain medication compliant    2/10- Pt is not to wean today due to increased paranoia, posturing at staff, and unpredictable behaviors. Treatment team to assess daily.           2/10/2024 1005 by Scarlett Hayes RN  Outcome: Not Progressing     Problem: Thought Process Alteration  Goal: Optimal Thought Clarity  2/10/2024 1005 by Scarlett Hayes RN  Outcome: Not Progressing   Goal Outcome Evaluation:         Pt. In room so far this shift, slept 7 hours last night, eating only packaged meals brought up by the kitchen, in MHICU due to disorganized, paranoid thought process and unpredictable behaviors, no posturing at staff yet this shift, will not answer assessment questions, will continue to monitor progress.    Face to face end of shift report will be communicated to oncoming afternoon shift BRIGITTE Hayes RN  2/10/2024  10:50 AM

## 2024-02-10 NOTE — PLAN OF CARE
Face to face end of shift report received from Randall STRONG. Rounding completed. Patient observed in bed appears asleep.     Problem: Adult Behavioral Health Plan of Care  Goal: Patient-Specific Goal (Individualization)  Description: Patient will sleep 6 to 8 hours per night  Patient will eat at least 50% of meals  Patient will attend at least 50% of groups  Patient will comply with recommendations of treatment team  Patient will remain medication compliant    2/5- Pt is not to wean today due to increased paranoia, posturing at staff, and unpredictable behaviors. Treatment team to assess daily.     Outcome: Progressing     Problem: Thought Process Alteration  Goal: Optimal Thought Clarity  Outcome: Progressing   Goal Outcome Evaluation:         No observed episodes of SIB this shift. Patient slept (7) hours. Face to face end of shift report communicated to oncoming shift.     John Badillo RN  2/10/2024  0616 AM

## 2024-02-11 PROCEDURE — 99232 SBSQ HOSP IP/OBS MODERATE 35: CPT | Mod: 95 | Performed by: STUDENT IN AN ORGANIZED HEALTH CARE EDUCATION/TRAINING PROGRAM

## 2024-02-11 PROCEDURE — 124N000001 HC R&B MH

## 2024-02-11 ASSESSMENT — ACTIVITIES OF DAILY LIVING (ADL)
ADLS_ACUITY_SCORE: 28
ADLS_ACUITY_SCORE: 28
ORAL_HYGIENE: INDEPENDENT
ADLS_ACUITY_SCORE: 28
DRESS: SCRUBS (BEHAVIORAL HEALTH)
HYGIENE/GROOMING: INDEPENDENT
ADLS_ACUITY_SCORE: 28
ORAL_HYGIENE: INDEPENDENT
ADLS_ACUITY_SCORE: 28
LAUNDRY: UNABLE TO COMPLETE
ADLS_ACUITY_SCORE: 28
HYGIENE/GROOMING: INDEPENDENT
ADLS_ACUITY_SCORE: 28
DRESS: SCRUBS (BEHAVIORAL HEALTH)

## 2024-02-11 NOTE — PLAN OF CARE
Problem: Adult Behavioral Health Plan of Care  Goal: Patient-Specific Goal (Individualization)  Description: Patient will sleep 6 to 8 hours per night  Patient will eat at least 50% of meals  Patient will attend at least 50% of groups  Patient will comply with recommendations of treatment team  Patient will remain medication compliant    2/10- Pt is not to wean today due to increased paranoia, posturing at staff, and unpredictable behaviors. Treatment team to assess daily.           Outcome: Not Progressing     Problem: Thought Process Alteration  Goal: Optimal Thought Clarity  Outcome: Not Progressing   Goal Outcome Evaluation:       Pt. Has been sleeping so far this shift, did wake briefly to eat breakfast, slept 7 hours last night plus napping this morning, has delusional thinking with paranoia and flight of ideas, no wean due to this and unpredictable behaviors, will continue to monitor progress.    Face to face end of shift report will be communicated to oncoming afternoon shift RN.     Scarlett Hayes RN  2/11/2024  10:14 AM

## 2024-02-11 NOTE — PROGRESS NOTES
"[facility] PSYCHIATRY  PROGRESS NOTE     SUBJECTIVE     Prior to interviewing the patient, I met with nursing and reviewed patient's clinical condition. We discussed clinical care both before and after the interview. I have reviewed the patient's clinical course by review of records including previous notes, labs, and vital signs.     Per nursing, the patient had the following behavioral events over the last 24-hours: none. Refusing medications. Psychotic. Continues in the MHICU.     On psychiatric interview, the patient was found in his room. He notes that he needs to get a hold of multiple bills he needs to pay.     He notes that there was a child book recently he read where you \"focused on one thing and then the other.\" He then asked what was under the desk and said \"you are the best.\"    No acute concerns. No willing to take medications.        MEDICATIONS   Scheduled Meds:      PRN Meds:.acetaminophen, albuterol, alum & mag hydroxide-simethicone, Benzocaine-Menthol-Zinc Cl, cloNIDine, hydrOXYzine HCl, melatonin, nicotine, OLANZapine **OR** OLANZapine     ALLERGIES   Allergies   Allergen Reactions     Corn Syrup [Glucose] GI Disturbance     Morphine Sulfate Nausea and Vomiting     Cannot take it in pill form, IV ok   Pancreatitis     Peanuts [Nuts] GI Disturbance     Internal cuts-GERD     Gold Au 198 [Gold] Rash        MENTAL STATUS EXAM   Vitals: BP (!) 179/104   Pulse 71   Temp 98.3  F (36.8  C) (Tympanic)   Resp 16   Ht 1.651 m (5' 5\")   Wt 144.5 kg (318 lb 9.6 oz)   SpO2 96%   BMI 53.02 kg/m      Appearance:  awake, alert, dressed in hospital scrubs, appeared as age stated, and disheveled   Attitude:  guarded, irritable, dismissive  Eye Contact:  intense  Mood: Irritable   Affect:  mood congruent  Speech:  pressured  Psychomotor Behavior:  no evidence of tardive dyskinesia, dystonia, or tics  Thought Process:  disorganized, tangential  Associations:  no loose associations  Thought Content:  " "denies SI/HI, denies hallucinations, remains grandiose, has delusions and paranoia present  Insight:  poor  Judgment:  poor  Oriented to:  time, person, and place  Attention Span and Concentration:  limited  Recent and Remote Memory:  fair  Fund of Knowledge: appropriate for education  Muscle Strength and Tone: normal  Gait and Station: Normal       LABS   No results found for this or any previous visit (from the past 24 hour(s)).      IMPRESSION     This is a 33 year old male with a PMH of unspecified mood disorder, ADHD, SAGE, insomnia who was brought to the ED from his PCP office after exhibiting psychotic symptoms. Patient was found to be disorganized and delusional. He was placed on a hold and petition for commitment was filed in ED prior to transfer to behavioral health. Today patient is irritable. He answers most questions with :that's confidential\". He does mention the [place], FBI, and government being involved. During our interaction he uses sign language when staring at the camera in the ceiling as if communicating to someone. He reports that he will not take any medication, because it is not real and he does not trust it. Writer and SW did attempt to explain the commitment process, however patient refused to take the paperwork offered and instead ended the interview, stating that that we needed to watch out for the [place], FBI, and secret service. Will schedule Seroquel at bedtime for tonight as patient had been on this in the past, though I suspect he will refuse. If continuing to refuse medication, will likely need to file Harris petition tomorrow.     Today: Patient continues to be psychotic and in the MHICU. Awaiting Harris hearing to be able to administer medications.       DIAGNOSES     Bipolar 1 disorder, current episode manic with psychotic features        PLAN     Location: Unit 5  Legal Status: Orders Placed This Encounter      Legal status Court Hold    Petition for commitment and harris filed through " [facility]. Date TBD of hearing.    Safety Assessment:    Behavioral Orders   Procedures     Code 1 - Restrict to Unit     Routine Programming     As clinically indicated     Status 15     Every 15 minutes.      PTA psychotropic medications stopped:     -no PTA meds    PTA psychotropic medications continued/changed:     -no PTA meds    New medications tried and stopped:     -Seroquel 100 mg at bedtime due to him refusing     New medications initiated:     -standard unit PRN medications    Today's Changes:    -no changes. Patient declining medication    Programming: Patient will be treated in a therapeutic milieu with appropriate individual and group therapies. Education will be provided on diagnoses, medications, and treatments.     Medical diagnoses:  Per medicine    Consult: None  Tests: None    Anticipated LOS: > 7 days  Disposition: IRTS        ATTESTATION      Video Visit: Patient has given verbal consent for video visit?: Yes  Type of Service: video visit for mental health treatment  [place] Visit: [place]  Originating Site (patient location): [facility]  Distant Site (provider location): Remote Location  Mode of Communication: Video Conference via [employer]  Time of Service: Date: 02/11/2024 Start: 200 end: 215

## 2024-02-11 NOTE — PLAN OF CARE
Problem: Adult Behavioral Health Plan of Care  Goal: Patient-Specific Goal (Individualization)  Description: Patient will sleep 6 to 8 hours per night  Patient will eat at least 50% of meals  Patient will attend at least 50% of groups  Patient will comply with recommendations of treatment team  Patient will remain medication compliant    2/10- Pt is not to wean today due to increased paranoia, posturing at staff, and unpredictable behaviors. Treatment team to assess daily.           Outcome: Progressing  Note:     1815 Patient is in room. Patient requesting pears with dinner meal. Patient steady on his feet. Denies any need. Patient requesting hot sauce and pears but not anything else. Patient tells writer that he just wants to get out of here. Calm behavior but patient is frustrated.     Problem: Thought Process Alteration  Goal: Optimal Thought Clarity  Outcome: Progressing   Goal Outcome Evaluation:    Plan of Care Reviewed With: patient

## 2024-02-11 NOTE — PLAN OF CARE
Face to face end of shift report received from Jodie STRONG. Rounding completed. Patient observed in bed appears asleep.       Problem: Adult Behavioral Health Plan of Care  Goal: Patient-Specific Goal (Individualization)  Description: Patient will sleep 6 to 8 hours per night  Patient will eat at least 50% of meals  Patient will attend at least 50% of groups  Patient will comply with recommendations of treatment team  Patient will remain medication compliant    2/10- Pt is not to wean today due to increased paranoia, posturing at staff, and unpredictable behaviors. Treatment team to assess daily.     Outcome: Progressing     Problem: Thought Process Alteration  Goal: Optimal Thought Clarity  Outcome: Progressing   Goal Outcome Evaluation:         No observed episodes of SIB this shift. Patient slept (7) hours. Face to face end of shift report communicated to oncoming shift.     John Badillo RN  2/11/2024  0614 AM

## 2024-02-12 PROCEDURE — 99232 SBSQ HOSP IP/OBS MODERATE 35: CPT | Performed by: NURSE PRACTITIONER

## 2024-02-12 PROCEDURE — 124N000001 HC R&B MH

## 2024-02-12 ASSESSMENT — ACTIVITIES OF DAILY LIVING (ADL)
ADLS_ACUITY_SCORE: 28
DRESS: SCRUBS (BEHAVIORAL HEALTH)
LAUNDRY: UNABLE TO COMPLETE
ADLS_ACUITY_SCORE: 28
ADLS_ACUITY_SCORE: 28
HYGIENE/GROOMING: INDEPENDENT
ADLS_ACUITY_SCORE: 28
ORAL_HYGIENE: INDEPENDENT

## 2024-02-12 NOTE — PLAN OF CARE
Problem: Adult Behavioral Health Plan of Care  Goal: Patient-Specific Goal (Individualization)  Description: Patient will sleep 6 to 8 hours per night  Patient will eat at least 50% of meals  Patient will attend at least 50% of groups  Patient will comply with recommendations of treatment team  Patient will remain medication compliant    2/10- Pt is not to wean today due to increased paranoia, posturing at staff, and unpredictable behaviors. Treatment team to assess daily.           Outcome: Not Progressing     Pt denies SI and pain.  Pt ate 100% of dinner. Pt has a flat affect. Pt is alert. Pt is using appropriate behavior peers. Pt is dismissive and labile with staff. Pt continues with paranoia and delusional thinking. Pt is a no wean due to unpredictable behavior.     Face to face report will be communicated to oncoming RN.    Jagruti Arguello RN  2/11/2024

## 2024-02-12 NOTE — PROGRESS NOTES
"[facility] PSYCHIATRY  PROGRESS NOTE     SUBJECTIVE     Prior to interviewing the patient, I met with nursing and reviewed patient's clinical condition. We discussed clinical care both before and after the interview. I have reviewed the patient's clinical course by review of records including previous notes, labs, and vital signs.     Per nursing, the patient had the following behavioral events over the last 24-hours: none. Refusing medications. Psychotic. Continues in the MHICU.     Patient declines to meet in his room, instead coming into the hallway and standing at the far end of the haywood. He notes that he wants a second mattress because he is used to \"11 inches of foam on my bed\" which helps with back pain. Did review whether patient wanted anything else ordered for back pain, which he declines. He does not wish to discuss anything else. Did review that he will have another commitment hearing on Friday, though patient denies that this is real and notes that it is \"fake\". He states that the hearing he had on Friday was \"fake\" as well. He declines any offer of medication.        MEDICATIONS   Scheduled Meds:      PRN Meds:.acetaminophen, albuterol, alum & mag hydroxide-simethicone, Benzocaine-Menthol-Zinc Cl, cloNIDine, hydrOXYzine HCl, melatonin, nicotine, OLANZapine **OR** OLANZapine     ALLERGIES   Allergies   Allergen Reactions     Corn Syrup [Glucose] GI Disturbance     Morphine Sulfate Nausea and Vomiting     Cannot take it in pill form, IV ok   Pancreatitis     Peanuts [Nuts] GI Disturbance     Internal cuts-GERD     Gold Au 198 [Gold] Rash        MENTAL STATUS EXAM   Vitals: BP (!) 179/104   Pulse 71   Temp 98.3  F (36.8  C) (Tympanic)   Resp 16   Ht 1.651 m (5' 5\")   Wt 144.5 kg (318 lb 9.6 oz)   SpO2 96%   BMI 53.02 kg/m      Appearance:  awake, alert, dressed in hospital scrubs, appeared as age stated, and disheveled   Attitude:  guarded, irritable, dismissive  Eye Contact:  intense  Mood: " "Irritable   Affect:  mood congruent  Speech:  pressured  Psychomotor Behavior:  no evidence of tardive dyskinesia, dystonia, or tics  Thought Process:  disorganized, tangential  Associations:  no loose associations  Thought Content:  denies SI/HI, denies hallucinations, remains grandiose, has delusions and paranoia present  Insight:  poor  Judgment:  poor  Oriented to:  time, person, and place  Attention Span and Concentration:  limited  Recent and Remote Memory:  fair  Fund of Knowledge: appropriate for education  Muscle Strength and Tone: normal  Gait and Station: Normal       LABS   No results found for this or any previous visit (from the past 24 hour(s)).      IMPRESSION     This is a 33 year old male with a PMH of unspecified mood disorder, ADHD, SAGE, insomnia who was brought to the ED from his PCP office after exhibiting psychotic symptoms. Patient was found to be disorganized and delusional. He was placed on a hold and petition for commitment was filed in ED prior to transfer to behavioral health. Today patient is irritable. He answers most questions with :that's confidential\". He does mention the [place], FBI, and government being involved. During our interaction he uses sign language when staring at the camera in the ceiling as if communicating to someone. He reports that he will not take any medication, because it is not real and he does not trust it. Writer and SW did attempt to explain the commitment process, however patient refused to take the paperwork offered and instead ended the interview, stating that that we needed to watch out for the [place], FBI, and secret service. Will schedule Seroquel at bedtime for tonight as patient had been on this in the past, though I suspect he will refuse. If continuing to refuse medication, will likely need to file Nieves petition tomorrow.     Today: Patient continues to be psychotic and in the MHICU. Awaiting Nieves hearing to be able to administer medications.   "     DIAGNOSES     Bipolar 1 disorder, current episode manic with psychotic features        PLAN     Location: Unit 5  Legal Status: Orders Placed This Encounter      Legal status Court Hold    Petition for commitment and harris filed through [facility]. Court 2/16/24 @ 10:30 AM    Safety Assessment:    Behavioral Orders   Procedures     Code 1 - Restrict to Unit     Routine Programming     As clinically indicated     Status 15     Every 15 minutes.      PTA psychotropic medications stopped:     -no PTA meds    PTA psychotropic medications continued/changed:     -no PTA meds    New medications tried and stopped:     -Seroquel 100 mg at bedtime due to him refusing     New medications initiated:     -standard unit PRN medications    Today's Changes:    -no changes. Patient declining medication    Programming: Patient will be treated in a therapeutic milieu with appropriate individual and group therapies. Education will be provided on diagnoses, medications, and treatments.     Medical diagnoses:  Per medicine    Consult: None  Tests: None    Anticipated LOS: > 7 days  Disposition: IRTS        TREATMENT TEAM CARE PLAN     Progress: Continued symptoms. Continues to be delusional. Declining medication.    Continued Stay Criteria/Rationale: Continued symptoms without sufficient improvement/resolution.    Medical/Physical: See above.    Precautions: See above.     Plan: Continue inpatient care with unit support and medication management.    Rationale for change in precautions or plan: NA due to no change.    Participants: Mayra Varma, AMY, Nursing, SW, OT.    The patient's care was discussed with the treatment team and chart notes were reviewed.        ATTESTATION      Mayra Varma CNP

## 2024-02-12 NOTE — PLAN OF CARE
Face to face shift report received from John WARREN R.N. Rounding completed, pt observed.       Problem: Thought Process Alteration  Goal: Optimal Thought Clarity  Outcome: Progressing    Patient does respond appropriately to conversation at this time. He makes intense eye contact with this writer. No paranoid or delusional statements made during conversation. Patient has asked to order off the regular menu today. No posturing towards this writer today. No weaning out of the MHICU per treatment team.

## 2024-02-12 NOTE — PROGRESS NOTES
"Commitment and Nieves hearing on February 16th @ 1:30pm. Pt did request an in person hearing, Replaced by [facility] will update once they know if they are keeping it zoom or in person.     Updated notes sent to [facility].     Spoke with Pt about signing an MARLY for his mother as she is worried about him. Pt states \"If she needs to know she can talk to her sister Jeanine, no body fuck's with Jeanine\"   "

## 2024-02-13 PROCEDURE — 99232 SBSQ HOSP IP/OBS MODERATE 35: CPT | Performed by: NURSE PRACTITIONER

## 2024-02-13 PROCEDURE — 124N000001 HC R&B MH

## 2024-02-13 ASSESSMENT — ACTIVITIES OF DAILY LIVING (ADL)
ADLS_ACUITY_SCORE: 28
ORAL_HYGIENE: INDEPENDENT
ADLS_ACUITY_SCORE: 28
DRESS: SCRUBS (BEHAVIORAL HEALTH)
LAUNDRY: UNABLE TO COMPLETE
HYGIENE/GROOMING: INDEPENDENT
ADLS_ACUITY_SCORE: 28

## 2024-02-13 NOTE — PLAN OF CARE
"  Problem: Adult Behavioral Health Plan of Care  Goal: Patient-Specific Goal (Individualization)  Description: Patient will sleep 6 to 8 hours per night  Patient will eat at least 50% of meals  Patient will attend at least 50% of groups  Patient will comply with recommendations of treatment team  Patient will remain medication compliant  2/13- Pt is not to wean today due to increased paranoia, posturing at staff, and unpredictable behaviors. Treatment team to assess daily.   Outcome: Progressing      Problem: Thought Process Alteration  Goal: Optimal Thought Clarity  Outcome: Progressing      Goal Outcome Evaluation:    Plan of Care Reviewed With: patient          Patient is cooperative. Affect is full range, mood is calm. He is withdrawn and dismissive, gives an answer but walks away. He jokes that he only needs \"a six pack of MGD and some marijuana\", later he brings toilet paper with something on it to the nurse's station window, states \"see, I found some marijuana, I found a onie\". He spends time in his room lying on his bed.   Face to face end of shift report communicated to oncoming RN.     Shell Wood RN  2/13/2024  11:05 PM             "

## 2024-02-13 NOTE — PROGRESS NOTES
No updates yet on whether pt's hearing will be staying zoom or will be held in person.     CM did put in a request for a ride if the hearing changes to in person.

## 2024-02-13 NOTE — PLAN OF CARE
Court appointed examiner (Dr. Denia Nelson )called and stated she will meet with pt via I pad at 10 am on 2/13.

## 2024-02-13 NOTE — PLAN OF CARE
"  Problem: Adult Behavioral Health Plan of Care  Goal: Patient-Specific Goal (Individualization)  Description: Patient will sleep 6 to 8 hours per night  Patient will eat at least 50% of meals  Patient will attend at least 50% of groups  Patient will comply with recommendations of treatment team  Patient will remain medication compliant    2/10- Pt is not to wean today due to increased paranoia, posturing at staff, and unpredictable behaviors. Treatment team to assess daily.           2/13/2024 0053 by Scarlett Moscoso, RN  Outcome: Not Progressing     Problem: Adult Behavioral Health Plan of Care  Goal: Patient-Specific Goal (Individualization)  Description: Patient will sleep 6 to 8 hours per night  Patient will eat at least 50% of meals  Patient will attend at least 50% of groups  Patient will comply with recommendations of treatment team  Patient will remain medication compliant    2/10- Pt is not to wean today due to increased paranoia, posturing at staff, and unpredictable behaviors. Treatment team to assess daily.           2/13/2024 0053 by Scarlett Moscoso, RN  Outcome: Not Progressing  2/13/2024 0047 by Scarlett Moscoso, RN  Outcome: Progressing  Note: Report received from Jagruti. Rounding complete. Pt [facility] Pt is malodorous, but pleasant with staff. Per report pt has not showered since admission. Pt did make an odd statement ans ask writer what we would have if there were no weekdays. Writer stated she was unsure and he stated, \"Years, that's the thing. If we didn't have weeks it would solve the world's problems and people would be far less panicked and stressed.\" Writer allowed pt to speak for a bit and get his thoughts out. Before exiting the [facility], writer asked pt if there was anything we could get him. Pt quickly responded and said, \"yeah she's got what I want!\", while pointing both index fingers at writer. Writer and UA exited the [facility].    Pt has been in " bed with eyes closed and regular respirations. 15 minute and PRN checks all night. No complaints offered. Will continue to monitor.    Pt slept approx  6.5  hours this NOC shift.    Face to face end of shift report communicated to oncoming RN.    Scarlett CROWELL RN  February 13, 2024  12:47 AM          Problem: Thought Process Alteration  Goal: Optimal Thought Clarity  2/13/2024 0053 by Scarlett Moscoso, RN  Outcome: Progressing  2/13/2024 0047 by Scarlett Moscoso RN  Outcome: Progressing   Goal Outcome Evaluation:

## 2024-02-13 NOTE — PROGRESS NOTES
"[facility] PSYCHIATRY  PROGRESS NOTE     SUBJECTIVE     Prior to interviewing the patient, I met with nursing and reviewed patient's clinical condition. We discussed clinical care both before and after the interview. I have reviewed the patient's clinical course by review of records including previous notes, labs, and vital signs.     Per nursing, the patient had the following behavioral events over the last 24-hours: none. Refusing medications. Psychotic. Continues in the MHICU. Per nursing note \"He seemed more disorganized in his thoughts and a few times requested messages to be given to his provider. When asked what he needed he'd said, \"tell her AQB 44mmg.\" He made other statements similar to this. When asked for clarification patient walked away. He did not shower and he is very odorous\".    When attempting to initiate conversation patient again gets out of bed, leaves his room, and stands at the other end of the hallway when speaking to me. He asks \"when are you going to be done with the unlawful detainment?\" Did attempt to review the court process with patient, including that he would have court again on Friday. Unclear how much patient really understands about the commitment process. Patient states that he \"prays to every [employer]ian[employer] or else I am ignorant\". He denies that he needs to take any medications. He continues to eat sealed foods such as pudding and fruit cups, though did eat a few chicken strips today for lunch.     Patient does continue to refuse to put mother on his release of information so that information can be provided. Mother has been informed by staff that we are unable to provide any information at this time.       MEDICATIONS   Scheduled Meds:      PRN Meds:.acetaminophen, albuterol, alum & mag hydroxide-simethicone, Benzocaine-Menthol-Zinc Cl, cloNIDine, hydrOXYzine HCl, melatonin, nicotine, OLANZapine **OR** OLANZapine     ALLERGIES   Allergies   Allergen Reactions     Corn Syrup " "[Glucose] GI Disturbance     Morphine Sulfate Nausea and Vomiting     Cannot take it in pill form, IV ok   Pancreatitis     Peanuts [Nuts] GI Disturbance     Internal cuts-GERD     Gold Au 198 [Gold] Rash        MENTAL STATUS EXAM   Vitals: BP (!) 179/104   Pulse 71   Temp 98.3  F (36.8  C) (Tympanic)   Resp 16   Ht 1.651 m (5' 5\")   Wt 144.5 kg (318 lb 9.6 oz)   SpO2 96%   BMI 53.02 kg/m      Appearance:  awake, alert, dressed in hospital scrubs, appeared as age stated, and disheveled   Attitude:  guarded, irritable, dismissive  Eye Contact:  intense  Mood: Irritable   Affect:  mood congruent  Speech:  pressured  Psychomotor Behavior:  no evidence of tardive dyskinesia, dystonia, or tics  Thought Process:  disorganized, tangential  Associations:  no loose associations  Thought Content:  denies SI/HI, denies hallucinations, remains grandiose, has delusions and paranoia present  Insight:  poor  Judgment:  poor  Oriented to:  time, person, and place  Attention Span and Concentration:  limited  Recent and Remote Memory:  fair  Fund of Knowledge: appropriate for education  Muscle Strength and Tone: normal  Gait and Station: Normal       LABS   No results found for this or any previous visit (from the past 24 hour(s)).      IMPRESSION     This is a 33 year old male with a PMH of unspecified mood disorder, ADHD, SAGE, insomnia who was brought to the ED from his PCP office after exhibiting psychotic symptoms. Patient was found to be disorganized and delusional. He was placed on a hold and petition for commitment was filed in ED prior to transfer to behavioral health. Today patient is irritable. He answers most questions with \"that's confidential\". He does mention the [place], FBI, and government being involved. During our interaction he uses sign language when staring at the camera in the ceiling as if communicating to someone. He reports that he will not take any medication, because it is not real and he does not trust " it. Writer and SW did attempt to explain the commitment process, however patient refused to take the paperwork offered and instead ended the interview, stating that that we needed to watch out for the [place], FBI, and secret service. Will schedule Seroquel at bedtime for tonight as patient had been on this in the past, though I suspect he will refuse. If continuing to refuse medication, will likely need to file Harris petition tomorrow.     Today: Patient continues to be psychotic and in the MHICU. Awaiting Harris hearing to be able to administer medications.       DIAGNOSES     Bipolar 1 disorder, current episode manic with psychotic features        PLAN     Location: Unit 5  Legal Status: Orders Placed This Encounter      Legal status Court Hold    Petition for commitment and harris filed through [facility]. Court 2/16/24 @ 10:30 AM    Safety Assessment:    Behavioral Orders   Procedures     Code 1 - Restrict to Unit     Routine Programming     As clinically indicated     Status 15     Every 15 minutes.      PTA psychotropic medications stopped:     -no PTA meds    PTA psychotropic medications continued/changed:     -no PTA meds    New medications tried and stopped:     -Seroquel 100 mg at bedtime due to him refusing     New medications initiated:     -standard unit PRN medications    Today's Changes:    -no changes. Patient declining medication    Programming: Patient will be treated in a therapeutic milieu with appropriate individual and group therapies. Education will be provided on diagnoses, medications, and treatments.     Medical diagnoses:  Per medicine    Consult: None  Tests: None    Anticipated LOS: > 7 days  Disposition: IRTS        ATTESTATION      Mayra Varma, CNP

## 2024-02-13 NOTE — PLAN OF CARE
"Face to face end of shift report received from Yamileth JEAN BAPTISTE RN. Rounding completed and patient observed lying in bed awake. He denied needing anything.     Goal Outcome Evaluation:  Patient was dismissive as previous. He denied needing anything from this writer. He seemed more disorganized in his thoughts and a few times requested messages to be given to his provider. When asked what he needed he'd said, \"tell her AQB 44mmg.\" He made other statements similar to this. When asked for clarification patient walked away. He did not shower and he is very odorous. He becomes irritable if prompted to shower. Patient gave no other information and makes few requests. He ate 5 snack packs for breakfast and lunch and also had a few chicken strips for lunch. He declines all other suggestions. He denied pain.    Face to face end of shift report communicated to oncoming RN.       Problem: Adult Behavioral Health Plan of Care  Goal: Patient-Specific Goal (Individualization)  Description: Patient will sleep 6 to 8 hours per night  Patient will eat at least 50% of meals  Patient will attend at least 50% of groups  Patient will comply with recommendations of treatment team  Patient will remain medication compliant    2/12- Pt is not to wean today due to increased paranoia, posturing at staff, and unpredictable behaviors. Treatment team to assess daily.           Outcome: Not Progressing     Problem: Thought Process Alteration  Goal: Optimal Thought Clarity  Outcome: Not Progressing                     "

## 2024-02-13 NOTE — PLAN OF CARE
Problem: Adult Behavioral Health Plan of Care  Goal: Patient-Specific Goal (Individualization)  Description: Patient will sleep 6 to 8 hours per night  Patient will eat at least 50% of meals  Patient will attend at least 50% of groups  Patient will comply with recommendations of treatment team  Patient will remain medication compliant    2/10- Pt is not to wean today due to increased paranoia, posturing at staff, and unpredictable behaviors. Treatment team to assess daily.           Outcome: Progressing     Problem: Thought Process Alteration  Goal: Optimal Thought Clarity  Outcome: Progressing     Pt denies SI and pain. Pt ate 100% of dinner. Pt has a flat affect. Pt is alert. Pt is using appropriate behavior peers. Pt is dismissive and labile with female staff.  Pt continues with paranoia and delusional thinking. Pt is a no wean due to unpredictable behavior.     Face to face report will be communicated to oncoming RN.    Jagruti Arguello RN  2/12/2024

## 2024-02-14 PROCEDURE — 124N000001 HC R&B MH

## 2024-02-14 PROCEDURE — 99232 SBSQ HOSP IP/OBS MODERATE 35: CPT | Performed by: NURSE PRACTITIONER

## 2024-02-14 ASSESSMENT — ACTIVITIES OF DAILY LIVING (ADL)
ADLS_ACUITY_SCORE: 28
HYGIENE/GROOMING: INDEPENDENT
ADLS_ACUITY_SCORE: 28
LAUNDRY: UNABLE TO COMPLETE
ADLS_ACUITY_SCORE: 28
ORAL_HYGIENE: INDEPENDENT
ADLS_ACUITY_SCORE: 28
ADLS_ACUITY_SCORE: 28
DRESS: SCRUBS (BEHAVIORAL HEALTH)
ADLS_ACUITY_SCORE: 28

## 2024-02-14 NOTE — PLAN OF CARE
Problem: Adult Behavioral Health Plan of Care  Goal: Patient-Specific Goal (Individualization)  Description: Patient will sleep 6 to 8 hours per night  Patient will eat at least 50% of meals  Patient will attend at least 50% of groups  Patient will comply with recommendations of treatment team  Patient will remain medication compliant    2/10- Pt is not to wean today due to increased paranoia, posturing at staff, and unpredictable behaviors. Treatment team to assess daily.     Outcome: Progressing  Note: He is awake in his room. He meets staff at the door of his room. He engages in minimal conversation. He is tangential. Asks questions regarding staffs security clearance level. He did speak on the phone with his mother. This nurse held the phone near him and he did not touch the phone. He is guarded and prefers not to share certain in formation. He is not feeling depressed or suicidal. He is polite in conversation. He talks of needing a beer and THC.   1402: he did listen to his mother on the phone. He is guarded. Asked for her social security number.  Then said never mind when she stated to give it to him. Then he abruptly ended the conversation.      Problem: Thought Process Alteration  Goal: Optimal Thought Clarity  Outcome: Progressing  Note: He continues with delusional thinking. He denies the need for any medications. He will rely on prayer.

## 2024-02-14 NOTE — PLAN OF CARE
Problem: Adult Behavioral Health Plan of Care  Goal: Patient-Specific Goal (Individualization)  Description: Patient will sleep 6 to 8 hours per night  Patient will eat at least 50% of meals  Patient will attend at least 50% of groups  Patient will comply with recommendations of treatment team  Patient will remain medication compliant    2/10- Pt is not to wean today due to increased paranoia, posturing at staff, and unpredictable behaviors. Treatment team to assess daily.           Outcome: Not Progressing  Note: Report received from Davis Rounding complete. Pt observed sleeping in supine position with regular and unlabored respirations.    Pt has been in bed with eyes closed and regular respirations. 15 minute and PRN checks all night. No complaints offered. Will continue to monitor.    Pt slept approx  6.5  hours this NOC shift.    Face to face end of shift report communicated to oncoming RN.    Scarlett CROWELL RN  February 14, 2024  2:41 AM          Problem: Thought Process Alteration  Goal: Optimal Thought Clarity  Outcome: Not Progressing  Note: Unable to assess due to pt sleeping. No issues or concerns noted at this time.     Goal Outcome Evaluation:

## 2024-02-14 NOTE — PROGRESS NOTES
"[facility] PSYCHIATRY  PROGRESS NOTE     SUBJECTIVE     Prior to interviewing the patient, I met with nursing and reviewed patient's clinical condition. We discussed clinical care both before and after the interview. I have reviewed the patient's clinical course by review of records including previous notes, labs, and vital signs.     Per nursing, the patient had the following behavioral events over the last 24-hours: none. Refusing medications. Continues in the MHICU.     Patient agrees to speak with me while standing in the hallway. He continues to be somewhat tangential and disorganized in conversation. When asked how he is doing today he replies \"do you want to know the joke of the day? Synthetic oil\". He then talks about buying [employer] burgers. He declines any offer of medication, stating that all he needs is \"a joint and a beer\". He asks writer and RN whether we have \"top secret clearance\" to be asking him questions. He continues to answer some questions with \"I can't disclose that information\". He reportedly declined to meet with the independent examiner today, patient reports that he \"fired\" her after she could not disclose how many credits in college she had completed. He continues to have limited intake, stating that he eats 5 fruit cups for breakfast, fruit cups and a few chicken strips for lunch, then eats 5 more fruit cups for dinner. He prefers drinks and food to be sealed, likely due to level of paranoia. Did discuss his previous follow-up with psychiatry, as he had been working with Dr. Soto, however patient states he \"fired\" his provider because \"he was going to give me a frontal lobotomy\".     Patient does continue to refuse to put mother on his release of information so that information can be provided. Mother has been informed by staff that we are unable to provide any information at this time. Patient is encouraged to contact mother due to her concern.        MEDICATIONS   Scheduled " "Meds:      PRN Meds:.acetaminophen, albuterol, alum & mag hydroxide-simethicone, Benzocaine-Menthol-Zinc Cl, cloNIDine, hydrOXYzine HCl, melatonin, nicotine, OLANZapine **OR** OLANZapine     ALLERGIES   Allergies   Allergen Reactions     Corn Syrup [Glucose] GI Disturbance     Morphine Sulfate Nausea and Vomiting     Cannot take it in pill form, IV ok   Pancreatitis     Peanuts [Nuts] GI Disturbance     Internal cuts-GERD     Gold Au 198 [Gold] Rash        MENTAL STATUS EXAM   Vitals: BP (!) 179/104   Pulse 71   Temp 98.3  F (36.8  C) (Tympanic)   Resp 16   Ht 1.651 m (5' 5\")   Wt 144.5 kg (318 lb 9.6 oz)   SpO2 96%   BMI 53.02 kg/m      Appearance:  awake, alert, dressed in hospital scrubs, appeared as age stated, and disheveled   Attitude:  guarded, dismissive  Eye Contact:  intense  Mood: \"fine\", mild irritability at times  Affect:  mood congruent  Speech: less pressured than admit  Psychomotor Behavior:  no evidence of tardive dyskinesia, dystonia, or tics  Thought Process:  disorganized, tangential  Associations:  no loose associations  Thought Content:  denies SI/HI, denies hallucinations, remains grandiose, has delusions and paranoia present  Insight:  poor  Judgment:  poor  Oriented to:  time, person, and place  Attention Span and Concentration:  limited  Recent and Remote Memory:  fair  Fund of Knowledge: appropriate for education  Muscle Strength and Tone: normal  Gait and Station: Normal       LABS   No results found for this or any previous visit (from the past 24 hour(s)).      IMPRESSION     This is a 33 year old male with a PMH of unspecified mood disorder, ADHD, SAGE, insomnia who was brought to the ED from his PCP office after exhibiting psychotic symptoms. Patient was found to be disorganized and delusional. He was placed on a hold and petition for commitment was filed in ED prior to transfer to behavioral health. Today patient is irritable. He answers most questions with \"that's " "confidential\". He does mention the [place], FBI, and government being involved. During our interaction he uses sign language when staring at the camera in the ceiling as if communicating to someone. He reports that he will not take any medication, because it is not real and he does not trust it. Writer and SW did attempt to explain the commitment process, however patient refused to take the paperwork offered and instead ended the interview, stating that that we needed to watch out for the [place], FBI, and secret service. Will schedule Seroquel at bedtime for tonight as patient had been on this in the past, though I suspect he will refuse. If continuing to refuse medication, will likely need to file Harris petition tomorrow.     Today: Patient continues to be psychotic and in the MHICU. Awaiting Harris hearing to be able to administer medications.       DIAGNOSES     Bipolar 1 disorder, current episode manic with psychotic features        PLAN     Location: Unit 5  Legal Status: Orders Placed This Encounter      Legal status Court Hold    Petition for commitment and harris filed through [facility]. Court 2/16/24 @ 1:30 PM    Safety Assessment:    Behavioral Orders   Procedures     Code 1 - Restrict to Unit     Routine Programming     As clinically indicated     Status 15     Every 15 minutes.      PTA psychotropic medications stopped:     -no PTA meds    PTA psychotropic medications continued/changed:     -no PTA meds    New medications tried and stopped:     -Seroquel 100 mg at bedtime due to him refusing     New medications initiated:     -standard unit PRN medications    Today's Changes:    -no changes. Patient declining medication.  -commitment and harris on Friday    Programming: Patient will be treated in a therapeutic milieu with appropriate individual and group therapies. Education will be provided on diagnoses, medications, and treatments.     Medical diagnoses:  Per medicine    Consult: None  Tests: " None    Anticipated LOS: > 7 days  Disposition: IRTS vs home with services, dependent on how patient stabilizes once on medication       ATTESTATION      Mayra Varma, CNP

## 2024-02-14 NOTE — PROGRESS NOTES
"[facility] sent an order that pt request for an in person hearing was granted. CM is working on 2-way transportation. The hearing will still be at 1:30pm. Awaiting on time for .    Pt has court appointed examiner today and Pt fired them. \"I am going to fire you because you don't know how many credits you have\"    Psychiatry and PCP scheduled.     [facility] Mayra Whiting called and spoke with  and stated \"[facility] are not representing the hospital in the Nieves hearing, I don't want to be sued again for a wrongful Nieves. The  on Friday is going to drop the Nieves because she is known for that when there is no legal representation. I recommend that you get on  Annettes schedule for Monday and see if he will do it.\"  also sent an email to  stating   \"Hello,  I happen to know that one of the [facility] hired this  for their [facility] Nieves hearing:    Lain Polanco  [employer]  Phone: [phone] Fax: [phone]  Email: [email or web address]: [employer]  [address], [address] [place], [place] [address]    Thank you so much,    Mayra Mehta provider and  supervisor.     [facility]   [address],   [place] [address]  [phone]  Psychiatry: JESSE Felix- March 25th @ 3:00pm- arrive @ 2:45pm unit 2.  PCP: Dr. Erasmo Saha- February 23rd @ 2:00pm- arrive @ 1:45pm.  "

## 2024-02-14 NOTE — PLAN OF CARE
"  Problem: Adult Behavioral Health Plan of Care  Goal: Patient-Specific Goal (Individualization)  Description: Patient will sleep 6 to 8 hours per night  Patient will eat at least 50% of meals  Patient will attend at least 50% of groups  Patient will comply with recommendations of treatment team  Patient will remain medication compliant    2/10- Pt is not to wean today due to increased paranoia, posturing at staff, and unpredictable behaviors. Treatment team to assess daily.   Outcome: Progressing     Problem: Thought Process Alteration  Goal: Optimal Thought Clarity  Outcome: Progressing   Goal Outcome Evaluation:    Plan of Care Reviewed With: patient          Patient spends time in his room, lying on his bed or looking out his window. Affect is full range, mood is calm. He is pleasant, but speech is illogical and nonsensical at times. He talks of needing \"a blunt\". States he's \"just maintaining\". He is paranoid, eating only packaged foods and closed bottles of beverages.    Face to face end of shift report communicated to oncoming RN.     Shell Wood RN  2/14/2024  11:00 PM            "

## 2024-02-15 PROCEDURE — 99232 SBSQ HOSP IP/OBS MODERATE 35: CPT | Performed by: NURSE PRACTITIONER

## 2024-02-15 PROCEDURE — 124N000001 HC R&B MH

## 2024-02-15 ASSESSMENT — ACTIVITIES OF DAILY LIVING (ADL)
ADLS_ACUITY_SCORE: 48
ADLS_ACUITY_SCORE: 28
ADLS_ACUITY_SCORE: 48
ADLS_ACUITY_SCORE: 48
LAUNDRY: UNABLE TO COMPLETE
ADLS_ACUITY_SCORE: 48
ADLS_ACUITY_SCORE: 28
ORAL_HYGIENE: PROMPTS
ADLS_ACUITY_SCORE: 28
HYGIENE/GROOMING: PROMPTS
DRESS: PROMPTS
ADLS_ACUITY_SCORE: 48

## 2024-02-15 NOTE — PROGRESS NOTES
[facility] [place] be here at 11:00am to  pt and transport them to their commitment hearing in [facility]. If pt is committed he will be coming back with the same transportation.

## 2024-02-15 NOTE — PROGRESS NOTES
"[facility] PSYCHIATRY  PROGRESS NOTE     SUBJECTIVE     Prior to interviewing the patient, I met with nursing and reviewed patient's clinical condition. We discussed clinical care both before and after the interview. I have reviewed the patient's clinical course by review of records including previous notes, labs, and vital signs.     Per nursing, the patient had the following behavioral events over the last 24-hours: none. Refusing medications. Continues in the MHICU.     Patient is up in the hallway of the MHICU when I see him today. He continues to insist that he has not seen a \"real doctor, real , or real  since I've been here\". He states that due to everyone needing \"top security clearance\", he is unable to provide much information to my questions. He states that tomorrow's hearing is not real and that is should be in  court. Patient denies having any questions regarding his hearing tomorrow. Patient was asked whether he knew what type of facility he is currently in, when we discussed he is in the hospital currently he replied \"no not really. You need top security clearance to be here since 1999\".     Patient does continue to refuse to put mother on his release of information so that information can be provided. Mother has been informed by staff that we are unable to provide any information at this time. Patient is encouraged to contact mother due to her concern.        MEDICATIONS   Scheduled Meds:      PRN Meds:.acetaminophen, albuterol, alum & mag hydroxide-simethicone, Benzocaine-Menthol-Zinc Cl, cloNIDine, hydrOXYzine HCl, melatonin, nicotine, OLANZapine **OR** OLANZapine     ALLERGIES   Allergies   Allergen Reactions     Corn Syrup [Glucose] GI Disturbance     Morphine Sulfate Nausea and Vomiting     Cannot take it in pill form, IV ok   Pancreatitis     Peanuts [Nuts] GI Disturbance     Internal cuts-GERD     Gold Au 198 [Gold] Rash        MENTAL STATUS EXAM   Vitals: BP (!) 179/104 " "  Pulse 71   Temp 98.3  F (36.8  C) (Tympanic)   Resp 16   Ht 1.651 m (5' 5\")   Wt 144.5 kg (318 lb 9.6 oz)   SpO2 96%   BMI 53.02 kg/m      Appearance:  awake, alert, dressed in hospital scrubs, appeared as age stated, and disheveled   Attitude:  guarded, dismissive  Eye Contact: fair, has been less intense  Mood: \"fine\", mild irritability at times  Affect:  mood congruent  Speech: less pressured than admit  Psychomotor Behavior:  no evidence of tardive dyskinesia, dystonia, or tics  Thought Process:  disorganized, tangential  Associations:  no loose associations  Thought Content:  denies SI/HI, denies hallucinations, remains grandiose, has delusions and paranoia present  Insight:  poor  Judgment:  poor  Oriented to:  time, person, and place  Attention Span and Concentration:  limited  Recent and Remote Memory:  fair  Fund of Knowledge: appropriate for education  Muscle Strength and Tone: normal  Gait and Station: Normal       LABS   No results found for this or any previous visit (from the past 24 hour(s)).      IMPRESSION     This is a 33 year old male with a PMH of unspecified mood disorder, ADHD, SAGE, insomnia who was brought to the ED from his PCP office after exhibiting psychotic symptoms. Patient was found to be disorganized and delusional. He was placed on a hold and petition for commitment was filed in ED prior to transfer to behavioral health. Today patient is irritable. He answers most questions with \"that's confidential\". He does mention the [place], FBI, and government being involved. During our interaction he uses sign language when staring at the camera in the ceiling as if communicating to someone. He reports that he will not take any medication, because it is not real and he does not trust it. Writer and SW did attempt to explain the commitment process, however patient refused to take the paperwork offered and instead ended the interview, stating that that we needed to watch out for the [place], " FBI, and secret service. Will schedule Seroquel at bedtime for tonight as patient had been on this in the past, though I suspect he will refuse. If continuing to refuse medication, will likely need to file Harris petition tomorrow.     Today: Patient continues to be psychotic and in the MHICU. Awaiting Harris hearing to be able to administer medications.       DIAGNOSES     Bipolar 1 disorder, current episode manic with psychotic features        PLAN     Location: Unit 5  Legal Status: Orders Placed This Encounter      Legal status Court Hold    Petition for commitment and harris filed through [facility]. Court 2/16/24 @ 1:30 PM    Safety Assessment:    Behavioral Orders   Procedures     Code 1 - Restrict to Unit     Routine Programming     As clinically indicated     Status 15     Every 15 minutes.      PTA psychotropic medications stopped:     -no PTA meds    PTA psychotropic medications continued/changed:     -no PTA meds    New medications tried and stopped:     -Seroquel 100 mg at bedtime due to him refusing     New medications initiated:     -standard unit PRN medications    Today's Changes:    -no changes. Patient declining medication.  -commitment and harris on Friday    Programming: Patient will be treated in a therapeutic milieu with appropriate individual and group therapies. Education will be provided on diagnoses, medications, and treatments.     Medical diagnoses:  Per medicine    Consult: None  Tests: None    Anticipated LOS: > 7 days  Disposition: IRTS vs home with services, dependent on how patient stabilizes once on medication       ATTESTATION      Mayra Varma CNP

## 2024-02-15 NOTE — PLAN OF CARE
"Face to face end of shift report received from day RN. Rounding completed. [facility]     Robby Ramirez, RN  2/15/2024  5448    Patient spent majority of shift pacing slowly in the hallway/lounge of the [facility]. Bright affect, joking with writer and staff. Declines all medications. \"This isn't a hospital. You guys know that. All I need is some THC vapes and I'd be good!\" Denies SI/HI/AVH/depression/anxiety/pain. Makes needs known. Appetite good; hygiene okay, showered this week, but without soap, smells of body odor.     Report will be given to night RN at change of shift.        MARLY:    There is an MARLY in the chart completed/signed by the patient from 2/2/24 that states, \"The whole world may know my medical. I give up my Hepa Rights.\"     There is a sticky note attached to MARLY with patient's mother's name and phone number on it. \"Aubrie Dietrich \"     Aubrie Meron called and asked to speak with patient. Writer asked patient and he stated, \"No. I don't want to talk to her at this time.\" Writer talked with Aubrie, and she spoke about how he was missing for days and that she couldn't find him, etc. Writer explained that he has a bright affect today and has commitment court tomorrow. She was not aware of the commitment. \"Please have someone call and update me about him.\" Writer explained that this was an MARLY issue and will let the SW/treatment team know she would like a call. Aubrie verbalized understanding and appreciation.     Problem: Adult Behavioral Health Plan of Care  Goal: Patient-Specific Goal (Individualization)  Description: Patient will sleep 6 to 8 hours per night  Patient will eat at least 50% of meals  Patient will attend at least 50% of groups  Patient will comply with recommendations of treatment team  Patient will remain medication compliant    2/15/24 - Pt is not to wean today due to increased paranoia, posturing at staff, and " unpredictable behaviors. Treatment team to assess daily.           Outcome: Progressing     Problem: Thought Process Alteration  Goal: Optimal Thought Clarity  Outcome: Progressing

## 2024-02-15 NOTE — PLAN OF CARE
Face to face report received from Mary Ann STRONG. Pt. Observed.    Problem: Adult Behavioral Health Plan of Care  Goal: Patient-Specific Goal (Individualization)  Description: Patient will sleep 6 to 8 hours per night  Patient will eat at least 50% of meals  Patient will attend at least 50% of groups  Patient will comply with recommendations of treatment team  Patient will remain medication compliant    2/15- Pt is not to wean today due to increased paranoia, posturing at staff, and unpredictable behaviors. Treatment team to assess daily.     Withdrawn and isolating to room this day shift.  Pt. demanding lettus burrito for breakfast. Pt. Updated this would not be possible, as it is not on the menu. Pt. Requesting this writer call kitchen. Pt. Eating WDL. Pt. Encouraged to shower. Pt. Declining this day shift, will continue to encourage. Pt. Allowing environmental's this day shift.      Outcome: Progressing   Face to face end of shift report communicated to dean STRONG.     Radha Santos RN  2/15/2024

## 2024-02-15 NOTE — PLAN OF CARE
Face to face end of shift report will be communicated to oncoming RN.    Problem: Adult Behavioral Health Plan of Care  Goal: Patient-Specific Goal (Individualization)  Description: Patient will sleep 6 to 8 hours per night  Patient will eat at least 50% of meals  Patient will attend at least 50% of groups  Patient will comply with recommendations of treatment team  Patient will remain medication compliant    2/14- Pt is not to wean today due to increased paranoia, posturing at staff, and unpredictable behaviors. Treatment team to assess daily.       Outcome: Progressing     Problem: Thought Process Alteration  Goal: Optimal Thought Clarity  Outcome: Progressing    Face to face end of shift report obtained from LIGIA Goff. Pt observed resting in bed.   Pt appears to be sleeping in bed with eyes closed. 15 minutes and PRN safety checks completed with no noted complains. No delusional comments noted or reported so far this shift.   0600-Pt appeared to had slept all night.

## 2024-02-16 PROCEDURE — 99231 SBSQ HOSP IP/OBS SF/LOW 25: CPT | Performed by: NURSE PRACTITIONER

## 2024-02-16 PROCEDURE — 124N000001 HC R&B MH

## 2024-02-16 ASSESSMENT — ACTIVITIES OF DAILY LIVING (ADL)
DRESS: PROMPTS
LAUNDRY: UNABLE TO COMPLETE
ADLS_ACUITY_SCORE: 48
ADLS_ACUITY_SCORE: 48
HYGIENE/GROOMING: PROMPTS
ADLS_ACUITY_SCORE: 48
ORAL_HYGIENE: PROMPTS
ADLS_ACUITY_SCORE: 48

## 2024-02-16 NOTE — PLAN OF CARE
Face to face end of shift report will be communicated to oncoming RN.    Problem: Adult Behavioral Health Plan of Care  Goal: Patient-Specific Goal (Individualization)  Description: Patient will sleep 6 to 8 hours per night  Patient will eat at least 50% of meals  Patient will attend at least 50% of groups  Patient will comply with recommendations of treatment team  Patient will remain medication compliant    2/14- Pt is not to wean today due to increased paranoia, posturing at staff, and unpredictable behaviors. Treatment team to assess daily.       Outcome: Progressing     Problem: Thought Process Alteration  Goal: Optimal Thought Clarity  Outcome: Progressing    Face to face end of shift report obtained from LIGIA Bustamante. Pt observed awake resting in bed.    0145-Pt appears to be sleeping in bed with eyes closed. 15 minutes and PRN safety checks completed with no noted complains. No delusional comments noted or reported so far this shift.    0600-Pt appeared to had slept 5 hours. At times difficult to determine.

## 2024-02-16 NOTE — PROGRESS NOTES
Received pt examination report, copy placed in chart and tried to give copy to pt but he did not take it. Examiner is in support of full commitment and Harris. It was reported that [facility] did not address the Harris in the hearing. I emailed Jenni Coleman at the [facility] and asked about this.     Pt will be transported via [facility] to his commitment and harris hearing @ 11:00am.

## 2024-02-16 NOTE — PLAN OF CARE
1555 Pt returned from court with security.  Skin check WDL. Pt changed into new scrubs.     Face to face report will be communicated to oncoming RN.    Jagruti Arguello RN  2/16/2024

## 2024-02-16 NOTE — PLAN OF CARE
"Face to face report received from Mary Ann STRONG. Pt. Observed.    Problem: Adult Behavioral Health Plan of Care  Goal: Patient-Specific Goal (Individualization)  Description: Patient will sleep 6 to 8 hours per night  Patient will eat at least 50% of meals  Patient will attend at least 50% of groups  Patient will comply with recommendations of treatment team  Patient will remain medication compliant    2/16- Pt is not to wean today due to increased paranoia, posturing at staff, and unpredictable behaviors. Treatment team to assess daily.     Pt. Encouraged to shower and change scrubs prior to court this a.m. r/t being unclean. Pt. Wetting down hair. Pt. Offered bag lunch to bring with on transfer. Pt. Declining \"I'll bring the same amount of fruit I always get.\"Transferred off unit to court via security x 2 and staff @ 1040.      Outcome: Progressing   Face to face end of shift report communicated to dean STRONG.     Radha Santos RN  2/16/2024        "

## 2024-02-16 NOTE — PROGRESS NOTES
"[facility] PSYCHIATRY  PROGRESS NOTE     SUBJECTIVE     Prior to interviewing the patient, I met with nursing and reviewed patient's clinical condition. We discussed clinical care both before and after the interview. I have reviewed the patient's clinical course by review of records including previous notes, labs, and vital signs.     Per nursing, the patient had the following behavioral events over the last 24-hours: none. Refusing medications. Continues in the MHICU.     Patient was transported to court this morning for commitment and harris. In review of nursing notes, he does not believe that he is in a hospital. He continues to exhibit paranoid behavior. He continues to decline all offers of medication and requests to use marijuana.     Patient does continue to refuse to put mother on his release of information so that information can be provided. Mother has been informed by staff that we are unable to provide any information at this time. Patient is encouraged to contact mother to provide updates.        MEDICATIONS   Scheduled Meds:      PRN Meds:.acetaminophen, albuterol, alum & mag hydroxide-simethicone, Benzocaine-Menthol-Zinc Cl, cloNIDine, hydrOXYzine HCl, melatonin, nicotine, OLANZapine **OR** OLANZapine, psyllium     ALLERGIES   Allergies   Allergen Reactions     Corn Syrup [Glucose] GI Disturbance     Morphine Sulfate Nausea and Vomiting     Cannot take it in pill form, IV ok   Pancreatitis     Peanuts [Nuts] GI Disturbance     Internal cuts-GERD     Gold Au 198 [Gold] Rash        MENTAL STATUS EXAM   Vitals: BP (!) 179/104   Pulse 71   Temp 98.3  F (36.8  C) (Tympanic)   Resp 16   Ht 1.651 m (5' 5\")   Wt 144.5 kg (318 lb 9.6 oz)   SpO2 96%   BMI 53.02 kg/m      Appearance:  awake, alert, dressed in hospital scrubs, appeared as age stated, and disheveled   Attitude:  guarded, dismissive  Eye Contact: fair  Mood: \"fine\", mild irritability at times  Affect:  mood congruent  Speech: less " "pressured than admit  Psychomotor Behavior:  no evidence of tardive dyskinesia, dystonia, or tics  Thought Process:  disorganized, tangential  Associations:  no loose associations  Thought Content:  denies SI/HI, denies hallucinations, remains grandiose, has delusions and paranoia present  Insight:  poor  Judgment:  poor  Oriented to:  time, person, and place  Attention Span and Concentration:  limited  Recent and Remote Memory:  fair  Fund of Knowledge: appropriate for education  Muscle Strength and Tone: normal  Gait and Station: Normal       LABS   No results found for this or any previous visit (from the past 24 hour(s)).      IMPRESSION     This is a 33 year old male with a PMH of unspecified mood disorder, ADHD, SAGE, insomnia who was brought to the ED from his PCP office after exhibiting psychotic symptoms. Patient was found to be disorganized and delusional. He was placed on a hold and petition for commitment was filed in ED prior to transfer to behavioral health. Today patient is irritable. He answers most questions with \"that's confidential\". He does mention the [place], FBI, and government being involved. During our interaction he uses sign language when staring at the camera in the ceiling as if communicating to someone. He reports that he will not take any medication, because it is not real and he does not trust it. Writer and SW did attempt to explain the commitment process, however patient refused to take the paperwork offered and instead ended the interview, stating that that we needed to watch out for the [place], FBI, and secret service. Will schedule Seroquel at bedtime for tonight as patient had been on this in the past, though I suspect he will refuse. If continuing to refuse medication, will likely need to file Nieves petition tomorrow.     Today: Patient continues to be psychotic and in the MHICU. Awaiting [place] hearing to be able to administer medications.       DIAGNOSES     Bipolar 1 disorder, " current episode manic with psychotic features        PLAN     Location: Unit 5  Legal Status: Orders Placed This Encounter      Legal status Court Hold    Petition for commitment and harris filed through [facility]. Court 2/16/24 @ 1:30 PM    Safety Assessment:    Behavioral Orders   Procedures     Code 1 - Restrict to Unit     Routine Programming     As clinically indicated     Status 15     Every 15 minutes.      PTA psychotropic medications stopped:     -no PTA meds    PTA psychotropic medications continued/changed:     -no PTA meds    New medications tried and stopped:     -Seroquel 100 mg at bedtime due to him refusing     New medications initiated:     -standard unit PRN medications    Today's Changes:    -no changes. Patient declining medication.  -commitment and harris this afternoon    Programming: Patient will be treated in a therapeutic milieu with appropriate individual and group therapies. Education will be provided on diagnoses, medications, and treatments.     Medical diagnoses:  Per medicine    Consult: None  Tests: None    Anticipated LOS: > 7 days  Disposition: IRTS vs home with services, dependent on how patient stabilizes once on medication       ATTESTATION      Mayra Varma, CNP

## 2024-02-17 PROCEDURE — 124N000001 HC R&B MH

## 2024-02-17 PROCEDURE — 99231 SBSQ HOSP IP/OBS SF/LOW 25: CPT | Performed by: NURSE PRACTITIONER

## 2024-02-17 ASSESSMENT — ACTIVITIES OF DAILY LIVING (ADL)
ADLS_ACUITY_SCORE: 48
DRESS: SCRUBS (BEHAVIORAL HEALTH);INDEPENDENT
ADLS_ACUITY_SCORE: 48
ADLS_ACUITY_SCORE: 38
ORAL_HYGIENE: PROMPTS
ADLS_ACUITY_SCORE: 38
HYGIENE/GROOMING: PROMPTS
ADLS_ACUITY_SCORE: 48
ADLS_ACUITY_SCORE: 48
ADLS_ACUITY_SCORE: 38
LAUNDRY: UNABLE TO COMPLETE
ADLS_ACUITY_SCORE: 48
ADLS_ACUITY_SCORE: 38
ADLS_ACUITY_SCORE: 38
ADLS_ACUITY_SCORE: 48
ADLS_ACUITY_SCORE: 38

## 2024-02-17 NOTE — PROGRESS NOTES
01/31/24 1756   Patient Belongings   Patient Belongings locker;sent to security per site process;remains with patient   Patient Belongings Remaining with Patient jewelry   Patient Belongings Put in Hospital Secure Location (Security or Locker, etc.) cash/credit card;cell phone/electronics;clothing;keys;money (see comment);shoes   Belongings Search Yes   Clothing Search Yes   Comment black t shirt, blue sweatshirt, tan pants, black coat, green boots, green socks, miscellaneous papers, 2 cans of starry, red pen, blue lighter     List items sent to safe: Key chain with numerous keys and fobs, [place] license, [place] card, Medical insurance card, 1 Usha, $257.00 cash ( 2-$100, 2-$20, 1-$10, 1-$5, 2-$1), Black cell phone in black otter box- small chip on back outer camera lens cover.  All other belongings put in assigned cubby in belongings room.       I have reviewed my belongings list on admission and verify that it is correct.     Patient signature_______________________________    Second staff witness (if patient unable to sign) ______________________________       I have received all my belongings at discharge.    Patient signature________________________________    Madhavi  2/16/2024  9:30 PM

## 2024-02-17 NOTE — PLAN OF CARE
Face to face end of shift report will be communicated to oncoming RN.      Problem: Adult Behavioral Health Plan of Care  Goal: Patient-Specific Goal (Individualization)  Description: Patient will sleep 6 to 8 hours per night  Patient will eat at least 50% of meals  Patient will attend at least 50% of groups  Patient will comply with recommendations of treatment team  Patient will remain medication compliant    2/16- Pt is not to wean today due to increased paranoia, posturing at staff, and unpredictable behaviors. Treatment team to assess daily.       Outcome: Progressing     Problem: Thought Process Alteration  Goal: Optimal Thought Clarity  Outcome: Progressing      Face to face end of shift report obtained from LIGIA Chand. Pt observed resting in bed.  Pt appears to be sleeping in bed with eyes closed. 15 minutes and PRN safety checks completed with no noted complains. No delusional comments noted or reported so far this shift.    0600-Pt appeared to had slept all night.

## 2024-02-17 NOTE — PLAN OF CARE
"Face to face report received from Mary Ann RN. Pt. Observed.      Problem: Adult Behavioral Health Plan of Care  Goal: Patient-Specific Goal (Individualization)  Description: Patient will sleep 6 to 8 hours per night  Patient will eat at least 50% of meals  Patient will attend at least 50% of groups  Patient will comply with recommendations of treatment team  Patient will remain medication compliant    2/17- Pt may wean to open unit, if behaviors are appropriate, during time of decreased stimulus. Treatment team to assess daily.     Pt. Smiling and joking this day shift, social with staff. Pt. Denies HI, SI, anxiety, depression, hallucinations and pain. Pt. Encouraged to wean to open unit. Pt. Declining at this time.  Pt. Eating WDL. Pt. In agreement to update staff to thoughts feelings of wanting to harm self ro others. Pt. Encouraged to shower. Pt. Declining this day shift, will continue to encourage. Pt. Allowing environmental's this day shift.       Care of pt. Continues into chuck shift. Pt. Encouraged to wean to open unit. \"I will tell you when I want to go out there. I don't want to mess up my routine right now.\"     Outcome: Progressing   Face to face end of shift report communicated to dean STRONG.     Radha Santos RN  2/17/2024  11:51 AM      "

## 2024-02-17 NOTE — PLAN OF CARE
Problem: Adult Behavioral Health Plan of Care  Goal: Patient-Specific Goal (Individualization)  Description: Patient will sleep 6 to 8 hours per night  Patient will eat at least 50% of meals  Patient will attend at least 50% of groups  Patient will comply with recommendations of treatment team  Patient will remain medication compliant    2/16- Pt is not to wean today due to increased paranoia, posturing at staff, and unpredictable behaviors. Treatment team to assess daily.           Outcome: Progressing     Problem: Thought Process Alteration  Goal: Optimal Thought Clarity  Outcome: Not Progressing     Pt denies SI/HI and pain. Pt refused VS. Pt ate 100 % of dinner. Pt has a flat affect. Pt is calm and alert. Pt is using appropriate behavior with peers. Pt can be dismissive and labile with staff. Pt able to make needs. Pt spent most of shift in bed or watching TV.     Face to face report will be communicated to oncoming RN.    Jagruti Arguello RN  2/16/2024

## 2024-02-18 PROCEDURE — 99232 SBSQ HOSP IP/OBS MODERATE 35: CPT | Performed by: NURSE PRACTITIONER

## 2024-02-18 PROCEDURE — 124N000001 HC R&B MH

## 2024-02-18 ASSESSMENT — ACTIVITIES OF DAILY LIVING (ADL)
DRESS: INDEPENDENT;SCRUBS (BEHAVIORAL HEALTH)
ADLS_ACUITY_SCORE: 28
ORAL_HYGIENE: INDEPENDENT
HYGIENE/GROOMING: INDEPENDENT
ADLS_ACUITY_SCORE: 28
ADLS_ACUITY_SCORE: 38
ORAL_HYGIENE: INDEPENDENT
LAUNDRY: UNABLE TO COMPLETE
ADLS_ACUITY_SCORE: 38
DRESS: INDEPENDENT;SCRUBS (BEHAVIORAL HEALTH)
ADLS_ACUITY_SCORE: 28
ADLS_ACUITY_SCORE: 38
ADLS_ACUITY_SCORE: 28
ADLS_ACUITY_SCORE: 38
ADLS_ACUITY_SCORE: 28
HYGIENE/GROOMING: INDEPENDENT
ADLS_ACUITY_SCORE: 38
ADLS_ACUITY_SCORE: 28
LAUNDRY: UNABLE TO COMPLETE
ADLS_ACUITY_SCORE: 38

## 2024-02-18 NOTE — PLAN OF CARE
Face to face end of shift report will be communicated to oncoming RN.    Problem: Adult Behavioral Health Plan of Care  Goal: Patient-Specific Goal (Individualization)  Description: Patient will sleep 6 to 8 hours per night  Patient will eat at least 50% of meals  Patient will attend at least 50% of groups  Patient will comply with recommendations of treatment team  Patient will remain medication compliant    2/17- Pt may wean to open unit, if behaviors are appropriate, during time of decreased stimulus. Treatment team to assess daily.     Outcome: Progressing      Problem: Thought Process Alteration  Goal: Optimal Thought Clarity  Outcome: Progressing       Face to face end of shift report obtained from LIGIA Garcia. Pt observed resting in bed.  Pt appears to be sleeping in bed with eyes closed. 15 minutes and PRN safety checks performed via camera. Face to face safety checks minimized to hourly to promote sleep. No delusional comments noted or reported so far this shift.    0600-Pt appeared to had slept 6 hours. Difficult to determine. Pt sleep was interrupted due to peer loud talking.

## 2024-02-18 NOTE — PROGRESS NOTES
"[facility] PSYCHIATRY  PROGRESS NOTE     SUBJECTIVE     Prior to interviewing the patient, I met with nursing and reviewed patient's clinical condition. We discussed clinical care both before and after the interview. I have reviewed the patient's clinical course by review of records including previous notes, labs, and vital signs.     Per nursing, the patient had the following behavioral events over the last 24-hours: none.     Patient is up in his room when I see him today. He notes that he is \"same as yesterday\". He denies having any questions or concerns. He notes that he is sleeping fine, he denies feeling suicidal. He denies any hallucinations. Denies the need for medication. He notes that all he needs is dabs. He talks about the legalization of marijuana, he questions why there is not a \"weed patch\" like there is a nicotine patch. He declines to discuss the commitment hearing.    Patient does continue to refuse to put mother on his release of information so that information can be provided. Mother has been informed by staff that we are unable to provide any information at this time. Mother has been informed that she can speak with patient directly for updates.        MEDICATIONS   Scheduled Meds:      PRN Meds:.acetaminophen, albuterol, alum & mag hydroxide-simethicone, Benzocaine-Menthol-Zinc Cl, cloNIDine, hydrOXYzine HCl, melatonin, nicotine, OLANZapine **OR** OLANZapine, psyllium     ALLERGIES   Allergies   Allergen Reactions     Corn Syrup [Glucose] GI Disturbance     Morphine Sulfate Nausea and Vomiting     Cannot take it in pill form, IV ok   Pancreatitis     Peanuts [Nuts] GI Disturbance     Internal cuts-GERD     Gold Au 198 [Gold] Rash        MENTAL STATUS EXAM   Vitals: BP (!) 179/104   Pulse 71   Temp 98.3  F (36.8  C) (Tympanic)   Resp 16   Ht 1.651 m (5' 5\")   Wt 144.5 kg (318 lb 9.6 oz)   SpO2 96%   BMI 53.02 kg/m      Appearance:  awake, alert, dressed in hospital scrubs, appeared as " "age stated, and disheveled   Attitude:  guarded  Eye Contact: fair  Mood: \"fine\"  Affect: euthymic  Speech: clear, coherent  Psychomotor Behavior:  no evidence of tardive dyskinesia, dystonia, or tics  Thought Process:  tangential  Associations:  no loose associations  Thought Content:  denies SI/HI, denies hallucinations, remains paranoid  Insight: limited  Judgment:  limited  Oriented to:  time, person, and place  Attention Span and Concentration:  limited  Recent and Remote Memory:  fair  Fund of Knowledge: appropriate for education  Muscle Strength and Tone: normal  Gait and Station: Normal       LABS   No results found for this or any previous visit (from the past 24 hour(s)).      IMPRESSION     This is a 33 year old male with a PMH of unspecified mood disorder, ADHD, SAGE, insomnia who was brought to the ED from his PCP office after exhibiting psychotic symptoms. Patient was found to be disorganized and delusional. He was placed on a hold and petition for commitment was filed in ED prior to transfer to behavioral health. Today patient is irritable. He answers most questions with \"that's confidential\". He does mention the [place], FBI, and government being involved. During our interaction he uses sign language when staring at the camera in the ceiling as if communicating to someone. He reports that he will not take any medication, because it is not real and he does not trust it. Writer and SW did attempt to explain the commitment process, however patient refused to take the paperwork offered and instead ended the interview, stating that that we needed to watch out for the [place], FBI, and secret service. Will schedule Seroquel at bedtime for tonight as patient had been on this in the past, though I suspect he will refuse. If continuing to refuse medication, will likely need to file [place] petition tomorrow.        DIAGNOSES     Bipolar 1 disorder, current episode manic with psychotic features        PLAN     Location: " Unit 5  Legal Status: Committed.    Petition for commitment and harris filed through [facility]. Court 2/16/24 @ 1:30 PM    Safety Assessment:    Behavioral Orders   Procedures     Code 1 - Restrict to Unit     Routine Programming     As clinically indicated     Status 15     Every 15 minutes.      PTA psychotropic medications stopped:     -no PTA meds    PTA psychotropic medications continued/changed:     -no PTA meds    New medications tried and stopped:     -Seroquel 100 mg at bedtime due to him refusing     New medications initiated:     -standard unit PRN medications    Today's Changes:    -no changes. Patient declining medication.  - is now committed    Programming: Patient will be treated in a therapeutic milieu with appropriate individual and group therapies. Education will be provided on diagnoses, medications, and treatments.     Medical diagnoses:  Per medicine    Consult: None  Tests: None    Anticipated LOS: > 7 days  Disposition: IRTS vs home with services       ATTESTATION      Mayra Varma, ALANA

## 2024-02-18 NOTE — PLAN OF CARE
Face to face report received from Mary Ann STRONG. Pt. Observed.     Goal Outcome Evaluation:    Plan of Care Reviewed With: patient      Problem: Adult Behavioral Health Plan of Care  Goal: Patient-Specific Goal (Individualization)  Description: Patient will sleep 6 to 8 hours per night  Patient will eat at least 50% of meals  Patient will attend at least 50% of groups  Patient will comply with recommendations of treatment team  Patient will remain medication compliant    2/18- Pt may wean to open unit, if behaviors are appropriate, during time of decreased stimulus. Treatment team to assess daily.       Outcome: Progressing    Pt. Smiling, dancing, and joking this day shift, social with staff and peer. Pt. Denies HI, SI, anxiety, depression, hallucinations and pain. Pt. Encouraged to wean to open unit. Pt. Declining at this time.  Pt. Eating WDL. Pt. In agreement to update staff to thoughts feelings of wanting to harm self ro others. Pt. Encouraged to shower. Pt. Declining this day shift, will continue to encourage. Pt. Allowing environmental's this day shift.       Care of pt. Continues into chuck shift. Pt. Declines weaning to open unit.          Face to face end of shift report communicated to dean STRONG.     Radha Santos RN  2/18/2024

## 2024-02-19 PROBLEM — F31.2 SEVERE MANIC BIPOLAR 1 DISORDER WITH PSYCHOTIC BEHAVIOR (H): Status: ACTIVE | Noted: 2024-02-19

## 2024-02-19 PROCEDURE — 124N000001 HC R&B MH

## 2024-02-19 PROCEDURE — 99233 SBSQ HOSP IP/OBS HIGH 50: CPT | Performed by: NURSE PRACTITIONER

## 2024-02-19 ASSESSMENT — ACTIVITIES OF DAILY LIVING (ADL)
ADLS_ACUITY_SCORE: 28
ORAL_HYGIENE: INDEPENDENT
ADLS_ACUITY_SCORE: 28
DRESS: SCRUBS (BEHAVIORAL HEALTH);INDEPENDENT
ADLS_ACUITY_SCORE: 28
HYGIENE/GROOMING: INDEPENDENT

## 2024-02-19 NOTE — PLAN OF CARE
Face to face end of shift report will be communicated to oncoming RN.    Problem: Adult Behavioral Health Plan of Care  Goal: Patient-Specific Goal (Individualization)  Description: Patient will sleep 6 to 8 hours per night  Patient will eat at least 50% of meals  Patient will attend at least 50% of groups  Patient will comply with recommendations of treatment team  Patient will remain medication compliant    2/18- Pt may wean to open unit, if behaviors are appropriate, during time of decreased stimulus. Treatment team to assess daily.       Outcome: Progressing     Problem: Thought Process Alteration  Goal: Optimal Thought Clarity  Outcome: Progressing       Face to face end of shift report obtained from LIGIA Garcia. Pt observed resting in bed.  Pt appears to be sleeping in bed with eyes closed. 15 minutes and PRN safety checks performed via camera. Face to face safety checks minimized to hourly to promote sleep. No delusional comments noted or reported so far this shift.   0600-Pt appeared to had slept all night.

## 2024-02-19 NOTE — PLAN OF CARE
Problem: Adult Behavioral Health Plan of Care  Goal: Patient-Specific Goal (Individualization)  Description: Patient will sleep 6 to 8 hours per night  Patient will eat at least 50% of meals  Patient will attend at least 50% of groups  Patient will comply with recommendations of treatment team  Patient will remain medication compliant    2/18- Pt may wean to open unit, if behaviors are appropriate, during time of decreased stimulus. Treatment team to assess daily.           Outcome: Progressing  Note:       Problem: Thought Process Alteration  Goal: Optimal Thought Clarity  Outcome: Progressing   1100 Patient remains in MHICU. Patient quiet and has had no behavioral outburst. Patient continues to not have staff in her room. Patient ate well for breakfast meal. Patient has no current complaints.   Goal Outcome Evaluation:    Plan of Care Reviewed With: patient

## 2024-02-20 PROCEDURE — 99232 SBSQ HOSP IP/OBS MODERATE 35: CPT | Performed by: NURSE PRACTITIONER

## 2024-02-20 PROCEDURE — 124N000001 HC R&B MH

## 2024-02-20 ASSESSMENT — ACTIVITIES OF DAILY LIVING (ADL)
ORAL_HYGIENE: INDEPENDENT
ADLS_ACUITY_SCORE: 28
ADLS_ACUITY_SCORE: 28
DRESS: SCRUBS (BEHAVIORAL HEALTH);INDEPENDENT
ADLS_ACUITY_SCORE: 28
ADLS_ACUITY_SCORE: 28
HYGIENE/GROOMING: INDEPENDENT
ADLS_ACUITY_SCORE: 28
ADLS_ACUITY_SCORE: 28
LAUNDRY: UNABLE TO COMPLETE
ADLS_ACUITY_SCORE: 28

## 2024-02-20 NOTE — PROGRESS NOTES
"[facility] PSYCHIATRY  PROGRESS NOTE     SUBJECTIVE     Prior to interviewing the patient, I met with nursing and reviewed patient's clinical condition. We discussed clinical care both before and after the interview. I have reviewed the patient's clinical course by review of records including previous notes, labs, and vital signs.     Per nursing, the patient had the following behavioral events over the last 24-hours: Notes indicate he is sleeping but unsure if he is actually sleeping or laying in bed    He is still very disorganized and at times has a feigned accent at times sounding jamiacan or as if he is a gang member.  He frequently talks about marijuana and smoking marijuana in a very disorganized fashion.  States he is not willing to take any medication other than \"dabs\".  When asked if there is anything I can do for him he told me \"not unless you know how to roll a blunt\".  He did talk about alcohol a bit and I asked him if he frequently drink and he stated \"I have only had 7 drinks in the past 2 years\".  He makes very random comments that are illogical and nonsensical and still at times creates his own words and has no insight into the fact that he is doing this.  He makes comments about \"I have the right to a second opinion\".  I asked him if he has been through the commitment process in the past and he quickly states no.       MEDICATIONS   Scheduled Meds:  Not on any psychiatric medications and denies any need for psychiatric medications      PRN Meds:.acetaminophen, albuterol, alum & mag hydroxide-simethicone, Benzocaine-Menthol-Zinc Cl, cloNIDine, hydrOXYzine HCl, melatonin, nicotine, OLANZapine **OR** OLANZapine, psyllium     ALLERGIES   Allergies   Allergen Reactions     Corn Syrup [Glucose] GI Disturbance     Morphine Sulfate Nausea and Vomiting     Cannot take it in pill form, IV ok   Pancreatitis     Peanuts [Nuts] GI Disturbance     Internal cuts-GERD     Gold Au 198 [Gold] Rash        MENTAL " "STATUS EXAM   Vitals: BP (!) 179/104   Pulse 71   Temp 98.3  F (36.8  C) (Tympanic)   Resp 16   Ht 1.651 m (5' 5\")   Wt 144.5 kg (318 lb 9.6 oz)   SpO2 96%   BMI 53.02 kg/m      Appearance:  awake, alert, dressed in hospital scrubs, appeared as age stated, and disheveled   Attitude:  guarded  Eye Contact: Poor  Mood: restricted  Affect: Flat mostly though exaggerated at times when he started rhyming/rapping   Speech: Extremely difficult to follow in conversation.  Does have a feigned accent at times. Neologisms present.  Psychomotor Behavior:  no evidence of tardive dyskinesia, dystonia, or tics  Thought Process:  tangential  Associations: Loose associations present  Thought Content: Very preoccupied paranoid.  Grandiose statements \"I am one of the voices of shirley\"  Insight: None  Judgment: Poor  Oriented to:  time, person, and place  Attention Span and Concentration: Poor  Recent and Remote Memory:  fair  Fund of Knowledge: Difficult to assess  Muscle Strength and Tone: normal  Gait and Station: Normal       LABS   No results found for this or any previous visit (from the past 24 hour(s)).      IMPRESSION     This is a 33 year old male with a PMH of unspecified mood disorder, ADHD, SAGE, insomnia who was brought to the ED from his PCP office after exhibiting psychotic symptoms. Patient was found to be disorganized and delusional. He was placed on a hold and petition for commitment was filed in ED prior to transfer to behavioral health. Today patient is irritable. He answers most questions with \"that's confidential\". He does mention the [place], FBI, and government being involved. During our interaction he uses sign language when staring at the camera in the ceiling as if communicating to someone. He reports that he will not take any medication, because it is not real and he does not trust it. Writer and SW did attempt to explain the commitment process, however patient refused to take the paperwork offered and " instead ended the interview, stating that that we needed to watch out for the [place], FBI, and secret service. Will schedule Seroquel at bedtime for tonight as patient had been on this in the past, though I suspect he will refuse. If continuing to refuse medication, will likely need to file [place] petition tomorrow.        DIAGNOSES     Bipolar 1 disorder, current episode manic with psychotic features        PLAN     Location: Unit 5  Legal Status: Committed.    Petition for commitment and harris filed through [facility]. Court 2/16/24 @ 1:30 PM    Safety Assessment:    Behavioral Orders   Procedures     Code 1 - Restrict to Unit     Routine Programming     As clinically indicated     Status 15     Every 15 minutes.      PTA psychotropic medications stopped:     -no PTA meds    PTA psychotropic medications continued/changed:     -no PTA meds    New medications tried and stopped:     -Seroquel 100 mg at bedtime due to him refusing     New medications initiated:     -standard unit PRN medications    Today's Changes:    -Will likely need to fill out a [facility] petition as he refuses to take any medications whatsoever other than medical marijuana    Programming: Patient will be treated in a therapeutic milieu with appropriate individual and group therapies. Education will be provided on diagnoses, medications, and treatments.     Medical diagnoses:  Per medicine    Consult: None  Tests: None    Anticipated LOS: > 7 days  Disposition: IRTS vs home with services       ATTESTATION    April Gillian Lyon NP

## 2024-02-20 NOTE — PROGRESS NOTES
"Emailed [facility] regarding commitment order. Responded back \"I see the proposed order is now filed.  I will route to the  for signature and get to you as soon as I have it.\"  "

## 2024-02-20 NOTE — PLAN OF CARE
Problem: Adult Behavioral Health Plan of Care  Goal: Patient-Specific Goal (Individualization)  Description: Patient will sleep 6 to 8 hours per night  Patient will eat at least 50% of meals  Patient will attend at least 50% of groups  Patient will comply with recommendations of treatment team  Patient will remain medication compliant    2/18- Pt may wean to open unit, if behaviors are appropriate, during time of decreased stimulus. Treatment team to assess daily.           Outcome: Progressing     Problem: Thought Process Alteration  Goal: Optimal Thought Clarity  Outcome: Progressing   Goal Outcome Evaluation:    Pt in the MHICU this shift, pt asked if he wanted to wean to the  open unit after breakfast. Pt declined. Pt denies pain, Denies pain, SI, and hallucinations, pt smiling and socializing with staff. Pt spent most of the shift laying on his bed. No prns or medications asked for.         Plan of Care Reviewed With: patient

## 2024-02-20 NOTE — PLAN OF CARE
Face to face end of shift report received from Jodie STRONG. Rounding completed. Patient observed in bed appears asleep.     Problem: Adult Behavioral Health Plan of Care  Goal: Patient-Specific Goal (Individualization)  Description: Patient will sleep 6 to 8 hours per night  Patient will eat at least 50% of meals  Patient will attend at least 50% of groups  Patient will comply with recommendations of treatment team  Patient will remain medication compliant    2/18- Pt may wean to open unit, if behaviors are appropriate, during time of decreased stimulus. Treatment team to assess daily.   Outcome: Progressing     Problem: Thought Process Alteration  Goal: Optimal Thought Clarity  Outcome: Progressing   Goal Outcome Evaluation:         No observed episodes of SIB this shift. Patient slept (7) hours this shift. Face to face end of shift report communicated to oncoming shift.     John Badillo RN  2/20/2024  0625 AM

## 2024-02-20 NOTE — PLAN OF CARE
"  Problem: Adult Behavioral Health Plan of Care  Goal: Patient-Specific Goal (Individualization)  Description: Patient will sleep 6 to 8 hours per night  Patient will eat at least 50% of meals  Patient will attend at least 50% of groups  Patient will comply with recommendations of treatment team  Patient will remain medication compliant    2/18- Pt may wean to open unit, if behaviors are appropriate, during time of decreased stimulus. Treatment team to assess daily.           2/19/2024 1824 by Jodie Degroot RN  Outcome: Progressing  Note:     1800 Patient awake in MHICU pacing in the hallway. Patient approached nursing station and requested \"weed\" patient said \"ok\" when told we don't have that. Patient ate well for supper meal.  Denies any pain or other problems at this time.    2215 Patient awake and sitting on the edge of his bed. No complaints or requests offered.    Face to face end of shift report communicated to 11-7 shift RN.     Jodie Degroot RN  2/19/2024  10:31 PM         Problem: Thought Process Alteration  Goal: Optimal Thought Clarity  2/19/2024 1824 by Jodie Degroot, RN  Outcome: Progressing      Goal Outcome Evaluation:    Plan of Care Reviewed With: patient                   "

## 2024-02-21 PROCEDURE — 99233 SBSQ HOSP IP/OBS HIGH 50: CPT | Performed by: NURSE PRACTITIONER

## 2024-02-21 PROCEDURE — 124N000004

## 2024-02-21 ASSESSMENT — ACTIVITIES OF DAILY LIVING (ADL)
ADLS_ACUITY_SCORE: 28
ADLS_ACUITY_SCORE: 28
DRESS: SCRUBS (BEHAVIORAL HEALTH);INDEPENDENT
ADLS_ACUITY_SCORE: 28
LAUNDRY: UNABLE TO COMPLETE
ADLS_ACUITY_SCORE: 29
ADLS_ACUITY_SCORE: 29
HYGIENE/GROOMING: INDEPENDENT
ADLS_ACUITY_SCORE: 28
ADLS_ACUITY_SCORE: 28
ORAL_HYGIENE: INDEPENDENT
ADLS_ACUITY_SCORE: 28
ADLS_ACUITY_SCORE: 28
LAUNDRY: UNABLE TO COMPLETE
ADLS_ACUITY_SCORE: 28
ADLS_ACUITY_SCORE: 28
ADLS_ACUITY_SCORE: 29
ADLS_ACUITY_SCORE: 28
DRESS: SCRUBS (BEHAVIORAL HEALTH);INDEPENDENT
HYGIENE/GROOMING: INDEPENDENT
ORAL_HYGIENE: INDEPENDENT
ADLS_ACUITY_SCORE: 28

## 2024-02-21 NOTE — PLAN OF CARE
Face to face end of shift report received from Jeanne STRONG. Rounding completed. Patient observed in bed appears asleep.     Problem: Adult Behavioral Health Plan of Care  Goal: Patient-Specific Goal (Individualization)  Description: Patient will sleep 6 to 8 hours per night  Patient will eat at least 50% of meals  Patient will attend at least 50% of groups  Patient will comply with recommendations of treatment team  Patient will remain medication compliant    2/18- Pt may wean to open unit, if behaviors are appropriate, during time of decreased stimulus. Treatment team to assess daily.     Outcome: Progressing     Problem: Thought Process Alteration  Goal: Optimal Thought Clarity  Outcome: Progressing   Goal Outcome Evaluation:         No observed episodes of SIB this shift. Patient slept (7) hours.Face to face end of shift report communicated to oncoming shift.     John Badillo RN  2/21/2024  0614 AM

## 2024-02-21 NOTE — PLAN OF CARE
"Face to face shift report received from Apurva, RN.       Problem: Adult Behavioral Health Plan of Care  Goal: Patient-Specific Goal (Individualization)  Description: Patient will sleep 6 to 8 hours per night  Patient will eat at least 50% of meals  Patient will attend at least 50% of groups  Patient will comply with recommendations of treatment team  Patient will remain medication compliant    2/18- Pt may wean to open unit, if behaviors are appropriate, during time of decreased stimulus. Treatment team to assess daily.   Outcome: Not Progressing   Room remains in MHICU due to need for decreased stimulation. Pt offered to wean to open unit but he refused. Pt refused vital signs this shift stating \"[place] won't allow it.\" Conversation focused on pt wanting a \"blunt\" and asks writer to provide him with one, which was not provided. Speech rambling and excessive, but hard to follow. Pleasant. No reports of pain.       Problem: Thought Process Alteration  Goal: Optimal Thought Clarity  Outcome: Not Progressing       Face to face end of shift report to be communicated to oncoming RN.       "

## 2024-02-21 NOTE — PLAN OF CARE
Face to face shift report received from John. LIGIA. Rounding completed, pt observed walking in ICU [place] at start of shift.  Problem: Adult Behavioral Health Plan of Care  Goal: Patient-Specific Goal (Individualization)  Description: Patient will sleep 6 to 8 hours per night  Patient will eat at least 50% of meals  Patient will attend at least 50% of groups  Patient will comply with recommendations of treatment team  Patient will remain medication compliant    2/18- Pt may wean to open unit, if behaviors are appropriate, during time of decreased stimulus. Treatment team to assess daily.       Outcome: Progressing     Problem: Thought Process Alteration  Goal: Optimal Thought Clarity  Outcome: Progressing   Goal Outcome Evaluation:        Pt. Denied any physical pain at the start of this shift. They did have some mild foot pain by the end of the shift. They also denied any anxiety, depression, SI, or intent to self-harm. Pt. Is able to wean and was offered to do so multiple times this shift. Pt. politely declined with each offer. Pt. More open and talkative with staff this shift. They talked about how much they enjoyed dulce as a hobby and how they were eating fruit to help with weight lost. 100% was eaten at breakfast and 100% at lunch. No groups were attended this shift.       Face to face report will be communicated to oncdolly RN.    Ashley Rothman RN  2/21/2024  1:11 PM

## 2024-02-21 NOTE — PROGRESS NOTES
[facility] PSYCHIATRY  PROGRESS NOTE     SUBJECTIVE     Prior to interviewing the patient, I met with nursing and reviewed patient's clinical condition. We discussed clinical care both before and after the interview. I have reviewed the patient's clinical course by review of records including previous notes, labs, and vital signs.     Per nursing, the patient had the following behavioral events over the last 24-hours: Still remains disorganized and tangential.  Still fixated on marijuana.    Today he is still quite pleasant in conversation though is still disorganized and answers inappropriately things and topics not related to what I have asked him.  When I walked in his room he quickly got up and walked out of his room with me as if he did not want me in his room.  When we were meeting in the [facility] area of the [facility] intensive care unit one of the female patients became very loud and intrusive though he handled this very well and does not become upset.  His affect is extremely restricted.  He states that he is sleeping well.  No grandiose comments today about being famous in [employer] movies.  Has no insight into his illness.  I did ask him if he would like to come out of the  ICU and he stated he did not want to.  He has no reason for this though states he does not want to.       MEDICATIONS   Scheduled Meds:  Not on any psychiatric medications and denies any need for psychiatric medications      PRN Meds:.acetaminophen, albuterol, alum & mag hydroxide-simethicone, Benzocaine-Menthol-Zinc Cl, cloNIDine, hydrOXYzine HCl, melatonin, nicotine, OLANZapine **OR** OLANZapine, psyllium     ALLERGIES   Allergies   Allergen Reactions     Corn Syrup [Glucose] GI Disturbance     Morphine Sulfate Nausea and Vomiting     Cannot take it in pill form, IV ok   Pancreatitis     Peanuts [Nuts] GI Disturbance     Internal cuts-GERD     Gold Au 198 [Gold] Rash        MENTAL STATUS EXAM   Vitals: BP (!) 179/104   Pulse 71  "  Temp 98.3  F (36.8  C) (Tympanic)   Resp 16   Ht 1.651 m (5' 5\")   Wt 144.5 kg (318 lb 9.6 oz)   SpO2 96%   BMI 53.02 kg/m      Appearance:  awake, alert, dressed in hospital scrubs, appeared as age stated, and disheveled   Attitude: Less guarded a bit pleasant  Eye Contact: Fair  Mood: restricted  Affect: Very restricted  Speech: Extremely difficult to follow in conversation.  When asked questions his answers are not appropriate and very off topic  Psychomotor Behavior:  no evidence of tardive dyskinesia, dystonia, or tics  Thought Process:  tangential  Associations: Loose associations present  Thought Content: Very preoccupied paranoid.  No grandiosity noted today  Insight: None  Judgment: Poor  Oriented to:  time, person, and place  Attention Span and Concentration: Poor  Recent and Remote Memory:  fair  Fund of Knowledge: Difficult to assess  Muscle Strength and Tone: normal  Gait and Station: Normal       LABS   No results found for this or any previous visit (from the past 24 hour(s)).      IMPRESSION     This is a 33 year old male with a PMH of unspecified mood disorder, ADHD, SAGE, insomnia who was brought to the ED from his PCP office after exhibiting psychotic symptoms. Patient was found to be disorganized and delusional. He was placed on a hold and petition for commitment was filed in ED prior to transfer to behavioral health. Today patient is irritable. He answers most questions with \"that's confidential\". He does mention the [place], FBI, and government being involved. During our interaction he uses sign language when staring at the camera in the ceiling as if communicating to someone. He reports that he will not take any medication, because it is not real and he does not trust it. Writer and SW did attempt to explain the commitment process, however patient refused to take the paperwork offered and instead ended the interview, stating that that we needed to watch out for the [place], FBI, and secret " service. Will schedule Seroquel at bedtime for tonight as patient had been on this in the past, though I suspect he will refuse. If continuing to refuse medication, will likely need to file Harris petition tomorrow.        DIAGNOSES     Bipolar 1 disorder, current episode manic with psychotic features        PLAN     Location: Unit 5  Legal Status: Committed.    Petition for commitment and harris filed through [facility]. Court 2/16/24 @ 1:30 PM    Safety Assessment:    Behavioral Orders   Procedures     Code 1 - Restrict to Unit     Routine Programming     As clinically indicated     Status 15     Every 15 minutes.      PTA psychotropic medications stopped:     -no PTA meds    PTA psychotropic medications continued/changed:     -no PTA meds    New medications tried and stopped:     -Seroquel 100 mg at bedtime due to him refusing     New medications initiated:     -standard unit PRN medications    Today's Changes:    -Refuses medications.  Will require Harris order as he is unable to care for himself and is grossly disorganized    Programming: Patient will be treated in a therapeutic milieu with appropriate individual and group therapies. Education will be provided on diagnoses, medications, and treatments.     Medical diagnoses:  Per medicine    Consult: None  Tests: None    Anticipated LOS: > 7 days  Disposition: IRTS vs home with services       ATTESTATION    April Gillian Lyon NP

## 2024-02-22 PROCEDURE — 124N000004

## 2024-02-22 ASSESSMENT — ACTIVITIES OF DAILY LIVING (ADL)
HYGIENE/GROOMING: INDEPENDENT
ADLS_ACUITY_SCORE: 29
DRESS: SCRUBS (BEHAVIORAL HEALTH);INDEPENDENT
ADLS_ACUITY_SCORE: 29
ORAL_HYGIENE: INDEPENDENT
ADLS_ACUITY_SCORE: 29
LAUNDRY: UNABLE TO COMPLETE
ADLS_ACUITY_SCORE: 29

## 2024-02-22 NOTE — PLAN OF CARE
Problem: Thought Process Alteration  Goal: Optimal Thought Clarity  Outcome: no change    Pt continues to be tangential and disorganized    Problem: Adult Behavioral Health Plan of Care  Goal: Patient-Specific Goal (Individualization)  Description: Patient will sleep 6 to 8 hours per night  Patient will eat at least 50% of meals  Patient will attend at least 50% of groups  Patient will comply with recommendations of treatment team  Patient will remain medication compliant    2/18- Pt may wean to open unit, if behaviors are appropriate, during time of decreased stimulus. Treatment team to assess daily.     Outcome: Progressing   Goal Outcome Evaluation:    Plan of Care Reviewed With: patient      1530 Face to face rounding complete.  Pt introduced to nursing for the shift.      Pt was up walking often during the shift talking to himself in a confused manner. He asked me if I could bring him a blunt and some tequila.  He showed me that drawing on the chalkboard he made of a mashed potato .  PT smiled often and talked to the other Pt int the MHICU some times.  HE was encouraged to wean out of the MHICU many times but declined.    2300 Face to face end of shift report communicated to night shift RN's along with Pt's fall risk.     Amandeep Lopes, RN  2/21/2024

## 2024-02-22 NOTE — PROGRESS NOTES
"Emailed CM updated notes per her request. CM stated \"I did submit all documents to [facility] for him to be on the waitlist for a bed for either [facility] or [facility]. I will let you know when a bed is open and he is ready to be transported.\"  "

## 2024-02-22 NOTE — PLAN OF CARE
Face to face end of shift report received from Amandeep STRONG. Rounding completed. Patient observed in bed appears asleep.     Problem: Adult Behavioral Health Plan of Care  Goal: Patient-Specific Goal (Individualization)  Description: Patient will sleep 6 to 8 hours per night  Patient will eat at least 50% of meals  Patient will attend at least 50% of groups  Patient will comply with recommendations of treatment team  Patient will remain medication compliant    2/18- Pt may wean to open unit, if behaviors are appropriate, during time of decreased stimulus. Treatment team to assess daily.     Outcome: Progressing     Problem: Thought Process Alteration  Goal: Optimal Thought Clarity  Outcome: Progressing   Goal Outcome Evaluation:         No observed episodes of SIB this shift. Patient slept (6) hours. Face to face end of shift report communicated to oncoming shift.     John Badillo RN  2/22/2024  0611 AM

## 2024-02-22 NOTE — PLAN OF CARE
"  Problem: Adult Behavioral Health Plan of Care  Goal: Patient-Specific Goal (Individualization)  Description: Patient will sleep 6 to 8 hours per night  Patient will eat at least 50% of meals  Patient will attend at least 50% of groups  Patient will comply with recommendations of treatment team  Patient will remain medication compliant    2/22- Pt may wean to open unit, if behaviors are appropriate, during time of decreased stimulus. Treatment team to assess daily.       Outcome: Progressing   Goal Outcome Evaluation:    Patient is Alert, able to make needs known. Refuses vitals to be taken. Has not had vitals taken since admission.  Denies headache, dizziness, etc.   Patient has been in bed much of shift. Does come up to nurses station to make request. On two occasions, he  used different accents when speaking with staff. Asked if he would like to move to a different room, in which he replied; \"I'm good. 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 Ate 100% of breakfast and lunch. Steady gait, no falls. Did not wean out. Declines politely to move out of the MHICU.     "

## 2024-02-23 PROCEDURE — 124N000004

## 2024-02-23 PROCEDURE — 99232 SBSQ HOSP IP/OBS MODERATE 35: CPT | Mod: 95 | Performed by: STUDENT IN AN ORGANIZED HEALTH CARE EDUCATION/TRAINING PROGRAM

## 2024-02-23 ASSESSMENT — ACTIVITIES OF DAILY LIVING (ADL)
ADLS_ACUITY_SCORE: 29
HYGIENE/GROOMING: INDEPENDENT
ADLS_ACUITY_SCORE: 29

## 2024-02-23 NOTE — PLAN OF CARE
Problem: Adult Behavioral Health Plan of Care  Goal: Patient-Specific Goal (Individualization)  Description: Patient will sleep 6 to 8 hours per night  Patient will eat at least 50% of meals  Patient will attend at least 50% of groups  Patient will comply with recommendations of treatment team  Patient will remain medication compliant    2/22- Pt may wean to open unit, if behaviors are appropriate, during time of decreased stimulus. Treatment team to assess daily.           Outcome: Progressing   Goal Outcome Evaluation:       Face to face shift report received from RN. Rounding completed, pt observed.Client rested in room for 7 hours with eyes closed and respirations noted.Face to face report will be communicated to oncoming RN.    Mike Blankenship RN  2/23/2024  6:26 AM

## 2024-02-23 NOTE — PLAN OF CARE
"Problem: Thought Process Alteration  Goal: Optimal Thought Clarity  Outcome: No change    Pt continues to be disorganized and tangential     Problem: Adult Behavioral Health Plan of Care  Goal: Patient-Specific Goal (Individualization)  Description: Patient will sleep 6 to 8 hours per night  Patient will eat at least 50% of meals  Patient will attend at least 50% of groups  Patient will comply with recommendations of treatment team  Patient will remain medication compliant    2/22- Pt may wean to open unit, if behaviors are appropriate, during time of decreased stimulus. Treatment team to assess daily.     Outcome: Progressing   Goal Outcome Evaluation:    Plan of Care Reviewed With: patient      1530 Face to face rounding complete.  Pt introduced to nursing for the shift.    Pt was very suspicious and nervous this shift. He was walked out of the ICU and shown room 562 and he said, \"Nope! I'm good!  He then walked right back to the ICU doors and pulled on the door.  He asked over and over to be let back into his room.  I asked him why he cannot move out to the new room and he said, \"I cannot tell you why!  You don't have the proper security clearance!\"  He then paced around the ICU dayroom talking to himself and appearing to respond to hallucinations interactively gesturing and crouching on the floor looking at things.    Face to face end of shift report communicated to Night Shift RN's along with Pt's fall risk.     Amandeep Lopes RN  2/22/2024          "

## 2024-02-23 NOTE — PLAN OF CARE
"Problem: Adult Behavioral Health Plan of Care  Goal: Patient-Specific Goal (Individualization)  Description: Patient will sleep 6 to 8 hours per night  Patient will eat at least 50% of meals  Patient will attend at least 50% of groups  Patient will comply with recommendations of treatment team  Patient will remain medication compliant    2/22- Pt may wean to open unit, if behaviors are appropriate, during time of decreased stimulus. Treatment team to assess daily.   Outcome: Not Progressing     Problem: Thought Process Alteration  Goal: Optimal Thought Clarity  Outcome: Not Progressing     Face to face shift report received from Mike. Rounding completed, pt observed in bed in MHICU.    Patient ate 100% of breakfast & 100% of lunch. Patient did not wean this shift. He was very happy at lunch as he stated the chicken tenders were good. Patient refused to talk to his provider today. 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 Patient then went on to talk about a deven on YouTube playing the zerved with a broken arm. We are to try and find him and let him know that patient will not be able to perform his surgery. Only straight answer writer got from patient questions was if he felt like hurting himself or other he stated \"No\". Patient also stated he is always in pain, did not rate or elaborate.     Face to face report will be communicated to oncoming RN.    Rima Mallory RN  2/23/2024  3:38 PM    "

## 2024-02-23 NOTE — PROGRESS NOTES
"[facility] PSYCHIATRY  PROGRESS NOTE     SUBJECTIVE     Prior to interviewing the patient, I met with nursing and reviewed patient's clinical condition. We discussed clinical care both before and after the interview. I have reviewed the patient's clinical course by review of records including previous notes, labs, and vital signs.     Per nursing, the patient had the following behavioral events over the last 24-hours: Still remains disorganized and tangential.  Still fixated on marijuana.    Upon interview, the patient was found in the MHICU. He notes that he is not interested in talking. He notes that he would prefer to be left alone. No safety concerns.       MEDICATIONS   Scheduled Meds:  Not on any psychiatric medications and denies any need for psychiatric medications      PRN Meds:.acetaminophen, albuterol, alum & mag hydroxide-simethicone, Benzocaine-Menthol-Zinc Cl, cloNIDine, hydrOXYzine HCl, melatonin, nicotine, OLANZapine **OR** OLANZapine, psyllium     ALLERGIES   Allergies   Allergen Reactions     Corn Syrup [Glucose] GI Disturbance     Morphine Sulfate Nausea and Vomiting     Cannot take it in pill form, IV ok   Pancreatitis     Peanuts [Nuts] GI Disturbance     Internal cuts-GERD     Gold Au 198 [Gold] Rash        MENTAL STATUS EXAM   Vitals: BP (!) 179/104   Pulse 71   Temp 98.3  F (36.8  C) (Tympanic)   Resp 16   Ht 1.651 m (5' 5\")   Wt 144.5 kg (318 lb 9.6 oz)   SpO2 96%   BMI 53.02 kg/m      Appearance:  awake, alert, dressed in hospital scrubs, appeared as age stated, and disheveled   Attitude:Guarded  Eye Contact: Fair  Mood: \"Fine\"  Affect: Irritable   Speech: Regular rate  Psychomotor Behavior:  no evidence of tardive dyskinesia, dystonia, or tics  Thought Process: Tangential   Associations: Loose associations present  Thought Content: No SI or HI elicited. Denies AVH. Paranoid.  Insight: Poor  Judgment: Poor  Oriented to:  time, person, and place  Attention Span and Concentration: " "Poor  Recent and Remote Memory:  fair  Fund of Knowledge: Low to average  Muscle Strength and Tone: normal  Gait and Station: Normal       LABS   No results found for this or any previous visit (from the past 24 hour(s)).      IMPRESSION     This is a 33 year old male with a PMH of unspecified mood disorder, ADHD, SAGE, insomnia who was brought to the ED from his PCP office after exhibiting psychotic symptoms. Patient was found to be disorganized and delusional. He was placed on a hold and petition for commitment was filed in ED prior to transfer to behavioral health. Today patient is irritable. He answers most questions with \"that's confidential\". He does mention the [place], FBI, and government being involved. During our interaction he uses sign language when staring at the camera in the ceiling as if communicating to someone. He reports that he will not take any medication, because it is not real and he does not trust it. Writer and SW did attempt to explain the commitment process, however patient refused to take the paperwork offered and instead ended the interview, stating that that we needed to watch out for the [place], FBI, and secret service. Will schedule Seroquel at bedtime for tonight as patient had been on this in the past, though I suspect he will refuse. If continuing to refuse medication, will likely need to file Nieves petition tomorrow.     Today: The patient continues to be manic and psychotic. Refusing all scheduled medications. Nieves re-submitted.       DIAGNOSES     Bipolar 1 disorder, current episode manic with psychotic features        PLAN     Location: Unit 5  Legal Status: Committed.    Nieves: Pending. 3/4 at 11.    Safety Assessment:    Behavioral Orders   Procedures     Code 1 - Restrict to Unit     Routine Programming     As clinically indicated     Status 15     Every 15 minutes.      PTA psychotropic medications stopped:     -no PTA meds    PTA psychotropic medications continued/changed: "     -no PTA meds    New medications tried and stopped:     -Seroquel 100 mg at bedtime due to him refusing     New medications initiated:     -standard unit PRN medications    Today's Changes:    -None. Re-submitted [place] paperwork given [place] legal issues.    Programming: Patient will be treated in a therapeutic milieu with appropriate individual and group therapies. Education will be provided on diagnoses, medications, and treatments.     Medical diagnoses:  Per medicine    Consult: None  Tests: None    Anticipated LOS: > 7 days  Disposition: IRTS vs home with services       ATTESTATION      Maximino Terry DO, MA  Psychiatrist     Video Visit: Patient has given verbal consent for video visit?: Yes  Type of Service: video visit for mental health treatment  [place] Visit: [place]  Originating Site (patient location): [facility], [place]  Distant Site (provider location): Remote Location  Mode of Communication: Video Conference via [employer]  Time of Service: Date: 02/23/2024 Start: 1100 end: 1115

## 2024-02-24 PROCEDURE — 250N000011 HC RX IP 250 OP 636: Performed by: PSYCHIATRY & NEUROLOGY

## 2024-02-24 PROCEDURE — 124N000004

## 2024-02-24 PROCEDURE — 99232 SBSQ HOSP IP/OBS MODERATE 35: CPT | Mod: 95 | Performed by: PSYCHIATRY & NEUROLOGY

## 2024-02-24 RX ORDER — CHLORPROMAZINE HYDROCHLORIDE 25 MG/ML
50 INJECTION INTRAMUSCULAR ONCE
Status: COMPLETED | OUTPATIENT
Start: 2024-02-24 | End: 2024-02-24

## 2024-02-24 RX ADMIN — CHLORPROMAZINE HYDROCHLORIDE 50 MG: 25 INJECTION INTRAMUSCULAR at 14:50

## 2024-02-24 ASSESSMENT — ACTIVITIES OF DAILY LIVING (ADL)
ADLS_ACUITY_SCORE: 29
ADLS_ACUITY_SCORE: 29
LAUNDRY: UNABLE TO COMPLETE
ADLS_ACUITY_SCORE: 29
HYGIENE/GROOMING: INDEPENDENT
ORAL_HYGIENE: INDEPENDENT
DRESS: SCRUBS (BEHAVIORAL HEALTH);INDEPENDENT
DRESS: SCRUBS (BEHAVIORAL HEALTH);INDEPENDENT
ADLS_ACUITY_SCORE: 29
HYGIENE/GROOMING: INDEPENDENT
ADLS_ACUITY_SCORE: 29
LAUNDRY: UNABLE TO COMPLETE
ADLS_ACUITY_SCORE: 29
ORAL_HYGIENE: INDEPENDENT

## 2024-02-24 NOTE — PROGRESS NOTES
"[facility] PSYCHIATRY  PROGRESS NOTE     SUBJECTIVE     Prior to interviewing the patient, I met with nursing and reviewed patient's clinical condition. We discussed clinical care both before and after the interview. I have reviewed the patient's clinical course by review of records including previous notes, labs, and vital signs.     Per nursing, the patient had the following behavioral events over the last 24-hours: Still remains disorganized and tangential.  Superficially engaged when he does communicate with nursing.     Upon interview, the patient was found in the MHICU. He states \"no fake doctors\" and walks away.  Declines psychiatric interview. He is witnessed pacing around the MHICU.        MEDICATIONS   Scheduled Meds:  Not on any psychiatric medications and denies any need for psychiatric medications      PRN Meds:.acetaminophen, albuterol, alum & mag hydroxide-simethicone, Benzocaine-Menthol-Zinc Cl, cloNIDine, hydrOXYzine HCl, melatonin, nicotine, OLANZapine **OR** OLANZapine, psyllium     ALLERGIES   Allergies   Allergen Reactions     Corn Syrup [Glucose] GI Disturbance     Morphine Sulfate Nausea and Vomiting     Cannot take it in pill form, IV ok   Pancreatitis     Peanuts [Nuts] GI Disturbance     Internal cuts-GERD     Gold Au 198 [Gold] Rash        MENTAL STATUS EXAM   Vitals: BP (!) 179/104   Pulse 71   Temp 98.3  F (36.8  C) (Tympanic)   Resp 16   Ht 1.651 m (5' 5\")   Wt 144.5 kg (318 lb 9.6 oz)   SpO2 96%   BMI 53.02 kg/m      Appearance:  awake, alert, dressed in hospital scrubs, appeared as age stated, and disheveled   Attitude:Guarded  Eye Contact: Fair  Mood:not stated   Affect: Irritable   Speech: Regular rate  Psychomotor Behavior:  no evidence of tardive dyskinesia, dystonia, or tics  Thought Process: Tangential   Associations: Loose associations present  Thought Content: No SI or HI elicited. Denies AVH. Paranoid.  Insight: Poor  Judgment: Poor  Oriented to:  time, person, and " "place  Attention Span and Concentration: Poor  Recent and Remote Memory:  fair  Fund of Knowledge: Low to average  Muscle Strength and Tone: normal  Gait and Station: Normal       LABS   No results found for this or any previous visit (from the past 24 hour(s)).      IMPRESSION     This is a 33 year old male with a PMH of unspecified mood disorder, ADHD, SAGE, insomnia who was brought to the ED from his PCP office after exhibiting psychotic symptoms. Patient was found to be disorganized and delusional. He was placed on a hold and petition for commitment was filed in ED prior to transfer to behavioral health. Today patient is irritable. He answers most questions with \"that's confidential\". He does mention the [place], FBI, and government being involved. During our interaction he uses sign language when staring at the camera in the ceiling as if communicating to someone. He reports that he will not take any medication, because it is not real and he does not trust it. Writer and SW did attempt to explain the commitment process, however patient refused to take the paperwork offered and instead ended the interview, stating that that we needed to watch out for the [place], FBI, and secret service. Will schedule Seroquel at bedtime for tonight as patient had been on this in the past, though I suspect he will refuse. If continuing to refuse medication, will likely need to file Nieves petition tomorrow.     2/23/24: The patient continues to be manic and psychotic. Refusing all scheduled medications. Nieves re-submitted.    2/24/24: declines psychiatric interview today. Remains manic and psychotic. Nieves hearing set for 3/4/24       DIAGNOSES     Bipolar 1 disorder, current episode manic with psychotic features        PLAN     Location: Unit 5  Legal Status: Committed.    Nieves: Pending. 3/4 at 11.    Safety Assessment:    Behavioral Orders   Procedures     Code 1 - Restrict to Unit     Routine Programming     As clinically " indicated     Status 15     Every 15 minutes.      PTA psychotropic medications stopped:     -no PTA meds    PTA psychotropic medications continued/changed:     -no PTA meds    New medications tried and stopped:     -Seroquel 100 mg at bedtime due to him refusing     New medications initiated:     -standard unit PRN medications    Today's Changes:    -None. Re-submitted [place] paperwork given [place] legal issues.    Programming: Patient will be treated in a therapeutic milieu with appropriate individual and group therapies. Education will be provided on diagnoses, medications, and treatments.     Medical diagnoses:  Per medicine    Consult: None  Tests: None    Anticipated LOS: > 7 days  Disposition: IRTS vs home with services       ATTESTATION    Bernardo Martinez MD  [facility]   Psychiatry    Video Visit: Patient has given verbal consent for video visit?: Yes  Type of Service: video visit for mental health treatment  Reason for Video Visit: COVID-19 [place]  Originating Site (patient location): [facility]  Distant Site (provider location): Remote Location  Mode of Communication: Video Conference via [employer]  Time of Service: Date: 02/24/2024 , Start: 08:00,  Stop: 08:30

## 2024-02-24 NOTE — PLAN OF CARE
Goal Outcome Evaluation:             Seclusion/Restraint Face to Face Note  In person face to face assessment completed, including an evaluation of the patient's immediate reaction to the intervention, complete review of systems assessment, behavioral assessment and review/assessment of history, drugs and medications, recent labs, etc., and behavioral condition.  The patient experienced: No adverse physical outcome from seclusion/restraint initiation.  The intervention of restraint or seclusion needs to terminate.  If face to face was completed by a trained RN, the above findings were discused with Dr. Martinez.   Cristina Montoya, RN  2/24/2024  2:47 PM

## 2024-02-24 NOTE — PLAN OF CARE
"SHIFT SUMMARY:  At the start of the shift, declined PRN pain medications, stating \"I'm fine.\" No obvious bruising. Denies nausea, vomiting and dizziness.     Disorganized, tangential thought process continues.  Denies suicidal ideation, pain and hallucinations.    When approached for assessment questions, he replied \"Please just give me some space.\" Otherwise calm and cooperative.     Remains in the MHICU. PO intake of food and fluids is adequate. Able to make needs known.       Problem: Adult Behavioral Health Plan of Care  Goal: Patient-Specific Goal (Individualization)  Description: Patient will sleep 6 to 8 hours per night  Patient will eat at least 50% of meals  Patient will attend at least 50% of groups  Patient will comply with recommendations of treatment team  Patient will remain medication compliant    2/22- Pt may wean to open unit, if behaviors are appropriate, during time of decreased stimulus. Treatment team to assess daily.     Outcome: Progressing     Problem: Thought Process Alteration  Goal: Optimal Thought Clarity  Outcome: Progressing   Goal Outcome Evaluation:                        "
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
## 2024-02-24 NOTE — PLAN OF CARE
Face to face shift report received from nurse. Rounding completed, pt observed.    Problem: Adult Behavioral Health Plan of Care  Goal: Patient-Specific Goal (Individualization)  Description: Patient will sleep 6 to 8 hours per night  Patient will eat at least 50% of meals  Patient will attend at least 50% of groups  Patient will comply with recommendations of treatment team  Patient will remain medication compliant    2/22- Pt may wean to open unit, if behaviors are appropriate, during time of decreased stimulus. Treatment team to assess daily.     Outcome: Progressing   Face to face shift report received from nurse. Rounding completed, pt observed. Patient slept 7 hours no issues noted.     Face to face report will be communicated to oncoming RN.    Martin Fleming RN  2/24/2024

## 2024-02-25 PROCEDURE — 99232 SBSQ HOSP IP/OBS MODERATE 35: CPT | Mod: 95 | Performed by: PSYCHIATRY & NEUROLOGY

## 2024-02-25 PROCEDURE — 124N000004

## 2024-02-25 ASSESSMENT — ACTIVITIES OF DAILY LIVING (ADL)
ADLS_ACUITY_SCORE: 29
HYGIENE/GROOMING: INDEPENDENT
ADLS_ACUITY_SCORE: 29
LAUNDRY: UNABLE TO COMPLETE
ADLS_ACUITY_SCORE: 29
ORAL_HYGIENE: INDEPENDENT
ADLS_ACUITY_SCORE: 29
DRESS: SCRUBS (BEHAVIORAL HEALTH)
ADLS_ACUITY_SCORE: 29
LAUNDRY: UNABLE TO COMPLETE
DRESS: SCRUBS (BEHAVIORAL HEALTH);INDEPENDENT
ADLS_ACUITY_SCORE: 29
ORAL_HYGIENE: INDEPENDENT
ADLS_ACUITY_SCORE: 29
HYGIENE/GROOMING: INDEPENDENT

## 2024-02-25 NOTE — PROGRESS NOTES
"[facility] PSYCHIATRY  PROGRESS NOTE     SUBJECTIVE     Prior to interviewing the patient, I met with nursing and reviewed patient's clinical condition. We discussed clinical care both before and after the interview. I have reviewed the patient's clinical course by review of records including previous notes, labs, and vital signs.     Per nursing, the patient had the following behavioral events over the last 24-hours: Still remains disorganized and tangential.  Superficially engaged when he does communicate with nursing.     Upon interview, the patient was found in the MHICU. He interacts with writer today but does not speak outside of the word \"protein\". He puts up weird hand signals and then shows writer his menu. When asking what he is ordering he states \"protein\" and then proceeds to give more hand gestures.  When asking if he has any questions he shakes his head and walks away.        MEDICATIONS   Scheduled Meds:  Not on any psychiatric medications and denies any need for psychiatric medications      PRN Meds:.acetaminophen, albuterol, alum & mag hydroxide-simethicone, Benzocaine-Menthol-Zinc Cl, cloNIDine, hydrOXYzine HCl, melatonin, nicotine, OLANZapine **OR** OLANZapine, psyllium     ALLERGIES   Allergies   Allergen Reactions     Corn Syrup [Glucose] GI Disturbance     Morphine Sulfate Nausea and Vomiting     Cannot take it in pill form, IV ok   Pancreatitis     Peanuts [Nuts] GI Disturbance     Internal cuts-GERD     Gold Au 198 [Gold] Rash        MENTAL STATUS EXAM   Vitals: BP (!) 179/104   Pulse 71   Temp 98.3  F (36.8  C) (Tympanic)   Resp 16   Ht 1.651 m (5' 5\")   Wt 144.5 kg (318 lb 9.6 oz)   SpO2 96%   BMI 53.02 kg/m      Appearance:  awake, alert, dressed in hospital scrubs, appeared as age stated, and disheveled   Attitude:Guarded  Eye Contact: Fair  Mood:not stated   Affect: Irritable   Speech: Regular rate  Psychomotor Behavior:  no evidence of tardive dyskinesia, dystonia, or " "tics  Thought Process: Tangential   Associations: Loose associations present  Thought Content: No SI or HI elicited. Denies AVH. Paranoid.  Insight: Poor  Judgment: Poor  Oriented to:  time, person, and place  Attention Span and Concentration: Poor  Recent and Remote Memory:  fair  Fund of Knowledge: Low to average  Muscle Strength and Tone: normal  Gait and Station: Normal       LABS   No results found for this or any previous visit (from the past 24 hour(s)).      IMPRESSION     This is a 33 year old male with a PMH of unspecified mood disorder, ADHD, SAGE, insomnia who was brought to the ED from his PCP office after exhibiting psychotic symptoms. Patient was found to be disorganized and delusional. He was placed on a hold and petition for commitment was filed in ED prior to transfer to behavioral health. Today patient is irritable. He answers most questions with \"that's confidential\". He does mention the [place], FBI, and government being involved. During our interaction he uses sign language when staring at the camera in the ceiling as if communicating to someone. He reports that he will not take any medication, because it is not real and he does not trust it. Writer and SW did attempt to explain the commitment process, however patient refused to take the paperwork offered and instead ended the interview, stating that that we needed to watch out for the [place], FBI, and secret service. Will schedule Seroquel at bedtime for tonight as patient had been on this in the past, though I suspect he will refuse. If continuing to refuse medication, will likely need to file Nieves petition tomorrow.     2/23/24: The patient continues to be manic and psychotic. Refusing all scheduled medications. Nieves re-submitted.    2/24/24: declines psychiatric interview today. Remains manic and psychotic. Nieves hearing set for 3/4/24    2/25/24: only utters one word today on examination. Remains decompensated.        DIAGNOSES     Bipolar 1 " disorder, current episode manic with psychotic features        PLAN     Location: Unit 5  Legal Status: Committed.    Nieves: Pending. 3/4 at 11.    Safety Assessment:    Behavioral Orders   Procedures     Code 1 - Restrict to Unit     Routine Programming     As clinically indicated     Status 15     Every 15 minutes.      PTA psychotropic medications stopped:     -no PTA meds    PTA psychotropic medications continued/changed:     -no PTA meds    New medications tried and stopped:     -Seroquel 100 mg at bedtime due to him refusing     New medications initiated:     -standard unit PRN medications    Today's Changes:    -None.     Programming: Patient will be treated in a therapeutic milieu with appropriate individual and group therapies. Education will be provided on diagnoses, medications, and treatments.     Medical diagnoses:  Per medicine    Consult: None  Tests: None    Anticipated LOS: > 7 days  Disposition: IRTS vs home with services       ATTESTATION    Bernardo Martinez MD  [facility]   Psychiatry    Video Visit: Patient has given verbal consent for video visit?: Yes  Type of Service: video visit for mental health treatment  Reason for Video Visit: COVID-19 [place]  Originating Site (patient location): [facility]  Distant Site (provider location): Remote Location  Mode of Communication: Video Conference via [employer]  Time of Service: Date: 02/25/2024 , Start: 08:00,  Stop: 08:30
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
## 2024-02-25 NOTE — PLAN OF CARE
Face to face shift report received from nurse. Rounding completed, pt observed.    Problem: Adult Behavioral Health Plan of Care  Goal: Patient-Specific Goal (Individualization)  Description: Patient will sleep 6 to 8 hours per night  Patient will eat at least 50% of meals  Patient will attend at least 50% of groups  Patient will comply with recommendations of treatment team  Patient will remain medication compliant    2/22- Pt may wean to open unit, if behaviors are appropriate, during time of decreased stimulus. Treatment team to assess daily.     Outcome: Progressing   Pt has been in bed with eyes closed and regular respirations observed all night. Will continue to monitor. Patient slept 7 hours no issues noted.     Face to face report will be communicated to oncoming RN.    Martin Fleming RN  2/25/2024

## 2024-02-25 NOTE — PLAN OF CARE
Face to face shift report received from LIGIA Salazar. Rounding completed, pt observed resting in bed at start of shift.  Problem: Adult Behavioral Health Plan of Care  Goal: Patient-Specific Goal (Individualization)  Description: Patient will sleep 6 to 8 hours per night  Patient will eat at least 50% of meals  Patient will attend at least 50% of groups  Patient will comply with recommendations of treatment team  Patient will remain medication compliant    2/22- Pt may wean to open unit, if behaviors are appropriate, during time of decreased stimulus. Treatment team to assess daily.       Outcome: Progressing     Problem: Thought Process Alteration  Goal: Optimal Thought Clarity  Outcome: Progressing   Goal Outcome Evaluation:    Plan of Care Reviewed With: patient         Pt. Denied having any physical pain this shift. They also denied having any depression, SI, or intent to self-harm. Pt. Did display some anxiety this shift with their room. This shift, Pt. Set up containers of creamer and other items by their door. Pt. Stated that this was so they could tell if someone was opening their door. Pt. Would also quickly sit up in their bed at each safety check. This shift, Pt. Refused to allow housekeeping in to clean their room. The only thing they would let housekeeping do was to empty the garbage. 100% was eaten at both meals this shift.    Face to face report will be communicated to oncoming RN.    Ashley Rothman RN  2/25/2024  2:09 PM

## 2024-02-26 PROCEDURE — 124N000004

## 2024-02-26 PROCEDURE — 99232 SBSQ HOSP IP/OBS MODERATE 35: CPT | Mod: 95 | Performed by: PSYCHIATRY & NEUROLOGY

## 2024-02-26 ASSESSMENT — ACTIVITIES OF DAILY LIVING (ADL)
ADLS_ACUITY_SCORE: 29
ORAL_HYGIENE: INDEPENDENT
ADLS_ACUITY_SCORE: 29
DRESS: SCRUBS (BEHAVIORAL HEALTH)
ADLS_ACUITY_SCORE: 29
HYGIENE/GROOMING: INDEPENDENT
ADLS_ACUITY_SCORE: 29

## 2024-02-26 NOTE — PLAN OF CARE
Problem: Thought Process Alteration  Goal: Optimal Thought Clarity  Outcome: Progressing     Problem: Adult Behavioral Health Plan of Care  Goal: Patient-Specific Goal (Individualization)  Description: Patient will sleep 6 to 8 hours per night  Patient will eat at least 50% of meals  Patient will attend at least 50% of groups  Patient will comply with recommendations of treatment team  Patient will remain medication compliant    2/25- Pt may wean to open unit, if behaviors are appropriate, during time of decreased stimulus. Treatment team to assess daily.     Outcome: Progressing     Patient declined offers to wean to the open unit this shift.  Unable to have a liner conversation with staff and continues to make delusional statements.  Gave staff a note today asking us to order 10 cheeseburgers then kept asking when his cheeseburgers would arrive.  Explained to patient multiple times that we are unable to order food from outside the hospital.  No scheduled or PRN medications given.   No complaints of pain.  Ate 100% of meals.  Face to face end of shift report communicated to night shift RN.     Alexa Resendiz RN  2/25/2024  10:30 PM

## 2024-02-26 NOTE — PROGRESS NOTES
"[facility] PSYCHIATRY  PROGRESS NOTE     SUBJECTIVE     Prior to interviewing the patient, I met with nursing and reviewed patient's clinical condition. We discussed clinical care both before and after the interview. I have reviewed the patient's clinical course by review of records including previous notes, labs, and vital signs.     Per nursing, the patient had the following behavioral events over the last 24-hours: continues to engage in odd behaviors such as lining up food items by his door, declining to let housekeeping clean his room and gesturing to staff with nonsensical motions. He requested staff order him 10 cheeseburgers yesterday.     Upon interview, the patient was found in the MHICU. He engages in conversation. He is asked about why he requested 10 cheeseburgers yesterday, he states he needs to get the \"XP\".  He states \"I need to get the experience points\".  He states by eating [employer] cheeseburgers he can level up.  He is tangential in thoughts. He states \"you can leave the air in the mattress\".  He then asks writer if he has top secret clearance.  He states he is talking to his mother \"in spirit\".  He informs writer to \"pray for the [place]\". He then smacks his lips together and goes \"kiss, kiss\".   He is asked by writer if he is finding his life meaningful in the hospital and what his goals for hosptialization are. He can't answer this.  He states \"Be careful\".        MEDICATIONS   Scheduled Meds:  Not on any psychiatric medications and denies any need for psychiatric medications      PRN Meds:.acetaminophen, albuterol, alum & mag hydroxide-simethicone, Benzocaine-Menthol-Zinc Cl, cloNIDine, hydrOXYzine HCl, melatonin, nicotine, OLANZapine **OR** OLANZapine, psyllium     ALLERGIES   Allergies   Allergen Reactions     Corn Syrup [Glucose] GI Disturbance     Morphine Sulfate Nausea and Vomiting     Cannot take it in pill form, IV ok   Pancreatitis     Peanuts [Nuts] GI Disturbance     Internal " "cuts-GERD     Gold Au 198 [Gold] Rash        MENTAL STATUS EXAM   Vitals: BP (!) 179/104   Pulse 71   Temp 98.3  F (36.8  C) (Tympanic)   Resp 16   Ht 1.651 m (5' 5\")   Wt 144.5 kg (318 lb 9.6 oz)   SpO2 96%   BMI 53.02 kg/m      Appearance:  awake, alert, dressed in hospital scrubs, appeared as age stated, and disheveled   Attitude:Guarded  Eye Contact: Fair  Mood: \"fine\"  Affect: labile  Speech: Regular rate  Psychomotor Behavior:  no evidence of tardive dyskinesia, dystonia, or tics  Thought Process: Tangential   Associations: Loose associations present  Thought Content: No SI or HI elicited. Denies AVH. Paranoid.  Insight: Poor  Judgment: Poor  Oriented to:  time, person, and place  Attention Span and Concentration: Poor  Recent and Remote Memory:  fair  Fund of Knowledge: Low to average  Muscle Strength and Tone: normal  Gait and Station: Normal       LABS   No results found for this or any previous visit (from the past 24 hour(s)).      IMPRESSION     This is a 33 year old male with a PMH of unspecified mood disorder, ADHD, SAGE, insomnia who was brought to the ED from his PCP office after exhibiting psychotic symptoms. Patient was found to be disorganized and delusional. He was placed on a hold and petition for commitment was filed in ED prior to transfer to behavioral health. Today patient is irritable. He answers most questions with \"that's confidential\". He does mention the [place], FBI, and government being involved. During our interaction he uses sign language when staring at the camera in the ceiling as if communicating to someone. He reports that he will not take any medication, because it is not real and he does not trust it. Writer and SW did attempt to explain the commitment process, however patient refused to take the paperwork offered and instead ended the interview, stating that that we needed to watch out for the [place], FBI, and secret service. Will schedule Seroquel at bedtime for tonight as " patient had been on this in the past, though I suspect he will refuse. If continuing to refuse medication, will likely need to file Nieves petition tomorrow.     2/23/24: The patient continues to be manic and psychotic. Refusing all scheduled medications. Nieves re-submitted.    2/24/24: declines psychiatric interview today. Remains manic and psychotic. Nieves hearing set for 3/4/24    2/25/24: only utters one word today on examination. Remains decompensated.     2/26/24: converses today. He is illogical tangentially talking about nonsensical things.        DIAGNOSES     Bipolar 1 disorder, current episode manic with psychotic features        PLAN     Location: Unit 5  Legal Status: Committed.    Nieves: Pending. 3/4 at 11.    Safety Assessment:    Behavioral Orders   Procedures     Code 1 - Restrict to Unit     Routine Programming     As clinically indicated     Status 15     Every 15 minutes.      PTA psychotropic medications stopped:     -no PTA meds    PTA psychotropic medications continued/changed:     -no PTA meds    New medications tried and stopped:     -Seroquel 100 mg at bedtime due to him refusing     New medications initiated:     -standard unit PRN medications    Today's Changes:    -None.     Programming: Patient will be treated in a therapeutic milieu with appropriate individual and group therapies. Education will be provided on diagnoses, medications, and treatments.     Medical diagnoses:  Per medicine    Consult: None  Tests: None    Anticipated LOS: > 7 days  Disposition: IRTS vs home with services       ATTESTATION    Bernardo Martinez MD  [facility]   Psychiatry    Video Visit: Patient has given verbal consent for video visit?: Yes  Type of Service: video visit for mental health treatment  Reason for Video Visit: COVID-19 [place]  Originating Site (patient location): [facility]  Distant Site (provider location): Remote Location  Mode of Communication:  Video Conference via [employer]  Time of Service: Date: 02/26/2024 , Start: 08:00,  Stop: 08:30

## 2024-02-26 NOTE — PROGRESS NOTES
Attached on sticky note the zoom meeting ID and passcode for pt Ezequiel hearing on March 4th @ 11:00am     Meeting ID:   Passcode: [id number]

## 2024-02-26 NOTE — PLAN OF CARE
"Face to face start of shift report received from RN.  Patient in room at this time.      Yamileth Neves RN  2/26/2024  7:38 AM       Problem: Adult Behavioral Health Plan of Care  Goal: Patient-Specific Goal (Individualization)  Description: Patient will sleep 6 to 8 hours per night  Patient will eat at least 50% of meals  Patient will attend at least 50% of groups  Patient will comply with recommendations of treatment team  Patient will remain medication compliant    2/25/24- Pt may wean to open unit, if behaviors are appropriate, during time of decreased stimulus. Treatment team to assess daily.       Outcome: Progressing  Note: Patient remains in room in MHICU this shift.  In [place] walking at times.    Offered patient to wean, patient declines to wean to the open unit, he feels \"unsafe\"  Patient reports \"my hair is bothering me, that's about it today\" \"I still haven't figured out what to do with it\"     Patient eats 100% of meals.    Patient lets needs be known.  Encouraged to shower, patient declines.      Patient denies SI/HI, AH/VH and pain.         Goal Outcome Evaluation:       Face to face end of shift report communicated to oncoming shift.     Yamileth Neves RN  2/26/2024  3:10 PM                      "

## 2024-02-26 NOTE — PLAN OF CARE
Problem: Adult Behavioral Health Plan of Care  Goal: Patient-Specific Goal (Individualization)  Description: Patient will sleep 6 to 8 hours per night  Patient will eat at least 50% of meals  Patient will attend at least 50% of groups  Patient will comply with recommendations of treatment team  Patient will remain medication compliant    2/25/24- Pt may wean to open unit, if behaviors are appropriate, during time of decreased stimulus. Treatment team to assess daily.           Outcome: Progressing   Goal Outcome Evaluation:        Face to face shift report received from RN. Rounding completed, pt observed.Client rested in room for 7 hours with eyes closed and respirations noted.Face to face report will be communicated to oncoming RN.    Mike Blankenship RN  2/26/2024  6:10 AM

## 2024-02-27 PROCEDURE — 99231 SBSQ HOSP IP/OBS SF/LOW 25: CPT | Mod: 95 | Performed by: PSYCHIATRY & NEUROLOGY

## 2024-02-27 PROCEDURE — 124N000004

## 2024-02-27 ASSESSMENT — ACTIVITIES OF DAILY LIVING (ADL)
ADLS_ACUITY_SCORE: 29
ORAL_HYGIENE: INDEPENDENT
ADLS_ACUITY_SCORE: 29
LAUNDRY: UNABLE TO COMPLETE
ADLS_ACUITY_SCORE: 29
DRESS: SCRUBS (BEHAVIORAL HEALTH);INDEPENDENT
ADLS_ACUITY_SCORE: 29
HYGIENE/GROOMING: INDEPENDENT

## 2024-02-27 NOTE — PLAN OF CARE
"Problem: Adult Behavioral Health Plan of Care  Goal: Patient-Specific Goal (Individualization)  Description: Patient will sleep 6 to 8 hours per night  Patient will eat at least 50% of meals  Patient will attend at least 50% of groups  Patient will comply with recommendations of treatment team  Patient will remain medication compliant    2/25/24- Pt may wean to open unit, if behaviors are appropriate, during time of decreased stimulus. Treatment team to assess daily.   2/26/2024 2119 by Jodie Raygoza, RN  Outcome: Not Progressing  Note: Pt refused vital signs. He continues to have disorganized, tangential thinking and speech. Flight of ideas present. Pt talked of being \"unlawfully detained.\" Pt frequently talking about \"protein\" and how he should be on a \"liquid diet\". He talked about how he used to have a \"high profile client.\" Pt was guarded with a lot of questions. He denied anxiety, depression, hallucinations, HI, and SI. He appeared to be responding to internal stimuli.     Face to face end of shift report will be communicated to oncoming RN.      Problem: Thought Process Alteration  Goal: Optimal Thought Clarity  Description: Pt will be able to have a reality based conversation prior to discharge.  2/26/2024 2037 by Jodie Raygoza, RN  Outcome: Not Progressing    "

## 2024-02-27 NOTE — PLAN OF CARE
Face to face shift report received from LIGIA Mckeon. Rounding completed, pt observed resting in bed at start of shift.  Problem: Adult Behavioral Health Plan of Care  Goal: Patient-Specific Goal (Individualization)  Description: Patient will sleep 6 to 8 hours per night  Patient will eat at least 50% of meals  Patient will attend at least 50% of groups  Patient will comply with recommendations of treatment team  Patient will remain medication compliant    2/25/24- Pt may wean to open unit, if behaviors are appropriate, during time of decreased stimulus. Treatment team to assess daily.     Outcome: Progressing     Problem: Thought Process Alteration  Goal: Optimal Thought Clarity  Description: Pt will be able to have a reality based conversation prior to discharge.    Outcome: Progressing   Goal Outcome Evaluation:        Pt. Denied having any physical pain this shift. They also denied having any depression, SI, or intent to self-harm. Pt. Did display some signs of anxiety. They are still placing items such as creamer containers and paper by their door and is startled during safety checks. Pt. Is able to wean to open unit. They declined to do so with each offer. 100% was eaten at breakfast and 100% at lunch.       Face to face report will be communicated to oncoming RN.    Ashley Rothman RN  2/27/2024  2:15 PM

## 2024-02-27 NOTE — PROGRESS NOTES
CM from [facility] reached and wanted an MARLY signed for pt mother. Went back to speak with pt and he would not answer me and faced the window.

## 2024-02-27 NOTE — PROGRESS NOTES
"[facility] PSYCHIATRY  PROGRESS NOTE     SUBJECTIVE     Prior to interviewing the patient, I met with nursing and reviewed patient's clinical condition. We discussed clinical care both before and after the interview. I have reviewed the patient's clinical course by review of records including previous notes, labs, and vital signs.     Per nursing, the patient had the following behavioral events over the last 24-hours: remains disorganized.    Upon interview, the patient was found in the MHICU. Attempted to meet with Marija twice today. He believes Dr. Martinez is \"fake\" and does not communicate today.  On the third attempt to meet with patient, he stated he was in the bathroom and did not want to be bothered. Will re-assess tomorrow.        MEDICATIONS   Scheduled Meds:  Not on any psychiatric medications and denies any need for psychiatric medications      PRN Meds:.acetaminophen, albuterol, alum & mag hydroxide-simethicone, Benzocaine-Menthol-Zinc Cl, cloNIDine, hydrOXYzine HCl, melatonin, nicotine, OLANZapine **OR** OLANZapine, psyllium     ALLERGIES   Allergies   Allergen Reactions     Corn Syrup [Glucose] GI Disturbance     Morphine Sulfate Nausea and Vomiting     Cannot take it in pill form, IV ok   Pancreatitis     Peanuts [Nuts] GI Disturbance     Internal cuts-GERD     Gold Au 198 [Gold] Rash        MENTAL STATUS EXAM   Vitals: BP (!) 179/104   Pulse 71   Temp 98.3  F (36.8  C) (Tympanic)   Resp 16   Ht 1.651 m (5' 5\")   Wt 144.5 kg (318 lb 9.6 oz)   SpO2 96%   BMI 53.02 kg/m      Appearance:  awake, alert, dressed in hospital scrubs, appeared as age stated, and disheveled   Attitude:Guarded  Eye Contact: Fair  Mood: does not state   Affect: labile  Speech: Regular rate  Psychomotor Behavior:  no evidence of tardive dyskinesia, dystonia, or tics  Thought Process: Tangential   Associations: Loose associations present  Thought Content: No SI or HI elicited. Denies AVH. Paranoid.  Insight: " "Poor  Judgment: Poor  Oriented to:  time, person, and place  Attention Span and Concentration: Poor  Recent and Remote Memory:  fair  Fund of Knowledge: Low to average  Muscle Strength and Tone: normal  Gait and Station: Normal       LABS   No results found for this or any previous visit (from the past 24 hour(s)).      IMPRESSION     This is a 33 year old male with a PMH of unspecified mood disorder, ADHD, SAGE, insomnia who was brought to the ED from his PCP office after exhibiting psychotic symptoms. Patient was found to be disorganized and delusional. He was placed on a hold and petition for commitment was filed in ED prior to transfer to behavioral health. Today patient is irritable. He answers most questions with \"that's confidential\". He does mention the [place], FBI, and government being involved. During our interaction he uses sign language when staring at the camera in the ceiling as if communicating to someone. He reports that he will not take any medication, because it is not real and he does not trust it. Writer and SW did attempt to explain the commitment process, however patient refused to take the paperwork offered and instead ended the interview, stating that that we needed to watch out for the [place], FBI, and secret service. Will schedule Seroquel at bedtime for tonight as patient had been on this in the past, though I suspect he will refuse. If continuing to refuse medication, will likely need to file Nieves petition tomorrow.     2/23/24: The patient continues to be manic and psychotic. Refusing all scheduled medications. Nieves re-submitted.    2/24/24: declines psychiatric interview today. Remains manic and psychotic. Nieves hearing set for 3/4/24    2/25/24: only utters one word today on examination. Remains decompensated.     2/26/24: converses today. He is illogical tangentially talking about nonsensical things.     2/27/24: attempted to meet with Marija twice today. He cites that Dr. Martinez is " "\"fake\" and does not wish to communicate today.        DIAGNOSES     Bipolar 1 disorder, current episode manic with psychotic features        PLAN     Location: Unit 5  Legal Status: Committed.    Nieves: Pending. 3/4 at 11.    Safety Assessment:    Behavioral Orders   Procedures     Code 1 - Restrict to Unit     Routine Programming     As clinically indicated     Status 15     Every 15 minutes.      PTA psychotropic medications stopped:     -no PTA meds    PTA psychotropic medications continued/changed:     -no PTA meds    New medications tried and stopped:     -Seroquel 100 mg at bedtime due to him refusing     New medications initiated:     -standard unit PRN medications    Today's Changes:    -None.     Programming: Patient will be treated in a therapeutic milieu with appropriate individual and group therapies. Education will be provided on diagnoses, medications, and treatments.     Medical diagnoses:  Per medicine    Consult: None  Tests: None    Anticipated LOS: > 7 days  Disposition: IRTS vs home with services       ATTESTATION    Bernardo Martinez MD  [facility]   Psychiatry    Video Visit: Patient has given verbal consent for video visit?: Yes  Type of Service: video visit for mental health treatment  Reason for Video Visit: COVID-19 [place]  Originating Site (patient location): [facility]  Distant Site (provider location): Remote Location  Mode of Communication: Video Conference via [employer]  Time of Service: Date: 02/27/2024 , Start: 08:00,  Stop: 08:30           "

## 2024-02-27 NOTE — PLAN OF CARE
Problem: Adult Behavioral Health Plan of Care  Goal: Patient-Specific Goal (Individualization)  Description: Patient will sleep 6 to 8 hours per night  Patient will eat at least 50% of meals  Patient will attend at least 50% of groups  Patient will comply with recommendations of treatment team  Patient will remain medication compliant    2/25/24- Pt may wean to open unit, if behaviors are appropriate, during time of decreased stimulus. Treatment team to assess daily.           Outcome: Progressing   Goal Outcome Evaluation:         Face to face shift report received from RN. Rounding completed, pt observed.Client rested in room for 3 hours with eyes closed and respirations noted.Face to face report will be communicated to oncoming RN.    Mike Blankenship RN  2/27/2024  6:08 AM

## 2024-02-28 PROCEDURE — 124N000004

## 2024-02-28 PROCEDURE — 99232 SBSQ HOSP IP/OBS MODERATE 35: CPT | Mod: 95 | Performed by: PSYCHIATRY & NEUROLOGY

## 2024-02-28 ASSESSMENT — ACTIVITIES OF DAILY LIVING (ADL)
ADLS_ACUITY_SCORE: 29
HYGIENE/GROOMING: INDEPENDENT
ADLS_ACUITY_SCORE: 29
LAUNDRY: UNABLE TO COMPLETE
DRESS: SCRUBS (BEHAVIORAL HEALTH)
ADLS_ACUITY_SCORE: 29
ORAL_HYGIENE: INDEPENDENT
ADLS_ACUITY_SCORE: 29

## 2024-02-28 NOTE — PLAN OF CARE
Problem: Thought Process Alteration  Goal: Optimal Thought Clarity  Description: Pt will be able to have a reality based conversation prior to discharge.    Outcome: Progressing     Problem: Adult Behavioral Health Plan of Care  Goal: Patient-Specific Goal (Individualization)  Description: Patient will sleep 6 to 8 hours per night  Patient will eat at least 50% of meals  Patient will attend at least 50% of groups  Patient will comply with recommendations of treatment team  Patient will remain medication compliant    2/28/24- Pt may wean to open unit, if behaviors are appropriate, during time of decreased stimulus. Treatment team to assess daily.     Outcome: Progressing     Patient dismissive of staff and unwilling to participate in nursing assessment.  Refused offers to wean to the open unit.  Did spent time in the ICU lounge watching tv but does not interact with peers.  Patient remains guarded and delusional.  Only willing to eat chicken for lunch but asked to have hamburgers ordered from [employer].  No scheduled or PRN medications given this shift.  No complaints of pain.  Will continue to monitor.  Face to face end of shift report communicated to evening shift RN.     Alexa Resendiz RN  2/28/2024  2:24 PM

## 2024-02-28 NOTE — PROGRESS NOTES
"[facility] PSYCHIATRY  PROGRESS NOTE     SUBJECTIVE     Prior to interviewing the patient, I met with nursing and reviewed patient's clinical condition. We discussed clinical care both before and after the interview. I have reviewed the patient's clinical course by review of records including previous notes, labs, and vital signs.     Per nursing, the patient had the following behavioral events over the last 24-hours: remains disorganized.    Upon interview, the patient was found in the MHICU. He is laying in his bed. He is waving writer to leave his room. He does not communicate verbally with writer today, however he does attempt to use sign language.  He will not give a thumbs up or thumbs down when talking with writer.  Nursing reports he may be signing \"CRYING\".         MEDICATIONS   Scheduled Meds:  Not on any psychiatric medications and denies any need for psychiatric medications      PRN Meds:.acetaminophen, albuterol, alum & mag hydroxide-simethicone, Benzocaine-Menthol-Zinc Cl, cloNIDine, hydrOXYzine HCl, melatonin, nicotine, OLANZapine **OR** OLANZapine, psyllium     ALLERGIES   Allergies   Allergen Reactions     Corn Syrup [Glucose] GI Disturbance     Morphine Sulfate Nausea and Vomiting     Cannot take it in pill form, IV ok   Pancreatitis     Peanuts [Nuts] GI Disturbance     Internal cuts-GERD     Gold Au 198 [Gold] Rash        MENTAL STATUS EXAM   Vitals: BP (!) 179/104   Pulse 71   Temp 98.3  F (36.8  C) (Tympanic)   Resp 16   Ht 1.651 m (5' 5\")   Wt 144.5 kg (318 lb 9.6 oz)   SpO2 96%   BMI 53.02 kg/m      Appearance:  awake, alert, dressed in hospital scrubs, appeared as age stated, and disheveled   Attitude:Guarded  Eye Contact: Fair  Mood: does not state   Affect: labile  Speech: Regular rate  Psychomotor Behavior:  no evidence of tardive dyskinesia, dystonia, or tics  Thought Process: Tangential   Associations: Loose associations present  Thought Content: No SI or HI elicited. Denies " "AVH. Paranoid.  Insight: Poor  Judgment: Poor  Oriented to:  time, person, and place  Attention Span and Concentration: Poor  Recent and Remote Memory:  fair  Fund of Knowledge: Low to average  Muscle Strength and Tone: normal  Gait and Station: Normal       LABS   No results found for this or any previous visit (from the past 24 hour(s)).      IMPRESSION     This is a 33 year old male with a PMH of unspecified mood disorder, ADHD, SAGE, insomnia who was brought to the ED from his PCP office after exhibiting psychotic symptoms. Patient was found to be disorganized and delusional. He was placed on a hold and petition for commitment was filed in ED prior to transfer to behavioral health. Today patient is irritable. He answers most questions with \"that's confidential\". He does mention the [place], FBI, and government being involved. During our interaction he uses sign language when staring at the camera in the ceiling as if communicating to someone. He reports that he will not take any medication, because it is not real and he does not trust it. Writer and SW did attempt to explain the commitment process, however patient refused to take the paperwork offered and instead ended the interview, stating that that we needed to watch out for the [place], FBI, and secret service. Will schedule Seroquel at bedtime for tonight as patient had been on this in the past, though I suspect he will refuse. If continuing to refuse medication, will likely need to file Nieves petition tomorrow.     2/23/24: The patient continues to be manic and psychotic. Refusing all scheduled medications. Nieves re-submitted.    2/24/24: declines psychiatric interview today. Remains manic and psychotic. Nieves hearing set for 3/4/24    2/25/24: only utters one word today on examination. Remains decompensated.     2/26/24: converses today. He is illogical tangentially talking about nonsensical things.     2/27/24: attempted to meet with Marija twice today. He " "cites that Dr. Martinez is \"fake\" and does not wish to communicate today.     2/28/24: will not communicate with writer, waving him away.  He may be using sing language to sign the word \"crying\" but this is unclear.        DIAGNOSES     Bipolar 1 disorder, current episode manic with psychotic features        PLAN     Location: Unit 5  Legal Status: Committed.    Nieves: Pending. 3/4 at 11.    Safety Assessment:    Behavioral Orders   Procedures     Code 1 - Restrict to Unit     Routine Programming     As clinically indicated     Status 15     Every 15 minutes.      PTA psychotropic medications stopped:     -no PTA meds    PTA psychotropic medications continued/changed:     -no PTA meds    New medications tried and stopped:     -Seroquel 100 mg at bedtime due to him refusing     New medications initiated:     -standard unit PRN medications    Today's Changes:    -None.   -Nieves scheduled for Monday 3/4 at 11AM    Programming: Patient will be treated in a therapeutic milieu with appropriate individual and group therapies. Education will be provided on diagnoses, medications, and treatments.     Medical diagnoses:  Per medicine    Consult: None  Tests: None    Anticipated LOS: > 7 days  Disposition: IRTS vs home with services       ATTESTATION    Bernardo Martinez MD  [facility]   Psychiatry    Video Visit: Patient has given verbal consent for video visit?: Yes  Type of Service: video visit for mental health treatment  Reason for Video Visit: COVID-19 and [place]  Originating Site (patient location): [facility]  Distant Site (provider location): Remote Location  Mode of Communication: Video Conference via [employer]  Time of Service: Date: 02/28/2024 , Start: 08:00,  Stop: 08:30           "

## 2024-02-28 NOTE — PROGRESS NOTES
Security emailed regarding pt [facility]equ[facility] hearing on 3/4 @ 11:00am. [facility]

## 2024-02-28 NOTE — PLAN OF CARE
Problem: Adult Behavioral Health Plan of Care  Goal: Patient-Specific Goal (Individualization)  Description: Patient will sleep 6 to 8 hours per night  Patient will eat at least 50% of meals  Patient will attend at least 50% of groups  Patient will comply with recommendations of treatment team  Patient will remain medication compliant    2/25/24- Pt may wean to open unit, if behaviors are appropriate, during time of decreased stimulus. Treatment team to assess daily.           Outcome: Progressing   Goal Outcome Evaluation:       Face to face shift report received from RN. Rounding completed, pt observed.Client rested in room for 7 hours with eyes closed and respirations noted.Face to face report will be communicated to oncoming RN.    Mike Blankenship RN  2/28/2024  6:40 AM

## 2024-02-28 NOTE — PLAN OF CARE
"Face to face shift report received from Ashley RN.       Problem: Adult Behavioral Health Plan of Care  Goal: Patient-Specific Goal (Individualization)  Description: Patient will sleep 6 to 8 hours per night  Patient will eat at least 50% of meals  Patient will attend at least 50% of groups  Patient will comply with recommendations of treatment team  Patient will remain medication compliant    2/25/24- Pt may wean to open unit, if behaviors are appropriate, during time of decreased stimulus. Treatment team to assess daily.   Outcome: Not Progressing   Room remains in MHICU due to need for decreased stimulation. Pt declined to wean to open unit. Bizarre in conversation. Pt declined vital signs this shift due to \"because of [place].\" Pt mostly remains in room. Pt did come to nurse's station to request \"a dab and a Percocet.\" When staff informed pt that was not going to be provided pt questioned \"are you disobeying a direct order?\" Pt observed to be dancing in room at times, no music playing in MHICU. No reports of pain.     Problem: Thought Process Alteration  Goal: Optimal Thought Clarity  Description: Pt will be able to have a reality based conversation prior to discharge.  Outcome: Not Progressing         Face to face end of shift report to be communicated to oncoming RN.       "

## 2024-02-29 PROCEDURE — 124N000004

## 2024-02-29 PROCEDURE — 99232 SBSQ HOSP IP/OBS MODERATE 35: CPT | Mod: 95 | Performed by: PSYCHIATRY & NEUROLOGY

## 2024-02-29 ASSESSMENT — ACTIVITIES OF DAILY LIVING (ADL)
ADLS_ACUITY_SCORE: 29
LAUNDRY: UNABLE TO COMPLETE
ORAL_HYGIENE: INDEPENDENT
ADLS_ACUITY_SCORE: 29
HYGIENE/GROOMING: INDEPENDENT
ADLS_ACUITY_SCORE: 29
DRESS: SCRUBS (BEHAVIORAL HEALTH)
ADLS_ACUITY_SCORE: 29

## 2024-02-29 NOTE — PLAN OF CARE
"  Problem: Thought Process Alteration  Goal: Optimal Thought Clarity  Description: Pt will be able to have a reality based conversation prior to discharge.    Outcome: Not Progressing     Problem: Adult Behavioral Health Plan of Care  Goal: Patient-Specific Goal (Individualization)  Description: Patient will sleep 6 to 8 hours per night  Patient will eat at least 50% of meals  Patient will attend at least 50% of groups  Patient will comply with recommendations of treatment team  Patient will remain medication compliant    2/29/24- Pt may wean to open unit, if behaviors are appropriate, during time of decreased stimulus. Treatment team to assess daily.     Outcome: Not Progressing     Patient disorganized and illogical in conversation.  Unwilling to answer assessment questions.  Only wants to talk about tv shows and smoking pot.  When asked how he is doing today patient states, \"get me doritos, a subway, and a redbull\" then walks away from staff.  Did eat two servings of chicken for lunch and had some ensure.  Declined shower.  No PRN or scheduled medications this shift.  Denies any pain.  Refused to have VS taken stating, \"[place] won't allow it.\" Face to face end of shift report communicated to evening shift RN.     Alexa Resendiz RN  2/29/2024  2:49 PM       "

## 2024-02-29 NOTE — PLAN OF CARE
Face to face shift report received from nurse. Rounding completed, pt observed.    Problem: Adult Behavioral Health Plan of Care  Goal: Patient-Specific Goal (Individualization)  Description: Patient will sleep 6 to 8 hours per night  Patient will eat at least 50% of meals  Patient will attend at least 50% of groups  Patient will comply with recommendations of treatment team  Patient will remain medication compliant    2/28/24- Pt may wean to open unit, if behaviors are appropriate, during time of decreased stimulus. Treatment team to assess daily.   Outcome: Progressing  Pt has been in bed with eyes closed and regular respirations observed all night. Will continue to monitor. Patient slept 7 hours no issues.     Face to face report will be communicated to oncoming RN.    Martin Fleming RN  2/29/2024

## 2024-02-29 NOTE — PLAN OF CARE
"Face to face shift report received from LIGIA Liu.       Problem: Adult Behavioral Health Plan of Care  Goal: Patient-Specific Goal (Individualization)  Description: Patient will sleep 6 to 8 hours per night  Patient will eat at least 50% of meals  Patient will attend at least 50% of groups  Patient will comply with recommendations of treatment team  Patient will remain medication compliant    2/28/24- Pt may wean to open unit, if behaviors are appropriate, during time of decreased stimulus. Treatment team to assess daily.   Outcome: Not Progressing  Pt again declined vital signs due to \"[place].\" Pt observed to gesture towards walls in american sign language frequently throughout shift. Pt observed to dance in [facility] lounge at times, when approached by writer pt explained \"I'm practicing dancing in front of people\" no other people present in lounge at this time. No reports of pain.     Problem: Thought Process Alteration  Goal: Optimal Thought Clarity  Description: Pt will be able to have a reality based conversation prior to discharge.  Outcome: Not Progressing         Face to face end of shift report to be communicated to oncoming RN.       "

## 2024-02-29 NOTE — PROGRESS NOTES
"[facility] PSYCHIATRY  PROGRESS NOTE     SUBJECTIVE     Prior to interviewing the patient, I met with nursing and reviewed patient's clinical condition. We discussed clinical care both before and after the interview. I have reviewed the patient's clinical course by review of records including previous notes, labs, and vital signs.     Per nursing, the patient had the following behavioral events over the last 24-hours: remains disorganized.    Upon interview, the patient was found in the MHICU. He is standing in his room looking out the window. He has an intense blank stare. He occasionally will look out the window. He does not answer questions being asked of him. He will occasionally respond in nonsensical terms. For example he asked writer if he had ever watched X files today and makes comments about bonfires.  He is asked if there is anything we can do to make him feel more comfortable and he does not respond despite asking three times.        MEDICATIONS   Scheduled Meds:  Not on any psychiatric medications and denies any need for psychiatric medications      PRN Meds:.acetaminophen, albuterol, alum & mag hydroxide-simethicone, Benzocaine-Menthol-Zinc Cl, cloNIDine, hydrOXYzine HCl, melatonin, nicotine, OLANZapine **OR** OLANZapine, psyllium     ALLERGIES   Allergies   Allergen Reactions     Corn Syrup [Glucose] GI Disturbance     Morphine Sulfate Nausea and Vomiting     Cannot take it in pill form, IV ok   Pancreatitis     Peanuts [Nuts] GI Disturbance     Internal cuts-GERD     Gold Au 198 [Gold] Rash        MENTAL STATUS EXAM   Vitals: BP (!) 179/104   Pulse 71   Temp 98.3  F (36.8  C) (Tympanic)   Resp 16   Ht 1.651 m (5' 5\")   Wt 144.5 kg (318 lb 9.6 oz)   SpO2 96%   BMI 53.02 kg/m      Appearance:  awake, alert, dressed in hospital scrubs, appeared as age stated, and disheveled   Attitude:Guarded  Eye Contact: Fair  Mood: does not state   Affect: labile  Speech: Regular rate  Psychomotor " "Behavior:  no evidence of tardive dyskinesia, dystonia, or tics  Thought Process: Tangential   Associations: Loose associations present  Thought Content: No SI or HI elicited. Denies AVH. Paranoid.  Insight: Poor  Judgment: Poor  Oriented to:  time, person, and place  Attention Span and Concentration: Poor  Recent and Remote Memory:  fair  Fund of Knowledge: Low to average  Muscle Strength and Tone: normal  Gait and Station: Normal       LABS   No results found for this or any previous visit (from the past 24 hour(s)).      IMPRESSION     This is a 33 year old male with a PMH of unspecified mood disorder, ADHD, SAGE, insomnia who was brought to the ED from his PCP office after exhibiting psychotic symptoms. Patient was found to be disorganized and delusional. He was placed on a hold and petition for commitment was filed in ED prior to transfer to behavioral health. Today patient is irritable. He answers most questions with \"that's confidential\". He does mention the [place], FBI, and government being involved. During our interaction he uses sign language when staring at the camera in the ceiling as if communicating to someone. He reports that he will not take any medication, because it is not real and he does not trust it. Writer and SW did attempt to explain the commitment process, however patient refused to take the paperwork offered and instead ended the interview, stating that that we needed to watch out for the [employer], FBI, and secret service. Will schedule Seroquel at bedtime for tonight as patient had been on this in the past, though I suspect he will refuse. If continuing to refuse medication, will likely need to file Nieves petition tomorrow.     2/23/24: The patient continues to be manic and psychotic. Refusing all scheduled medications. Nieves re-submitted.    2/24/24: declines psychiatric interview today. Remains manic and psychotic. Nieves hearing set for 3/4/24    2/25/24: only utters one word today on " "examination. Remains decompensated.     2/26/24: converses today. He is illogical tangentially talking about nonsensical things.     2/27/24: attempted to meet with Marija twice today. He cites that Dr. Martinez is \"fake\" and does not wish to communicate today.     2/28/24: will not communicate with writer, waving him away.  He may be using sing language to sign the word \"crying\" but this is unclear.     2/29/24: nonsensical in communication. Remains disorganized.        DIAGNOSES     Bipolar 1 disorder, current episode manic with psychotic features        PLAN     Location: Unit 5  Legal Status: Committed.    Nieves: Pending. 3/4 at 11.    Safety Assessment:    Behavioral Orders   Procedures     Code 1 - Restrict to Unit     Routine Programming     As clinically indicated     Status 15     Every 15 minutes.      PTA psychotropic medications stopped:     -no PTA meds    PTA psychotropic medications continued/changed:     -no PTA meds    New medications tried and stopped:     -Seroquel 100 mg at bedtime due to him refusing     New medications initiated:     -standard unit PRN medications    Today's Changes:    -None.   -Nieves scheduled for Monday 3/4 at 11AM    Programming: Patient will be treated in a therapeutic milieu with appropriate individual and group therapies. Education will be provided on diagnoses, medications, and treatments.     Medical diagnoses:  Per medicine    Consult: None  Tests: None    Anticipated LOS: > 7 days  Disposition: IRTS vs home with services       ATTESTATION    Bernardo Martinez MD  [facility]   Psychiatry    Video Visit: Patient has given verbal consent for video visit?: Yes  Type of Service: video visit for mental health treatment  Reason for Video Visit: COVID-19 [place]  Originating Site (patient location): [facility]  Distant Site (provider location): Remote Location  Mode of Communication: Video Conference via [employer]  Time of Service: " Date: 02/29/2024 , Start: 08:00,  Stop: 08:30

## 2024-03-01 PROCEDURE — 99232 SBSQ HOSP IP/OBS MODERATE 35: CPT | Mod: 95 | Performed by: PSYCHIATRY & NEUROLOGY

## 2024-03-01 PROCEDURE — 124N000004

## 2024-03-01 ASSESSMENT — ACTIVITIES OF DAILY LIVING (ADL)
ADLS_ACUITY_SCORE: 29
HYGIENE/GROOMING: INDEPENDENT
ADLS_ACUITY_SCORE: 29
ADLS_ACUITY_SCORE: 29
ORAL_HYGIENE: INDEPENDENT
ADLS_ACUITY_SCORE: 29
LAUNDRY: UNABLE TO COMPLETE
ADLS_ACUITY_SCORE: 29
DRESS: INDEPENDENT
ADLS_ACUITY_SCORE: 29

## 2024-03-01 NOTE — PLAN OF CARE
Face to face shift report received from LIGIA Liu.       Problem: Adult Behavioral Health Plan of Care  Goal: Patient-Specific Goal (Individualization)  Description: Patient will sleep 6 to 8 hours per night  Patient will eat at least 50% of meals  Patient will attend at least 50% of groups  Patient will comply with recommendations of treatment team  Patient will remain medication compliant    2/29/24- Pt may wean to open unit, if behaviors are appropriate, during time of decreased stimulus. Treatment team to assess daily.   Outcome: Not Progressing   Room remains in MHICU due to need for decreased stimulation. Pt declined to wean this shift. Disorganized and bizarre in conversation. Offers little in conversation. Observed to dance in MHICU lounge. No reports of pain.     Problem: Thought Process Alteration  Goal: Optimal Thought Clarity  Description: Pt will be able to have a reality based conversation prior to discharge.  Outcome: Not Progressing         Face to face end of shift report to be communicated to oncoming RN.

## 2024-03-01 NOTE — PROGRESS NOTES
"[facility] PSYCHIATRY  PROGRESS NOTE     SUBJECTIVE     Prior to interviewing the patient, I met with nursing and reviewed patient's clinical condition. We discussed clinical care both before and after the interview. I have reviewed the patient's clinical course by review of records including previous notes, labs, and vital signs.     Per nursing, the patient had the following behavioral events over the last 24-hours: remains disorganized and in MHICU    Upon interview, the patient was found in the MHICU. He does not wish to communicate.  We discussed in team that he has been ordering 10 ensures at a time. We will restrict to mealtime.  He oscillates between what he is willing to eat and drink on the unit. No concern for malnourishment at this time.       MEDICATIONS   Scheduled Meds:  Not on any psychiatric medications and denies any need for psychiatric medications      PRN Meds:.acetaminophen, albuterol, alum & mag hydroxide-simethicone, Benzocaine-Menthol-Zinc Cl, cloNIDine, hydrOXYzine HCl, melatonin, nicotine, OLANZapine **OR** OLANZapine, psyllium     ALLERGIES   Allergies   Allergen Reactions     Corn Syrup [Glucose] GI Disturbance     Morphine Sulfate Nausea and Vomiting     Cannot take it in pill form, IV ok   Pancreatitis     Peanuts [Nuts] GI Disturbance     Internal cuts-GERD     Gold Au 198 [Gold] Rash        MENTAL STATUS EXAM   Vitals: BP (!) 179/104   Pulse 71   Temp 98.3  F (36.8  C) (Tympanic)   Resp 16   Ht 1.651 m (5' 5\")   Wt 144.5 kg (318 lb 9.6 oz)   SpO2 96%   BMI 53.02 kg/m      Appearance:  awake, alert, dressed in hospital scrubs, appeared as age stated, and disheveled   Attitude:Guarded  Eye Contact: Fair  Mood: does not state   Affect: labile  Speech: Regular rate  Psychomotor Behavior:  no evidence of tardive dyskinesia, dystonia, or tics  Thought Process: Tangential   Associations: Loose associations present  Thought Content: No SI or HI elicited. Denies AVH. " "Paranoid.  Insight: Poor  Judgment: Poor  Oriented to:  time, person, and place  Attention Span and Concentration: Poor  Recent and Remote Memory:  fair  Fund of Knowledge: Low to average  Muscle Strength and Tone: normal  Gait and Station: Normal       LABS   No results found for this or any previous visit (from the past 24 hour(s)).      IMPRESSION     This is a 33 year old male with a PMH of unspecified mood disorder, ADHD, SAGE, insomnia who was brought to the ED from his PCP office after exhibiting psychotic symptoms. Patient was found to be disorganized and delusional. He was placed on a hold and petition for commitment was filed in ED prior to transfer to behavioral health. Today patient is irritable. He answers most questions with \"that's confidential\". He does mention the [place], FBI, and government being involved. During our interaction he uses sign language when staring at the camera in the ceiling as if communicating to someone. He reports that he will not take any medication, because it is not real and he does not trust it. Writer and SW did attempt to explain the commitment process, however patient refused to take the paperwork offered and instead ended the interview, stating that that we needed to watch out for the [employer], FBI, and secret service. Will schedule Seroquel at bedtime for tonight as patient had been on this in the past, though I suspect he will refuse. If continuing to refuse medication, will likely need to file Nieves petition tomorrow.     2/23/24: The patient continues to be manic and psychotic. Refusing all scheduled medications. Nieves re-submitted.    2/24/24: declines psychiatric interview today. Remains manic and psychotic. Nieves hearing set for 3/4/24    2/25/24: only utters one word today on examination. Remains decompensated.     2/26/24: converses today. He is illogical tangentially talking about nonsensical things.     2/27/24: attempted to meet with Marija twice today. He cites " "that Dr. Martinez is \"fake\" and does not wish to communicate today.     2/28/24: will not communicate with writer, waving him away.  He may be using sing language to sign the word \"crying\" but this is unclear.     2/29/24: nonsensical in communication. Remains disorganized.     3/1/24: does not participate in interview       DIAGNOSES     Bipolar 1 disorder, current episode manic with psychotic features        PLAN     Location: Unit 5  Legal Status: Committed.    Nieves: Pending. 3/4 at 11.    Safety Assessment:    Behavioral Orders   Procedures     Code 1 - Restrict to Unit     Routine Programming     As clinically indicated     Status 15     Every 15 minutes.      PTA psychotropic medications stopped:     -no PTA meds    PTA psychotropic medications continued/changed:     -no PTA meds    New medications tried and stopped:     -Seroquel 100 mg at bedtime due to him refusing     New medications initiated:     -standard unit PRN medications    Today's Changes:    -None.   -Nieves scheduled for Monday 3/4 at 11AM    Programming: Patient will be treated in a therapeutic milieu with appropriate individual and group therapies. Education will be provided on diagnoses, medications, and treatments.     Medical diagnoses:  Per medicine    Consult: None  Tests: None    Anticipated LOS: > 7 days  Disposition: IRTS vs home with services       ATTESTATION    Bernardo Martinez MD  [facility]   Psychiatry    Video Visit: Patient has given verbal consent for video visit?: Yes  Type of Service: video visit for mental health treatment  Reason for Video Visit: COVID-19 and [place]  Originating Site (patient location): [facility]  Distant Site (provider location): Remote Location  Mode of Communication: Video Conference via [employer]  Time of Service: Date: 03/01/2024 , Start: 08:00,  Stop: 08:30     "

## 2024-03-01 NOTE — PLAN OF CARE
Face to face shift report received from nurse. Rounding completed, pt observed.    Problem: Adult Behavioral Health Plan of Care  Goal: Patient-Specific Goal (Individualization)  Description: Patient will sleep 6 to 8 hours per night  Patient will eat at least 50% of meals  Patient will attend at least 50% of groups  Patient will comply with recommendations of treatment team  Patient will remain medication compliant    2/29/24- Pt may wean to open unit, if behaviors are appropriate, during time of decreased stimulus. Treatment team to assess daily.     Outcome: Progressing   Pt has been in bed with eyes closed and regular respirations observed all night. Will continue to monitor. Patient slept 5 hours no issues.    Face to face report will be communicated to oncoming RN.    Martin Fleming RN  3/1/2024

## 2024-03-01 NOTE — PLAN OF CARE
Problem: Adult Behavioral Health Plan of Care  Goal: Patient-Specific Goal (Individualization)  Description: Patient will sleep 6 to 8 hours per night  Patient will eat at least 50% of meals  Patient will attend at least 50% of groups  Patient will comply with recommendations of treatment team  Patient will remain medication compliant    2/29/24- Pt may wean to open unit, if behaviors are appropriate, during time of decreased stimulus. Treatment team to assess daily.       Outcome: Progressing     Problem: Thought Process Alteration  Goal: Optimal Thought Clarity  Description: Pt will be able to have a reality based conversation prior to discharge.    Outcome: Progressing   Goal Outcome Evaluation:       Pt in MHICU at the start of the shift. Pt refused to talk to nursing about symptoms. Pt ordered nothing but fruit cups and ensures for breakfast and lunch. Pt states that he is done with chicken and is now on liquids only. Pt asked for towels but did not take a shower.

## 2024-03-02 PROCEDURE — 124N000004

## 2024-03-02 PROCEDURE — 99231 SBSQ HOSP IP/OBS SF/LOW 25: CPT | Mod: 95 | Performed by: PSYCHIATRY & NEUROLOGY

## 2024-03-02 ASSESSMENT — ACTIVITIES OF DAILY LIVING (ADL)
ADLS_ACUITY_SCORE: 29
LAUNDRY: UNABLE TO COMPLETE
ADLS_ACUITY_SCORE: 29
ADLS_ACUITY_SCORE: 29
ADLS_ACUITY_SCORE: 39
ADLS_ACUITY_SCORE: 29
ADLS_ACUITY_SCORE: 39
LAUNDRY: UNABLE TO COMPLETE
HYGIENE/GROOMING: PROMPTS;INDEPENDENT
ADLS_ACUITY_SCORE: 29
ADLS_ACUITY_SCORE: 39
HYGIENE/GROOMING: INDEPENDENT
ADLS_ACUITY_SCORE: 39
ADLS_ACUITY_SCORE: 39
ADLS_ACUITY_SCORE: 29
ADLS_ACUITY_SCORE: 39
ADLS_ACUITY_SCORE: 29
ADLS_ACUITY_SCORE: 29
ADLS_ACUITY_SCORE: 39
DRESS: SCRUBS (BEHAVIORAL HEALTH)
ORAL_HYGIENE: INDEPENDENT

## 2024-03-02 NOTE — PLAN OF CARE
"Face to face end of shift report received from Apurva BECKETT RN. Rounding completed. [place] [facility]     Pt has been calm this shift. He continues to refuse to participate in nursing assessment, VS, and declines all medication. Pt denied having any issues. Reports that he usually purees his food and after having so many chicken strips he needs to drink Ensure \"to get the food to go down.\" Questioned if pt was having any issues having a BM- pt did not give straight answer and stated \"I puree my food for that. Just give me some chicken, pesto, and water. Then put them of crackers.\" Pt informed that the kitchen will not send him any Ensure because of his allergy to corn syrup. Pt would not specify the severity level of allergy then stated \"They will do as they are told. That is all.\" Pt did not have any behaviors throughout the shift. Encouraged pt to wean to open unit, but he states \"No, I' good back here. The TV does its job.\" Pt does not engage with staff for the remainder of the evening. He is able to make his needs known. Steady gait- no falls. Frequent rounding.    Problem: Adult Behavioral Health Plan of Care  Goal: Patient-Specific Goal (Individualization)  Description: Patient will sleep 6 to 8 hours per night  Patient will eat at least 50% of meals  Patient will attend at least 50% of groups  Patient will comply with recommendations of treatment team  Patient will remain medication compliant    3/1/24- Pt may wean to open unit, if behaviors are appropriate, during time of decreased stimulus. Treatment team to assess daily.     Outcome: Progressing     Problem: Thought Process Alteration  Goal: Optimal Thought Clarity  Description: Pt will be able to have a reality based conversation prior to discharge.    Outcome: Progressing               Katlyn Jensen RN  3/1/2024  8:39 PM    "

## 2024-03-02 NOTE — PLAN OF CARE
"  Problem: Adult Behavioral Health Plan of Care  Goal: Patient-Specific Goal (Individualization)  Description: Patient will sleep 6 to 8 hours per night  Patient will eat at least 50% of meals  Patient will attend at least 50% of groups  Patient will comply with recommendations of treatment team  Patient will remain medication compliant    3/2/24- Pt may wean to open unit, if behaviors are appropriate, during time of decreased stimulus. Treatment team to assess daily.   Outcome: Progressing     Problem: Thought Process Alteration  Goal: Optimal Thought Clarity  Description: Pt will be able to have a reality based conversation prior to discharge.    Outcome: Progressing   Goal Outcome Evaluation:    Plan of Care Reviewed With: patient          Patient is withdrawn, does not engage in conversation with writer, he refuses to speak to his provider on the ipad, shakes his head 'no' to writer and puts a hand up with all 5 fingers, then points to each finger separately like he was counting them. 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 States he will only shower if he has Dr Fragoso's lavender epsom salts.  Patient spends time lying on his bed and in the ICU writing on the chalk board.   Face to face end of shift report communicated to oncdolly STRONG.     Shell Wood RN  3/2/2024  2:31 PM               "

## 2024-03-02 NOTE — PLAN OF CARE
Problem: Adult Behavioral Health Plan of Care  Goal: Patient-Specific Goal (Individualization)  Description: Patient will sleep 6 to 8 hours per night  Patient will eat at least 50% of meals  Patient will attend at least 50% of groups  Patient will comply with recommendations of treatment team  Patient will remain medication compliant    3/1/24- Pt may wean to open unit, if behaviors are appropriate, during time of decreased stimulus. Treatment team to assess daily.           Outcome: Progressing   Goal Outcome Evaluation:       Face to face shift report received from RN. Rounding completed, pt observed.Client rested in room for 2.5 hours with eyes closed and respirations noted.Face to face report will be communicated to oncoming RN.    Mike Blankenship RN  3/2/2024  6:08 AM

## 2024-03-02 NOTE — PROGRESS NOTES
"[facility] PSYCHIATRY  PROGRESS NOTE     SUBJECTIVE     Prior to interviewing the patient, I met with nursing and reviewed patient's clinical condition. We discussed clinical care both before and after the interview. I have reviewed the patient's clinical course by review of records including previous notes, labs, and vital signs.     Per nursing, the patient had the following behavioral events over the last 24-hours: remains disorganized and in MHICU    Upon interview, the patient was found in the MHICU. He shows writer his five fingers. Does not communicate further. He continues to make odd and illogical comments to nursing staff.        MEDICATIONS   Scheduled Meds:  Not on any psychiatric medications and denies any need for psychiatric medications      PRN Meds:.acetaminophen, albuterol, alum & mag hydroxide-simethicone, Benzocaine-Menthol-Zinc Cl, cloNIDine, hydrOXYzine HCl, melatonin, nicotine, OLANZapine **OR** OLANZapine, psyllium     ALLERGIES   Allergies   Allergen Reactions     Corn Syrup [Glucose] GI Disturbance     Morphine Sulfate Nausea and Vomiting     Cannot take it in pill form, IV ok   Pancreatitis     Peanuts [Nuts] GI Disturbance     Internal cuts-GERD     Gold Au 198 [Gold] Rash        MENTAL STATUS EXAM   Vitals: BP (!) 179/104   Pulse 71   Temp 98.3  F (36.8  C) (Tympanic)   Resp 16   Ht 1.651 m (5' 5\")   Wt 144.5 kg (318 lb 9.6 oz)   SpO2 96%   BMI 53.02 kg/m      Appearance:  awake, alert, dressed in hospital scrubs, appeared as age stated, and disheveled   Attitude:Guarded  Eye Contact: Fair  Mood: does not state   Affect: labile  Speech: Regular rate  Psychomotor Behavior:  no evidence of tardive dyskinesia, dystonia, or tics  Thought Process: Tangential   Associations: Loose associations present  Thought Content: No SI or HI elicited. Denies AVH. Paranoid.  Insight: Poor  Judgment: Poor  Oriented to:  time, person, and place  Attention Span and Concentration: Poor  Recent and " "Remote Memory:  fair  Fund of Knowledge: Low to average  Muscle Strength and Tone: normal  Gait and Station: Normal       LABS   No results found for this or any previous visit (from the past 24 hour(s)).      IMPRESSION     This is a 33 year old male with a PMH of unspecified mood disorder, ADHD, SAGE, insomnia who was brought to the ED from his PCP office after exhibiting psychotic symptoms. Patient was found to be disorganized and delusional. He was placed on a hold and petition for commitment was filed in ED prior to transfer to behavioral health. Today patient is irritable. He answers most questions with \"that's confidential\". He does mention the [place], FBI, and government being involved. During our interaction he uses sign language when staring at the camera in the ceiling as if communicating to someone. He reports that he will not take any medication, because it is not real and he does not trust it. Writer and SW did attempt to explain the commitment process, however patient refused to take the paperwork offered and instead ended the interview, stating that that we needed to watch out for the [place], FBI, and secret service. Will schedule Seroquel at bedtime for tonight as patient had been on this in the past, though I suspect he will refuse. If continuing to refuse medication, will likely need to file Nieves petition tomorrow.     2/23/24: The patient continues to be manic and psychotic. Refusing all scheduled medications. Nieves re-submitted.    2/24/24: declines psychiatric interview today. Remains manic and psychotic. Nieves hearing set for 3/4/24    2/25/24: only utters one word today on examination. Remains decompensated.     2/26/24: converses today. He is illogical tangentially talking about nonsensical things.     2/27/24: attempted to meet with Marija twice today. He cites that Dr. Martinez is \"fake\" and does not wish to communicate today.     2/28/24: will not communicate with writer, waving him away.  " "He may be using sing language to sign the word \"crying\" but this is unclear.     2/29/24: nonsensical in communication. Remains disorganized.     3/1/24: does not participate in interview    3/2/24: upon interview today, Marija does not communicate. He puts up his hand and shows all 5 fingers, nothing else.        DIAGNOSES     Bipolar 1 disorder, current episode manic with psychotic features        PLAN     Location: Unit 5  Legal Status: Committed.    Nieves: Pending. 3/4 at 11.    Safety Assessment:    Behavioral Orders   Procedures     Code 1 - Restrict to Unit     Routine Programming     As clinically indicated     Status 15     Every 15 minutes.      PTA psychotropic medications stopped:     -no PTA meds    PTA psychotropic medications continued/changed:     -no PTA meds    New medications tried and stopped:     -Seroquel 100 mg at bedtime due to him refusing     New medications initiated:     -standard unit PRN medications    Today's Changes:  -None.   -Nieves scheduled for Monday 3/4 at 11AM    Programming: Patient will be treated in a therapeutic milieu with appropriate individual and group therapies. Education will be provided on diagnoses, medications, and treatments.     Medical diagnoses:  Per medicine    Consult: None  Tests: None    Anticipated LOS: > 7 days  Disposition: IRTS vs home with services       ATTESTATION    Bernardo Martinez MD  [facility]   Psychiatry    Video Visit: Patient has given verbal consent for video visit?: Yes  Type of Service: video visit for mental health treatment  Reason for Video Visit: COVID-19 [place]  Originating Site (patient location): [facility]  Distant Site (provider location): Remote Location  Mode of Communication: Video Conference via [employer]  Time of Service: Date: 03/02/2024 , Start: 08:00,  Stop: 08:30     "

## 2024-03-03 PROCEDURE — 124N000004

## 2024-03-03 PROCEDURE — 99232 SBSQ HOSP IP/OBS MODERATE 35: CPT | Mod: 95 | Performed by: PSYCHIATRY & NEUROLOGY

## 2024-03-03 RX ORDER — FEEDER CONTAINER WITH PUMP SET
1 EACH MISCELLANEOUS
Status: DISCONTINUED | OUTPATIENT
Start: 2024-03-03 | End: 2024-03-03

## 2024-03-03 ASSESSMENT — ACTIVITIES OF DAILY LIVING (ADL)
ADLS_ACUITY_SCORE: 29
ADLS_ACUITY_SCORE: 29
LAUNDRY: UNABLE TO COMPLETE
ADLS_ACUITY_SCORE: 29
ORAL_HYGIENE: INDEPENDENT
ADLS_ACUITY_SCORE: 39
ADLS_ACUITY_SCORE: 29
ADLS_ACUITY_SCORE: 39
ADLS_ACUITY_SCORE: 29
ADLS_ACUITY_SCORE: 29
ADLS_ACUITY_SCORE: 39
ADLS_ACUITY_SCORE: 29
DRESS: SCRUBS (BEHAVIORAL HEALTH)
ADLS_ACUITY_SCORE: 39
ADLS_ACUITY_SCORE: 29
HYGIENE/GROOMING: INDEPENDENT
ADLS_ACUITY_SCORE: 39
ADLS_ACUITY_SCORE: 29
ADLS_ACUITY_SCORE: 39
ADLS_ACUITY_SCORE: 29
ADLS_ACUITY_SCORE: 39
ADLS_ACUITY_SCORE: 29
ADLS_ACUITY_SCORE: 29

## 2024-03-03 NOTE — PLAN OF CARE
"  Problem: Adult Behavioral Health Plan of Care  Goal: Patient-Specific Goal (Individualization)  Description: Patient will sleep 6 to 8 hours per night  Patient will eat at least 50% of meals  Patient will attend at least 50% of groups  Patient will comply with recommendations of treatment team  Patient will remain medication compliant    3/3/24- Pt may wean to open unit, if behaviors are appropriate, during time of decreased stimulus. Treatment team to assess daily.           Outcome: Progressing  Note: 15:40: Received end of shift report from LIGIA Goff. Pt remains in MN-ICU r/t decreased stimuli, paranoia, disorganized behavior. Standing in front of televison, using hand gestures, communicating;yelling out at television.     17:00: Politely  declines routine vitals, states, \"I'm in good with Hong David\" Extensive encouraging implemented to come out onto open unit---     22:00: Continues to decline coming out onto open unit. Ate 100% of chicken burger. Excellent fluid intake. Denies need for medication adminsitration. Derealization continues, loose associations mild paranoia. Disheveled, unkept, poor hygiene. No changes @ this time.     Face to face end of shift report communicated to on-coming [facility] staff.     Charla Castro RN  3/3/2024  11:22 PM           Problem: Thought Process Alteration  Goal: Optimal Thought Clarity  Description: Pt will be able to have a reality based conversation prior to discharge.    Outcome: Not Progressing   Goal Outcome Evaluation:                        "

## 2024-03-03 NOTE — PLAN OF CARE
Problem: Adult Behavioral Health Plan of Care  Goal: Patient-Specific Goal (Individualization)  Description: Patient will sleep 6 to 8 hours per night  Patient will eat at least 50% of meals  Patient will attend at least 50% of groups  Patient will comply with recommendations of treatment team  Patient will remain medication compliant    3/2/24- Pt may wean to open unit, if behaviors are appropriate, during time of decreased stimulus. Treatment team to assess daily.           Outcome: Progressing   Goal Outcome Evaluation:       Face to face shift report received from RN. Rounding completed, pt observed.Client rested in room for 7 hours with eyes closed and respirations noted.Face to face report will be communicated to oncoming RN.    Mike Blankenship RN  3/3/2024  6:22 AM

## 2024-03-03 NOTE — PLAN OF CARE
"  Problem: Adult Behavioral Health Plan of Care  Goal: Patient-Specific Goal (Individualization)  Description: Patient will sleep 6 to 8 hours per night  Patient will eat at least 50% of meals  Patient will attend at least 50% of groups  Patient will comply with recommendations of treatment team  Patient will remain medication compliant    3/3/24- Pt may wean to open unit, if behaviors are appropriate, during time of decreased stimulus. Treatment team to assess daily.   Outcome: Not Progressing     Problem: Thought Process Alteration  Goal: Optimal Thought Clarity  Description: Pt will be able to have a reality based conversation prior to discharge.    Outcome: Not Progressing   Goal Outcome Evaluation:    Plan of Care Reviewed With: patient          Patient is angry, frustrated, and argumentative. He won't answer questions, he uses sign language. He is demanding, \"where is my Ensure?\". It was explained that because of his corn syrup allergy, kitchen won't send it, patient angrily states \"don't lie to me\". Provider updated, order and allergy changed. Patient sitting on the edge of his bed with his arms crossed staring straight ahead.   Patient spends the afternoon standing in front of the tv in the [facility] lounge.  Face to face end of shift report communicated to oncoming LIGIA.     Shell Wood RN  3/3/2024  2:56 PM               "

## 2024-03-04 PROCEDURE — 99232 SBSQ HOSP IP/OBS MODERATE 35: CPT | Mod: 95 | Performed by: PSYCHIATRY & NEUROLOGY

## 2024-03-04 PROCEDURE — 124N000004

## 2024-03-04 ASSESSMENT — ACTIVITIES OF DAILY LIVING (ADL)
ADLS_ACUITY_SCORE: 29
ORAL_HYGIENE: INDEPENDENT
ADLS_ACUITY_SCORE: 29
ORAL_HYGIENE: INDEPENDENT
ADLS_ACUITY_SCORE: 29
HYGIENE/GROOMING: INDEPENDENT
ADLS_ACUITY_SCORE: 29
ADLS_ACUITY_SCORE: 29
HYGIENE/GROOMING: INDEPENDENT
ADLS_ACUITY_SCORE: 29
ADLS_ACUITY_SCORE: 29
DRESS: SCRUBS (BEHAVIORAL HEALTH)
ADLS_ACUITY_SCORE: 29
ADLS_ACUITY_SCORE: 29
DRESS: SCRUBS (BEHAVIORAL HEALTH);INDEPENDENT
ADLS_ACUITY_SCORE: 29
LAUNDRY: UNABLE TO COMPLETE
ADLS_ACUITY_SCORE: 29
ADLS_ACUITY_SCORE: 29

## 2024-03-04 NOTE — PLAN OF CARE
Problem: Adult Behavioral Health Plan of Care  Goal: Patient-Specific Goal (Individualization)  Description: Patient will sleep 6 to 8 hours per night  Patient will eat at least 50% of meals  Patient will attend at least 50% of groups  Patient will comply with recommendations of treatment team  Patient will remain medication compliant    3/3/24- Pt may wean to open unit, if behaviors are appropriate, during time of decreased stimulus. Treatment team to assess daily.           Outcome: Not Progressing     Problem: Thought Process Alteration  Goal: Optimal Thought Clarity  Description: Pt will be able to have a reality based conversation prior to discharge.    Outcome: Not Progressing   Goal Outcome Evaluation:         Pt. Is in MHICU, offered to wean to open unit, Pt. Declined,  slept 4 hours last night, appetite is good, eating at least 50% of meals, speech is rambling with flight of ideas, does not have reality based conversation, is able to make needs be known, will continue to monitor progress.    Face to face end of shift report will be communicated to oncoming afternoon shift RN.     Scarlett Hayes RN  3/4/2024  10:50 AM                     Patient given Rx for glasses.

## 2024-03-04 NOTE — PLAN OF CARE
Problem: Adult Behavioral Health Plan of Care  Goal: Patient-Specific Goal (Individualization)  Description: Patient will sleep 6 to 8 hours per night  Patient will eat at least 50% of meals  Patient will attend at least 50% of groups  Patient will comply with recommendations of treatment team  Patient will remain medication compliant    3/3/24- Pt may wean to open unit, if behaviors are appropriate, during time of decreased stimulus. Treatment team to assess daily.     Outcome: Progressing     Problem: Thought Process Alteration  Goal: Optimal Thought Clarity  Description: Pt will be able to have a reality based conversation prior to discharge.    Outcome: Progressing   Goal Outcome Evaluation:    Pt in MHICU at the start of the shift. Pt pleasant interacting with staff, writing jokes on the chalkboard. Pt denied all symptoms of pain, anxiety, depression, SI, HI and hallucinations. Pt was watching tv most of the shift, could be heard talking to the tv at times and laughing. Pt ate his dinner and ordered 2 orders of quiñonez after. Pt refused snack at snack time.            Face to face end of shift report communicated to night shift RN.     Cristina Montoya, RN  3/4/2024  4:16 PM

## 2024-03-04 NOTE — PROGRESS NOTES
"[facility] PSYCHIATRY  PROGRESS NOTE     SUBJECTIVE     Prior to interviewing the patient, I met with nursing and reviewed patient's clinical condition. We discussed clinical care both before and after the interview. I have reviewed the patient's clinical course by review of records including previous notes, labs, and vital signs.     Per nursing, the patient had the following behavioral events over the last 24-hours: remains disorganized and in MHICU    Upon interview, the patient was found in the MHICU. He initially shows writer his five fingers again today.  He is informed he has court tomorrow and that this is for commitment and Nieves. Writer made it clear to patient that it is highly encouraged he work with his  and attend the hearing should he wish to do so and that we will make this available to him. He begins talking about bradford rights.        MEDICATIONS   Scheduled Meds:  Not on any psychiatric medications and denies any need for psychiatric medications      PRN Meds:.acetaminophen, albuterol, alum & mag hydroxide-simethicone, Benzocaine-Menthol-Zinc Cl, cloNIDine, hydrOXYzine HCl, melatonin, nicotine, OLANZapine **OR** OLANZapine, psyllium     ALLERGIES   Allergies   Allergen Reactions     Morphine Sulfate Nausea and Vomiting     Cannot take it in pill form, IV ok   Pancreatitis     Peanuts [Nuts] GI Disturbance     Internal cuts-GERD     Gold Au 198 [Gold] Rash        MENTAL STATUS EXAM   Vitals: BP (!) 179/104   Pulse 71   Temp 98.3  F (36.8  C) (Tympanic)   Resp 16   Ht 1.651 m (5' 5\")   Wt 144.5 kg (318 lb 9.6 oz)   SpO2 96%   BMI 53.02 kg/m      Appearance:  awake, alert, dressed in hospital scrubs, appeared as age stated, and disheveled   Attitude:Guarded  Eye Contact: Fair  Mood: does not state   Affect: labile  Speech: Regular rate  Psychomotor Behavior:  no evidence of tardive dyskinesia, dystonia, or tics  Thought Process: Tangential   Associations: Loose associations " "present  Thought Content: No SI or HI elicited. Denies AVH. Paranoid.  Insight: Poor  Judgment: Poor  Oriented to:  time, person, and place  Attention Span and Concentration: Poor  Recent and Remote Memory:  fair  Fund of Knowledge: Low to average  Muscle Strength and Tone: normal  Gait and Station: Normal       LABS   No results found for this or any previous visit (from the past 24 hour(s)).      IMPRESSION     This is a 33 year old male with a PMH of unspecified mood disorder, ADHD, SAGE, insomnia who was brought to the ED from his PCP office after exhibiting psychotic symptoms. Patient was found to be disorganized and delusional. He was placed on a hold and petition for commitment was filed in ED prior to transfer to behavioral health. Today patient is irritable. He answers most questions with \"that's confidential\". He does mention the [place], FBI, and government being involved. During our interaction he uses sign language when staring at the camera in the ceiling as if communicating to someone. He reports that he will not take any medication, because it is not real and he does not trust it. Writer and SW did attempt to explain the commitment process, however patient refused to take the paperwork offered and instead ended the interview, stating that that we needed to watch out for the [place], FBI, and secret service. Will schedule Seroquel at bedtime for tonight as patient had been on this in the past, though I suspect he will refuse. If continuing to refuse medication, will likely need to file Nieves petition tomorrow.     2/23/24: The patient continues to be manic and psychotic. Refusing all scheduled medications. Nieves re-submitted.    2/24/24: declines psychiatric interview today. Remains manic and psychotic. Nieves hearing set for 3/4/24    2/25/24: only utters one word today on examination. Remains decompensated.     2/26/24: converses today. He is illogical tangentially talking about nonsensical things. " "    2/27/24: attempted to meet with Marija twice today. He cites that Dr. Martinez is \"fake\" and does not wish to communicate today.     2/28/24: will not communicate with writer, waving him away.  He may be using sing language to sign the word \"crying\" but this is unclear.     2/29/24: nonsensical in communication. Remains disorganized.     3/1/24: does not participate in interview    3/2/24: upon interview today, Marija does not communicate. He puts up his hand and shows all 5 fingers, nothing else.     3/3/24: encouraged to attend his court hearing tomorrow. He remains disorganized. He is in need of a Nieves for stabilization.        DIAGNOSES     Bipolar 1 disorder, current episode manic with psychotic features        PLAN     Location: Unit 5  Legal Status: Committed.    Nieves: Pending. 3/4 at 11.    Safety Assessment:    Behavioral Orders   Procedures     Code 1 - Restrict to Unit     Routine Programming     As clinically indicated     Status 15     Every 15 minutes.      PTA psychotropic medications stopped:     -no PTA meds    PTA psychotropic medications continued/changed:     -no PTA meds    New medications tried and stopped:     -Seroquel 100 mg at bedtime due to him refusing     New medications initiated:     -standard unit PRN medications    Today's Changes:  -None.   -Nieves scheduled for Monday 3/4 at 11AM    Programming: Patient will be treated in a therapeutic milieu with appropriate individual and group therapies. Education will be provided on diagnoses, medications, and treatments.     Medical diagnoses:  Per medicine    Consult: None  Tests: None    Anticipated LOS: > 7 days  Disposition: IRTS vs home with services       ATTESTATION    Bernardo Martinez MD  [facility]   Psychiatry    Video Visit: Patient has given verbal consent for video visit?: Yes  Type of Service: video visit for mental health treatment  Reason for Video Visit: COVID-19 and [place] access [place]  Originating Site (patient location): [facility]  Distant Site (provider location): Remote Location  Mode of Communication: Video Conference via [employer]  Time of Service: Date: 03/03/2024 , Start: 08:00,  Stop: 08:30

## 2024-03-04 NOTE — PROGRESS NOTES
Pt has Nieves hearing today and refused to participate.  did order Nieves, waiting on paperwork.     Updated notes sent to LIVIA.

## 2024-03-05 PROCEDURE — 99233 SBSQ HOSP IP/OBS HIGH 50: CPT | Mod: 95 | Performed by: PSYCHIATRY & NEUROLOGY

## 2024-03-05 PROCEDURE — 124N000004

## 2024-03-05 ASSESSMENT — ACTIVITIES OF DAILY LIVING (ADL)
ADLS_ACUITY_SCORE: 29

## 2024-03-05 NOTE — PROGRESS NOTES
"[facility] PSYCHIATRY  PROGRESS NOTE     SUBJECTIVE     Prior to interviewing the patient, I met with nursing and reviewed patient's clinical condition. We discussed clinical care both before and after the interview. I have reviewed the patient's clinical course by review of records including previous notes, labs, and vital signs.     Per nursing, the patient had the following behavioral events over the last 24-hours: remains disorganized and in MHICU    Upon interview, the patient was found in the MHICU. He refuses to speak with writer.  Treatment team encourages him to participate in [place] hearing, he also declines this opportunity.        MEDICATIONS   Scheduled Meds:  Not on any psychiatric medications and denies any need for psychiatric medications      PRN Meds:.acetaminophen, albuterol, alum & mag hydroxide-simethicone, Benzocaine-Menthol-Zinc Cl, cloNIDine, hydrOXYzine HCl, melatonin, nicotine, OLANZapine **OR** OLANZapine, psyllium     ALLERGIES   Allergies   Allergen Reactions     Morphine Sulfate Nausea and Vomiting     Cannot take it in pill form, IV ok   Pancreatitis     Peanuts [Nuts] GI Disturbance     Internal cuts-GERD     Gold Au 198 [Gold] Rash        MENTAL STATUS EXAM   Vitals: BP (!) 179/104   Pulse 71   Temp 98.3  F (36.8  C) (Tympanic)   Resp 16   Ht 1.651 m (5' 5\")   Wt 144.5 kg (318 lb 9.6 oz)   SpO2 96%   BMI 53.02 kg/m      Appearance:  awake, alert, dressed in hospital scrubs, appeared as age stated, and disheveled   Attitude:Guarded  Eye Contact: Fair  Mood: does not state   Affect: labile  Speech: Regular rate  Psychomotor Behavior:  no evidence of tardive dyskinesia, dystonia, or tics  Thought Process: Tangential   Associations: Loose associations present  Thought Content: No SI or HI elicited. Denies AVH. Paranoid.  Insight: Poor  Judgment: Poor  Oriented to:  time, person, and place  Attention Span and Concentration: Poor  Recent and Remote Memory:  fair  Fund of " "Knowledge: Low to average  Muscle Strength and Tone: normal  Gait and Station: Normal       LABS   No results found for this or any previous visit (from the past 24 hour(s)).      IMPRESSION     This is a 33 year old male with a PMH of unspecified mood disorder, ADHD, SAGE, insomnia who was brought to the ED from his PCP office after exhibiting psychotic symptoms. Patient was found to be disorganized and delusional. He was placed on a hold and petition for commitment was filed in ED prior to transfer to behavioral health. Today patient is irritable. He answers most questions with \"that's confidential\". He does mention the [place], FBI, and government being involved. During our interaction he uses sign language when staring at the camera in the ceiling as if communicating to someone. He reports that he will not take any medication, because it is not real and he does not trust it. Writer and SW did attempt to explain the commitment process, however patient refused to take the paperwork offered and instead ended the interview, stating that that we needed to watch out for the [place], FBI, and secret service. Will schedule Seroquel at bedtime for tonight as patient had been on this in the past, though I suspect he will refuse. If continuing to refuse medication, will likely need to file Nieves petition tomorrow.     2/23/24: The patient continues to be manic and psychotic. Refusing all scheduled medications. Nieves re-submitted.    2/24/24: declines psychiatric interview today. Remains manic and psychotic. Nieves hearing set for 3/4/24    2/25/24: only utters one word today on examination. Remains decompensated.     2/26/24: converses today. He is illogical tangentially talking about nonsensical things.     2/27/24: attempted to meet with Marija twice today. He cites that Dr. Martinez is \"fake\" and does not wish to communicate today.     2/28/24: will not communicate with writer, waving him away.  He may be using sing language " "to sign the word \"crying\" but this is unclear.     2/29/24: nonsensical in communication. Remains disorganized.     3/1/24: does not participate in interview    3/2/24: upon interview today, Marija does not communicate. He puts up his hand and shows all 5 fingers, nothing else.     3/3/24: encouraged to attend his court hearing tomorrow. He remains disorganized. He is in need of a Nieves for stabilization.     3/4/24: declines to participate in interview and Nieves hearing. He declined to speak with his  despite his  meeting with him several times.   stated in court he is approving order for Nieves. We await signed paperwork       DIAGNOSES     Bipolar 1 disorder, current episode manic with psychotic features        PLAN     Location: Unit 5  Legal Status: Committed.    Nieves: Pending. 3/4 at 11.    Safety Assessment:    Behavioral Orders   Procedures     Code 1 - Restrict to Unit     Routine Programming     As clinically indicated     Status 15     Every 15 minutes.      PTA psychotropic medications stopped:     -no PTA meds    PTA psychotropic medications continued/changed:     -no PTA meds    New medications tried and stopped:     -Seroquel 100 mg at bedtime due to him refusing     New medications initiated:     -standard unit PRN medications    Today's Changes:  -None.   -we await signed order for Nieves    Programming: Patient will be treated in a therapeutic milieu with appropriate individual and group therapies. Education will be provided on diagnoses, medications, and treatments.     Medical diagnoses:  Per medicine    Consult: None  Tests: None    Anticipated LOS: > 7 days  Disposition: IRTS vs home with services       ATTESTATION    Bernardo Martinez MD  [facility]   Psychiatry    Video Visit: Patient has given verbal consent for video visit?: Yes  Type of Service: video visit for mental health treatment  Reason for Video Visit: COVID-19 and [place] access [place]  Originating Site (patient location): [facility]  Distant Site (provider location): Remote Location  Mode of Communication: Video Conference via [employer]  Time of Service: Date: 03/04/2024 , Start: 10:00 end: 10:30

## 2024-03-05 NOTE — PLAN OF CARE
Problem: Adult Behavioral Health Plan of Care  Goal: Patient-Specific Goal (Individualization)  Description: Patient will sleep 6 to 8 hours per night  Patient will eat at least 50% of meals  Patient will attend at least 50% of groups  Patient will comply with recommendations of treatment team  Patient will remain medication compliant    3/3/24- Pt may wean to open unit, if behaviors are appropriate, during time of decreased stimulus. Treatment team to assess daily.     Outcome: Progressing  Note: He is up in his room earlier this morning. He denies any needs. He is tangential in conversation. He denies any depression, anxiety, suicidal ideations, homicidal ideations. He denies hallucinations although he is preoccupied.      Problem: Thought Process Alteration  Goal: Optimal Thought Clarity  Description: Pt will be able to have a reality based conversation prior to discharge.    Outcome: Progressing  Note: He is not able to have a reality based conversation. He is tangential and doesn't always answer questions appropriately

## 2024-03-05 NOTE — PLAN OF CARE
Problem: Adult Behavioral Health Plan of Care  Goal: Patient-Specific Goal (Individualization)  Description: Patient will sleep 6 to 8 hours per night  Patient will eat at least 50% of meals  Patient will attend at least 50% of groups  Patient will comply with recommendations of treatment team  Patient will remain medication compliant    3/3/24- Pt may wean to open unit, if behaviors are appropriate, during time of decreased stimulus. Treatment team to assess daily.           Outcome: Progressing   Goal Outcome Evaluation:       Face to face shift report received from RN. Rounding completed, pt observed. Client rested in room for 3.5 hours with eyes closed and respirations noted.Face to face report will be communicated to oncoming RN.    Mike Blankenship RN  3/5/2024  6:17 AM

## 2024-03-05 NOTE — PROGRESS NOTES
"[facility] PSYCHIATRY  PROGRESS NOTE     SUBJECTIVE     Prior to interviewing the patient, I met with nursing and reviewed patient's clinical condition. We discussed clinical care both before and after the interview. I have reviewed the patient's clinical course by review of records including previous notes, labs, and vital signs.     Per nursing, the patient had the following behavioral events over the last 24-hours: remains disorganized and in MHICU    Upon interview, the patient was found in the MHICU. He is met in MHICU. He is informed that we are waiting for his paperwork from court yesterday that he declined to attend. He states he was never represented. He was reminded that his  attempted to meet with him on multiple times and he declined and that he also declined to come to court when asked.  He was informed that the Nieves has been authorized and that it is likely tonight we will move forward with forced neuroleptic medicatins in the form of an injection. Explained side effects, benefits and side effects of both paliperidone and olanzapine.  He is asked several times if he has any questions and he simply requests a Tuna sandwhich.     He wants a tuna sandwhich      MEDICATIONS   Scheduled Meds:  Not on any psychiatric medications and denies any need for psychiatric medications      PRN Meds:.acetaminophen, albuterol, alum & mag hydroxide-simethicone, Benzocaine-Menthol-Zinc Cl, cloNIDine, hydrOXYzine HCl, melatonin, nicotine, OLANZapine **OR** OLANZapine, psyllium     ALLERGIES   Allergies   Allergen Reactions     Morphine Sulfate Nausea and Vomiting     Cannot take it in pill form, IV ok   Pancreatitis     Peanuts [Nuts] GI Disturbance     Internal cuts-GERD     Gold Au 198 [Gold] Rash        MENTAL STATUS EXAM   Vitals: BP (!) 179/104   Pulse 71   Temp 98.3  F (36.8  C) (Tympanic)   Resp 16   Ht 1.651 m (5' 5\")   Wt 144.5 kg (318 lb 9.6 oz)   SpO2 96%   BMI 53.02 kg/m      Appearance:  " "awake, alert, dressed in hospital scrubs, appeared as age stated, and disheveled   Attitude:Guarded  Eye Contact: Fair  Mood: \"fine\"  Affect: labile  Speech: Regular rate  Psychomotor Behavior:  no evidence of tardive dyskinesia, dystonia, or tics  Thought Process: Tangential   Associations: Loose associations present  Thought Content: No SI or HI elicited. Denies AVH. Paranoid.  Insight: Poor  Judgment: Poor  Oriented to:  time, person, and place  Attention Span and Concentration: Poor  Recent and Remote Memory:  fair  Fund of Knowledge: Low to average  Muscle Strength and Tone: normal  Gait and Station: Normal       LABS   No results found for this or any previous visit (from the past 24 hour(s)).      IMPRESSION     This is a 33 year old male with a PMH of unspecified mood disorder, ADHD, SAGE, insomnia who was brought to the ED from his PCP office after exhibiting psychotic symptoms. Patient was found to be disorganized and delusional. He was placed on a hold and petition for commitment was filed in ED prior to transfer to behavioral health. Today patient is irritable. He answers most questions with \"that's confidential\". He does mention the [place], FBI, and government being involved. During our interaction he uses sign language when staring at the camera in the ceiling as if communicating to someone. He reports that he will not take any medication, because it is not real and he does not trust it. Writer and SW did attempt to explain the commitment process, however patient refused to take the paperwork offered and instead ended the interview, stating that that we needed to watch out for the [place], FBI, and secret service. Will schedule Seroquel at bedtime for tonight as patient had been on this in the past, though I suspect he will refuse. If continuing to refuse medication, will likely need to file Nieves petition tomorrow.     2/23/24: The patient continues to be manic and psychotic. Refusing all scheduled " "medications. Nieves re-submitted.    2/24/24: declines psychiatric interview today. Remains manic and psychotic. Nieves hearing set for 3/4/24    2/25/24: only utters one word today on examination. Remains decompensated.     2/26/24: converses today. He is illogical tangentially talking about nonsensical things.     2/27/24: attempted to meet with Marija twice today. He cites that Dr. Martinez is \"fake\" and does not wish to communicate today.     2/28/24: will not communicate with writer, waving him away.  He may be using sing language to sign the word \"crying\" but this is unclear.     2/29/24: nonsensical in communication. Remains disorganized.     3/1/24: does not participate in interview    3/2/24: upon interview today, Marija does not communicate. He puts up his hand and shows all 5 fingers, nothing else.     3/3/24: encouraged to attend his court hearing tomorrow. He remains disorganized. He is in need of a Nieves for stabilization.     3/4/24: declines to participate in interview and Nieves hearing. He declined to speak with his  despite his  meeting with him several times.   stated in court he is approving order for Nieves. We await signed paperwork    3/5/24: Nieves approved. We await paperwork. Will move forwad with oral paliperidone and IM olanzapine back-up.      DIAGNOSES     Bipolar 1 disorder, current episode manic with psychotic features        PLAN     Location: Unit 5  Legal Status: Committed.    Nieves: Pending. 3/4 at 11.    Safety Assessment:    Behavioral Orders   Procedures     Code 1 - Restrict to Unit     Routine Programming     As clinically indicated     Status 15     Every 15 minutes.      PTA psychotropic medications stopped:     -no PTA meds    PTA psychotropic medications continued/changed:     -no PTA meds    New medications tried and stopped:     -Seroquel 100 mg at bedtime due to him refusing     New medications initiated:     -standard unit PRN " medications    Today's Changes:  -None.   -we await signed order for Nieves    Programming: Patient will be treated in a therapeutic milieu with appropriate individual and group therapies. Education will be provided on diagnoses, medications, and treatments.     Medical diagnoses:  Per medicine    Consult: None  Tests: None    Anticipated LOS: > 7 days  Disposition: IRTS vs home with services       ATTESTATION    Bernardo Martienz MD  [facility]   Psychiatry    Video Visit: Patient has given verbal consent for video visit?: Yes  Type of Service: video visit for mental health treatment  Reason for Video Visit: COVID-19 [place]  Originating Site (patient location): [facility]  Distant Site (provider location): Remote Location  Mode of Communication: Video Conference via [employer]  Time of Service: Date: 03/05/2024 , Start: 09:00 end: 09:30

## 2024-03-06 PROCEDURE — 99233 SBSQ HOSP IP/OBS HIGH 50: CPT | Mod: 95 | Performed by: PSYCHIATRY & NEUROLOGY

## 2024-03-06 PROCEDURE — 124N000004

## 2024-03-06 PROCEDURE — 250N000011 HC RX IP 250 OP 636: Performed by: PSYCHIATRY & NEUROLOGY

## 2024-03-06 RX ORDER — OLANZAPINE 10 MG/2ML
10 INJECTION, POWDER, FOR SOLUTION INTRAMUSCULAR 2 TIMES DAILY
Status: DISCONTINUED | OUTPATIENT
Start: 2024-03-06 | End: 2024-03-13

## 2024-03-06 RX ORDER — PALIPERIDONE 3 MG/1
3 TABLET, EXTENDED RELEASE ORAL 2 TIMES DAILY
Status: DISCONTINUED | OUTPATIENT
Start: 2024-03-06 | End: 2024-03-13

## 2024-03-06 RX ADMIN — OLANZAPINE 10 MG: 10 INJECTION, POWDER, LYOPHILIZED, FOR SOLUTION INTRAMUSCULAR at 13:35

## 2024-03-06 RX ADMIN — OLANZAPINE 10 MG: 10 INJECTION, POWDER, LYOPHILIZED, FOR SOLUTION INTRAMUSCULAR at 21:43

## 2024-03-06 ASSESSMENT — ACTIVITIES OF DAILY LIVING (ADL)
ADLS_ACUITY_SCORE: 29
HYGIENE/GROOMING: INDEPENDENT
ADLS_ACUITY_SCORE: 29
LAUNDRY: UNABLE TO COMPLETE
ADLS_ACUITY_SCORE: 29
ORAL_HYGIENE: INDEPENDENT
ADLS_ACUITY_SCORE: 29
DRESS: SCRUBS (BEHAVIORAL HEALTH);INDEPENDENT
ADLS_ACUITY_SCORE: 29

## 2024-03-06 NOTE — PLAN OF CARE
Problem: Adult Behavioral Health Plan of Care  Goal: Patient-Specific Goal (Individualization)  Description: Patient will sleep 6 to 8 hours per night  Patient will eat at least 50% of meals  Patient will attend at least 50% of groups  Patient will comply with recommendations of treatment team  Patient will remain medication compliant    3/3/24- Pt may wean to open unit, if behaviors are appropriate, during time of decreased stimulus. Treatment team to assess daily.           Outcome: Progressing   Goal Outcome Evaluation:        Face to face shift report received from RN. Rounding completed, pt observed.Client rested in room for 6 hours with eyes closed and respirations noted.Face to face report will be communicated to oncoming RN.    Mike Blankenship RN  3/6/2024  6:23 AM

## 2024-03-06 NOTE — TREATMENT PLAN
Initial Treatment Plan      Client Strengths:  creative, has a previous history of therapy, and support of family, friends and providers    Areas of Vulnerability:  Manic symptoms   Psychotic symptoms/behavior     Long-Term Goals:  Knowledge about illness and management of symptoms   Maintenance of personal safety   Maintenance of sobriety   Effective management of impulsivity     Abuse Prevention Plan:  Safe, therapeutic environment   Safety coping plan as needed   Education regarding illness and skill development   Coordination with care providers   Impluse control education and intervention   Medication adjustment/management (MI/CD)   Monitor for use of substances    Discharge Criteria:  Satisfactory progress toward treatment goals   Improvement re: identified problems and symptoms   Ability to continue recovery at next level of service   Has a discharge plan in place      Specific Barriers to Discharge:  Stabilization on medication    Need for Continued Inpatient Treatment:  Stabilization on medication    Assessment/Intervention/Current Symtoms and Care Coordination:  Ezequiel was received from [facility].      Discharge Plan or Goal:  Pending stabilization & development of a safe discharge plan.  Considerations include:      Discharge Checklist:  Transportation: TBD  Medications ordered/sent to: No  Provisional discharge required: Yes: will work on when discharge date is known.   Appointments scheduled:   Psychiatry - TBD  Therapy - TBD  PCP - TBD  AVS complete: No     Referral Status:  Will make closer to discharge     Legal Status:  Commitment and Nieves     Contacts:  [facility]

## 2024-03-06 NOTE — PLAN OF CARE
Face to face shift report received from LIGIA Huber.       Problem: Adult Behavioral Health Plan of Care  Goal: Patient-Specific Goal (Individualization)  Description: Patient will sleep 6 to 8 hours per night  Patient will eat at least 50% of meals  Patient will attend at least 50% of groups  Patient will comply with recommendations of treatment team  Patient will remain medication compliant    3/3/24- Pt may wean to open unit, if behaviors are appropriate, during time of decreased stimulus. Treatment team to assess daily.   Outcome: Not Progressing   Room remains in MHICU due to need for decreased stimulation. Pt encouraged to wean to open unit, but he declined. Calm. Bizarre in conversation. Flight of ideas. Responses usually do not match questions asked. Spends time in lounge area, watching television. No reports of pain.       Problem: Thought Process Alteration  Goal: Optimal Thought Clarity  Description: Pt will be able to have a reality based conversation prior to discharge.  Outcome: Not Progressing         Face to face end of shift report to be communicated to oncoming RN.

## 2024-03-06 NOTE — PLAN OF CARE
Face to face shift report received from nurse. Rounding completed, pt observed.    Problem: Adult Behavioral Health Plan of Care  Goal: Patient-Specific Goal (Individualization)  Description: Patient will sleep 6 to 8 hours per night  Patient will eat at least 50% of meals  Patient will attend at least 50% of groups  Patient will comply with recommendations of treatment team  Patient will remain medication compliant    3/3/24- Pt may wean to open unit, if behaviors are appropriate, during time of decreased stimulus. Treatment team to assess daily.   Outcome: Not Progressing  Patient was calm through most of the shift. Patient received the harris medication zyprexa 10mg IM. Patioent was given the opportunity to take the invega 3mg PO but refused. Code green was [id number], with code team patient stood in the hallway and showed writer his butt to get the shot. No hands on was required.  Patient laid back down on his bed and was quite the rest of the shift.     Problem: Thought Process Alteration  Goal: Optimal Thought Clarity  Description: Pt will be able to have a reality based conversation prior to discharge.    Outcome: Not Progressing     Face to face report will be communicated to oncoming RN.    Martin Fleming RN  3/6/2024

## 2024-03-07 PROCEDURE — 250N000013 HC RX MED GY IP 250 OP 250 PS 637: Performed by: PSYCHIATRY & NEUROLOGY

## 2024-03-07 PROCEDURE — 124N000004

## 2024-03-07 PROCEDURE — 99232 SBSQ HOSP IP/OBS MODERATE 35: CPT | Mod: 95 | Performed by: PSYCHIATRY & NEUROLOGY

## 2024-03-07 PROCEDURE — 250N000011 HC RX IP 250 OP 636: Performed by: PSYCHIATRY & NEUROLOGY

## 2024-03-07 RX ADMIN — OLANZAPINE 10 MG: 10 INJECTION, POWDER, LYOPHILIZED, FOR SOLUTION INTRAMUSCULAR at 08:33

## 2024-03-07 RX ADMIN — OLANZAPINE 10 MG: 10 INJECTION, POWDER, LYOPHILIZED, FOR SOLUTION INTRAMUSCULAR at 20:56

## 2024-03-07 ASSESSMENT — ACTIVITIES OF DAILY LIVING (ADL)
ADLS_ACUITY_SCORE: 29
ADLS_ACUITY_SCORE: 49
ADLS_ACUITY_SCORE: 29
ADLS_ACUITY_SCORE: 49
ADLS_ACUITY_SCORE: 49
ADLS_ACUITY_SCORE: 29
DRESS: SCRUBS (BEHAVIORAL HEALTH);INDEPENDENT
ADLS_ACUITY_SCORE: 29
ADLS_ACUITY_SCORE: 29
HYGIENE/GROOMING: INDEPENDENT
ADLS_ACUITY_SCORE: 29
DRESS: INDEPENDENT;PROMPTS
LAUNDRY: UNABLE TO COMPLETE
ORAL_HYGIENE: INDEPENDENT
ADLS_ACUITY_SCORE: 49
ADLS_ACUITY_SCORE: 49
ORAL_HYGIENE: PROMPTS
LAUNDRY: UNABLE TO COMPLETE
ADLS_ACUITY_SCORE: 29
HYGIENE/GROOMING: PROMPTS
ADLS_ACUITY_SCORE: 29

## 2024-03-07 NOTE — PLAN OF CARE
Problem: Adult Behavioral Health Plan of Care  Goal: Patient-Specific Goal (Individualization)  Description: Patient will sleep 6 to 8 hours per night  Patient will eat at least 50% of meals  Patient will attend at least 50% of groups  Patient will comply with recommendations of treatment team  Patient will remain medication compliant    3/3/24- Pt may wean to open unit, if behaviors are appropriate, during time of decreased stimulus. Treatment team to assess daily.           Outcome: Progressing   Goal Outcome Evaluation:       Face to face shift report received from RN. Rounding completed, pt observed.Client rested in room for 7 hours with eyes closed and respirations noted.Face to face report will be communicated to oncoming RN.    Mike Blankenship RN  3/7/2024  6:12 AM

## 2024-03-07 NOTE — PLAN OF CARE
"  Problem: Adult Behavioral Health Plan of Care  Goal: Patient-Specific Goal (Individualization)  Description: Patient will sleep 6 to 8 hours per night  Patient will eat at least 50% of meals  Patient will attend at least 50% of groups  Patient will comply with recommendations of treatment team  Patient will remain medication compliant    3/7/24- Pt may wean to open unit, if behaviors are appropriate, during time of decreased stimulus. Treatment team to assess daily.           Outcome: Progressing  Note: 15:40: Received end of shift report from LIGIA Bernstein. Pt resting in bed upon arrival-- appears to be awake.    18:00: Pt ate 100% of dinner, states roast beef, mashed potatoes et gravy. Thought process is disorganized, rambles/flight of ideas-- derealization. Does not answer questions, difficulties comprehending.      20:42; Code green initiated, few attempts to encourage oral invega administrations, bluntly refuses, \"No\" \"I want a new nurse\" \"I want neither\"-- Upon entering -ICU pt argumentative, amb back et forth in lounge area, then voluntarily pulls down pants. Tense in appearance, irritable, restless--     21:00: Returned to room for bed---    Face to face end of shift report to be communicated to on-coming [facility] staff.     Charla Castro RN  3/7/2024  9:20 PM              Problem: Thought Process Alteration  Goal: Optimal Thought Clarity  Description: Pt will be able to have a reality based conversation prior to discharge.    Outcome: Progressing   Goal Outcome Evaluation:                        "

## 2024-03-07 NOTE — PROGRESS NOTES
"[facility] PSYCHIATRY  PROGRESS NOTE     SUBJECTIVE     Prior to interviewing the patient, I met with nursing and reviewed patient's clinical condition. We discussed clinical care both before and after the interview. I have reviewed the patient's clinical course by review of records including previous notes, labs, and vital signs.     Per nursing, the patient had the following behavioral events over the last 24-hours: remains disorganized and in MHICU    Upon interview, tuan is met in his room. He is upset he has a Nieves order to take neuroleptics. Continues to state we are violating his Anabaptist and says something along the lines of his Anabaptist is to \"smoke blunts\". He wishes writer would think about \"ladybugs\" today. He states \"I would like to smoke a big blunt and pray for my Anabaptist\" when asked if he has any questions today.            MEDICATIONS   Scheduled Meds:  Not on any psychiatric medications and denies any need for psychiatric medications    paliperidone  3 mg Oral BID    Or     OLANZapine  10 mg Intramuscular BID       PRN Meds:.acetaminophen, albuterol, alum & mag hydroxide-simethicone, Benzocaine-Menthol-Zinc Cl, cloNIDine, hydrOXYzine HCl, melatonin, nicotine, OLANZapine **OR** OLANZapine, psyllium     ALLERGIES   Allergies   Allergen Reactions     Morphine Sulfate Nausea and Vomiting     Cannot take it in pill form, IV ok   Pancreatitis     Peanuts [Nuts] GI Disturbance     Internal cuts-GERD     Gold Au 198 [Gold] Rash        MENTAL STATUS EXAM   Vitals: BP (!) 179/104   Pulse 71   Temp 98.3  F (36.8  C) (Tympanic)   Resp 16   Ht 1.651 m (5' 5\")   Wt 144.5 kg (318 lb 9.6 oz)   SpO2 96%   BMI 53.02 kg/m      Appearance:  awake, alert, dressed in hospital scrubs, appeared as age stated, and disheveled   Attitude:Guarded  Eye Contact: Fair  Mood:does not state   Affect: labile  Speech: Regular rate  Psychomotor Behavior:  no evidence of tardive dyskinesia, dystonia, or tics  Thought " "Process: Tangential   Associations: Loose associations present  Thought Content: No SI or HI elicited. Denies AVH. Paranoid.  Insight: Poor  Judgment: Poor  Oriented to:  time, person, and place  Attention Span and Concentration: Poor  Recent and Remote Memory:  fair  Fund of Knowledge: Low to average  Muscle Strength and Tone: normal  Gait and Station: Normal       LABS   No results found for this or any previous visit (from the past 24 hour(s)).      IMPRESSION     This is a 33 year old male with a PMH of unspecified mood disorder, ADHD, SAGE, insomnia who was brought to the ED from his PCP office after exhibiting psychotic symptoms. Patient was found to be disorganized and delusional. He was placed on a hold and petition for commitment was filed in ED prior to transfer to behavioral health. Today patient is irritable. He answers most questions with \"that's confidential\". He does mention the [place], FBI, and government being involved. During our interaction he uses sign language when staring at the camera in the ceiling as if communicating to someone. He reports that he will not take any medication, because it is not real and he does not trust it. Writer and SW did attempt to explain the commitment process, however patient refused to take the paperwork offered and instead ended the interview, stating that that we needed to watch out for the [place], FBI, and secret service. Will schedule Seroquel at bedtime for tonight as patient had been on this in the past, though I suspect he will refuse. If continuing to refuse medication, will likely need to file Nieves petition tomorrow.     2/23/24: The patient continues to be manic and psychotic. Refusing all scheduled medications. Nieves re-submitted.    2/24/24: declines psychiatric interview today. Remains manic and psychotic. Nieves hearing set for 3/4/24    2/25/24: only utters one word today on examination. Remains decompensated.     2/26/24: converses today. He is illogical " "tangentially talking about nonsensical things.     2/27/24: attempted to meet with Marija twice today. He cites that Dr. Martinez is \"fake\" and does not wish to communicate today.     2/28/24: will not communicate with writer, waving him away.  He may be using sing language to sign the word \"crying\" but this is unclear.     2/29/24: nonsensical in communication. Remains disorganized.     3/1/24: does not participate in interview    3/2/24: upon interview today, Marija does not communicate. He puts up his hand and shows all 5 fingers, nothing else.     3/3/24: encouraged to attend his court hearing tomorrow. He remains disorganized. He is in need of a Nieves for stabilization.     3/4/24: declines to participate in interview and Nieves hearing. He declined to speak with his  despite his  meeting with him several times.   stated in court he is approving order for Nieves. We await signed paperwork    3/5/24: Nieves approved. We await paperwork. Will move forwad with oral paliperidone and IM olanzapine back-up.     3/6/24: Nieves paper received. Will start paliperidone 3 mg BID with 10 mg olanzapine IM back-up    3/7/24: receiving IM olanzapine, he engages in more conversations today compared to other days when he has not had neuroleptics.      DIAGNOSES     Bipolar 1 disorder, current episode manic with psychotic features        PLAN     Location: Unit 5  Legal Status: Committed.    Nieves: Pending. 3/4 at 11.    Safety Assessment:    Behavioral Orders   Procedures     Code 1 - Restrict to Unit     Routine Programming     As clinically indicated     Status 15     Every 15 minutes.      PTA psychotropic medications stopped:     -no PTA meds    PTA psychotropic medications continued/changed:     -no PTA meds    New medications tried and stopped:     -Seroquel 100 mg at bedtime due to him refusing     New medications initiated:     -standard unit PRN medications    Today's Changes:  -Nieves order " received and reviewed.  Will move forward with 3 mg paliperidone BID with 10 mg IM olanzapine back-up     Programming: Patient will be treated in a therapeutic milieu with appropriate individual and group therapies. Education will be provided on diagnoses, medications, and treatments.     Medical diagnoses:  Per medicine    Consult: None  Tests: None    Anticipated LOS: > 7 days  Disposition: IRTS vs home with services       ATTESTATION    Bernardo Martinez MD  [facility]   Psychiatry    Video Visit: Patient has given verbal consent for video visit?: Yes  Type of Service: video visit for mental health treatment  Reason for Video Visit: COVID-19 [place]  Originating Site (patient location): [facility]  Distant Site (provider location): Remote Location  Mode of Communication: Video Conference via [employer]  Time of Service: Date: 03/07/2024 , Start: 09:00 end: 09:30

## 2024-03-07 NOTE — PLAN OF CARE
Problem: Thought Process Alteration  Goal: Optimal Thought Clarity  Description: Pt will be able to have a reality based conversation prior to discharge.    Outcome: Progressing    Pt is able to talk about his frustrations mostly reality based but very tangential     Problem: Adult Behavioral Health Plan of Care  Goal: Patient-Specific Goal (Individualization)  Description: Patient will sleep 6 to 8 hours per night  Patient will eat at least 50% of meals  Patient will attend at least 50% of groups  Patient will comply with recommendations of treatment team  Patient will remain medication compliant    3/3/24- Pt may wean to open unit, if behaviors are appropriate, during time of decreased stimulus. Treatment team to assess daily.     Outcome: Progressing   Goal Outcome Evaluation:    0730 Face to face rounding complete.  Pt introduced to nursing for the shift.    Pt was withdrawn to his room in bed almost all shift. He was very upset with having to take medications this morning. He said that medications are violating his rights and Christianity.  He said that he is not going to choose between taking a pill and getting as shot.  He did cooperate with a injection of Zyprexa IM in his right ventral gluteal.  He told me that he understood that I was just following orders from illegal higher ups.  He also said statements that wee very bizarre and so disorganized that they were nearly word salad.  PT slept for 5 hours after his Zyprexa injection.      1500  Face to face end of shift report communicated to evening Shift RN's along with Pt's fall risk.     Amandeep Lopes RN  3/7/2024

## 2024-03-07 NOTE — PROGRESS NOTES
"[facility] PSYCHIATRY  PROGRESS NOTE     SUBJECTIVE     Prior to interviewing the patient, I met with nursing and reviewed patient's clinical condition. We discussed clinical care both before and after the interview. I have reviewed the patient's clinical course by review of records including previous notes, labs, and vital signs.     Per nursing, the patient had the following behavioral events over the last 24-hours: remains disorganized and in MHICU    Upon interview, Marija is walking the halls.  He has been explained his Nieves order. He continues to state that it is not real.  He states that we are all being [place].  He is informed our preference is for him to take an oral medication instead of an injection but we will administer an injection of neuroleptic if we need to per Nieves.       MEDICATIONS   Scheduled Meds:  Not on any psychiatric medications and denies any need for psychiatric medications    paliperidone  3 mg Oral BID    Or     OLANZapine  10 mg Intramuscular BID       PRN Meds:.acetaminophen, albuterol, alum & mag hydroxide-simethicone, Benzocaine-Menthol-Zinc Cl, cloNIDine, hydrOXYzine HCl, melatonin, nicotine, OLANZapine **OR** OLANZapine, psyllium     ALLERGIES   Allergies   Allergen Reactions     Morphine Sulfate Nausea and Vomiting     Cannot take it in pill form, IV ok   Pancreatitis     Peanuts [Nuts] GI Disturbance     Internal cuts-GERD     Gold Au 198 [Gold] Rash        MENTAL STATUS EXAM   Vitals: BP (!) 179/104   Pulse 71   Temp 98.3  F (36.8  C) (Tympanic)   Resp 16   Ht 1.651 m (5' 5\")   Wt 144.5 kg (318 lb 9.6 oz)   SpO2 96%   BMI 53.02 kg/m      Appearance:  awake, alert, dressed in hospital scrubs, appeared as age stated, and disheveled   Attitude:Guarded  Eye Contact: Fair  Mood:does not state   Affect: labile  Speech: Regular rate  Psychomotor Behavior:  no evidence of tardive dyskinesia, dystonia, or tics  Thought Process: Tangential   Associations: " "Loose associations present  Thought Content: No SI or HI elicited. Denies AVH. Paranoid.  Insight: Poor  Judgment: Poor  Oriented to:  time, person, and place  Attention Span and Concentration: Poor  Recent and Remote Memory:  fair  Fund of Knowledge: Low to average  Muscle Strength and Tone: normal  Gait and Station: Normal       LABS   No results found for this or any previous visit (from the past 24 hour(s)).      IMPRESSION     This is a 33 year old male with a PMH of unspecified mood disorder, ADHD, SAGE, insomnia who was brought to the ED from his PCP office after exhibiting psychotic symptoms. Patient was found to be disorganized and delusional. He was placed on a hold and petition for commitment was filed in ED prior to transfer to behavioral health. Today patient is irritable. He answers most questions with \"that's confidential\". He does mention the [place], FBI, and government being involved. During our interaction he uses sign language when staring at the camera in the ceiling as if communicating to someone. He reports that he will not take any medication, because it is not real and he does not trust it. Writer and SW did attempt to explain the commitment process, however patient refused to take the paperwork offered and instead ended the interview, stating that that we needed to watch out for the [place], FBI, and secret service. Will schedule Seroquel at bedtime for tonight as patient had been on this in the past, though I suspect he will refuse. If continuing to refuse medication, will likely need to file Nieves petition tomorrow.     2/23/24: The patient continues to be manic and psychotic. Refusing all scheduled medications. Nieves re-submitted.    2/24/24: declines psychiatric interview today. Remains manic and psychotic. Nieves hearing set for 3/4/24    2/25/24: only utters one word today on examination. Remains decompensated.     2/26/24: converses today. He is illogical tangentially talking about " "nonsensical things.     2/27/24: attempted to meet with Marija twice today. He cites that Dr. Martinez is \"fake\" and does not wish to communicate today.     2/28/24: will not communicate with writer, waving him away.  He may be using sing language to sign the word \"crying\" but this is unclear.     2/29/24: nonsensical in communication. Remains disorganized.     3/1/24: does not participate in interview    3/2/24: upon interview today, Marija does not communicate. He puts up his hand and shows all 5 fingers, nothing else.     3/3/24: encouraged to attend his court hearing tomorrow. He remains disorganized. He is in need of a Nieves for stabilization.     3/4/24: declines to participate in interview and Nieves hearing. He declined to speak with his  despite his  meeting with him several times.   stated in court he is approving order for Nieves. We await signed paperwork    3/5/24: Nieves approved. We await paperwork. Will move forwad with oral paliperidone and IM olanzapine back-up.     3/6/24: Nieves paper received. Will start paliperidone 3 mg BID with 10 mg olanzapine IM back-up     DIAGNOSES     Bipolar 1 disorder, current episode manic with psychotic features        PLAN     Location: Unit 5  Legal Status: Committed.    Nieves: Pending. 3/4 at 11.    Safety Assessment:    Behavioral Orders   Procedures     Code 1 - Restrict to Unit     Routine Programming     As clinically indicated     Status 15     Every 15 minutes.      PTA psychotropic medications stopped:     -no PTA meds    PTA psychotropic medications continued/changed:     -no PTA meds    New medications tried and stopped:     -Seroquel 100 mg at bedtime due to him refusing     New medications initiated:     -standard unit PRN medications    Today's Changes:  -Nieves order received and reviewed.  Will move forward with 3 mg paliperidone BID with 10 mg IM olanzapine back-up     Programming: Patient will be treated in a therapeutic milieu " with appropriate individual and group therapies. Education will be provided on diagnoses, medications, and treatments.     Medical diagnoses:  Per medicine    Consult: None  Tests: None    Anticipated LOS: > 7 days  Disposition: IRTS vs home with services       ATTESTATION    [facility]   [facility]    Video Visit: Patient has given verbal consent for video visit?: Yes  Type of Service: video visit for mental health treatment  Reason for Video Visit: COVID-19 [place]  Originating Site (patient location): [facility]  Distant Site (provider location): Remote Location  Mode of Communication: Video Conference via [employer]  Time of Service: Date: 03/06/2024 , Start: 09:00 end: 09:30

## 2024-03-07 NOTE — PLAN OF CARE
"  Problem: Adult Behavioral Health Plan of Care  Goal: Patient-Specific Goal (Individualization)  Description: Patient will sleep 6 to 8 hours per night  Patient will eat at least 50% of meals  Patient will attend at least 50% of groups  Patient will comply with recommendations of treatment team  Patient will remain medication compliant    3/3/24- Pt may wean to open unit, if behaviors are appropriate, during time of decreased stimulus. Treatment team to assess daily.   Outcome: Progressing     Problem: Thought Process Alteration  Goal: Optimal Thought Clarity  Description: Pt will be able to have a reality based conversation prior to discharge.    Outcome: Progressing   Goal Outcome Evaluation:pt       Pt has been sleeping most of the shift. Pt was up to eat his dinner but went back to sleep.. pt encouraged to get up and come out to watch tv but would not open his eyes to talk to nursing .       Pt woken up to take his medication, pt offered paliperidone ER 3mg tablet. Pt stated \"no thank you\". Nursing explained that if he refuses, we will need to give the zyprexa 10mg IM. Pt stated that he doesn't want either one, pt again stated \"no thank you\". Pt went into the [facility] lounge and waited for the staff to come back. Pt argued for a few minutes asking about his civil rights and how he got the diagnosis to be able to give him meds. Pt pulled down his pants and allowed nursing to give the shot.            "

## 2024-03-08 PROCEDURE — 99232 SBSQ HOSP IP/OBS MODERATE 35: CPT | Mod: 95 | Performed by: PSYCHIATRY & NEUROLOGY

## 2024-03-08 PROCEDURE — 250N000011 HC RX IP 250 OP 636: Performed by: PSYCHIATRY & NEUROLOGY

## 2024-03-08 PROCEDURE — 250N000011 HC RX IP 250 OP 636

## 2024-03-08 PROCEDURE — 124N000004

## 2024-03-08 RX ADMIN — OLANZAPINE 10 MG: 10 INJECTION, POWDER, LYOPHILIZED, FOR SOLUTION INTRAMUSCULAR at 20:51

## 2024-03-08 RX ADMIN — OLANZAPINE 10 MG: 10 INJECTION, POWDER, LYOPHILIZED, FOR SOLUTION INTRAMUSCULAR at 09:17

## 2024-03-08 ASSESSMENT — ACTIVITIES OF DAILY LIVING (ADL)
ADLS_ACUITY_SCORE: 39
ADLS_ACUITY_SCORE: 39
ADLS_ACUITY_SCORE: 49
ADLS_ACUITY_SCORE: 39
ADLS_ACUITY_SCORE: 49
ORAL_HYGIENE: PROMPTS
ADLS_ACUITY_SCORE: 39
ADLS_ACUITY_SCORE: 49
LAUNDRY: UNABLE TO COMPLETE
ADLS_ACUITY_SCORE: 49
ADLS_ACUITY_SCORE: 39
ADLS_ACUITY_SCORE: 49
ADLS_ACUITY_SCORE: 39
ADLS_ACUITY_SCORE: 39
DRESS: SCRUBS (BEHAVIORAL HEALTH);INDEPENDENT
ADLS_ACUITY_SCORE: 39
ADLS_ACUITY_SCORE: 39
ADLS_ACUITY_SCORE: 49
ADLS_ACUITY_SCORE: 49
ADLS_ACUITY_SCORE: 39
ADLS_ACUITY_SCORE: 49
HYGIENE/GROOMING: PROMPTS
ADLS_ACUITY_SCORE: 49

## 2024-03-08 NOTE — PLAN OF CARE
Problem: Adult Behavioral Health Plan of Care  Goal: Patient-Specific Goal (Individualization)  Description: Patient will sleep 6 to 8 hours per night  Patient will eat at least 50% of meals  Patient will attend at least 50% of groups  Patient will comply with recommendations of treatment team  Patient will remain medication compliant    3/7/24- Pt may wean to open unit, if behaviors are appropriate, during time of decreased stimulus. Treatment team to assess daily.           Outcome: Progressing     Face to face shift report received from Charla STRONG. Rounding completed, pt observed.     Pt appeared to sleep most of this shift.    Face to face report will be communicated to oncoming RN.    Radhika Albright RN  3/8/2024  6:20 AM

## 2024-03-08 NOTE — PLAN OF CARE
Problem: Thought Process Alteration  Goal: Optimal Thought Clarity  Description: Pt will be able to have a reality based conversation prior to discharge.    Outcome: Progressing    Pt can articulate complete sentences that are more congruent     Problem: Adult Behavioral Health Plan of Care  Goal: Patient-Specific Goal (Individualization)  Description: Patient will sleep 6 to 8 hours per night  Patient will eat at least 50% of meals  Patient will attend at least 50% of groups  Patient will comply with recommendations of treatment team  Patient will remain medication compliant    3/7/24- Pt may wean to open unit, if behaviors are appropriate, during time of decreased stimulus. Treatment team to assess daily.     Outcome: Progressing   Goal Outcome Evaluation:    Plan of Care Reviewed With: patient      0730 Face To face rounding complete.  Pt introduced to nursing for the shift.    Pt was up this morning in the ICU dayroom.  He refused to take the Invega PO again and cooperated with the injection of the Zyprexa IM.  He pulled the side of his pants down and received the IM Zyprexa in the right ventral gluteal.  He told me that the medications are against his Taoist and because he does not know how it is supposed to work they may have different effects.  Pt's speech is much more fluid and the content was much more congruent and organized though the content is still delusional.    1500 Face to face end of shift report communicated to Evening shift RN's along with Pt fall risk.     Amandeep Lopes RN  3/8/2024

## 2024-03-08 NOTE — PROGRESS NOTES
"[facility] PSYCHIATRY  PROGRESS NOTE     SUBJECTIVE     Prior to interviewing the patient, I met with nursing and reviewed patient's clinical condition. We discussed clinical care both before and after the interview. I have reviewed the patient's clinical course by review of records including previous notes, labs, and vital signs.     Per nursing, the patient had the following behavioral events over the last 24-hours: remains disorganized and in MHICU    Upon interview, tuan is met in his room. Continues to state we are in violation of both his civil and human rights. He is responding more to questions but still nonsensical. Makes comments about how his only Restorationism is to smoke weed. When asked if he would like to go home today, he states \"yes to feed this floor\". He is not able to clarify what he means by this and states \"you do not know do you?\".        MEDICATIONS   Scheduled Meds:  Not on any psychiatric medications and denies any need for psychiatric medications    paliperidone  3 mg Oral BID    Or     OLANZapine  10 mg Intramuscular BID       PRN Meds:.acetaminophen, albuterol, alum & mag hydroxide-simethicone, Benzocaine-Menthol-Zinc Cl, cloNIDine, hydrOXYzine HCl, melatonin, nicotine, OLANZapine **OR** OLANZapine, psyllium     ALLERGIES   Allergies   Allergen Reactions     Morphine Sulfate Nausea and Vomiting     Cannot take it in pill form, IV ok   Pancreatitis     Peanuts [Nuts] GI Disturbance     Internal cuts-GERD     Gold Au 198 [Gold] Rash        MENTAL STATUS EXAM   Vitals: BP (!) 179/104   Pulse 71   Temp 98.3  F (36.8  C) (Tympanic)   Resp 16   Ht 1.651 m (5' 5\")   Wt 144.5 kg (318 lb 9.6 oz)   SpO2 96%   BMI 53.02 kg/m      Appearance:  awake, alert, dressed in hospital scrubs, appeared as age stated, and disheveled   Attitude:Guarded  Eye Contact: Fair  Mood: irritable   Affect: labile  Speech: Regular rate  Psychomotor Behavior:  no evidence of tardive dyskinesia, dystonia, or " "tics  Thought Process: Tangential   Associations: Loose associations present  Thought Content: No SI or HI elicited. Denies AVH. Paranoid.  Insight: Poor  Judgment: Poor  Oriented to:  time, person, and place  Attention Span and Concentration: Poor  Recent and Remote Memory:  fair  Fund of Knowledge: Low to average  Muscle Strength and Tone: normal  Gait and Station: Normal       LABS   No results found for this or any previous visit (from the past 24 hour(s)).      IMPRESSION     This is a 33 year old male with a PMH of unspecified mood disorder, ADHD, SAGE, insomnia who was brought to the ED from his PCP office after exhibiting psychotic symptoms. Patient was found to be disorganized and delusional. He was placed on a hold and petition for commitment was filed in ED prior to transfer to behavioral health. Today patient is irritable. He answers most questions with \"that's confidential\". He does mention the [place], FBI, and government being involved. During our interaction he uses sign language when staring at the camera in the ceiling as if communicating to someone. He reports that he will not take any medication, because it is not real and he does not trust it. Writer and SW did attempt to explain the commitment process, however patient refused to take the paperwork offered and instead ended the interview, stating that that we needed to watch out for the [place], FBI, and secret service. Will schedule Seroquel at bedtime for tonight as patient had been on this in the past, though I suspect he will refuse. If continuing to refuse medication, will likely need to file Nieves petition tomorrow.     2/23/24: The patient continues to be manic and psychotic. Refusing all scheduled medications. Nieves re-submitted.    2/24/24: declines psychiatric interview today. Remains manic and psychotic. Nieves hearing set for 3/4/24    2/25/24: only utters one word today on examination. Remains decompensated.     2/26/24: converses today. " "He is illogical tangentially talking about nonsensical things.     2/27/24: attempted to meet with Marija twice today. He cites that Dr. Martinez is \"fake\" and does not wish to communicate today.     2/28/24: will not communicate with writer, waving him away.  He may be using sing language to sign the word \"crying\" but this is unclear.     2/29/24: nonsensical in communication. Remains disorganized.     3/1/24: does not participate in interview    3/2/24: upon interview today, Marija does not communicate. He puts up his hand and shows all 5 fingers, nothing else.     3/3/24: encouraged to attend his court hearing tomorrow. He remains disorganized. He is in need of a Nieves for stabilization.     3/4/24: declines to participate in interview and Nieves hearing. He declined to speak with his  despite his  meeting with him several times.   stated in court he is approving order for Nieves. We await signed paperwork    3/5/24: Nieves approved. We await paperwork. Will move forwad with oral paliperidone and IM olanzapine back-up.     3/6/24: Nieves paper received. Will start paliperidone 3 mg BID with 10 mg olanzapine IM back-up    3/7/24: receiving IM olanzapine, he engages in more conversations today compared to other days when he has not had neuroleptics.     3/8/24: more conversational, however much more irritable     DIAGNOSES     Bipolar 1 disorder, current episode manic with psychotic features        PLAN     Location: Unit 5  Legal Status: Committed.    Nieves: Pending. 3/4 at 11.    Safety Assessment:    Behavioral Orders   Procedures     Code 1 - Restrict to Unit     Routine Programming     As clinically indicated     Status 15     Every 15 minutes.      PTA psychotropic medications stopped:     -no PTA meds    PTA psychotropic medications continued/changed:     -no PTA meds    New medications tried and stopped:     -Seroquel 100 mg at bedtime due to him refusing     New medications initiated: "     -standard unit PRN medications    Today's Changes:  -Nieves order received and reviewed.  Will move forward with 3 mg paliperidone BID with 10 mg IM olanzapine back-up     Programming: Patient will be treated in a therapeutic milieu with appropriate individual and group therapies. Education will be provided on diagnoses, medications, and treatments.     Medical diagnoses:  Per medicine    Consult: None  Tests: None    Anticipated LOS: > 7 days  Disposition: IRTS vs home with services       ATTESTATION    Bernardo Martinez MD  [facility]   Psychiatry    Video Visit: Patient has given verbal consent for video visit?: Yes  Type of Service: video visit for mental health treatment  Reason for Video Visit: COVID-19 [place]  Originating Site (patient location): [facility]  Distant Site (provider location): Remote Location  Mode of Communication: Video Conference via [employer]  Time of Service: Date: 03/08/2024 , Start: 09:00 end: 09:30

## 2024-03-09 PROCEDURE — 99232 SBSQ HOSP IP/OBS MODERATE 35: CPT | Mod: 95 | Performed by: PSYCHIATRY & NEUROLOGY

## 2024-03-09 PROCEDURE — 124N000004

## 2024-03-09 PROCEDURE — 250N000011 HC RX IP 250 OP 636: Performed by: PSYCHIATRY & NEUROLOGY

## 2024-03-09 RX ADMIN — OLANZAPINE 10 MG: 10 INJECTION, POWDER, LYOPHILIZED, FOR SOLUTION INTRAMUSCULAR at 21:41

## 2024-03-09 RX ADMIN — OLANZAPINE 10 MG: 10 INJECTION, POWDER, LYOPHILIZED, FOR SOLUTION INTRAMUSCULAR at 08:41

## 2024-03-09 ASSESSMENT — ACTIVITIES OF DAILY LIVING (ADL)
ADLS_ACUITY_SCORE: 39
DRESS: INDEPENDENT;SCRUBS (BEHAVIORAL HEALTH)
ADLS_ACUITY_SCORE: 39
ORAL_HYGIENE: PROMPTS
LAUNDRY: UNABLE TO COMPLETE
ADLS_ACUITY_SCORE: 39
HYGIENE/GROOMING: PROMPTS
ADLS_ACUITY_SCORE: 39

## 2024-03-09 NOTE — PLAN OF CARE
Problem: Adult Behavioral Health Plan of Care  Goal: Patient-Specific Goal (Individualization)  Description: Patient will sleep 6 to 8 hours per night  Patient will eat at least 50% of meals  Patient will attend at least 50% of groups  Patient will comply with recommendations of treatment team  Patient will remain medication compliant    3/7/24- Pt may wean to open unit, if behaviors are appropriate, during time of decreased stimulus. Treatment team to assess daily.           Outcome: Progressing     Face to face shift report received from Carlito STRONG. Rounding completed, pt observed.     Pt appeared to sleep most of this shift.    Face to face report will be communicated to oncoming RN.    Radhika Albright RN  3/9/2024  6:10 AM

## 2024-03-09 NOTE — PLAN OF CARE
"Face to face end of shift report received from day RN. Rounding completed. Patient observed walking in the [facility] lounge/hallway.     Robby Ramirez, RN  3/8/2024  1530    Patient spent shift watching basketball in the [facility] lounge and resting in bed. Less tangential, able to carry on a conversation about basketball for over 5 minutes. Denies SI/HI/AVH/depression/anxiety/pain. Declined Invega 3 mg oral medication (first line Nieves medication). \"I follow my own god. Marijuana is the only thing that heals. I'll take the shot.\" Zyprexa 10 mg IM administered to right dorsogluteal muscle (secondary Nieves medication). Pt accepted medication calmly and tolerated well. Appetite good. Hygiene okay, needs to shower, encouraged by staff.    Report given to night RN.    Problem: Adult Behavioral Health Plan of Care  Goal: Patient-Specific Goal (Individualization)  Description: Patient will sleep 6 to 8 hours per night  Patient will eat at least 50% of meals  Patient will attend at least 50% of groups  Patient will comply with recommendations of treatment team  Patient will remain medication compliant    3/7/24- Pt may wean to open unit, if behaviors are appropriate, during time of decreased stimulus. Treatment team to assess daily.           Outcome: Progressing     Problem: Thought Process Alteration  Goal: Optimal Thought Clarity  Description: Pt will be able to have a reality based conversation prior to discharge.    Outcome: Progressing       "

## 2024-03-09 NOTE — PLAN OF CARE
"Face to face end of shift report received from day RN. Rounding completed. Patient observed in his room resting in bed.     Robby Ramirez, RN  3/9/2024  8801    Patient is less tangential, able to carry on a short conversations. Denies SI/HI/AVH/depression/anxiety/pain. Declined Invega 3 mg oral medication (first line Nieves medication). \"I only use marijuana. I follow my god. It's up to you if I get the medication or not. I understand that you're the middle-man and don't want to lose hour job.\" Zyprexa 10 mg IM administered to left dorsogluteal muscle (secondary Nieves medication). Pt accepted medication calmly and tolerated well. Appetite/hygiene good. Calm, cooperative, and appropriate with staff/peers. Watched television with peers, minimal interaction, arms crossed.     Report will be given to night RN at change of shift.       Problem: Adult Behavioral Health Plan of Care  Goal: Patient-Specific Goal (Individualization)  Description: Patient will sleep 6 to 8 hours per night  Patient will eat at least 50% of meals  Patient will attend at least 50% of groups  Patient will comply with recommendations of treatment team  Patient will remain medication compliant            Outcome: Progressing     Problem: Thought Process Alteration  Goal: Optimal Thought Clarity  Description: Pt will be able to have a reality based conversation prior to discharge.    Outcome: Progressing     "

## 2024-03-09 NOTE — PLAN OF CARE
Problem: Thought Process Alteration  Goal: Optimal Thought Clarity  Description: Pt will be able to have a reality based conversation prior to discharge.    Outcome: Progressing    Pt is still paranoid and delusional but has less tangential thinking     Problem: Adult Behavioral Health Plan of Care  Goal: Patient-Specific Goal (Individualization)  Description: Patient will sleep 6 to 8 hours per night  Patient will eat at least 50% of meals  Patient will attend at least 50% of groups  Patient will comply with recommendations of treatment team  Patient will remain medication compliant    Outcome: Progressing   Goal Outcome Evaluation:    0730 Face to face rounding complete.  PT introduced to nursing for the shift.      Pt was moved to the open unit due to the need for a MHICU bed. He refused initially and kept asking to go back into the MHICU and was told that his bed was occupied. He eventually gave up on this.  He spent time sitting in the sun this morning and walked the hallway some.  HE does not interact with peers but did look comfortable by the end of the shift.  He cooperated with a Zyprexa injection this morning after refusing to take the oral Invega.  Pt napped in the afternoon.    1500 Face to face end of shift report communicated to evening shift RN's along with Pt's fall risk.     Amandeep Lopes RN  3/9/2024

## 2024-03-09 NOTE — PROGRESS NOTES
"[facility] PSYCHIATRY  PROGRESS NOTE     SUBJECTIVE     Prior to interviewing the patient, I met with nursing and reviewed patient's clinical condition. We discussed clinical care both before and after the interview. I have reviewed the patient's clinical course by review of records including previous notes, labs, and vital signs.     Per nursing, the patient had the following behavioral events over the last 24-hours: remains disorganized and in MHICU. Refusing to leave room.     Upon interview, tuan is met in his room. He states he is not \"I am not going to move, how am I supposed to do my investigation, I am working, this is an undercover boss episode that has not aired yet.  You will lose your job\".       MEDICATIONS   Scheduled Meds:  Not on any psychiatric medications and denies any need for psychiatric medications    paliperidone  3 mg Oral BID    Or     OLANZapine  10 mg Intramuscular BID       PRN Meds:.acetaminophen, albuterol, alum & mag hydroxide-simethicone, Benzocaine-Menthol-Zinc Cl, cloNIDine, hydrOXYzine HCl, melatonin, nicotine, OLANZapine **OR** OLANZapine, psyllium     ALLERGIES   Allergies   Allergen Reactions     Morphine Sulfate Nausea and Vomiting     Cannot take it in pill form, IV ok   Pancreatitis     Peanuts [Nuts] GI Disturbance     Internal cuts-GERD     Gold Au 198 [Gold] Rash        MENTAL STATUS EXAM   Vitals: BP (!) 179/104   Pulse 71   Temp 98.3  F (36.8  C) (Tympanic)   Resp 16   Ht 1.651 m (5' 5\")   Wt 144.5 kg (318 lb 9.6 oz)   SpO2 96%   BMI 53.02 kg/m      Appearance:  awake, alert, dressed in hospital scrubs, appeared as age stated, and disheveled   Attitude:Guarded  Eye Contact: Fair  Mood: irritable   Affect: labile  Speech: Regular rate  Psychomotor Behavior:  no evidence of tardive dyskinesia, dystonia, or tics  Thought Process: Tangential   Associations: Loose associations present  Thought Content: No SI or HI elicited. Denies AVH. Paranoid.  Insight: " "Poor  Judgment: Poor  Oriented to:  time, person, and place  Attention Span and Concentration: Poor  Recent and Remote Memory:  fair  Fund of Knowledge: Low to average  Muscle Strength and Tone: normal  Gait and Station: Normal       LABS   No results found for this or any previous visit (from the past 24 hour(s)).      IMPRESSION     This is a 33 year old male with a PMH of unspecified mood disorder, ADHD, SAGE, insomnia who was brought to the ED from his PCP office after exhibiting psychotic symptoms. Patient was found to be disorganized and delusional. He was placed on a hold and petition for commitment was filed in ED prior to transfer to behavioral health. Today patient is irritable. He answers most questions with \"that's confidential\". He does mention the [place], FBI, and government being involved. During our interaction he uses sign language when staring at the camera in the ceiling as if communicating to someone. He reports that he will not take any medication, because it is not real and he does not trust it. Writer and SW did attempt to explain the commitment process, however patient refused to take the paperwork offered and instead ended the interview, stating that that we needed to watch out for the [place], FBI, and secret service. Will schedule Seroquel at bedtime for tonight as patient had been on this in the past, though I suspect he will refuse. If continuing to refuse medication, will likely need to file Nieves petition tomorrow.     2/23/24: The patient continues to be manic and psychotic. Refusing all scheduled medications. Nieves re-submitted.    2/24/24: declines psychiatric interview today. Remains manic and psychotic. Nieves hearing set for 3/4/24    2/25/24: only utters one word today on examination. Remains decompensated.     2/26/24: converses today. He is illogical tangentially talking about nonsensical things.     2/27/24: attempted to meet with Marija twice today. He cites that Dr. Martinez is " "\"fake\" and does not wish to communicate today.     2/28/24: will not communicate with writer, waving him away.  He may be using sing language to sign the word \"crying\" but this is unclear.     2/29/24: nonsensical in communication. Remains disorganized.     3/1/24: does not participate in interview    3/2/24: upon interview today, Marija does not communicate. He puts up his hand and shows all 5 fingers, nothing else.     3/3/24: encouraged to attend his court hearing tomorrow. He remains disorganized. He is in need of a Nieves for stabilization.     3/4/24: declines to participate in interview and Nieves hearing. He declined to speak with his  despite his  meeting with him several times.   stated in court he is approving order for Nieves. We await signed paperwork    3/5/24: Nieves approved. We await paperwork. Will move forwad with oral paliperidone and IM olanzapine back-up.     3/6/24: Nieves paper received. Will start paliperidone 3 mg BID with 10 mg olanzapine IM back-up    3/7/24: receiving IM olanzapine, he engages in more conversations today compared to other days when he has not had neuroleptics.     3/8/24: more conversational, however much more irritable    3/9/24: continues to take IM olanzapine over oral. He is becoming more vocal about his paranoia      DIAGNOSES     Bipolar 1 disorder, current episode manic with psychotic features        PLAN     Location: Unit 5  Legal Status: Committed.    Nieves: Pending. 3/4 at 11.    Safety Assessment:    Behavioral Orders   Procedures     Code 1 - Restrict to Unit     Routine Programming     As clinically indicated     Status 15     Every 15 minutes.      PTA psychotropic medications stopped:     -no PTA meds    PTA psychotropic medications continued/changed:     -no PTA meds    New medications tried and stopped:     -Seroquel 100 mg at bedtime due to him refusing     New medications initiated:     -standard unit PRN medications    Today's " Changes:  -Nieves order received and reviewed.  Will move forward with 3 mg paliperidone BID with 10 mg IM olanzapine back-up     Programming: Patient will be treated in a therapeutic milieu with appropriate individual and group therapies. Education will be provided on diagnoses, medications, and treatments.     Medical diagnoses:  Per medicine    Consult: None  Tests: None    Anticipated LOS: > 7 days  Disposition: IRTS vs home with services       ATTESTATION    Bernardo Martinez MD  [facility]   Psychiatry    Video Visit: Patient has given verbal consent for video visit?: Yes  Type of Service: video visit for mental health treatment  Reason for Video Visit: COVID-19 [place]  Originating Site (patient location): [facility]  Distant Site (provider location): Remote Location  Mode of Communication: Video Conference via [employer]  Time of Service: Date: 03/09/2024 , Start: 07:30 end: 08:00

## 2024-03-10 PROCEDURE — 124N000004

## 2024-03-10 PROCEDURE — 250N000011 HC RX IP 250 OP 636: Performed by: PSYCHIATRY & NEUROLOGY

## 2024-03-10 PROCEDURE — 99232 SBSQ HOSP IP/OBS MODERATE 35: CPT | Mod: 95 | Performed by: PSYCHIATRY & NEUROLOGY

## 2024-03-10 RX ADMIN — OLANZAPINE 10 MG: 10 INJECTION, POWDER, LYOPHILIZED, FOR SOLUTION INTRAMUSCULAR at 09:12

## 2024-03-10 RX ADMIN — OLANZAPINE 10 MG: 10 INJECTION, POWDER, LYOPHILIZED, FOR SOLUTION INTRAMUSCULAR at 21:39

## 2024-03-10 ASSESSMENT — ACTIVITIES OF DAILY LIVING (ADL)
ADLS_ACUITY_SCORE: 39
DRESS: SCRUBS (BEHAVIORAL HEALTH);INDEPENDENT
ADLS_ACUITY_SCORE: 39
HYGIENE/GROOMING: PROMPTS;INDEPENDENT
ADLS_ACUITY_SCORE: 39
LAUNDRY: UNABLE TO COMPLETE
ADLS_ACUITY_SCORE: 39
ORAL_HYGIENE: INDEPENDENT;PROMPTS
ADLS_ACUITY_SCORE: 39

## 2024-03-10 NOTE — PLAN OF CARE
Problem: Adult Behavioral Health Plan of Care  Goal: Patient-Specific Goal (Individualization)  Description: Patient will sleep 6 to 8 hours per night  Patient will eat at least 50% of meals  Patient will attend at least 50% of groups  Patient will comply with recommendations of treatment team  Patient will remain medication compliant            Outcome: Progressing     Face to face shift report received from Carlito STRONG. Rounding completed, pt observed.     Pt appeared to sleep most of this shift.    Face to face report will be communicated to oncoming RN.    Radhika Albright RN  3/10/2024  6:19 AM

## 2024-03-10 NOTE — PLAN OF CARE
Problem: Thought Process Alteration  Goal: Optimal Thought Clarity  Description: Pt will be able to have a reality based conversation prior to discharge.    Outcome: Progressing    Pt continues to be paranoid and delusional     Problem: Adult Behavioral Health Plan of Care  Goal: Patient-Specific Goal (Individualization)  Description: Patient will sleep 6 to 8 hours per night  Patient will eat at least 50% of meals  Patient will attend at least 50% of groups  Patient will comply with recommendations of treatment team  Patient will remain medication compliant    Outcome: Progressing   Goal Outcome Evaluation:    Plan of Care Reviewed With: patient      0730 Face to face rounding complete.  Pt introduced to nursing for the shift.      Pt was withdrawn to his room all shift except to get his meal trays.  Pt refused to take the oral Invega this morning and cooperated with the injection of Zyprexa. He told me that we are violating his [employer] by giving him medications instead of mariajuana.  I did some teaching about the oral Invega and compared it to the Im Zyprexa.  He was not receptive to the teaching. He continues to talk about doing an investigation and needing to go back into the room in back.  Pt's affect is flat and he was not interested in answering assessment questions.     1500 Face to face end of shift report communicated to evening shift RN's along with Pt's fall risk.      Amandeep Lopes, RN  3/10/2024

## 2024-03-10 NOTE — PLAN OF CARE
Face to face end of shift report received from day RN. Rounding completed. Patient observed in his room resting in bed.     Robby Ramirez, RN  3/10/2024  [id number]      Patient more withdrawn this evening. Denies SI/HI/AVH/depression/anxiety/pain. Declined Invega 3 mg oral medication (first line Nieves medication). Zyprexa 10 mg IM administered to right dorsogluteal muscle (secondary Nieves medication). Pt accepted medication calmly and tolerated well. Appetite/hygiene good. Calm, cooperative, and appropriate with staff/peers. Watched television with peers, minimal interaction, arms crossed when sitting in the lounge.     Report will be given to night RN at change of shift.          Problem: Adult Behavioral Health Plan of Care  Goal: Patient-Specific Goal (Individualization)  Description: Patient will sleep 6 to 8 hours per night  Patient will eat at least 50% of meals  Patient will attend at least 50% of groups  Patient will comply with recommendations of treatment team  Patient will remain medication compliant            Outcome: Progressing     Problem: Thought Process Alteration  Goal: Optimal Thought Clarity  Description: Pt will be able to have a reality based conversation prior to discharge.    Outcome: Progressing

## 2024-03-11 PROCEDURE — 124N000004

## 2024-03-11 PROCEDURE — 99232 SBSQ HOSP IP/OBS MODERATE 35: CPT | Mod: 95 | Performed by: PSYCHIATRY & NEUROLOGY

## 2024-03-11 PROCEDURE — 250N000011 HC RX IP 250 OP 636: Performed by: PSYCHIATRY & NEUROLOGY

## 2024-03-11 RX ORDER — OLANZAPINE 10 MG/2ML
10 INJECTION, POWDER, FOR SOLUTION INTRAMUSCULAR DAILY
Status: DISCONTINUED | OUTPATIENT
Start: 2024-03-11 | End: 2024-03-13

## 2024-03-11 RX ORDER — HALOPERIDOL 5 MG/ML
5 INJECTION INTRAMUSCULAR EVERY 6 HOURS PRN
Status: DISCONTINUED | OUTPATIENT
Start: 2024-03-11 | End: 2024-03-26 | Stop reason: HOSPADM

## 2024-03-11 RX ADMIN — OLANZAPINE 10 MG: 10 INJECTION, POWDER, LYOPHILIZED, FOR SOLUTION INTRAMUSCULAR at 13:19

## 2024-03-11 RX ADMIN — OLANZAPINE 10 MG: 10 INJECTION, POWDER, LYOPHILIZED, FOR SOLUTION INTRAMUSCULAR at 08:04

## 2024-03-11 RX ADMIN — OLANZAPINE 10 MG: 10 INJECTION, POWDER, LYOPHILIZED, FOR SOLUTION INTRAMUSCULAR at 22:06

## 2024-03-11 ASSESSMENT — ACTIVITIES OF DAILY LIVING (ADL)
ADLS_ACUITY_SCORE: 29
ADLS_ACUITY_SCORE: 29
ADLS_ACUITY_SCORE: 39
ADLS_ACUITY_SCORE: 29
ADLS_ACUITY_SCORE: 39
ADLS_ACUITY_SCORE: 39
ADLS_ACUITY_SCORE: 29
ADLS_ACUITY_SCORE: 39
ADLS_ACUITY_SCORE: 29
HYGIENE/GROOMING: INDEPENDENT
ADLS_ACUITY_SCORE: 39
ADLS_ACUITY_SCORE: 39
ADLS_ACUITY_SCORE: 29
ADLS_ACUITY_SCORE: 29
ADLS_ACUITY_SCORE: 39
DRESS: SCRUBS (BEHAVIORAL HEALTH);INDEPENDENT
ADLS_ACUITY_SCORE: 39
ORAL_HYGIENE: INDEPENDENT
ADLS_ACUITY_SCORE: 39
ADLS_ACUITY_SCORE: 29
ADLS_ACUITY_SCORE: 29

## 2024-03-11 NOTE — PROGRESS NOTES
"[facility] PSYCHIATRY  PROGRESS NOTE     SUBJECTIVE     Prior to interviewing the patient, I met with nursing and reviewed patient's clinical condition. We discussed clinical care both before and after the interview. I have reviewed the patient's clinical course by review of records including previous notes, labs, and vital signs.     Per nursing, the patient had the following behavioral events over the last 24-hours: continues to require IM olanzapine: \"I only use marijuana. I follow my god. It's up to you if I get the medication or not. 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 He states \"I am good thanks\" and walks away.        MEDICATIONS   Scheduled Meds:  Not on any psychiatric medications and denies any need for psychiatric medications    paliperidone  3 mg Oral BID    Or     OLANZapine  10 mg Intramuscular BID       PRN Meds:.acetaminophen, albuterol, alum & mag hydroxide-simethicone, Benzocaine-Menthol-Zinc Cl, cloNIDine, hydrOXYzine HCl, melatonin, nicotine, OLANZapine **OR** OLANZapine, psyllium     ALLERGIES   Allergies   Allergen Reactions     Morphine Sulfate Nausea and Vomiting     Cannot take it in pill form, IV ok   Pancreatitis     Peanuts [Nuts] GI Disturbance     Internal cuts-GERD     Gold Au 198 [Gold] Rash        MENTAL STATUS EXAM   Vitals: BP (!) 179/104   Pulse 71   Temp 98.3  F (36.8  C) (Tympanic)   Resp 16   Ht 1.651 m (5' 5\")   Wt 144.5 kg (318 lb 9.6 oz)   SpO2 96%   BMI 53.02 kg/m      Appearance:  awake, alert, dressed in hospital scrubs, appeared as age stated, and disheveled   Attitude:Guarded  Eye Contact: Fair  Mood: does not state  Affect: labile  Speech: Regular rate  Psychomotor Behavior:  no evidence of tardive dyskinesia, dystonia, or tics  Thought Process: Tangential   Associations: Loose associations present  Thought Content: No SI or HI elicited. Denies AVH. " "Paranoid.  Insight: Poor  Judgment: Poor  Oriented to:  time, person, and place  Attention Span and Concentration: Poor  Recent and Remote Memory:  fair  Fund of Knowledge: Low to average  Muscle Strength and Tone: normal  Gait and Station: Normal       LABS   No results found for this or any previous visit (from the past 24 hour(s)).      IMPRESSION     This is a 33 year old male with a PMH of unspecified mood disorder, ADHD, SAGE, insomnia who was brought to the ED from his PCP office after exhibiting psychotic symptoms. Patient was found to be disorganized and delusional. He was placed on a hold and petition for commitment was filed in ED prior to transfer to behavioral health. Today patient is irritable. He answers most questions with \"that's confidential\". He does mention the [place], FBI, and government being involved. During our interaction he uses sign language when staring at the camera in the ceiling as if communicating to someone. He reports that he will not take any medication, because it is not real and he does not trust it. Writer and SW did attempt to explain the commitment process, however patient refused to take the paperwork offered and instead ended the interview, stating that that we needed to watch out for the [place], FBI, and secret service. Will schedule Seroquel at bedtime for tonight as patient had been on this in the past, though I suspect he will refuse. If continuing to refuse medication, will likely need to file Nieves petition tomorrow.     2/23/24: The patient continues to be manic and psychotic. Refusing all scheduled medications. Nieves re-submitted.    2/24/24: declines psychiatric interview today. Remains manic and psychotic. Nieves hearing set for 3/4/24    2/25/24: only utters one word today on examination. Remains decompensated.     2/26/24: converses today. He is illogical tangentially talking about nonsensical things.     2/27/24: attempted to meet with Marija twice today. He cites " "that Dr. Martinez is \"fake\" and does not wish to communicate today.     2/28/24: will not communicate with writer, waving him away.  He may be using sing language to sign the word \"crying\" but this is unclear.     2/29/24: nonsensical in communication. Remains disorganized.     3/1/24: does not participate in interview    3/2/24: upon interview today, Marija does not communicate. He puts up his hand and shows all 5 fingers, nothing else.     3/3/24: encouraged to attend his court hearing tomorrow. He remains disorganized. He is in need of a Nieves for stabilization.     3/4/24: declines to participate in interview and Nieves hearing. He declined to speak with his  despite his  meeting with him several times.   stated in court he is approving order for Nieves. We await signed paperwork    3/5/24: Nieves approved. We await paperwork. Will move forwad with oral paliperidone and IM olanzapine back-up.     3/6/24: Nieves paper received. Will start paliperidone 3 mg BID with 10 mg olanzapine IM back-up    3/7/24: receiving IM olanzapine, he engages in more conversations today compared to other days when he has not had neuroleptics.     3/8/24: more conversational, however much more irritable    3/9/24: continues to take IM olanzapine over oral. He is becoming more vocal about his paranoia     3/10/24: irritability persists, however he is more talkative over last 4 days. Will consider increasing olanzapine IM tomorrow    3/11/24: increase olanzapine to 10 mg IM TID     DIAGNOSES     Bipolar 1 disorder, current episode manic with psychotic features        PLAN     Location: Unit 5  Legal Status: Committed.    Nieves: Pending. 3/4 at 11.    Safety Assessment:    Behavioral Orders   Procedures     Code 1 - Restrict to Unit     Routine Programming     As clinically indicated     Status 15     Every 15 minutes.      PTA psychotropic medications stopped:     -no PTA meds    PTA psychotropic medications " continued/changed:     -no PTA meds    New medications tried and stopped:     -Seroquel 100 mg at bedtime due to him refusing     New medications initiated:     -standard unit PRN medications    Today's Changes:  -Jincrease olanzapine to 10 mg IM TID, will keep oral paliperidone 3 mg BID with preference to take oral medications    Programming: Patient will be treated in a therapeutic milieu with appropriate individual and group therapies. Education will be provided on diagnoses, medications, and treatments.     Medical diagnoses:  Per medicine    Consult: None  Tests: None    Anticipated LOS: > 7 days  Disposition: IRTS vs home with services       ATTESTATION    Bernardo Martinez MD  [facility]   Psychiatry    Video Visit: Patient has given verbal consent for video visit?: Yes  Type of Service: video visit for mental health treatment  Reason for Video Visit: COVID-19 [place]  Originating Site (patient location): [facility]  Distant Site (provider location): Remote Location  Mode of Communication: Video Conference via [employer]  Time of Service: Date: 03/11/2024 , Start: 09:15 end: 09:45

## 2024-03-11 NOTE — PLAN OF CARE
"  Problem: Adult Behavioral Health Plan of Care  Goal: Patient-Specific Goal (Individualization)  Description: Patient will sleep 6 to 8 hours per night  Patient will eat at least 50% of meals  Patient will attend at least 50% of groups  Patient will comply with recommendations of treatment team  Patient will remain medication compliant            Outcome: Progressing  Note:     1030 Patient remains in room this morning. Ate well for breakfast meal. Patient refused oral medication and took IM zyprexa 10. Patient asked for a \"witness\". Patient steady on his feet. Refused to answer questions.     1318 Patient given prescribed Zyprexa IM 10 mg  at this time. Patient appropriately questioned dose but did accept it. Patient spends much of day in his room. Offers no complaints. Questions writers nursing credentials.    Face to face end of shift report communicated to 3-11 shift RN.     Jodie Degroot RN  3/11/2024  3:05 PM         Problem: Thought Process Alteration  Goal: Optimal Thought Clarity  Description: Pt will be able to have a reality based conversation prior to discharge.    Outcome: Progressing      Goal Outcome Evaluation:    Plan of Care Reviewed With: patient                   "

## 2024-03-11 NOTE — PLAN OF CARE
Problem: Adult Behavioral Health Plan of Care  Goal: Patient-Specific Goal (Individualization)  Description: Patient will sleep 6 to 8 hours per night  Patient will eat at least 50% of meals  Patient will attend at least 50% of groups  Patient will comply with recommendations of treatment team  Patient will remain medication compliant            Outcome: Progressing     Face to face shift report received from Carlito STRONG. Rounding completed, pt observed.     Pt appeared to sleep a total of 5 hours on and off this shift.    Face to face report will be communicated to oncoming RN.    Radhika Albright RN  3/11/2024  6:16 AM

## 2024-03-11 NOTE — PLAN OF CARE
Face to face end of shift report received from day RN. Rounding completed. Patient observed in his room resting in bed.     Robby Ramirez, RN  3/11/2024  0860      Patient spent entire shift in room in bed. Denies SI/HI/AVH/depression/anxiety/pain. Declined Invega 3 mg oral medication (first line Nieves medication). Zyprexa 10 mg IM administered to right dorsogluteal muscle (secondary Nieves medication). Pt accepted medication calmly and tolerated well. Appetite/hygiene good. Calm, cooperative, and appropriate with staff/peers.      Report will be given to night RN at change of shift.     Problem: Adult Behavioral Health Plan of Care  Goal: Patient-Specific Goal (Individualization)  Description: Patient will sleep 6 to 8 hours per night  Patient will eat at least 50% of meals  Patient will attend at least 50% of groups  Patient will comply with recommendations of treatment team  Patient will remain medication compliant            Outcome: Progressing     Problem: Thought Process Alteration  Goal: Optimal Thought Clarity  Description: Pt will be able to have a reality based conversation prior to discharge.    Outcome: Progressing

## 2024-03-11 NOTE — PROGRESS NOTES
"Phone call from [facility] worker; ascom brought to patient. Pt politely declines, states, \"No thank you\" \"I will wait for the court hearing\"   "

## 2024-03-12 PROCEDURE — 99232 SBSQ HOSP IP/OBS MODERATE 35: CPT | Mod: 95 | Performed by: PSYCHIATRY & NEUROLOGY

## 2024-03-12 PROCEDURE — 250N000011 HC RX IP 250 OP 636: Performed by: PSYCHIATRY & NEUROLOGY

## 2024-03-12 PROCEDURE — 124N000004

## 2024-03-12 RX ADMIN — OLANZAPINE 10 MG: 10 INJECTION, POWDER, LYOPHILIZED, FOR SOLUTION INTRAMUSCULAR at 08:12

## 2024-03-12 RX ADMIN — OLANZAPINE 10 MG: 10 INJECTION, POWDER, LYOPHILIZED, FOR SOLUTION INTRAMUSCULAR at 13:36

## 2024-03-12 RX ADMIN — OLANZAPINE 10 MG: 10 INJECTION, POWDER, LYOPHILIZED, FOR SOLUTION INTRAMUSCULAR at 22:14

## 2024-03-12 ASSESSMENT — ACTIVITIES OF DAILY LIVING (ADL)
ADLS_ACUITY_SCORE: 29
ORAL_HYGIENE: INDEPENDENT
ADLS_ACUITY_SCORE: 29
ADLS_ACUITY_SCORE: 29
DRESS: SCRUBS (BEHAVIORAL HEALTH);INDEPENDENT
ADLS_ACUITY_SCORE: 29
HYGIENE/GROOMING: INDEPENDENT
ADLS_ACUITY_SCORE: 29

## 2024-03-12 NOTE — PROGRESS NOTES
Emailed Madeleine  regarding [facility] list, letting her know that they are just now placing people from Sept and Oct. Awaiting a response.

## 2024-03-12 NOTE — PLAN OF CARE
Problem: Adult Behavioral Health Plan of Care  Goal: Patient-Specific Goal (Individualization)  Description: Patient will sleep 6 to 8 hours per night  Patient will eat at least 50% of meals  Patient will attend at least 50% of groups  Patient will comply with recommendations of treatment team  Patient will remain medication compliant            Outcome: Progressing  Note:     0830 Patient given IM Zyprexa 10 mg and patient was cooperative. Patient remains in room but comes out briefly. Patient is isolative and irritable but no other behaviors seen. Patient has no complaints of physical problems or pain at this time.    1345 Patient given PM dose of Zyprexa by IM injection. Patient allowed without difficulty. Patient came out of room in pm and socialized minimally.    Face to face end of shift report communicated to 3-11 shift RN.     Jodie Degroot RN  3/12/2024  2:19 PM         Problem: Thought Process Alteration  Goal: Optimal Thought Clarity  Description: Pt will be able to have a reality based conversation prior to discharge.    Outcome: Progressing      Goal Outcome Evaluation:    Plan of Care Reviewed With: patient

## 2024-03-12 NOTE — PLAN OF CARE
Problem: Adult Behavioral Health Plan of Care  Goal: Patient-Specific Goal (Individualization)  Description: Patient will sleep 6 to 8 hours per night  Patient will eat at least 50% of meals  Patient will attend at least 50% of groups  Patient will comply with recommendations of treatment team  Patient will remain medication compliant            Outcome: Progressing   Goal Outcome Evaluation:       Face to face shift report received from RN. Rounding completed, pt observed. Client rested in room for 7 hours with eyes closed and respirations noted.Face to face report will be communicated to oncoming RN.    Mike Blankenship RN  3/12/2024  6:12 AM

## 2024-03-12 NOTE — PROGRESS NOTES
"[facility] PSYCHIATRY  PROGRESS NOTE     SUBJECTIVE     Prior to interviewing the patient, I met with nursing and reviewed patient's clinical condition. We discussed clinical care both before and after the interview. I have reviewed the patient's clinical course by review of records including previous notes, labs, and vital signs.     Per nursing, the patient had the following behavioral events over the last 24-hours: mostly isolative to his room. Does not interact with peers.    On psychiatric interview ,he is met on the open unit. He is laying in bed. He is asked if he is tired and he does not respond. He again tells writer that I am breaking his Shinto and that he is \"taking the 5th and 6th amendments today\".  He is asked if he is having any side effects from the medications and he does not answer rather he is set on arguing today. He is asked if he would like to come up with a plan to get out of the hospital but he does not state the answer to this question.         MEDICATIONS   Scheduled Meds:  Not on any psychiatric medications and denies any need for psychiatric medications    OLANZapine  10 mg Intramuscular Daily     paliperidone  3 mg Oral BID    Or     OLANZapine  10 mg Intramuscular BID       PRN Meds:.acetaminophen, albuterol, alum & mag hydroxide-simethicone, Benzocaine-Menthol-Zinc Cl, cloNIDine, haloperidol lactate, hydrOXYzine HCl, melatonin, nicotine, psyllium     ALLERGIES   Allergies   Allergen Reactions     Morphine Sulfate Nausea and Vomiting     Cannot take it in pill form, IV ok   Pancreatitis     Peanuts [Nuts] GI Disturbance     Internal cuts-GERD     Gold Au 198 [Gold] Rash        MENTAL STATUS EXAM   Vitals: BP (!) 179/104   Pulse 71   Temp 98.3  F (36.8  C) (Tympanic)   Resp 16   Ht 1.651 m (5' 5\")   Wt 144.5 kg (318 lb 9.6 oz)   SpO2 96%   BMI 53.02 kg/m      Appearance:  awake, alert, dressed in hospital scrubs, appeared as age stated, and disheveled " "  Attitude:Guarded  Eye Contact: Fair  Mood: \"horrible\"  Affect:irritable  Speech: Regular rate  Psychomotor Behavior:  no evidence of tardive dyskinesia, dystonia, or tics  Thought Process: Tangential   Associations: Loose associations present  Thought Content: No SI or HI elicited. Denies AVH. Paranoid.  Insight: Poor  Judgment: Poor  Oriented to:  time, person, and place  Attention Span and Concentration: Poor  Recent and Remote Memory:  fair  Fund of Knowledge: Low to average  Muscle Strength and Tone: normal  Gait and Station: Normal       LABS   No results found for this or any previous visit (from the past 24 hour(s)).      IMPRESSION     This is a 33 year old male with a PMH of unspecified mood disorder, ADHD, SAGE, insomnia who was brought to the ED from his PCP office after exhibiting psychotic symptoms. Patient was found to be disorganized and delusional. He was placed on a hold and petition for commitment was filed in ED prior to transfer to behavioral health. Today patient is irritable. He answers most questions with \"that's confidential\". He does mention the [place], FBI, and government being involved. During our interaction he uses sign language when staring at the camera in the ceiling as if communicating to someone. He reports that he will not take any medication, because it is not real and he does not trust it. Writer and SW did attempt to explain the commitment process, however patient refused to take the paperwork offered and instead ended the interview, stating that that we needed to watch out for the [place], FBI, and secret service. Will schedule Seroquel at bedtime for tonight as patient had been on this in the past, though I suspect he will refuse. If continuing to refuse medication, will likely need to file Nieves petition tomorrow.     2/23/24: The patient continues to be manic and psychotic. Refusing all scheduled medications. Nieves re-submitted.    2/24/24: declines psychiatric interview today. " "Remains manic and psychotic. Nieves hearing set for 3/4/24    2/25/24: only utters one word today on examination. Remains decompensated.     2/26/24: converses today. He is illogical tangentially talking about nonsensical things.     2/27/24: attempted to meet with Marija twice today. He cites that Dr. Martinez is \"fake\" and does not wish to communicate today.     2/28/24: will not communicate with writer, waving him away.  He may be using sing language to sign the word \"crying\" but this is unclear.     2/29/24: nonsensical in communication. Remains disorganized.     3/1/24: does not participate in interview    3/2/24: upon interview today, Marija does not communicate. He puts up his hand and shows all 5 fingers, nothing else.     3/3/24: encouraged to attend his court hearing tomorrow. He remains disorganized. He is in need of a Nieves for stabilization.     3/4/24: declines to participate in interview and Nieves hearing. He declined to speak with his  despite his  meeting with him several times.   stated in court he is approving order for Nieves. We await signed paperwork    3/5/24: Nieves approved. We await paperwork. Will move forwad with oral paliperidone and IM olanzapine back-up.     3/6/24: Nieves paper received. Will start paliperidone 3 mg BID with 10 mg olanzapine IM back-up    3/7/24: receiving IM olanzapine, he engages in more conversations today compared to other days when he has not had neuroleptics.     3/8/24: more conversational, however much more irritable    3/9/24: continues to take IM olanzapine over oral. He is becoming more vocal about his paranoia     3/10/24: irritability persists, however he is more talkative over last 4 days. Will consider increasing olanzapine IM tomorrow    3/11/24: increase olanzapine to 10 mg IM TID    3/12/24: no changes, resistant to cooperating with Nieves and continues to require IM formulation of olanzapine. Will continue with olanzapine for the " next week to see if there is any improvement in his delusional construct before switching neuroleptic agents.      DIAGNOSES     Bipolar 1 disorder, current episode manic with psychotic features        PLAN     Location: Unit 5  Legal Status: Committed.    Nieves: Pending. 3/4 at 11.    Safety Assessment:    Behavioral Orders   Procedures     Code 1 - Restrict to Unit     Routine Programming     As clinically indicated     Status 15     Every 15 minutes.      PTA psychotropic medications stopped:     -no PTA meds    PTA psychotropic medications continued/changed:     -no PTA meds    New medications tried and stopped:     -Seroquel 100 mg at bedtime due to him refusing     New medications initiated:     -standard unit PRN medications    Today's Changes:  -continue with increase olanzapine to 10 mg IM TID, will keep oral paliperidone 3 mg BID with preference to take oral medications    Programming: Patient will be treated in a therapeutic milieu with appropriate individual and group therapies. Education will be provided on diagnoses, medications, and treatments.     Medical diagnoses:  Per medicine    Consult: None  Tests: None    Anticipated LOS: > 7 days  Disposition: IRTS vs home with services       ATTESTATION    Bernardo Martinez MD  [facility]   Psychiatry    Video Visit: Patient has given verbal consent for video visit?: Yes  Type of Service: video visit for mental health treatment  Reason for Video Visit: COVID-19 and [place]  Originating Site (patient location): [facility]  Distant Site (provider location): Remote Location  Mode of Communication: Video Conference via [employer]  Time of Service: Date: 03/12/2024 , Start: 11:00 end: 11:30

## 2024-03-13 PROCEDURE — 250N000011 HC RX IP 250 OP 636: Performed by: PSYCHIATRY & NEUROLOGY

## 2024-03-13 PROCEDURE — 99232 SBSQ HOSP IP/OBS MODERATE 35: CPT | Mod: 95 | Performed by: PSYCHIATRY & NEUROLOGY

## 2024-03-13 PROCEDURE — 124N000004

## 2024-03-13 RX ORDER — HALOPERIDOL 5 MG/ML
5 INJECTION INTRAMUSCULAR 2 TIMES DAILY
Status: DISCONTINUED | OUTPATIENT
Start: 2024-03-13 | End: 2024-03-15

## 2024-03-13 RX ORDER — HALOPERIDOL 5 MG/ML
5 INJECTION INTRAMUSCULAR DAILY
Status: DISCONTINUED | OUTPATIENT
Start: 2024-03-13 | End: 2024-03-14

## 2024-03-13 RX ORDER — PALIPERIDONE 3 MG/1
3 TABLET, EXTENDED RELEASE ORAL 2 TIMES DAILY
Status: DISCONTINUED | OUTPATIENT
Start: 2024-03-13 | End: 2024-03-15

## 2024-03-13 RX ADMIN — HALOPERIDOL LACTATE 5 MG: 5 INJECTION, SOLUTION INTRAMUSCULAR at 21:26

## 2024-03-13 RX ADMIN — HALOPERIDOL LACTATE 5 MG: 5 INJECTION, SOLUTION INTRAMUSCULAR at 14:57

## 2024-03-13 RX ADMIN — OLANZAPINE 10 MG: 10 INJECTION, POWDER, LYOPHILIZED, FOR SOLUTION INTRAMUSCULAR at 08:54

## 2024-03-13 ASSESSMENT — ACTIVITIES OF DAILY LIVING (ADL)
ADLS_ACUITY_SCORE: 29

## 2024-03-13 NOTE — PROGRESS NOTES
"[facility] PSYCHIATRY  PROGRESS NOTE     SUBJECTIVE     Prior to interviewing the patient, I met with nursing and reviewed patient's clinical condition. We discussed clinical care both before and after the interview. I have reviewed the patient's clinical course by review of records including previous notes, labs, and vital signs.     Per nursing, the patient had the following behavioral events over the last 24-hours: none    On psychiatric interview ,he is met in his room. He is asked again today whether or not he wants to work on a plan to transition out of the hospital to which he states \"you need to contact the fire department\". He states that everyroom needs to have a fire exit plan and that we are denying everyone on the unit this right. He is argumentative. Does not believe he needs medications and continues to vocalize he needs pot because that is his Baptism.        MEDICATIONS   Scheduled Meds:  Not on any psychiatric medications and denies any need for psychiatric medications    paliperidone  3 mg Oral BID    Or     haloperidol lactate  5 mg Intramuscular BID     haloperidol lactate  5 mg Intramuscular Daily       PRN Meds:.acetaminophen, albuterol, alum & mag hydroxide-simethicone, Benzocaine-Menthol-Zinc Cl, cloNIDine, haloperidol lactate, hydrOXYzine HCl, melatonin, nicotine, psyllium     ALLERGIES   Allergies   Allergen Reactions     Morphine Sulfate Nausea and Vomiting     Cannot take it in pill form, IV ok   Pancreatitis     Peanuts [Nuts] GI Disturbance     Internal cuts-GERD     Gold Au 198 [Gold] Rash        MENTAL STATUS EXAM   Vitals: BP (!) 179/104   Pulse 71   Temp 98.3  F (36.8  C) (Tympanic)   Resp 16   Ht 1.651 m (5' 5\")   Wt 144.5 kg (318 lb 9.6 oz)   SpO2 96%   BMI 53.02 kg/m      Appearance:  awake, alert, dressed in hospital scrubs, appeared as age stated, and disheveled   Attitude:Guarded  Eye Contact: Fair  Mood: \"fine\"  Affect:irritable  Speech: Regular rate  Psychomotor " "Behavior:  no evidence of tardive dyskinesia, dystonia, or tics  Thought Process: Tangential   Associations: Loose associations present  Thought Content: No SI or HI elicited. Denies AVH. Paranoid.  Insight: Poor  Judgment: Poor  Oriented to:  time, person, and place  Attention Span and Concentration: Poor  Recent and Remote Memory:  fair  Fund of Knowledge: Low to average  Muscle Strength and Tone: normal  Gait and Station: Normal       LABS   No results found for this or any previous visit (from the past 24 hour(s)).      IMPRESSION     This is a 33 year old male with a PMH of unspecified mood disorder, ADHD, SAGE, insomnia who was brought to the ED from his PCP office after exhibiting psychotic symptoms. Patient was found to be disorganized and delusional. He was placed on a hold and petition for commitment was filed in ED prior to transfer to behavioral health. Today patient is irritable. He answers most questions with \"that's confidential\". He does mention the [place], FBI, and government being involved. During our interaction he uses sign language when staring at the camera in the ceiling as if communicating to someone. He reports that he will not take any medication, because it is not real and he does not trust it. Writer and SW did attempt to explain the commitment process, however patient refused to take the paperwork offered and instead ended the interview, stating that that we needed to watch out for the [place], FBI, and secret service. Will schedule Seroquel at bedtime for tonight as patient had been on this in the past, though I suspect he will refuse. If continuing to refuse medication, will likely need to file Nieves petition tomorrow.     2/23/24: The patient continues to be manic and psychotic. Refusing all scheduled medications. Nieves re-submitted.    2/24/24: declines psychiatric interview today. Remains manic and psychotic. Nieves hearing set for 3/4/24    2/25/24: only utters one word today on " "examination. Remains decompensated.     2/26/24: converses today. He is illogical tangentially talking about nonsensical things.     2/27/24: attempted to meet with Marija twice today. He cites that Dr. Martinez is \"fake\" and does not wish to communicate today.     2/28/24: will not communicate with writer, waving him away.  He may be using sing language to sign the word \"crying\" but this is unclear.     2/29/24: nonsensical in communication. Remains disorganized.     3/1/24: does not participate in interview    3/2/24: upon interview today, Marija does not communicate. He puts up his hand and shows all 5 fingers, nothing else.     3/3/24: encouraged to attend his court hearing tomorrow. He remains disorganized. He is in need of a Nieves for stabilization.     3/4/24: declines to participate in interview and Nieves hearing. He declined to speak with his  despite his  meeting with him several times.   stated in court he is approving order for Nieves. We await signed paperwork    3/5/24: Nieves approved. We await paperwork. Will move forwad with oral paliperidone and IM olanzapine back-up.     3/6/24: Nieves paper received. Will start paliperidone 3 mg BID with 10 mg olanzapine IM back-up    3/7/24: receiving IM olanzapine, he engages in more conversations today compared to other days when he has not had neuroleptics.     3/8/24: more conversational, however much more irritable    3/9/24: continues to take IM olanzapine over oral. He is becoming more vocal about his paranoia     3/10/24: irritability persists, however he is more talkative over last 4 days. Will consider increasing olanzapine IM tomorrow    3/11/24: increase olanzapine to 10 mg IM TID    3/12/24: no changes, resistant to cooperating with Nieves and continues to require IM formulation of olanzapine. Will continue with olanzapine for the next week to see if there is any improvement in his delusional construct before switching " "neuroleptic agents.     3/13/24: decision made today to switch to haloperidol instead of olanzapine. No appreciable different with olanzapine.  He is more argumentative and slightly more communicative. Continues to state he is not going to take medications on a long term basis and that he only needs \"pot\"     DIAGNOSES     Bipolar 1 disorder, current episode manic with psychotic features        PLAN     Location: Unit 5  Legal Status: Committed.    Nieves: Pending. 3/4 at 11.    Safety Assessment:    Behavioral Orders   Procedures     Code 1 - Restrict to Unit     Routine Programming     As clinically indicated     Status 15     Every 15 minutes.      PTA psychotropic medications stopped:     -no PTA meds    PTA psychotropic medications continued/changed:     -no PTA meds    New medications tried and stopped:     -Seroquel 100 mg at bedtime due to him refusing     New medications initiated:     -standard unit PRN medications    Today's Changes:  -discontinue olanzapine, will trial haloperidol    Programming: Patient will be treated in a therapeutic milieu with appropriate individual and group therapies. Education will be provided on diagnoses, medications, and treatments.     Medical diagnoses:  Per medicine    Consult: None  Tests: None    Anticipated LOS: > 7 days  Disposition: IRTS vs home with services       ATTESTATION    Bernardo Martinez MD  [facility]   Psychiatry    Video Visit: Patient has given verbal consent for video visit?: Yes  Type of Service: video visit for mental health treatment  Reason for Video Visit: COVID-19 [place]  Originating Site (patient location): [facility]  Distant Site (provider location): Remote Location  Mode of Communication: Video Conference via [employer]  Time of Service: Date: 03/13/2024 , Start: 09:00 end: 09:30     "

## 2024-03-13 NOTE — TREATMENT PLAN
Initial Treatment Plan      Client Strengths:  creative, has a previous history of therapy, and support of family, friends and providers    Areas of Vulnerability:  Manic symptoms   Psychotic symptoms/behavior     Long-Term Goals:  Knowledge about illness and management of symptoms   Maintenance of personal safety   Maintenance of sobriety   Effective management of impulsivity     Abuse Prevention Plan:  Safe, therapeutic environment   Safety coping plan as needed   Education regarding illness and skill development   Coordination with care providers   Impluse control education and intervention   Medication adjustment/management (MI/CD)   Monitor for use of substances    Discharge Criteria:  Satisfactory progress toward treatment goals   Improvement re: identified problems and symptoms   Ability to continue recovery at next level of service   Has a discharge plan in place      Specific Barriers to Discharge:  Continued need for stabilization on medication    Need for Continued Inpatient Treatment:  Continued need for stabilization on medication     Assessment/Intervention/Current Symtoms and Care Coordination:      Discharge Plan or Goal:  Pending stabilization & development of a safe discharge plan.  Considerations include:      Discharge Checklist:  Transportation: TBD  Medications ordered/sent to: No  Provisional discharge required: Yes: will work on when discharge date is known.   Appointments scheduled:   Psychiatry - TBD  Therapy - TBD  PCP - TBD  AVS complete: No     Referral Status:  [facility]     Legal Status:  Full commitment and Nieves     Contacts:  [facility]

## 2024-03-13 NOTE — PLAN OF CARE
"Face to face shift report received from Mary Ann STRONG. Rounding completed, pt observed sleeping at the start of the shift.    Patient withdrawn to room throughout the day. Alert and making needs known. Eating well for meals. Pleasant overall during conversation with this writer. Expresses frustration regarding hospitalization. Compliant with scheduled medications via IM and nursing assessment. Denies anxiety, depression, hallucinations, and SI/HI. Reports pain \"everywhere\" and continues to state \"dilaudid is the only pain medication that works.\" Offered what was available and he declined at this time. Moved to open unit this afternoon. States \"I can't have a roommate.\" No problems observed or reported. Requested supplies and took a shower this afternoon.    Problem: Adult Behavioral Health Plan of Care  Goal: Patient-Specific Goal (Individualization)  Description: Patient will sleep 6 to 8 hours per night  Patient will eat at least 50% of meals  Patient will attend at least 50% of groups  Patient will comply with recommendations of treatment team  Patient will remain medication compliant    3/13/24: May wean as appropriate.  Outcome: Progressing     Problem: Thought Process Alteration  Goal: Optimal Thought Clarity  Description: Pt will be able to have a reality based conversation prior to discharge.    Outcome: Progressing    Face to face report will be communicated to dean STRONG.    Gladys Galloway RN  3/13/2024                          "

## 2024-03-13 NOTE — PLAN OF CARE
Face to face end of shift report will be communicated to oncoming RN.    Problem: Adult Behavioral Health Plan of Care  Goal: Patient-Specific Goal (Individualization)  Description: Patient will sleep 6 to 8 hours per night  Patient will eat at least 50% of meals  Patient will attend at least 50% of groups  Patient will comply with recommendations of treatment team  Patient will remain medication compliant            Outcome: Progressing     Problem: Thought Process Alteration  Goal: Optimal Thought Clarity  Description: Pt will be able to have a reality based conversation prior to discharge.    Outcome: Progressing    Face to face end of shift report obtained from LIGIA Bustamante. Pt observed resting in bed awake.   Pt appears to be resting in bed with eyes closed. 15 minutes and PRN safety checks completed with no noted complains. No delusional comments noted or reported so far this shift.   0600-Pt appeared to had slept 2.5 hours. Pt waking up during safety checks so in person safety checks later minimized to hourly to promote sleep. Pt appeared to be sleeping with no complains via camera.

## 2024-03-13 NOTE — PLAN OF CARE
Face to face end of shift report received from day RN. Rounding completed. Patient observed in his room resting in bed.     Robby Ramirez, RN  3/12/2024  [id number]    Patient spent shift either resting in bed or watching TV in the ICU lounge (patient in MHICU due to bed needs). Denies SI/HI/AVH/depression/anxiety/pain. Declined Invega 3 mg oral medication (first line Nieves medication). Zyprexa 10 mg IM administered to left dorsogluteal muscle (secondary Nieves medication). Pt accepted medication calmly and tolerated well. Appetite/hygiene good. Calm, cooperative, and appropriate with staff/peers.      Report will be given to night RN at change of shift.     Problem: Adult Behavioral Health Plan of Care  Goal: Patient-Specific Goal (Individualization)  Description: Patient will sleep 6 to 8 hours per night  Patient will eat at least 50% of meals  Patient will attend at least 50% of groups  Patient will comply with recommendations of treatment team  Patient will remain medication compliant            Outcome: Progressing     Problem: Thought Process Alteration  Goal: Optimal Thought Clarity  Description: Pt will be able to have a reality based conversation prior to discharge.    Outcome: Progressing

## 2024-03-14 PROCEDURE — 250N000011 HC RX IP 250 OP 636: Mod: JZ | Performed by: PSYCHIATRY & NEUROLOGY

## 2024-03-14 PROCEDURE — 250N000013 HC RX MED GY IP 250 OP 250 PS 637: Performed by: PSYCHIATRY & NEUROLOGY

## 2024-03-14 PROCEDURE — 99232 SBSQ HOSP IP/OBS MODERATE 35: CPT | Mod: 95 | Performed by: PSYCHIATRY & NEUROLOGY

## 2024-03-14 PROCEDURE — 124N000004

## 2024-03-14 RX ORDER — BUPRENORPHINE AND NALOXONE 4; 1 MG/1; MG/1
1 FILM, SOLUBLE BUCCAL; SUBLINGUAL ONCE
Qty: 1 FILM | Refills: 0 | Status: COMPLETED | OUTPATIENT
Start: 2024-03-14 | End: 2024-03-14

## 2024-03-14 RX ORDER — BUPRENORPHINE AND NALOXONE 4; 1 MG/1; MG/1
1 FILM, SOLUBLE BUCCAL; SUBLINGUAL 2 TIMES DAILY
Status: DISCONTINUED | OUTPATIENT
Start: 2024-03-14 | End: 2024-03-16

## 2024-03-14 RX ADMIN — BUPRENORPHINE AND NALOXONE 1 FILM: 4; 1 FILM, SOLUBLE BUCCAL; SUBLINGUAL at 21:10

## 2024-03-14 RX ADMIN — HALOPERIDOL LACTATE 5 MG: 5 INJECTION, SOLUTION INTRAMUSCULAR at 08:42

## 2024-03-14 RX ADMIN — PALIPERIDONE 3 MG: 3 TABLET, EXTENDED RELEASE ORAL at 22:26

## 2024-03-14 RX ADMIN — HALOPERIDOL LACTATE 5 MG: 5 INJECTION, SOLUTION INTRAMUSCULAR at 13:33

## 2024-03-14 RX ADMIN — BUPRENORPHINE AND NALOXONE 1 FILM: 4; 1 FILM, SOLUBLE BUCCAL; SUBLINGUAL at 15:50

## 2024-03-14 ASSESSMENT — ACTIVITIES OF DAILY LIVING (ADL)
ADLS_ACUITY_SCORE: 29
HYGIENE/GROOMING: INDEPENDENT
ADLS_ACUITY_SCORE: 29
DRESS: SCRUBS (BEHAVIORAL HEALTH);INDEPENDENT
ADLS_ACUITY_SCORE: 29
ORAL_HYGIENE: INDEPENDENT
ADLS_ACUITY_SCORE: 29
LAUNDRY: UNABLE TO COMPLETE
ADLS_ACUITY_SCORE: 29
HYGIENE/GROOMING: INDEPENDENT
ADLS_ACUITY_SCORE: 29
LAUNDRY: UNABLE TO COMPLETE
DRESS: SCRUBS (BEHAVIORAL HEALTH);INDEPENDENT
ADLS_ACUITY_SCORE: 29
ORAL_HYGIENE: INDEPENDENT
ADLS_ACUITY_SCORE: 29

## 2024-03-14 NOTE — PLAN OF CARE
"  Problem: Adult Behavioral Health Plan of Care  Goal: Patient-Specific Goal (Individualization)  Description: Patient will sleep 6 to 8 hours per night  Patient will eat at least 50% of meals  Patient will attend at least 50% of groups  Patient will comply with recommendations of treatment team  Patient will remain medication compliant    Outcome: Progressing     Face to face shift report received from Mary Ann STRONG. Rounding completed, pt observed.     Pt does not answer nursing assessment questions appropriately. Pt continues to believe he is here doing an investigation. Pt continues to feel his rights are being violated. Pt refused oral Invega requiring linked Haldol IM which was given without any concerns. Pt does request a second staff member to accompany staff to his room to give the injection as he says \"I need a witness.\" Scheduled 1400 Haldol IM given with out incident. Pt continues to voice his rights are being violated and that the court was all fake.     Face to face report will be communicated to oncdolly STRONG.    Radhika Albright RN  3/14/2024  2:04 PM   "

## 2024-03-14 NOTE — PLAN OF CARE
Face to face end of shift report will be communicated to oncoming RN.    Problem: Adult Behavioral Health Plan of Care  Goal: Patient-Specific Goal (Individualization)  Description: Patient will sleep 6 to 8 hours per night  Patient will eat at least 50% of meals  Patient will attend at least 50% of groups  Patient will comply with recommendations of treatment team  Patient will remain medication compliant    3/14/2024 0121 by Mary Ann Gomez RN  Outcome: Progressing     Problem: Thought Process Alteration  Goal: Optimal Thought Clarity  Description: Pt will be able to have a reality based conversation prior to discharge.    Outcome: Progressing  Face to face end of shift report obtained from LIGIA Vivas. Pt observed resting in bed.  Pt appears to be sleeping in bed with eyes closed. 15 minutes and PRN safety checks completed with no noted complains. No delusional thoughts noted or reported so far this shift.  0600-Pt appeared to had slept all night.

## 2024-03-14 NOTE — PLAN OF CARE
"PT withdrawn to room or pacing in hallway throughout shift.   PT upset about harris medication stating \"Its against his [employer] beliefs, and he only believes in marijuana\" He is in disagreement with harris medication and IM halidol administered. PT wanted me to put in his chart that he \"does not consent to this medication in oral or IM form.\" PT cooperatively took harris IM halidol.     Writer gave patient a notebook.    He reports he should not still be here, he does not believe he is here unlawfully reporting to writer he came into the emergency room for a toothache.     He reports he needs to pay his rent as he is worried he is going to be evicted. Unsure if this is true or needs to be addressed.     PT overall calm and cooperative throughout shift and appropriate out on open unit and with roommate this shift.     Face to face end of shift report communicated to oncoming LIGIA Phillips RN  3/13/2024  11:05 PM                       Problem: Adult Behavioral Health Plan of Care  Goal: Patient-Specific Goal (Individualization)  Description: Patient will sleep 6 to 8 hours per night  Patient will eat at least 50% of meals  Patient will attend at least 50% of groups  Patient will comply with recommendations of treatment team  Patient will remain medication compliant    3/13/24: May wean as appropriate.  Outcome: Progressing     Problem: Thought Process Alteration  Goal: Optimal Thought Clarity  Description: Pt will be able to have a reality based conversation prior to discharge.    Outcome: Progressing     Problem: Adult Behavioral Health Plan of Care  Goal: Plan of Care Review  Recent Flowsheet Documentation  Taken 3/13/2024 1900 by Sangeetha Phillips, RN  Patient Agreement with Plan of Care: (harris medications and hospitalizations) disagrees (describe)     "

## 2024-03-14 NOTE — PROGRESS NOTES
"[facility] PSYCHIATRY  PROGRESS NOTE     SUBJECTIVE     Prior to interviewing the patient, I met with nursing and reviewed patient's clinical condition. We discussed clinical care both before and after the interview. I have reviewed the patient's clinical course by review of records including previous notes, labs, and vital signs.     Per nursing, the patient had the following behavioral events over the last 24-hours: none    On psychiatric interview ,he is met in his room. He states today he does not like haloperidol that he has been receiving. He wishes to know the exact date of which haloperidol started and he is informed that in the [place] clinical trials began in 1960s and it was FDA approved shortly thereafter. He states \"the FDA is a fraud\".  He allows writer to ask about other symptoms today and he states he is in a considerable amount of pain he states that his feet are \"shot\" and that his knees and spine no longer work. He states that he has chronic pain and that we are doing nothing to address his pain he states he needs dilaudid, percocets or cannabis. He feels oxycodone is \"too strong\".  He states he can't take muscle relaxers nor gabapentin.  We talked about off-label usage of suboxone today. He agrees to trial. He is encouraged that if he does not like the haloperidol than to take the oral paliperidone that is linked to it in accordance with his Nieves.        MEDICATIONS   Scheduled Meds:  Not on any psychiatric medications and denies any need for psychiatric medications    paliperidone  3 mg Oral BID    Or     haloperidol lactate  5 mg Intramuscular BID     haloperidol lactate  5 mg Intramuscular Daily       PRN Meds:.acetaminophen, albuterol, alum & mag hydroxide-simethicone, Benzocaine-Menthol-Zinc Cl, cloNIDine, haloperidol lactate, hydrOXYzine HCl, melatonin, nicotine, psyllium     ALLERGIES   Allergies   Allergen Reactions     Morphine Sulfate Nausea and Vomiting     Cannot take it " "in pill form, IV ok   Pancreatitis     Peanuts [Nuts] GI Disturbance     Internal cuts-GERD     Gold Au 198 [Gold] Rash        MENTAL STATUS EXAM   Vitals: BP (!) 179/104   Pulse 71   Temp 98.3  F (36.8  C) (Tympanic)   Resp 16   Ht 1.651 m (5' 5\")   Wt 144.5 kg (318 lb 9.6 oz)   SpO2 96%   BMI 53.02 kg/m      Appearance:  awake, alert, dressed in hospital scrubs, appeared as age stated, and disheveled   Attitude:Guarded  Eye Contact: Fair  Mood: \"okay\"  Affect:slightly less irritable  Speech: Regular rate  Psychomotor Behavior:  no evidence of tardive dyskinesia, dystonia, or tics  Thought Process: Tangential   Associations: Loose associations present  Thought Content: No SI or HI elicited. Denies AVH. Paranoid.  Insight: Poor  Judgment: Poor  Oriented to:  time, person, and place  Attention Span and Concentration: Poor  Recent and Remote Memory:  fair  Fund of Knowledge: Low to average  Muscle Strength and Tone: normal  Gait and Station: Normal       LABS   No results found for this or any previous visit (from the past 24 hour(s)).      IMPRESSION     This is a 33 year old male with a PMH of unspecified mood disorder, ADHD, SAGE, insomnia who was brought to the ED from his PCP office after exhibiting psychotic symptoms. Patient was found to be disorganized and delusional. He was placed on a hold and petition for commitment was filed in ED prior to transfer to behavioral health. Today patient is irritable. He answers most questions with \"that's confidential\". He does mention the [place], FBI, and government being involved. During our interaction he uses sign language when staring at the camera in the ceiling as if communicating to someone. He reports that he will not take any medication, because it is not real and he does not trust it. Writer and SW did attempt to explain the commitment process, however patient refused to take the paperwork offered and instead ended the interview, stating that that we needed to " "watch out for the [place], FBI, and secret service. Will schedule Seroquel at bedtime for tonight as patient had been on this in the past, though I suspect he will refuse. If continuing to refuse medication, will likely need to file Nieves petition tomorrow.     2/23/24: The patient continues to be manic and psychotic. Refusing all scheduled medications. Nieves re-submitted.    2/24/24: declines psychiatric interview today. Remains manic and psychotic. Nieves hearing set for 3/4/24    2/25/24: only utters one word today on examination. Remains decompensated.     2/26/24: converses today. He is illogical tangentially talking about nonsensical things.     2/27/24: attempted to meet with Marija twice today. He cites that Dr. Martinez is \"fake\" and does not wish to communicate today.     2/28/24: will not communicate with writer, waving him away.  He may be using sing language to sign the word \"crying\" but this is unclear.     2/29/24: nonsensical in communication. Remains disorganized.     3/1/24: does not participate in interview    3/2/24: upon interview today, Marija does not communicate. He puts up his hand and shows all 5 fingers, nothing else.     3/3/24: encouraged to attend his court hearing tomorrow. He remains disorganized. He is in need of a Nieves for stabilization.     3/4/24: declines to participate in interview and Nieves hearing. He declined to speak with his  despite his  meeting with him several times.   stated in court he is approving order for Nieves. We await signed paperwork    3/5/24: Nieves approved. We await paperwork. Will move forwad with oral paliperidone and IM olanzapine back-up.     3/6/24: Nieves paper received. Will start paliperidone 3 mg BID with 10 mg olanzapine IM back-up    3/7/24: receiving IM olanzapine, he engages in more conversations today compared to other days when he has not had neuroleptics.     3/8/24: more conversational, however much more " "irritable    3/9/24: continues to take IM olanzapine over oral. He is becoming more vocal about his paranoia     3/10/24: irritability persists, however he is more talkative over last 4 days. Will consider increasing olanzapine IM tomorrow    3/11/24: increase olanzapine to 10 mg IM TID    3/12/24: no changes, resistant to cooperating with Nieves and continues to require IM formulation of olanzapine. Will continue with olanzapine for the next week to see if there is any improvement in his delusional construct before switching neuroleptic agents.     3/13/24: decision made today to switch to haloperidol instead of olanzapine. No appreciable different with olanzapine.  He is more argumentative and slightly more communicative. Continues to state he is not going to take medications on a long term basis and that he only needs \"pot\"    3/14/24: explained side effects benefits and complications of medication suboxone. He agrees to trial for off-label usage of pain. He is encouraged to take paliperidone oral isntead of IM haloperidol. He is likely getting some EPS from haloperidol.      DIAGNOSES     Bipolar 1 disorder, current episode manic with psychotic features        PLAN     Location: Unit 5  Legal Status: Committed.    Nieves: granted    Safety Assessment:    Behavioral Orders   Procedures     Code 1 - Restrict to Unit     Routine Programming     As clinically indicated     Status 15     Every 15 minutes.      PTA psychotropic medications stopped:     -no PTA meds    PTA psychotropic medications continued/changed:     -no PTA meds    New medications tried and stopped:     -Seroquel 100 mg at bedtime due to him refusing     New medications initiated:     -standard unit PRN medications    Today's Changes:  -haloperidol IM or oral palperidone,  -initial 4-1 mg suboxone BID, first dose now    Programming: Patient will be treated in a therapeutic milieu with appropriate individual and group therapies. Education will be " provided on diagnoses, medications, and treatments.     Medical diagnoses:  Per medicine    Consult: None  Tests: None    Anticipated LOS: > 7 days  Disposition: IRTS vs home with services       ATTESTATION    Bernardo Martinez MD  [facility]   Psychiatry    Video Visit: Patient has given verbal consent for video visit?: Yes  Type of Service: video visit for mental health treatment  Reason for Video Visit: COVID-19 [place]  Originating Site (patient location): [facility]  Distant Site (provider location): Remote Location  Mode of Communication: Video Conference via [employer]  Time of Service: Date: 03/14/2024 , Start: 12:00 end: 12:30

## 2024-03-15 PROCEDURE — 99232 SBSQ HOSP IP/OBS MODERATE 35: CPT | Mod: 95 | Performed by: PSYCHIATRY & NEUROLOGY

## 2024-03-15 PROCEDURE — 124N000004

## 2024-03-15 PROCEDURE — 250N000013 HC RX MED GY IP 250 OP 250 PS 637: Performed by: PSYCHIATRY & NEUROLOGY

## 2024-03-15 RX ORDER — PALIPERIDONE 3 MG/1
3 TABLET, EXTENDED RELEASE ORAL 2 TIMES DAILY
Status: COMPLETED | OUTPATIENT
Start: 2024-03-15 | End: 2024-03-15

## 2024-03-15 RX ORDER — PALIPERIDONE 6 MG/1
6 TABLET, EXTENDED RELEASE ORAL AT BEDTIME
Status: DISCONTINUED | OUTPATIENT
Start: 2024-03-16 | End: 2024-03-22

## 2024-03-15 RX ADMIN — BUPRENORPHINE AND NALOXONE 1 FILM: 4; 1 FILM, SOLUBLE BUCCAL; SUBLINGUAL at 08:11

## 2024-03-15 RX ADMIN — PALIPERIDONE 3 MG: 3 TABLET, EXTENDED RELEASE ORAL at 09:39

## 2024-03-15 RX ADMIN — BUPRENORPHINE AND NALOXONE 1 FILM: 4; 1 FILM, SOLUBLE BUCCAL; SUBLINGUAL at 21:20

## 2024-03-15 RX ADMIN — PALIPERIDONE 3 MG: 3 TABLET, EXTENDED RELEASE ORAL at 22:17

## 2024-03-15 ASSESSMENT — ACTIVITIES OF DAILY LIVING (ADL)
ADLS_ACUITY_SCORE: 29
LAUNDRY: UNABLE TO COMPLETE
ADLS_ACUITY_SCORE: 29
DRESS: SCRUBS (BEHAVIORAL HEALTH);INDEPENDENT
ADLS_ACUITY_SCORE: 29
ORAL_HYGIENE: INDEPENDENT
HYGIENE/GROOMING: INDEPENDENT
ADLS_ACUITY_SCORE: 29

## 2024-03-15 NOTE — PROGRESS NOTES
Went and spoke with pt and pt said he was good. Asked pt if he has spoken with his CM, or his mom, patient shook his head yes.

## 2024-03-15 NOTE — PLAN OF CARE
Problem: Adult Behavioral Health Plan of Care  Goal: Patient-Specific Goal (Individualization)  Description: Patient will sleep 6 to 8 hours per night  Patient will eat at least 50% of meals  Patient will attend at least 50% of groups  Patient will comply with recommendations of treatment team  Patient will remain medication compliant    Outcome: Progressing     Problem: Thought Process Alteration  Goal: Optimal Thought Clarity  Description: Pt will be able to have a reality based conversation prior to discharge.    Outcome: Progressing     Face to face shift report received from Jaye STRONG. Rounding completed, pt observed.     Pt does have appropriate short conversations with this writer this shift. Pt takes Suboxone and then waits an hour to take the oral Invega this shift. Pt wants his medications to be left in the packages and allowed to open them himself which this writer allowed him to do. Pt does not talk about his rights being violated this shift.    Face to face report will be communicated to oncdolly STRONG.    Radhika Albright RN  3/15/2024  1:48 PM

## 2024-03-15 NOTE — PLAN OF CARE
PT more linear this shift. He reports a lot of relief of foot pain from new Suboxone order. He requested to take Suboxone then wait awhile for harris medication, he was initially confused he thought he was taking suboxone in replacement of harris medication. PT agreeable this shift to take oral Invega instead of IM injection took an hour after suboxone per request.   Social with nurse throughout shift about multiple topics. PT linear in conversation during different topics.     PT came into lounge and was intermittently social with a few patients.   Pacing intermittently.     Face to face end of shift report communicated to oncoming RN    Sangeetha Phillips RN  3/14/2024  10:53 PM                               Problem: Adult Behavioral Health Plan of Care  Goal: Patient-Specific Goal (Individualization)  Description: Patient will sleep 6 to 8 hours per night  Patient will eat at least 50% of meals  Patient will attend at least 50% of groups  Patient will comply with recommendations of treatment team  Patient will remain medication compliant    Outcome: Progressing     Problem: Thought Process Alteration  Goal: Optimal Thought Clarity  Description: Pt will be able to have a reality based conversation prior to discharge.    Outcome: Progressing   Goal Outcome Evaluation:    Plan of Care Reviewed With: patient

## 2024-03-15 NOTE — PLAN OF CARE
"  Problem: Thought Process Alteration  Goal: Optimal Thought Clarity  Description: Pt will be able to have a reality based conversation prior to discharge.    Outcome: Progressing     Problem: Adult Behavioral Health Plan of Care  Goal: Patient-Specific Goal (Individualization)  Description: Patient will sleep 6 to 8 hours per night  Patient will eat at least 50% of meals  Patient will attend at least 50% of groups  Patient will comply with recommendations of treatment team  Patient will remain medication compliant    Outcome: Progressing       Face to Face report received from LIGIA Vivas.  Rounding completed. Patient observed in bed with eyes closed appearing to sleep for 6 hours.  15 minute and PRN checks all night. No complaints offered. Patient able to make needs known.  Patient is social with peers this morning in the lounge and reports he is \"feeling great today and looking forward to a blonde espresso over a grog\".   Will continue to monitor.     Face to face end of shift report communicated to day shift RN.     Jaye aFlcon RN  3/15/2024  6:20 AM      "

## 2024-03-15 NOTE — PROGRESS NOTES
"[facility] PSYCHIATRY  PROGRESS NOTE     SUBJECTIVE     Prior to interviewing the patient, I met with nursing and reviewed patient's clinical condition. We discussed clinical care both before and after the interview. I have reviewed the patient's clinical course by review of records including previous notes, labs, and vital signs.     Per nursing, the patient had the following behavioral events over the last 24-hours: he was social with his peers yesterday rather than standing in milieu not interacting    On psychiatric interview ,he is met in his room. He is not argumentative and engages in a back and forth conversation. He states the suboxone helped his pain. He states he has a little bit of an upset stomach.  He was made aware to keep track of his bowel movements. We discussed pain dosing for suboxone and ideally having him on this TID, however he prefers BID right now.  He states he is not noticing any side effects from paliperidone which he did take. He was commended on taking this instead of needing to get a haloperidol shot.  He states \"much obliged\".         MEDICATIONS   Scheduled Meds:  Not on any psychiatric medications and denies any need for psychiatric medications    buprenorphine HCl-naloxone HCl  1 Film Sublingual BID     paliperidone  3 mg Oral BID    Or     haloperidol lactate  5 mg Intramuscular BID       PRN Meds:.acetaminophen, albuterol, alum & mag hydroxide-simethicone, Benzocaine-Menthol-Zinc Cl, cloNIDine, haloperidol lactate, hydrOXYzine HCl, melatonin, nicotine, psyllium     ALLERGIES   Allergies   Allergen Reactions     Morphine Sulfate Nausea and Vomiting     Cannot take it in pill form, IV ok   Pancreatitis     Peanuts [Nuts] GI Disturbance     Internal cuts-GERD     Gold Au 198 [Gold] Rash        MENTAL STATUS EXAM   Vitals: BP (!) 179/104   Pulse 71   Temp 98.3  F (36.8  C) (Tympanic)   Resp 16   Ht 1.651 m (5' 5\")   Wt 144.5 kg (318 lb 9.6 oz)   SpO2 96%   BMI 53.02 kg/m  " "    Appearance:  awake, alert, dressed in hospital scrubs, appeared as age stated, and disheveled   Attitude:Guarded  Eye Contact: Fair  Mood: \"okay\"  Affect:slightly less irritable  Speech: Regular rate  Psychomotor Behavior:  no evidence of tardive dyskinesia, dystonia, or tics  Thought Process: Tangential   Associations: Loose associations present  Thought Content: No SI or HI elicited. Denies AVH. Paranoid.  Insight: Poor  Judgment: Poor  Oriented to:  time, person, and place  Attention Span and Concentration: Poor  Recent and Remote Memory:  fair  Fund of Knowledge: Low to average  Muscle Strength and Tone: normal  Gait and Station: Normal       LABS   No results found for this or any previous visit (from the past 24 hour(s)).      IMPRESSION     This is a 33 year old male with a PMH of unspecified mood disorder, ADHD, SAGE, insomnia who was brought to the ED from his PCP office after exhibiting psychotic symptoms. Patient was found to be disorganized and delusional. He was placed on a hold and petition for commitment was filed in ED prior to transfer to behavioral health. Today patient is irritable. He answers most questions with \"that's confidential\". He does mention the [place], FBI, and government being involved. During our interaction he uses sign language when staring at the camera in the ceiling as if communicating to someone. He reports that he will not take any medication, because it is not real and he does not trust it. Writer and SW did attempt to explain the commitment process, however patient refused to take the paperwork offered and instead ended the interview, stating that that we needed to watch out for the [place], FBI, and secret service. Will schedule Seroquel at bedtime for tonight as patient had been on this in the past, though I suspect he will refuse. If continuing to refuse medication, will likely need to file Nieves petition tomorrow.     2/23/24: The patient continues to be manic and psychotic. " "Refusing all scheduled medications. Nieves re-submitted.    2/24/24: declines psychiatric interview today. Remains manic and psychotic. Nieves hearing set for 3/4/24    2/25/24: only utters one word today on examination. Remains decompensated.     2/26/24: converses today. He is illogical tangentially talking about nonsensical things.     2/27/24: attempted to meet with Marija twice today. He cites that Dr. Martinez is \"fake\" and does not wish to communicate today.     2/28/24: will not communicate with writer, waving him away.  He may be using sing language to sign the word \"crying\" but this is unclear.     2/29/24: nonsensical in communication. Remains disorganized.     3/1/24: does not participate in interview    3/2/24: upon interview today, Marija does not communicate. He puts up his hand and shows all 5 fingers, nothing else.     3/3/24: encouraged to attend his court hearing tomorrow. He remains disorganized. He is in need of a Nieves for stabilization.     3/4/24: declines to participate in interview and Nieves hearing. He declined to speak with his  despite his  meeting with him several times.   stated in court he is approving order for Nieves. We await signed paperwork    3/5/24: Nieves approved. We await paperwork. Will move forwad with oral paliperidone and IM olanzapine back-up.     3/6/24: Nieves paper received. Will start paliperidone 3 mg BID with 10 mg olanzapine IM back-up    3/7/24: receiving IM olanzapine, he engages in more conversations today compared to other days when he has not had neuroleptics.     3/8/24: more conversational, however much more irritable    3/9/24: continues to take IM olanzapine over oral. He is becoming more vocal about his paranoia     3/10/24: irritability persists, however he is more talkative over last 4 days. Will consider increasing olanzapine IM tomorrow    3/11/24: increase olanzapine to 10 mg IM TID    3/12/24: no changes, resistant to " "cooperating with Ezequiel and continues to require IM formulation of olanzapine. Will continue with olanzapine for the next week to see if there is any improvement in his delusional construct before switching neuroleptic agents.     3/13/24: decision made today to switch to haloperidol instead of olanzapine. No appreciable different with olanzapine.  He is more argumentative and slightly more communicative. Continues to state he is not going to take medications on a long term basis and that he only needs \"pot\"    3/14/24: explained side effects benefits and complications of medication suboxone. He agrees to trial for off-label usage of pain. He is encouraged to take paliperidone oral isntead of IM haloperidol. He is likely getting some EPS from haloperidol.     3/15/24: reports suboxone was helpful for pain for him. He is engaging in a back and forth conversation today and even states thank you to writer today. He is taking oral paliperidone and was made aware we will consolidate to evening.      DIAGNOSES     Bipolar 1 disorder, current episode manic with psychotic features        PLAN     Location: Unit 5  Legal Status: Committed.    Ezequiel: granted    Safety Assessment:    Behavioral Orders   Procedures     Code 1 - Restrict to Unit     Routine Programming     As clinically indicated     Status 15     Every 15 minutes.      PTA psychotropic medications stopped:     -no PTA meds    PTA psychotropic medications continued/changed:     -no PTA meds    New medications tried and stopped:     -Seroquel 100 mg at bedtime due to him refusing     New medications initiated:     -standard unit PRN medications    Today's Changes:  -consolidate paliperidone to 6 mg at bedtime (starting 3/16/24)  -continue 4-1 mg suboxone BID    Programming: Patient will be treated in a therapeutic milieu with appropriate individual and group therapies. Education will be provided on diagnoses, medications, and treatments.     Medical diagnoses:  " Per medicine    Consult: None  Tests: None    Anticipated LOS: > 7 days  Disposition: IRTS vs home with services       ATTESTATION    Bernardo Martinez MD  [facility]   Psychiatry    Video Visit: Patient has given verbal consent for video visit?: Yes  Type of Service: video visit for mental health treatment  Reason for Video Visit: COVID-19 [place]  Originating Site (patient location): [facility]  Distant Site (provider location): Remote Location  Mode of Communication: Video Conference via [employer]  Time of Service: Date: 03/15/2024 , Start: 12:00 end: 12:30

## 2024-03-16 PROCEDURE — 250N000013 HC RX MED GY IP 250 OP 250 PS 637: Performed by: PSYCHIATRY & NEUROLOGY

## 2024-03-16 PROCEDURE — 124N000004

## 2024-03-16 PROCEDURE — 250N000013 HC RX MED GY IP 250 OP 250 PS 637

## 2024-03-16 PROCEDURE — 250N000013 HC RX MED GY IP 250 OP 250 PS 637: Performed by: NURSE PRACTITIONER

## 2024-03-16 PROCEDURE — 99232 SBSQ HOSP IP/OBS MODERATE 35: CPT | Mod: 95 | Performed by: PSYCHIATRY & NEUROLOGY

## 2024-03-16 RX ORDER — MAGNESIUM CARB/ALUMINUM HYDROX 105-160MG
296 TABLET,CHEWABLE ORAL ONCE
Status: COMPLETED | OUTPATIENT
Start: 2024-03-16 | End: 2024-03-16

## 2024-03-16 RX ADMIN — BE HEALTH MAGNESIUM CITRATE ORAL SOLUTION - CHERRY 296 ML: 1.75 LIQUID ORAL at 19:06

## 2024-03-16 RX ADMIN — BUPRENORPHINE AND NALOXONE 1 FILM: 4; 1 FILM, SOLUBLE BUCCAL; SUBLINGUAL at 08:06

## 2024-03-16 RX ADMIN — ACETAMINOPHEN 650 MG: 325 TABLET, FILM COATED ORAL at 17:37

## 2024-03-16 RX ADMIN — PALIPERIDONE 6 MG: 6 TABLET, EXTENDED RELEASE ORAL at 22:12

## 2024-03-16 ASSESSMENT — ACTIVITIES OF DAILY LIVING (ADL)
ADLS_ACUITY_SCORE: 29
DRESS: SCRUBS (BEHAVIORAL HEALTH);INDEPENDENT
ADLS_ACUITY_SCORE: 29
ORAL_HYGIENE: INDEPENDENT
ADLS_ACUITY_SCORE: 29
LAUNDRY: UNABLE TO COMPLETE
ADLS_ACUITY_SCORE: 29
ADLS_ACUITY_SCORE: 29
DRESS: SCRUBS (BEHAVIORAL HEALTH);INDEPENDENT
ADLS_ACUITY_SCORE: 29
ADLS_ACUITY_SCORE: 29
HYGIENE/GROOMING: INDEPENDENT
ADLS_ACUITY_SCORE: 29
ADLS_ACUITY_SCORE: 29
HYGIENE/GROOMING: INDEPENDENT
ADLS_ACUITY_SCORE: 29
LAUNDRY: UNABLE TO COMPLETE
ADLS_ACUITY_SCORE: 29
ADLS_ACUITY_SCORE: 29
ORAL_HYGIENE: INDEPENDENT

## 2024-03-16 NOTE — PROGRESS NOTES
"[facility] PSYCHIATRY  PROGRESS NOTE     SUBJECTIVE     Prior to interviewing the patient, I met with nursing and reviewed patient's clinical condition. We discussed clinical care both before and after the interview. I have reviewed the patient's clinical course by review of records including previous notes, labs, and vital signs.     Per nursing, the patient had the following behavioral events over the last 24-hours: none    On psychiatric interview ,he is met in his room. He shaved his beard. He states he feels \"Good\" today. He states he has an upset stomach and does not wish to take suboxone anymore as he believes this is from the suboxone. He states it was helping with pain but he wishes to take no more.  He agrees to continue to take the paliperidone.         MEDICATIONS   Scheduled Meds:  Not on any psychiatric medications and denies any need for psychiatric medications    paliperidone  6 mg Oral At Bedtime       PRN Meds:.acetaminophen, albuterol, alum & mag hydroxide-simethicone, Benzocaine-Menthol-Zinc Cl, cloNIDine, haloperidol lactate, hydrOXYzine HCl, melatonin, nicotine, psyllium     ALLERGIES   Allergies   Allergen Reactions     Morphine Sulfate Nausea and Vomiting     Cannot take it in pill form, IV ok   Pancreatitis     Peanuts [Nuts] GI Disturbance     Internal cuts-GERD     Gold Au 198 [Gold] Rash        MENTAL STATUS EXAM   Vitals: /89   Pulse 106   Temp 99.2  F (37.3  C) (Temporal)   Resp 16   Ht 1.651 m (5' 5\")   Wt 138.9 kg (306 lb 3.2 oz)   SpO2 94%   BMI 50.95 kg/m      Appearance:  awake, alert, dressed in hospital scrubs, appeared as age stated, and disheveled   Attitude:Guarded  Eye Contact: Fair  Mood: \"good\"  Affect: flat  Speech: Regular rate  Psychomotor Behavior:  no evidence of tardive dyskinesia, dystonia, or tics  Thought Process: Tangential   Associations: Loose associations present  Thought Content: No SI or HI elicited. Denies AVH. Paranoid.  Insight: " "improving  Judgment: improving  Oriented to:  time, person, and place  Attention Span and Concentration: Poor  Recent and Remote Memory:  fair  Fund of Knowledge: Low to average  Muscle Strength and Tone: normal  Gait and Station: Normal       LABS   No results found for this or any previous visit (from the past 24 hour(s)).      IMPRESSION     This is a 33 year old male with a PMH of unspecified mood disorder, ADHD, SAGE, insomnia who was brought to the ED from his PCP office after exhibiting psychotic symptoms. Patient was found to be disorganized and delusional. He was placed on a hold and petition for commitment was filed in ED prior to transfer to behavioral health. Today patient is irritable. He answers most questions with \"that's confidential\". He does mention the [place], FBI, and government being involved. During our interaction he uses sign language when staring at the camera in the ceiling as if communicating to someone. He reports that he will not take any medication, because it is not real and he does not trust it. Writer and SW did attempt to explain the commitment process, however patient refused to take the paperwork offered and instead ended the interview, stating that that we needed to watch out for the [place], FBI, and secret service. Will schedule Seroquel at bedtime for tonight as patient had been on this in the past, though I suspect he will refuse. If continuing to refuse medication, will likely need to file Nieves petition tomorrow.     2/23/24: The patient continues to be manic and psychotic. Refusing all scheduled medications. Nieves re-submitted.    2/24/24: declines psychiatric interview today. Remains manic and psychotic. Nieves hearing set for 3/4/24    2/25/24: only utters one word today on examination. Remains decompensated.     2/26/24: converses today. He is illogical tangentially talking about nonsensical things.     2/27/24: attempted to meet with Marija twice today. He cites that Dr." "Juan is \"fake\" and does not wish to communicate today.     2/28/24: will not communicate with writer, waving him away.  He may be using sing language to sign the word \"crying\" but this is unclear.     2/29/24: nonsensical in communication. Remains disorganized.     3/1/24: does not participate in interview    3/2/24: upon interview today, Marija does not communicate. He puts up his hand and shows all 5 fingers, nothing else.     3/3/24: encouraged to attend his court hearing tomorrow. He remains disorganized. He is in need of a Nieves for stabilization.     3/4/24: declines to participate in interview and Nieves hearing. He declined to speak with his  despite his  meeting with him several times.   stated in court he is approving order for Nieves. We await signed paperwork    3/5/24: Nieves approved. We await paperwork. Will move forwad with oral paliperidone and IM olanzapine back-up.     3/6/24: Nieves paper received. Will start paliperidone 3 mg BID with 10 mg olanzapine IM back-up    3/7/24: receiving IM olanzapine, he engages in more conversations today compared to other days when he has not had neuroleptics.     3/8/24: more conversational, however much more irritable    3/9/24: continues to take IM olanzapine over oral. He is becoming more vocal about his paranoia     3/10/24: irritability persists, however he is more talkative over last 4 days. Will consider increasing olanzapine IM tomorrow    3/11/24: increase olanzapine to 10 mg IM TID    3/12/24: no changes, resistant to cooperating with Nieves and continues to require IM formulation of olanzapine. Will continue with olanzapine for the next week to see if there is any improvement in his delusional construct before switching neuroleptic agents.     3/13/24: decision made today to switch to haloperidol instead of olanzapine. No appreciable different with olanzapine.  He is more argumentative and slightly more communicative. Continues " "to state he is not going to take medications on a long term basis and that he only needs \"pot\"    3/14/24: explained side effects benefits and complications of medication suboxone. He agrees to trial for off-label usage of pain. He is encouraged to take paliperidone oral isntead of IM haloperidol. He is likely getting some EPS from haloperidol.     3/15/24: reports suboxone was helpful for pain for him. He is engaging in a back and forth conversation today and even states thank you to writer today. He is taking oral paliperidone and was made aware we will consolidate to evening.     3/16/24: today Marija states that although he is tolerating suboxone for pain, he wishes to stop as he reports he is having too many GI side effects. He agrees to continue to take paliperidone. We will hold off on linking it to an IM right now and encourage him to take it as prescribed, we will revert back to linked if needed.  Marija has made great strides in the last several days, he is now showering and shaving.      DIAGNOSES     Bipolar 1 disorder, current episode manic with psychotic features        PLAN     Location: Unit 5  Legal Status: Committed.    Nieves: granted    Safety Assessment:    Behavioral Orders   Procedures     Code 1 - Restrict to Unit     Routine Programming     As clinically indicated     Status 15     Every 15 minutes.      PTA psychotropic medications stopped:     -no PTA meds    PTA psychotropic medications continued/changed:     -no PTA meds    New medications tried and stopped:     -Seroquel 100 mg at bedtime due to him refusing     New medications initiated:     -standard unit PRN medications    Today's Changes:  -consolidate paliperidone to 6 mg at bedtime (starting 3/16/24)  -discontinue suboxone    Programming: Patient will be treated in a therapeutic milieu with appropriate individual and group therapies. Education will be provided on diagnoses, medications, and treatments.     Medical diagnoses:  Per " medicine    Consult: None  Tests: None    Anticipated LOS: > 7 days  Disposition: IRTS vs home with services       ATTESTATION    Bernardo Martinez MD  [facility]   Psychiatry    Video Visit: Patient has given verbal consent for video visit?: Yes  Type of Service: video visit for mental health treatment  Reason for Video Visit: COVID-19 [place]  Originating Site (patient location): [facility]  Distant Site (provider location): Remote Location  Mode of Communication: Video Conference via [employer]  Time of Service: Date: 03/16/2024 , Start: 12:00 end: 12:30

## 2024-03-16 NOTE — PLAN OF CARE
Face to face report received from Ottoniel STRONG. Pt. Observed.    Problem: Adult Behavioral Health Plan of Care  Goal: Patient-Specific Goal (Individualization)  Description: Patient will sleep 6 to 8 hours per night  Patient will eat at least 50% of meals  Patient will attend at least 50% of groups  Patient will comply with recommendations of treatment team  Patient will remain medication compliant    Outcome: Progressing   Pt has been in bed with eyes closed and regular respirations for a total of 6.5 hours this noc shift. Pt. Up x 1 this noc shift, out to lounge to get water, then returned to room. 15 minute and PRN checks all night. No complaints offered.     Face to face end of shift report communicated to oncdolly RN.     Radha Santos RN  3/16/2024

## 2024-03-16 NOTE — PLAN OF CARE
Face to face end of shift report received from Charla MAYO RN. Rounding completed and patient observed in the lounge. No requests at this time.      Goal Outcome Evaluation: Patient has reported feeling very unsafe. He was very fearful of his roommate due to something his roommate did. Patient's roommate was moved but this did not help patient calm. He asked multiple times if he could be moved to [facility] [place]. Patient declined to take any PRNs. He paced in the halls because he wouldn't go in his room. Patient felt better after his room was cleaned by housekeeping. He reported he was hot multiple times and each time he'd wet his hair down. He asked for a cold pack and was given one. He used this to cool down but it also seemed to help him calm down. Patient took his meds one hour apart. He agreed to mouth checks after administration of Invega. Patient denied pain. Patient only ate 25% of his dinner.     Face to face end of shift report communicated to oncdolly RN.         Problem: Adult Behavioral Health Plan of Care  Goal: Patient-Specific Goal (Individualization)  Description: Patient will sleep 6 to 8 hours per night  Patient will eat at least 50% of meals  Patient will attend at least 50% of groups  Patient will comply with recommendations of treatment team  Patient will remain medication compliant    Outcome: Progressing     Problem: Thought Process Alteration  Goal: Optimal Thought Clarity  Description: Pt will be able to have a reality based conversation prior to discharge.    Outcome: Progressing

## 2024-03-16 NOTE — PLAN OF CARE
"  Problem: Adult Behavioral Health Plan of Care  Goal: Patient-Specific Goal (Individualization)  Description: Patient will sleep 6 to 8 hours per night  Patient will eat at least 50% of meals  Patient will attend at least 50% of groups  Patient will comply with recommendations of treatment team  Patient will remain medication compliant    Outcome: Progressing     Cooperative with assessment. States he is doing \"pretty well today\". He initially only wanted to take half of AM suboxone as he said it makes him feel slightly over sedated but was then agreeable to take whole dose and discuss maybe lowering his dose with provider. He makes more eye contact than previous interactions. Out of his room more as well. Showered and clean shaven.     Problem: Thought Process Alteration  Goal: Optimal Thought Clarity  Description: Pt will be able to have a reality based conversation prior to discharge.    Outcome: Progressing     Face to face report given with opportunity to observe patient. Patient in lounge.     Report given to evening shift RN.     Ora Leigh RN   3/16/2024      "

## 2024-03-17 PROCEDURE — 124N000004

## 2024-03-17 PROCEDURE — 99232 SBSQ HOSP IP/OBS MODERATE 35: CPT | Mod: 95 | Performed by: PSYCHIATRY & NEUROLOGY

## 2024-03-17 PROCEDURE — 250N000013 HC RX MED GY IP 250 OP 250 PS 637

## 2024-03-17 PROCEDURE — 250N000013 HC RX MED GY IP 250 OP 250 PS 637: Performed by: PSYCHIATRY & NEUROLOGY

## 2024-03-17 RX ADMIN — ACETAMINOPHEN 650 MG: 325 TABLET, FILM COATED ORAL at 10:06

## 2024-03-17 RX ADMIN — PALIPERIDONE 6 MG: 6 TABLET, EXTENDED RELEASE ORAL at 19:17

## 2024-03-17 ASSESSMENT — ACTIVITIES OF DAILY LIVING (ADL)
ADLS_ACUITY_SCORE: 29

## 2024-03-17 NOTE — PLAN OF CARE
"Face to face end of shift report received from Mary Ann MAYO RN. Rounding completed and patient observed pacing in the halls. No requests at this itme.        Goal Outcome Evaluation: Patient has been cooperative and polite. He is tense and at times it is hard to read his mood. He is tearful which is a change from previous. He reported to this writer multiple times the med he took last night was \"too much.\" He did not go into details. Patient has used ice pack throughout the day. He said \"they really help.\" Patient reported pain in his feet and requested 650mg tylenol at 10:06. Later he reported how this was very helpful. He said this with great enthusiasm. Patient paces in the halls. He did say he needs to get home and said \"I'm very grateful.\" and \"I'm very veronika for my apartment. I didn't realize how good I had it.\" He did attempt to call his mother and said he needs help paying his rent. He reported his mother will check on his apartment but since it's been 2 months since he paid his rent, he's not sure he still has it.     15:15 Update: Face to face end of shift report communicated to the charge nurse. This writer will continue to provide nursing services for patient throughout the next shift. Rounding completed and patient observed.    20:00 Update: Patient requested to take his Invega early this evening and he verbalized an underanding of his meds. Patient denied pain. He shaved his facial hair.    Face to face end of shift report communicated to oncoming RN.             Problem: Adult Behavioral Health Plan of Care  Goal: Patient-Specific Goal (Individualization)  Description: Patient will sleep 6 to 8 hours per night  Patient will eat at least 50% of meals  Patient will attend at least 50% of groups  Patient will comply with recommendations of treatment team  Patient will remain medication compliant    Outcome: Progressing     Problem: Thought Process Alteration  Goal: Optimal Thought Clarity  Description: Pt " will be able to have a reality based conversation prior to discharge.    Outcome: Progressing

## 2024-03-17 NOTE — PLAN OF CARE
PT calm throughout shift. Less social. More in his room this shift.   He requested magnesium citrate and reports a small bowel movement after administration.    He initially refused Invega 6mg, reporting that the dose is too high and it makes him too tired.   Writer explained that is why the dose is changed to 6mg at night instead of the two split doses of 3mg twice a day.  PT reports he would like 3mg once a day reporting 6mg total is too sedating for him. I encourage him to talk to provider as I believe the plan is either 6mg at night or 3mg twice a day not a decrease to 3mg.   PT reports he does not recall the provider telling him he was going to change the dosage.    PT did end up agreeing to take the 6mg oral for tonight, reporting I he may have to go back to choosing the injections and he will see how the night goes.     PT still presenting a little paranoid, more so than Thursday when I cared for him on evening shift.     Requested to use hand held phone reporting he was scared to touch and use the patient phone, held the phone with a tissue.     Face to face end of shift report communicated to oncoming RN     Sangeetha Phillips RN  3/16/2024  10:46 PM                                 Problem: Adult Behavioral Health Plan of Care  Goal: Patient-Specific Goal (Individualization)  Description: Patient will sleep 6 to 8 hours per night  Patient will eat at least 50% of meals  Patient will attend at least 50% of groups  Patient will comply with recommendations of treatment team  Patient will remain medication compliant    Outcome: Progressing     Problem: Thought Process Alteration  Goal: Optimal Thought Clarity  Description: Pt will be able to have a reality based conversation prior to discharge.    Outcome: Progressing   Goal Outcome Evaluation:    Plan of Care Reviewed With: patient

## 2024-03-17 NOTE — PLAN OF CARE
Face to face end of shift report will be communicated to oncoming RN.    Problem: Adult Behavioral Health Plan of Care  Goal: Patient-Specific Goal (Individualization)  Description: Patient will sleep 6 to 8 hours per night  Patient will eat at least 50% of meals  Patient will attend at least 50% of groups  Patient will comply with recommendations of treatment team  Patient will remain medication compliant    Outcome: Progressing     Problem: Thought Process Alteration  Goal: Optimal Thought Clarity  Description: Pt will be able to have a reality based conversation prior to discharge.    Outcome: Progressing   Face to face end of shift report obtained from LIGIA Vivas. Pt observed resting in bed.   15 minutes and PRN safety checks completed with no noted complains. No delusional comments noted or reported so far this shift.    0600-Pt appeared to have been awake most of the night. Pt denies this and states he slept. Appears that if pt slept, was woken up by the doors during safety checks.

## 2024-03-17 NOTE — PROGRESS NOTES
"[facility] PSYCHIATRY  PROGRESS NOTE     SUBJECTIVE     Prior to interviewing the patient, I met with nursing and reviewed patient's clinical condition. We discussed clinical care both before and after the interview. I have reviewed the patient's clinical course by review of records including previous notes, labs, and vital signs.     Per nursing, the patient had the following behavioral events over the last 24-hours: none    On psychiatric interview ,he is met in his room. He asks today when he can leave the hospital (noting this is the first time he has asked writer this since we have worked together).  He states \"I am very thankful for the life I have and I got to start finding a job\". He states he plans to go live with his mother. He also states that the 6 mg paliperidone he feels is \"too much\". He states he prefers to take the 3 mg paliperidone. He does not vocalize any side effects other than \"it is too much\". Discussed with Marija that writer is not willing to lower his dose and believes that because he is taking 6 mg that he is doing better as a result.  Discussed plan to transition him from oral to MONTEIRO and that if all goes well then going home to live with his mother is a possibility.  He is instructed to call his mother and make sure that she is okay with him coming home.  We went over the  MONTEIRO again (unclear if he processed the information).  He states that his symptoms are gone as he was \"acting\". He does not remember declining to go to his court hearing and we reviewed again that he is under Nieves for at least 6 months. He thanks writer for his time.      MEDICATIONS   Scheduled Meds:  Not on any psychiatric medications and denies any need for psychiatric medications    paliperidone  6 mg Oral At Bedtime       PRN Meds:.acetaminophen, albuterol, alum & mag hydroxide-simethicone, Benzocaine-Menthol-Zinc Cl, cloNIDine, haloperidol lactate, hydrOXYzine HCl, melatonin, nicotine, psyllium     ALLERGIES " "  Allergies   Allergen Reactions     Morphine Sulfate Nausea and Vomiting     Cannot take it in pill form, IV ok   Pancreatitis     Peanuts [Nuts] GI Disturbance     Internal cuts-GERD     Gold Au 198 [Gold] Rash        MENTAL STATUS EXAM   Vitals: /89   Pulse 106   Temp 99.2  F (37.3  C) (Temporal)   Resp 16   Ht 1.651 m (5' 5\")   Wt 138.9 kg (306 lb 3.2 oz)   SpO2 94%   BMI 50.95 kg/m      Appearance:  awake, alert, dressed in hospital scrubs, appeared as age stated, and disheveled   Attitude:Guarded  Eye Contact: Fair  Mood: \"I am good\"  Affect:less restricted  Speech: Regular rate  Psychomotor Behavior:  no evidence of tardive dyskinesia, dystonia, or tics  Thought Process: Tangential   Associations: Loose associations present  Thought Content: No SI or HI elicited. Denies AVH. Paranoid.  Insight: improving  Judgment: improving  Oriented to:  time, person, and place  Attention Span and Concentration: Poor  Recent and Remote Memory:  fair  Fund of Knowledge: Low to average  Muscle Strength and Tone: normal  Gait and Station: Normal       LABS   No results found for this or any previous visit (from the past 24 hour(s)).      IMPRESSION     This is a 33 year old male with a PMH of unspecified mood disorder, ADHD, SAGE, insomnia who was brought to the ED from his PCP office after exhibiting psychotic symptoms. Patient was found to be disorganized and delusional. He was placed on a hold and petition for commitment was filed in ED prior to transfer to behavioral health. Today patient is irritable. He answers most questions with \"that's confidential\". He does mention the [place], FBI, and government being involved. During our interaction he uses sign language when staring at the camera in the ceiling as if communicating to someone. He reports that he will not take any medication, because it is not real and he does not trust it. Writer and SW did attempt to explain the commitment process, however patient refused " "to take the paperwork offered and instead ended the interview, stating that that we needed to watch out for the [place], FBI, and secret service. Will schedule Seroquel at bedtime for tonight as patient had been on this in the past, though I suspect he will refuse. If continuing to refuse medication, will likely need to file Nieves petition tomorrow.     2/23/24: The patient continues to be manic and psychotic. Refusing all scheduled medications. Nieves re-submitted.    2/24/24: declines psychiatric interview today. Remains manic and psychotic. Nieves hearing set for 3/4/24    2/25/24: only utters one word today on examination. Remains decompensated.     2/26/24: converses today. He is illogical tangentially talking about nonsensical things.     2/27/24: attempted to meet with Marija twice today. He cites that Dr. Martinez is \"fake\" and does not wish to communicate today.     2/28/24: will not communicate with writer, waving him away.  He may be using sing language to sign the word \"crying\" but this is unclear.     2/29/24: nonsensical in communication. Remains disorganized.     3/1/24: does not participate in interview    3/2/24: upon interview today, Marija does not communicate. He puts up his hand and shows all 5 fingers, nothing else.     3/3/24: encouraged to attend his court hearing tomorrow. He remains disorganized. He is in need of a Nieves for stabilization.     3/4/24: declines to participate in interview and Nieves hearing. He declined to speak with his  despite his  meeting with him several times.   stated in court he is approving order for Nieves. We await signed paperwork    3/5/24: Nieves approved. We await paperwork. Will move forwad with oral paliperidone and IM olanzapine back-up.     3/6/24: Nieves paper received. Will start paliperidone 3 mg BID with 10 mg olanzapine IM back-up    3/7/24: receiving IM olanzapine, he engages in more conversations today compared to other days when he " "has not had neuroleptics.     3/8/24: more conversational, however much more irritable    3/9/24: continues to take IM olanzapine over oral. He is becoming more vocal about his paranoia     3/10/24: irritability persists, however he is more talkative over last 4 days. Will consider increasing olanzapine IM tomorrow    3/11/24: increase olanzapine to 10 mg IM TID    3/12/24: no changes, resistant to cooperating with Nieves and continues to require IM formulation of olanzapine. Will continue with olanzapine for the next week to see if there is any improvement in his delusional construct before switching neuroleptic agents.     3/13/24: decision made today to switch to haloperidol instead of olanzapine. No appreciable different with olanzapine.  He is more argumentative and slightly more communicative. Continues to state he is not going to take medications on a long term basis and that he only needs \"pot\"    3/14/24: explained side effects benefits and complications of medication suboxone. He agrees to trial for off-label usage of pain. He is encouraged to take paliperidone oral isntead of IM haloperidol. He is likely getting some EPS from haloperidol.     3/15/24: reports suboxone was helpful for pain for him. He is engaging in a back and forth conversation today and even states thank you to writer today. He is taking oral paliperidone and was made aware we will consolidate to evening.     3/16/24: today Marija states that although he is tolerating suboxone for pain, he wishes to stop as he reports he is having too many GI side effects. He agrees to continue to take paliperidone. We will hold off on linking it to an IM right now and encourage him to take it as prescribed, we will revert back to linked if needed.  Marija has made great strides in the last several days, he is now showering and shaving.     3/17/24: maintain paliperidone at 6 mg at bedtime and will work to transition to MONTEIRO this week. Continues to " improve. He talked about discharge for the first time today and talked about a desire to get a job.      DIAGNOSES     Bipolar 1 disorder, current episode manic with psychotic features        PLAN     Location: Unit 5  Legal Status: Committed.    Nieves: granted    Safety Assessment:    Behavioral Orders   Procedures     Code 1 - Restrict to Unit     Routine Programming     As clinically indicated     Status 15     Every 15 minutes.      PTA psychotropic medications stopped:     -no PTA meds    PTA psychotropic medications continued/changed:     -no PTA meds    New medications tried and stopped:     -Seroquel 100 mg at bedtime due to him refusing     New medications initiated:     -standard unit PRN medications    Today's Changes:  -continue paliperidone at 6 mg at bedtime       Programming: Patient will be treated in a therapeutic milieu with appropriate individual and group therapies. Education will be provided on diagnoses, medications, and treatments.     Medical diagnoses:  Per medicine    Consult: None  Tests: None    Anticipated LOS: > 7 days  Disposition: IRTS vs home with services       ATTESTATION    Bernardo Martinez MD  [facility]   Psychiatry    Video Visit: Patient has given verbal consent for video visit?: Yes  Type of Service: video visit for mental health treatment  Reason for Video Visit: COVID-19 [place]  Originating Site (patient location): [facility]  Distant Site (provider location): Remote Location  Mode of Communication: Video Conference via [employer]  Time of Service: Date: 03/17/2024 , Start: 10:30 end: 11:00

## 2024-03-18 PROCEDURE — 250N000013 HC RX MED GY IP 250 OP 250 PS 637: Performed by: PSYCHIATRY & NEUROLOGY

## 2024-03-18 PROCEDURE — 250N000013 HC RX MED GY IP 250 OP 250 PS 637

## 2024-03-18 PROCEDURE — 124N000004

## 2024-03-18 PROCEDURE — 99232 SBSQ HOSP IP/OBS MODERATE 35: CPT | Mod: 95 | Performed by: PSYCHIATRY & NEUROLOGY

## 2024-03-18 RX ORDER — ALBUTEROL SULFATE 90 UG/1
AEROSOL, METERED RESPIRATORY (INHALATION)
Status: COMPLETED
Start: 2024-03-18 | End: 2024-03-18

## 2024-03-18 RX ADMIN — PALIPERIDONE 6 MG: 6 TABLET, EXTENDED RELEASE ORAL at 20:10

## 2024-03-18 RX ADMIN — ALBUTEROL SULFATE 2 PUFF: 90 INHALANT RESPIRATORY (INHALATION) at 06:19

## 2024-03-18 RX ADMIN — ALBUTEROL SULFATE: 90 AEROSOL, METERED RESPIRATORY (INHALATION) at 09:30

## 2024-03-18 RX ADMIN — ACETAMINOPHEN 650 MG: 325 TABLET, FILM COATED ORAL at 20:10

## 2024-03-18 RX ADMIN — ACETAMINOPHEN 650 MG: 325 TABLET, FILM COATED ORAL at 13:28

## 2024-03-18 RX ADMIN — ACETAMINOPHEN 650 MG: 325 TABLET, FILM COATED ORAL at 09:30

## 2024-03-18 ASSESSMENT — ACTIVITIES OF DAILY LIVING (ADL)
ADLS_ACUITY_SCORE: 29
HYGIENE/GROOMING: INDEPENDENT
ADLS_ACUITY_SCORE: 29
DRESS: SCRUBS (BEHAVIORAL HEALTH)
ADLS_ACUITY_SCORE: 29
ORAL_HYGIENE: INDEPENDENT
ADLS_ACUITY_SCORE: 29
LAUNDRY: UNABLE TO COMPLETE
ADLS_ACUITY_SCORE: 29

## 2024-03-18 NOTE — PLAN OF CARE
Problem: Thought Process Alteration  Goal: Optimal Thought Clarity  Description: Pt will be able to have a reality based conversation prior to discharge.    Outcome: Progressing     Problem: Adult Behavioral Health Plan of Care  Goal: Patient-Specific Goal (Individualization)  Description: Patient will sleep 6 to 8 hours per night  Patient will eat at least 50% of meals  Patient will attend at least 50% of groups  Patient will comply with recommendations of treatment team  Patient will remain medication compliant    Outcome: Progressing     Patient calm, cooperative, and medication compliant this shift.  He spent some time walking in the hallways but mostly stayed in his room.  Reported generalized pain and took Tylenol 650 mg at 0930 and 1330 with good relief of symptoms.  Invega sustenna given this shift.  Patient accepted medication education and asked good questions.  Patient able to have a more linear and logical conversation with staff but does still have some flights of ideas.  VS WNL.  Will continue to monitor.  Face to face end of shift report communicated to evening shift RN.     Alexa Resendiz RN  3/18/2024  1:41 PM

## 2024-03-18 NOTE — PROGRESS NOTES
"[facility] PSYCHIATRY  PROGRESS NOTE     SUBJECTIVE     Prior to interviewing the patient, I met with nursing and reviewed patient's clinical condition. We discussed clinical care both before and after the interview. I have reviewed the patient's clinical course by review of records including previous notes, labs, and vital signs.     Per nursing, the patient had the following behavioral events over the last 24-hours: none    On psychiatric interview ,he is met in his room. He states he slept \"mediocre\". Today he asks questions about how to get in contact with his . He would like to return back home to his apartmenet as soon as possible. Discussed that this decision is ultimately up to the  and treatment team in hospital and depends on how he continues to progress. He agrees to invega sustenna today. We explained the loading dose. He again vocalizes a desire to get a job. He denies SI.      MEDICATIONS   Scheduled Meds:  Not on any psychiatric medications and denies any need for psychiatric medications    [START ON 3/25/2024] paliperidone  156 mg Intramuscular Once     paliperidone  234 mg Intramuscular Once     paliperidone  6 mg Oral At Bedtime       PRN Meds:.acetaminophen, albuterol, alum & mag hydroxide-simethicone, Benzocaine-Menthol-Zinc Cl, cloNIDine, haloperidol lactate, hydrOXYzine HCl, melatonin, nicotine, psyllium     ALLERGIES   Allergies   Allergen Reactions     Morphine Sulfate Nausea and Vomiting     Cannot take it in pill form, IV ok   Pancreatitis     Peanuts [Nuts] GI Disturbance     Internal cuts-GERD     Gold Au 198 [Gold] Rash        MENTAL STATUS EXAM   Vitals: /89   Pulse 84   Temp 97.7  F (36.5  C) (Temporal)   Resp 18   Ht 1.651 m (5' 5\")   Wt 138.9 kg (306 lb 3.2 oz)   SpO2 97%   BMI 50.95 kg/m      Appearance:  awake, alert, dressed in hospital scrubs, appeared as age stated, and disheveled   Attitude:Guarded  Eye Contact: Fair  Mood: " "\"okay\"  Affect:less restricted  Speech: Regular rate  Psychomotor Behavior:  no evidence of tardive dyskinesia, dystonia, or tics  Thought Process: Tangential   Associations: Loose associations present  Thought Content: No SI or HI elicited. Denies AVH. Paranoid.  Insight: improving  Judgment: improving  Oriented to:  time, person, and place  Attention Span and Concentration: Poor  Recent and Remote Memory:  fair  Fund of Knowledge: Low to average  Muscle Strength and Tone: normal  Gait and Station: Normal       LABS   No results found for this or any previous visit (from the past 24 hour(s)).      IMPRESSION     This is a 33 year old male with a PMH of unspecified mood disorder, ADHD, SAGE, insomnia who was brought to the ED from his PCP office after exhibiting psychotic symptoms. Patient was found to be disorganized and delusional. He was placed on a hold and petition for commitment was filed in ED prior to transfer to behavioral health. Today patient is irritable. He answers most questions with \"that's confidential\". He does mention the [place], FBI, and government being involved. During our interaction he uses sign language when staring at the camera in the ceiling as if communicating to someone. He reports that he will not take any medication, because it is not real and he does not trust it. Writer and SW did attempt to explain the commitment process, however patient refused to take the paperwork offered and instead ended the interview, stating that that we needed to watch out for the [place], FBI, and secret service. Will schedule Seroquel at bedtime for tonight as patient had been on this in the past, though I suspect he will refuse. If continuing to refuse medication, will likely need to file Nieves petition tomorrow.     2/23/24: The patient continues to be manic and psychotic. Refusing all scheduled medications. Nieves re-submitted.    2/24/24: declines psychiatric interview today. Remains manic and psychotic. " "Nieves hearing set for 3/4/24    2/25/24: only utters one word today on examination. Remains decompensated.     2/26/24: converses today. He is illogical tangentially talking about nonsensical things.     2/27/24: attempted to meet with Marija twice today. He cites that Dr. Martinez is \"fake\" and does not wish to communicate today.     2/28/24: will not communicate with writer, waving him away.  He may be using sing language to sign the word \"crying\" but this is unclear.     2/29/24: nonsensical in communication. Remains disorganized.     3/1/24: does not participate in interview    3/2/24: upon interview today, Marija does not communicate. He puts up his hand and shows all 5 fingers, nothing else.     3/3/24: encouraged to attend his court hearing tomorrow. He remains disorganized. He is in need of a Nieves for stabilization.     3/4/24: declines to participate in interview and Nieves hearing. He declined to speak with his  despite his  meeting with him several times.   stated in court he is approving order for Nieves. We await signed paperwork    3/5/24: Nieves approved. We await paperwork. Will move forwad with oral paliperidone and IM olanzapine back-up.     3/6/24: Nieves paper received. Will start paliperidone 3 mg BID with 10 mg olanzapine IM back-up    3/7/24: receiving IM olanzapine, he engages in more conversations today compared to other days when he has not had neuroleptics.     3/8/24: more conversational, however much more irritable    3/9/24: continues to take IM olanzapine over oral. He is becoming more vocal about his paranoia     3/10/24: irritability persists, however he is more talkative over last 4 days. Will consider increasing olanzapine IM tomorrow    3/11/24: increase olanzapine to 10 mg IM TID    3/12/24: no changes, resistant to cooperating with Nieves and continues to require IM formulation of olanzapine. Will continue with olanzapine for the next week to see if there is " "any improvement in his delusional construct before switching neuroleptic agents.     3/13/24: decision made today to switch to haloperidol instead of olanzapine. No appreciable different with olanzapine.  He is more argumentative and slightly more communicative. Continues to state he is not going to take medications on a long term basis and that he only needs \"pot\"    3/14/24: explained side effects benefits and complications of medication suboxone. He agrees to trial for off-label usage of pain. He is encouraged to take paliperidone oral isntead of IM haloperidol. He is likely getting some EPS from haloperidol.     3/15/24: reports suboxone was helpful for pain for him. He is engaging in a back and forth conversation today and even states thank you to writer today. He is taking oral paliperidone and was made aware we will consolidate to evening.     3/16/24: today Marija states that although he is tolerating suboxone for pain, he wishes to stop as he reports he is having too many GI side effects. He agrees to continue to take paliperidone. We will hold off on linking it to an IM right now and encourage him to take it as prescribed, we will revert back to linked if needed.  Marija has made great strides in the last several days, he is now showering and shaving.     3/17/24: maintain paliperidone at 6 mg at bedtime and will work to transition to MONTEIRO this week. Continues to improve. He talked about discharge for the first time today and talked about a desire to get a job.     3/18/24: Invrubia Bullock IM today     DIAGNOSES     Bipolar 1 disorder, current episode manic with psychotic features        PLAN     Location: Unit 5  Legal Status: Committed.    Nieves: granted    Safety Assessment:    Behavioral Orders   Procedures     Code 1 - Restrict to Unit     Routine Programming     As clinically indicated     Status 15     Every 15 minutes.      PTA psychotropic medications stopped:     -no PTA meds    PTA psychotropic " medications continued/changed:     -no PTA meds    New medications tried and stopped:     -Seroquel 100 mg at bedtime due to him refusing     New medications initiated:     -standard unit PRN medications    Today's Changes:  -continue paliperidone at 6 mg at bedtime   -Invega Sustenna 234 mg IM today 3/18/24, followed by second loading dose of Invega Sustenna 156 mg IM on 3/25/24      Programming: Patient will be treated in a therapeutic milieu with appropriate individual and group therapies. Education will be provided on diagnoses, medications, and treatments.     Medical diagnoses:  Per medicine    Consult: None  Tests: None    Anticipated LOS: > 7 days  Disposition: IRTS vs home with services       ATTESTATION    Bernardo Martinez MD  [facility]   Psychiatry    Video Visit: Patient has given verbal consent for video visit?: Yes  Type of Service: video visit for mental health treatment  Reason for Video Visit: COVID-19 [place]  Originating Site (patient location): [facility]  Distant Site (provider location): Remote Location  Mode of Communication: Video Conference via [email or web address]  Time of Service: Date: 03/18/2024 , Start: 09:00 end: 09:30

## 2024-03-18 NOTE — PLAN OF CARE
Face to face end of shift report will be communicated to oncoming RN.    Problem: Adult Behavioral Health Plan of Care  Goal: Patient-Specific Goal (Individualization)  Description: Patient will sleep 6 to 8 hours per night  Patient will eat at least 50% of meals  Patient will attend at least 50% of groups  Patient will comply with recommendations of treatment team  Patient will remain medication compliant    Outcome: Progressing     Problem: Thought Process Alteration  Goal: Optimal Thought Clarity  Description: Pt will be able to have a reality based conversation prior to discharge.    Outcome: Progressing   Face to face end of shift report obtained from LIGIA Alcazar. Pt observed resting in bed.  Pt appears to be sleeping in bed with eyes closed. 15 minutes and PRN safety checks completed with no noted complains. No delusional comments noted or reported so far this shift.   0600-Pt appeared to had slept 3 hours.   0619-Pt c/o SOB. Albuterol inhaler 2 puffs administered.

## 2024-03-18 NOTE — PLAN OF CARE
"Face to face end of shift report received from . RN. Rounding completed and patient observed in the hallway. No requests at this time.      Goal Outcome Evaluation: Patient has been pleasant and cooperative. He is out of his room most of the shift. He doesn't attend groups but walks in the hallway. He is withdrawn but can also have a sense of humor that is hard to detect unless you bring it to his attention. Patient is a deep thinker. He reports he needs to leave and doesn't think he can stay here \"another day.\" Patient endorsed mile pain in his back and requested 650mg tylenol at 20:10. He reported this was helpful. He is clean and neatly dressed but has a lot of hair that can appeared wild. Patient is able to make his needs known.     Face to face end of shift report communicated to oncoming RN.      Problem: Adult Behavioral Health Plan of Care  Goal: Patient-Specific Goal (Individualization)  Description: Patient will sleep 6 to 8 hours per night  Patient will eat at least 50% of meals  Patient will attend at least 50% of groups  Patient will comply with recommendations of treatment team  Patient will remain medication compliant    Outcome: Progressing     Problem: Thought Process Alteration  Goal: Optimal Thought Clarity  Description: Pt will be able to have a reality based conversation prior to discharge.    Outcome: Progressing                         "

## 2024-03-19 ENCOUNTER — TELEPHONE (OUTPATIENT)
Dept: PSYCHIATRY | Facility: OTHER | Age: 34
End: 2024-03-19
Payer: COMMERCIAL

## 2024-03-19 PROCEDURE — 99232 SBSQ HOSP IP/OBS MODERATE 35: CPT | Mod: 95 | Performed by: PSYCHIATRY & NEUROLOGY

## 2024-03-19 PROCEDURE — 250N000013 HC RX MED GY IP 250 OP 250 PS 637: Performed by: PSYCHIATRY & NEUROLOGY

## 2024-03-19 PROCEDURE — 250N000013 HC RX MED GY IP 250 OP 250 PS 637

## 2024-03-19 PROCEDURE — 124N000004

## 2024-03-19 RX ORDER — AMOXICILLIN 250 MG
1 CAPSULE ORAL 2 TIMES DAILY
Status: DISCONTINUED | OUTPATIENT
Start: 2024-03-19 | End: 2024-03-26 | Stop reason: HOSPADM

## 2024-03-19 RX ADMIN — SENNOSIDES AND DOCUSATE SODIUM 1 TABLET: 8.6; 5 TABLET ORAL at 08:50

## 2024-03-19 RX ADMIN — PALIPERIDONE 6 MG: 6 TABLET, EXTENDED RELEASE ORAL at 19:54

## 2024-03-19 RX ADMIN — SENNOSIDES AND DOCUSATE SODIUM 1 TABLET: 8.6; 5 TABLET ORAL at 19:55

## 2024-03-19 RX ADMIN — ACETAMINOPHEN 650 MG: 325 TABLET, FILM COATED ORAL at 17:25

## 2024-03-19 ASSESSMENT — ACTIVITIES OF DAILY LIVING (ADL)
ADLS_ACUITY_SCORE: 29
DRESS: SCRUBS (BEHAVIORAL HEALTH)
ADLS_ACUITY_SCORE: 29
ADLS_ACUITY_SCORE: 29
ORAL_HYGIENE: INDEPENDENT
HYGIENE/GROOMING: INDEPENDENT
ADLS_ACUITY_SCORE: 29
LAUNDRY: UNABLE TO COMPLETE
ADLS_ACUITY_SCORE: 29

## 2024-03-19 NOTE — PLAN OF CARE
Problem: Thought Process Alteration  Goal: Optimal Thought Clarity  Description: Pt will be able to have a reality based conversation prior to discharge.    Outcome: Progressing     Problem: Adult Behavioral Health Plan of Care  Goal: Patient-Specific Goal (Individualization)  Description: Patient will sleep 6 to 8 hours per night  Patient will eat at least 50% of meals  Patient will attend at least 50% of groups  Patient will comply with recommendations of treatment team  Patient will remain medication compliant    Outcome: Progressing     Patient calm and cooperative this shift.  He spent most of the day walking the hallways.  Does not attend groups and avoids social contact with peers.  Will have a conversation with staff when approached.  Patient appears brighter and more engaging in conversation.  Did have some paranoia about senna being scheduled but took med after this writer provided education.  No complaints of pain.  VS WNL.  Face to face end of shift report communicated to evening shift RN.     Alexa Resendiz, LIGIA  3/19/2024  1:35 PM

## 2024-03-19 NOTE — TELEPHONE ENCOUNTER
Hello,   Received a call from [facility], they are requesting where the patients Invega Sustenna Injection order should be sent. Patient is currently admitted. Thank  you    [phone]

## 2024-03-19 NOTE — PHARMACY
Pt received Invega Sustenna 234mg on 3/18/2024. This dose has been credited as a replacement dose was provided to the hospital pharmacy by the  through their [facility].   Silas Penaloza [facility] on 3/19/2024 at 1:32 PM

## 2024-03-19 NOTE — PROGRESS NOTES
Rescheduled pt psychiatry and PCP for pt.     [facility]   [address],   [place] [address]  [phone]  Psychiatry: Dr. David Felix- April 23rd @ 9:15am - arrive @ 9:00am unit 2.  PCP: Dr. Erasmo Slade- May 30th @ @ 11:05am unit 3   Injection:

## 2024-03-19 NOTE — PLAN OF CARE
Problem: Adult Behavioral Health Plan of Care  Goal: Patient-Specific Goal (Individualization)  Description: Patient will sleep 6 to 8 hours per night  Patient will eat at least 50% of meals  Patient will attend at least 50% of groups  Patient will comply with recommendations of treatment team  Patient will remain medication compliant    Outcome: Progressing   Goal Outcome Evaluation:        Face to face shift report received from RN. Rounding completed, pt observed.Client rested in room for 5 hours with eyes closed and respirations noted.Face to face report will be communicated to oncoming RN.    Mike Blankenship RN  3/19/2024  6:12 AM

## 2024-03-19 NOTE — TELEPHONE ENCOUNTER
Called and spoke to Latha nurse at [facility] after verifying last name and date of birth. Wondering about where to send the prescription for injection for Debo Bullock, relayed that patient has used [facility] in the past. Latha verbalized understanding and stated that the prescription will be sent here and that the patient would have to have the injection done every month here.        Ramona Lora RN on 3/19/2024 at 12:53 PM

## 2024-03-19 NOTE — PROGRESS NOTES
"[facility] PSYCHIATRY  PROGRESS NOTE     SUBJECTIVE     Prior to interviewing the patient, I met with nursing and reviewed patient's clinical condition. We discussed clinical care both before and after the interview. I have reviewed the patient's clinical course by review of records including previous notes, labs, and vital signs.     Per nursing, the patient had the following behavioral events over the last 24-hours: none    On psychiatric interview ,he is met in his room. He tolerated Invega Sustenna without side effect. He denies SI, no plan, no intent. He states he has been having more difficulties with defecation. Discussed that constipation can be a side effect of paliperidone and is informed we can trial him on Senna - he reluctantly agrees.  He denies any physical pain. Today he states he has been reflecting on how \"grateful\" he is for having an apartment.        MEDICATIONS   Scheduled Meds:  Not on any psychiatric medications and denies any need for psychiatric medications    [START ON 3/25/2024] paliperidone  156 mg Intramuscular Once     paliperidone  6 mg Oral At Bedtime     senna-docusate  1 tablet Oral BID       PRN Meds:.acetaminophen, albuterol, alum & mag hydroxide-simethicone, Benzocaine-Menthol-Zinc Cl, cloNIDine, haloperidol lactate, hydrOXYzine HCl, melatonin, nicotine, psyllium     ALLERGIES   Allergies   Allergen Reactions     Morphine Sulfate Nausea and Vomiting     Cannot take it in pill form, IV ok   Pancreatitis     Peanuts [Nuts] GI Disturbance     Internal cuts-GERD     Gold Au 198 [Gold] Rash        MENTAL STATUS EXAM   Vitals: /89   Pulse 84   Temp 97.7  F (36.5  C) (Temporal)   Resp 18   Ht 1.651 m (5' 5\")   Wt 138.9 kg (306 lb 3.2 oz)   SpO2 97%   BMI 50.95 kg/m      Appearance:  awake, alert, dressed in hospital scrubs, appeared as age stated, and disheveled   Attitude:Guarded  Eye Contact: Fair  Mood: \"good\"  Affect:less restricted  Speech: Regular " "rate  Psychomotor Behavior:  no evidence of tardive dyskinesia, dystonia, or tics  Thought Process: Tangential   Associations: Loose associations present  Thought Content: No SI or HI elicited. Denies AVH. Paranoid.  Insight: improving  Judgment: improving  Oriented to:  time, person, and place  Attention Span and Concentration: Poor  Recent and Remote Memory:  fair  Fund of Knowledge: Low to average  Muscle Strength and Tone: normal  Gait and Station: Normal       LABS   No results found for this or any previous visit (from the past 24 hour(s)).      IMPRESSION     This is a 33 year old male with a PMH of unspecified mood disorder, ADHD, SAGE, insomnia who was brought to the ED from his PCP office after exhibiting psychotic symptoms. Patient was found to be disorganized and delusional. He was placed on a hold and petition for commitment was filed in ED prior to transfer to behavioral health. Today patient is irritable. He answers most questions with \"that's confidential\". He does mention the [place], FBI, and government being involved. During our interaction he uses sign language when staring at the camera in the ceiling as if communicating to someone. He reports that he will not take any medication, because it is not real and he does not trust it. Writer and SW did attempt to explain the commitment process, however patient refused to take the paperwork offered and instead ended the interview, stating that that we needed to watch out for the [place], FBI, and secret service. Will schedule Seroquel at bedtime for tonight as patient had been on this in the past, though I suspect he will refuse. If continuing to refuse medication, will likely need to file Nieves petition tomorrow.     2/23/24: The patient continues to be manic and psychotic. Refusing all scheduled medications. Nieves re-submitted.    2/24/24: declines psychiatric interview today. Remains manic and psychotic. Nieves hearing set for 3/4/24    2/25/24: only " "utters one word today on examination. Remains decompensated.     2/26/24: converses today. He is illogical tangentially talking about nonsensical things.     2/27/24: attempted to meet with Marija twice today. He cites that Dr. Martinez is \"fake\" and does not wish to communicate today.     2/28/24: will not communicate with writer, waving him away.  He may be using sing language to sign the word \"crying\" but this is unclear.     2/29/24: nonsensical in communication. Remains disorganized.     3/1/24: does not participate in interview    3/2/24: upon interview today, Marija does not communicate. He puts up his hand and shows all 5 fingers, nothing else.     3/3/24: encouraged to attend his court hearing tomorrow. He remains disorganized. He is in need of a Nieves for stabilization.     3/4/24: declines to participate in interview and Nieves hearing. He declined to speak with his  despite his  meeting with him several times.   stated in court he is approving order for Nieves. We await signed paperwork    3/5/24: Nieves approved. We await paperwork. Will move forwad with oral paliperidone and IM olanzapine back-up.     3/6/24: Nieves paper received. Will start paliperidone 3 mg BID with 10 mg olanzapine IM back-up    3/7/24: receiving IM olanzapine, he engages in more conversations today compared to other days when he has not had neuroleptics.     3/8/24: more conversational, however much more irritable    3/9/24: continues to take IM olanzapine over oral. He is becoming more vocal about his paranoia     3/10/24: irritability persists, however he is more talkative over last 4 days. Will consider increasing olanzapine IM tomorrow    3/11/24: increase olanzapine to 10 mg IM TID    3/12/24: no changes, resistant to cooperating with Nieves and continues to require IM formulation of olanzapine. Will continue with olanzapine for the next week to see if there is any improvement in his delusional construct " "before switching neuroleptic agents.     3/13/24: decision made today to switch to haloperidol instead of olanzapine. No appreciable different with olanzapine.  He is more argumentative and slightly more communicative. Continues to state he is not going to take medications on a long term basis and that he only needs \"pot\"    3/14/24: explained side effects benefits and complications of medication suboxone. He agrees to trial for off-label usage of pain. He is encouraged to take paliperidone oral isntead of IM haloperidol. He is likely getting some EPS from haloperidol.     3/15/24: reports suboxone was helpful for pain for him. He is engaging in a back and forth conversation today and even states thank you to writer today. He is taking oral paliperidone and was made aware we will consolidate to evening.     3/16/24: today Marija states that although he is tolerating suboxone for pain, he wishes to stop as he reports he is having too many GI side effects. He agrees to continue to take paliperidone. We will hold off on linking it to an IM right now and encourage him to take it as prescribed, we will revert back to linked if needed.  Marija has made great strides in the last several days, he is now showering and shaving.     3/17/24: maintain paliperidone at 6 mg at bedtime and will work to transition to MONTEIRO this week. Continues to improve. He talked about discharge for the first time today and talked about a desire to get a job.     3/18/24: Invega Sustenna IM today    3/19/24: tolerated Invega Sustenna IM yesterday, will maintain overlap. Noting some consitpation, will add senna.  Will get in contact with patient's  to coordinate outpatient plan     DIAGNOSES     Bipolar 1 disorder, current episode manic with psychotic features        PLAN     Location: Unit 5  Legal Status: Committed.    Nieves: granted    Safety Assessment:    Behavioral Orders   Procedures     Code 1 - Restrict to Unit     Routine " Programming     As clinically indicated     Status 15     Every 15 minutes.      PTA psychotropic medications stopped:     -no PTA meds    PTA psychotropic medications continued/changed:     -no PTA meds    New medications tried and stopped:     -Seroquel 100 mg at bedtime due to him refusing     New medications initiated:     -standard unit PRN medications    Today's Changes:  -continue paliperidone at 6 mg at bedtime   -Invega Sustenna 234 mg IM tgiven /18/24, followed by second loading dose of Invega Sustenna 156 mg IM on 3/25/24      Programming: Patient will be treated in a therapeutic milieu with appropriate individual and group therapies. Education will be provided on diagnoses, medications, and treatments.     Medical diagnoses:  Per medicine    Consult: None  Tests: None    Anticipated LOS: > 7 days  Disposition: IRTS vs home with services       ATTESTATION    Bernardo Martinez MD  [facility]   Psychiatry    Video Visit: Patient has given verbal consent for video visit?: Yes  Type of Service: video visit for mental health treatment  Reason for Video Visit: COVID-19 and [place]  Originating Site (patient location): [facility]  Distant Site (provider location): Remote Location  Mode of Communication: Video Conference via [employer]  Time of Service: Date: 03/19/2024 , Start: 10:00 end: 10:30

## 2024-03-20 PROCEDURE — 250N000013 HC RX MED GY IP 250 OP 250 PS 637

## 2024-03-20 PROCEDURE — 250N000013 HC RX MED GY IP 250 OP 250 PS 637: Performed by: PSYCHIATRY & NEUROLOGY

## 2024-03-20 PROCEDURE — 99232 SBSQ HOSP IP/OBS MODERATE 35: CPT | Mod: 95 | Performed by: PSYCHIATRY & NEUROLOGY

## 2024-03-20 PROCEDURE — 124N000004

## 2024-03-20 RX ADMIN — ACETAMINOPHEN 650 MG: 325 TABLET, FILM COATED ORAL at 12:55

## 2024-03-20 RX ADMIN — PALIPERIDONE 6 MG: 6 TABLET, EXTENDED RELEASE ORAL at 21:24

## 2024-03-20 RX ADMIN — SENNOSIDES AND DOCUSATE SODIUM 1 TABLET: 8.6; 5 TABLET ORAL at 08:32

## 2024-03-20 RX ADMIN — SENNOSIDES AND DOCUSATE SODIUM 1 TABLET: 8.6; 5 TABLET ORAL at 21:24

## 2024-03-20 ASSESSMENT — ACTIVITIES OF DAILY LIVING (ADL)
ORAL_HYGIENE: INDEPENDENT
ADLS_ACUITY_SCORE: 29
ORAL_HYGIENE: INDEPENDENT
ADLS_ACUITY_SCORE: 29
HYGIENE/GROOMING: INDEPENDENT
DRESS: SCRUBS (BEHAVIORAL HEALTH)
DRESS: SCRUBS (BEHAVIORAL HEALTH);INDEPENDENT
ADLS_ACUITY_SCORE: 29
LAUNDRY: UNABLE TO COMPLETE
ADLS_ACUITY_SCORE: 29
HYGIENE/GROOMING: INDEPENDENT
ADLS_ACUITY_SCORE: 29
HYGIENE/GROOMING: INDEPENDENT
ADLS_ACUITY_SCORE: 29
ADLS_ACUITY_SCORE: 29
LAUNDRY: UNABLE TO COMPLETE
ADLS_ACUITY_SCORE: 29
ADLS_ACUITY_SCORE: 29
ORAL_HYGIENE: INDEPENDENT
ADLS_ACUITY_SCORE: 29
LAUNDRY: UNABLE TO COMPLETE
ADLS_ACUITY_SCORE: 29
DRESS: INDEPENDENT
ADLS_ACUITY_SCORE: 29

## 2024-03-20 NOTE — PLAN OF CARE
"  Problem: Adult Behavioral Health Plan of Care  Goal: Patient-Specific Goal (Individualization)  Description: Patient will sleep 6 to 8 hours per night  Patient will eat at least 50% of meals  Patient will attend at least 50% of groups  Patient will comply with recommendations of treatment team  Patient will remain medication compliant    Outcome: Progressing  Note: 07:40: Received end of shift report from LIGIA Mckeon. Pt up amb in hallway upon arrival--     13:00: PRN acetaminophen given for c/o \"5'/10 bilateral \"sore feet\" \"from the floors\" -- withdrawn, restless activity--improved thought process, continues to avoid social interaction.states scheduled senna \"it's working good actually.\"     Face to face end of shift report to be communicated to on-coming PM staff.     Charla Castro RN  3/20/2024  2:55 PM           Problem: Thought Process Alteration  Goal: Optimal Thought Clarity  Description: Pt will be able to have a reality based conversation prior to discharge.    Outcome: Progressing   Goal Outcome Evaluation:                        "

## 2024-03-20 NOTE — PLAN OF CARE
Face to face end of shift report received from Alexa PELAEZ RN. Rounding completed and patient observed in the lounge. No requests at this time.        Goal Outcome Evaluation: Patient is still improving. He is pleasant and cooperative. There was a new peer admitted to the unit and patient talked to them and was pleasant and outgoing. He was mildly playful with staff. He denied HI/SI and hallucinations. He reports slight depression and anxiety. Patient complained of back pain and requested 650mg tylenol at 17:25. Patient had to wait longer than normal for his med and he politely told staff there was no need to apologize. Patient doesn't attend many groups.      Face to face end of shift report communicated to oncoming RN.      Problem: Adult Behavioral Health Plan of Care  Goal: Patient-Specific Goal (Individualization)  Description: Patient will sleep 6 to 8 hours per night  Patient will eat at least 50% of meals  Patient will attend at least 50% of groups  Patient will comply with recommendations of treatment team  Patient will remain medication compliant    Outcome: Progressing     Problem: Thought Process Alteration  Goal: Optimal Thought Clarity  Description: Pt will be able to have a reality based conversation prior to discharge.    Outcome: Progressing

## 2024-03-20 NOTE — PLAN OF CARE
Problem: Adult Behavioral Health Plan of Care  Goal: Patient-Specific Goal (Individualization)  Description: Patient will sleep 6 to 8 hours per night  Patient will eat at least 50% of meals  Patient will attend at least 50% of groups  Patient will comply with recommendations of treatment team  Patient will remain medication compliant    Outcome: Progressing   Goal Outcome Evaluation:       Face to face shift report received from RN. Rounding completed, pt observed.Client rested in room for 7 hours with eyes closed and respirations noted.Face to face report will be communicated to oncoming RN.    Mike Blankenship RN  3/20/2024  6:21 AM

## 2024-03-20 NOTE — PROGRESS NOTES
"[facility] PSYCHIATRY  PROGRESS NOTE     SUBJECTIVE     Prior to interviewing the patient, I met with nursing and reviewed patient's clinical condition. We discussed clinical care both before and after the interview. I have reviewed the patient's clinical course by review of records including previous notes, labs, and vital signs.     Per nursing, the patient had the following behavioral events over the last 24-hours: none    On psychiatric interview ,he is met in his room. Marija is in good spirits today. He is grateful for his car today. He states he has a 1999 [place] he is hoping to fix up so that he has his own transportation. He denies SI, no plan, no intent. He states he has been in communication with his mom. He is hopeful he can dismiss next week.       MEDICATIONS   Scheduled Meds:  Not on any psychiatric medications and denies any need for psychiatric medications    [START ON 3/25/2024] paliperidone  156 mg Intramuscular Once     paliperidone  6 mg Oral At Bedtime     senna-docusate  1 tablet Oral BID       PRN Meds:.acetaminophen, albuterol, alum & mag hydroxide-simethicone, Benzocaine-Menthol-Zinc Cl, cloNIDine, haloperidol lactate, hydrOXYzine HCl, melatonin, nicotine, psyllium     ALLERGIES   Allergies   Allergen Reactions     Morphine Sulfate Nausea and Vomiting     Cannot take it in pill form, IV ok   Pancreatitis     Peanuts [Nuts] GI Disturbance     Internal cuts-GERD     Gold Au 198 [Gold] Rash        MENTAL STATUS EXAM   Vitals: /89   Pulse 84   Temp 97.7  F (36.5  C) (Temporal)   Resp 18   Ht 1.651 m (5' 5\")   Wt 138.9 kg (306 lb 3.2 oz)   SpO2 97%   BMI 50.95 kg/m      Appearance:  awake, alert, dressed in hospital scrubs, appeared as age stated, and disheveled   Attitude:Guarded  Eye Contact: Fair  Mood: \"good\"  Affect:less restricted  Speech: Regular rate  Psychomotor Behavior:  no evidence of tardive dyskinesia, dystonia, or tics  Thought Process: Tangential " "  Associations: Loose associations present  Thought Content: No SI or HI elicited. Denies AVH. Paranoid.  Insight: improving  Judgment: improving  Oriented to:  time, person, and place  Attention Span and Concentration: Poor  Recent and Remote Memory:  fair  Fund of Knowledge: Low to average  Muscle Strength and Tone: normal  Gait and Station: Normal       LABS   No results found for this or any previous visit (from the past 24 hour(s)).      IMPRESSION     This is a 33 year old male with a PMH of unspecified mood disorder, ADHD, SAGE, insomnia who was brought to the ED from his PCP office after exhibiting psychotic symptoms. Patient was found to be disorganized and delusional. He was placed on a hold and petition for commitment was filed in ED prior to transfer to behavioral health. Today patient is irritable. He answers most questions with \"that's confidential\". He does mention the [place], FBI, and government being involved. During our interaction he uses sign language when staring at the camera in the ceiling as if communicating to someone. He reports that he will not take any medication, because it is not real and he does not trust it. Writer and SW did attempt to explain the commitment process, however patient refused to take the paperwork offered and instead ended the interview, stating that that we needed to watch out for the [place], FBI, and secret service. Will schedule Seroquel at bedtime for tonight as patient had been on this in the past, though I suspect he will refuse. If continuing to refuse medication, will likely need to file Nieves petition tomorrow.     2/23/24: The patient continues to be manic and psychotic. Refusing all scheduled medications. Nieves re-submitted.    2/24/24: declines psychiatric interview today. Remains manic and psychotic. Nieves hearing set for 3/4/24    2/25/24: only utters one word today on examination. Remains decompensated.     2/26/24: converses today. He is illogical " "tangentially talking about nonsensical things.     2/27/24: attempted to meet with Marija twice today. He cites that Dr. Martinez is \"fake\" and does not wish to communicate today.     2/28/24: will not communicate with writer, waving him away.  He may be using sing language to sign the word \"crying\" but this is unclear.     2/29/24: nonsensical in communication. Remains disorganized.     3/1/24: does not participate in interview    3/2/24: upon interview today, Marija does not communicate. He puts up his hand and shows all 5 fingers, nothing else.     3/3/24: encouraged to attend his court hearing tomorrow. He remains disorganized. He is in need of a Nieves for stabilization.     3/4/24: declines to participate in interview and Nieves hearing. He declined to speak with his  despite his  meeting with him several times.   stated in court he is approving order for Nieves. We await signed paperwork    3/5/24: Nieves approved. We await paperwork. Will move forwad with oral paliperidone and IM olanzapine back-up.     3/6/24: Nieves paper received. Will start paliperidone 3 mg BID with 10 mg olanzapine IM back-up    3/7/24: receiving IM olanzapine, he engages in more conversations today compared to other days when he has not had neuroleptics.     3/8/24: more conversational, however much more irritable    3/9/24: continues to take IM olanzapine over oral. He is becoming more vocal about his paranoia     3/10/24: irritability persists, however he is more talkative over last 4 days. Will consider increasing olanzapine IM tomorrow    3/11/24: increase olanzapine to 10 mg IM TID    3/12/24: no changes, resistant to cooperating with Nieves and continues to require IM formulation of olanzapine. Will continue with olanzapine for the next week to see if there is any improvement in his delusional construct before switching neuroleptic agents.     3/13/24: decision made today to switch to haloperidol instead of " "olanzapine. No appreciable different with olanzapine.  He is more argumentative and slightly more communicative. Continues to state he is not going to take medications on a long term basis and that he only needs \"pot\"    3/14/24: explained side effects benefits and complications of medication suboxone. He agrees to trial for off-label usage of pain. He is encouraged to take paliperidone oral isntead of IM haloperidol. He is likely getting some EPS from haloperidol.     3/15/24: reports suboxone was helpful for pain for him. He is engaging in a back and forth conversation today and even states thank you to writer today. He is taking oral paliperidone and was made aware we will consolidate to evening.     3/16/24: today Marija states that although he is tolerating suboxone for pain, he wishes to stop as he reports he is having too many GI side effects. He agrees to continue to take paliperidone. We will hold off on linking it to an IM right now and encourage him to take it as prescribed, we will revert back to linked if needed.  Marija has made great strides in the last several days, he is now showering and shaving.     3/17/24: maintain paliperidone at 6 mg at bedtime and will work to transition to MONTEIRO this week. Continues to improve. He talked about discharge for the first time today and talked about a desire to get a job.     3/18/24: Invega Sustenna IM today    3/19/24: tolerated Invega Sustenna IM yesterday, will maintain overlap. Noting some consitpation, will add senna.  Will get in contact with patient's  to coordinate outpatient plan    3/20/24: no changes to regimen     DIAGNOSES     Bipolar 1 disorder, current episode manic with psychotic features        PLAN     Location: Unit 5  Legal Status: Committed.    Nieves: granted    Safety Assessment:    Behavioral Orders   Procedures     Code 1 - Restrict to Unit     Routine Programming     As clinically indicated     Status 15     Every 15 minutes. "      PTA psychotropic medications stopped:     -no PTA meds    PTA psychotropic medications continued/changed:     -no PTA meds    New medications tried and stopped:     -Seroquel 100 mg at bedtime due to him refusing     New medications initiated:     -standard unit PRN medications    Today's Changes:  -continue paliperidone at 6 mg at bedtime   -Invega Sustenna 234 mg IM tgiven /18/24, followed by second loading dose of Invega Sustenna 156 mg IM on 3/25/24      Programming: Patient will be treated in a therapeutic milieu with appropriate individual and group therapies. Education will be provided on diagnoses, medications, and treatments.     Medical diagnoses:  Per medicine    Consult: None  Tests: None    Anticipated LOS: > 7 days  Disposition: IRTS vs home with services       ATTESTATION    Bernardo Martinez MD  [facility]   Psychiatry    Video Visit: Patient has given verbal consent for video visit?: Yes  Type of Service: video visit for mental health treatment  Reason for Video Visit: COVID-19 and [place]  Originating Site (patient location): [facility]  Distant Site (provider location): Remote Location  Mode of Communication: Video Conference via [employer]  Time of Service: Date: 03/20/2024 , Start: 10:00 end: 10:30

## 2024-03-21 PROCEDURE — 99232 SBSQ HOSP IP/OBS MODERATE 35: CPT | Mod: 95 | Performed by: PSYCHIATRY & NEUROLOGY

## 2024-03-21 PROCEDURE — 250N000013 HC RX MED GY IP 250 OP 250 PS 637: Performed by: PSYCHIATRY & NEUROLOGY

## 2024-03-21 PROCEDURE — 124N000004

## 2024-03-21 PROCEDURE — 250N000013 HC RX MED GY IP 250 OP 250 PS 637: Performed by: NURSE PRACTITIONER

## 2024-03-21 PROCEDURE — 250N000013 HC RX MED GY IP 250 OP 250 PS 637

## 2024-03-21 RX ORDER — MAGNESIUM CARB/ALUMINUM HYDROX 105-160MG
296 TABLET,CHEWABLE ORAL ONCE
Qty: 296 ML | Refills: 0 | Status: COMPLETED | OUTPATIENT
Start: 2024-03-21 | End: 2024-03-21

## 2024-03-21 RX ADMIN — PALIPERIDONE 6 MG: 6 TABLET, EXTENDED RELEASE ORAL at 20:05

## 2024-03-21 RX ADMIN — SENNOSIDES AND DOCUSATE SODIUM 1 TABLET: 8.6; 5 TABLET ORAL at 08:24

## 2024-03-21 RX ADMIN — BE HEALTH MAGNESIUM CITRATE ORAL SOLUTION - CHERRY 296 ML: 1.75 LIQUID ORAL at 18:53

## 2024-03-21 RX ADMIN — ACETAMINOPHEN 650 MG: 325 TABLET, FILM COATED ORAL at 12:56

## 2024-03-21 RX ADMIN — SENNOSIDES AND DOCUSATE SODIUM 1 TABLET: 8.6; 5 TABLET ORAL at 20:05

## 2024-03-21 ASSESSMENT — ACTIVITIES OF DAILY LIVING (ADL)
LAUNDRY: UNABLE TO COMPLETE
ADLS_ACUITY_SCORE: 29
ORAL_HYGIENE: INDEPENDENT
HYGIENE/GROOMING: INDEPENDENT
ADLS_ACUITY_SCORE: 29
ORAL_HYGIENE: INDEPENDENT
DRESS: INDEPENDENT;SCRUBS (BEHAVIORAL HEALTH)
HYGIENE/GROOMING: INDEPENDENT
ADLS_ACUITY_SCORE: 29
LAUNDRY: UNABLE TO COMPLETE
ADLS_ACUITY_SCORE: 29
ADLS_ACUITY_SCORE: 29
DRESS: SCRUBS (BEHAVIORAL HEALTH);INDEPENDENT

## 2024-03-21 NOTE — TREATMENT PLAN
Initial Treatment Plan      Client Strengths:  creative, goal-focused, intelligent, and motivated    Areas of Vulnerability:  Manic symptoms   Psychotic symptoms/behavior     Long-Term Goals:  Knowledge about illness and management of symptoms   Maintenance of personal safety   Maintenance of sobriety   Effective management of impulsivity     Abuse Prevention Plan:  Safe, therapeutic environment   Safety coping plan as needed   Education regarding illness and skill development   Coordination with care providers   Impluse control education and intervention   Medication adjustment/management (MI/CD)   Monitor for use of substances    Discharge Criteria:  Satisfactory progress toward treatment goals   Improvement re: identified problems and symptoms   Ability to continue recovery at next level of service   Has a discharge plan in place      Specific Barriers to Discharge:  Continued stabilization, second Invega injections next monday    Need for Continued Inpatient Treatment:  Continued stabilization, second Invega injections next monday    Assessment/Intervention/Current Symtoms and Care Coordination:  Spoke with pt and let him know all of his appointments have been scheduled      Discharge Plan or Goal:  Pending stabilization & development of a safe discharge plan.  Considerations include:      Discharge Checklist:  Transportation: TBD  Medications ordered/sent to: No  Provisional discharge required: Yes: will work on when discharge date is known.   Appointments scheduled:   Psychiatry - TBD  Therapy - TBD  PCP - TBD  AVS complete: No     Referral Status:  Will be going home     Legal Status:  Full commitment and Nieves     Contacts:  [facility]

## 2024-03-21 NOTE — PROGRESS NOTES
"[facility] PSYCHIATRY  PROGRESS NOTE     SUBJECTIVE     Prior to interviewing the patient, I met with nursing and reviewed patient's clinical condition. We discussed clinical care both before and after the interview. I have reviewed the patient's clinical course by review of records including previous notes, labs, and vital signs.     Per nursing, the patient had the following behavioral events over the last 24-hours: none    On psychiatric interview ,he is met in his room. Marija is met in his room.    He is relaxed today. We reviewed plan. He is in agreement. Denies SI. Denies HI     MEDICATIONS   Scheduled Meds:  Not on any psychiatric medications and denies any need for psychiatric medications    [START ON 3/25/2024] paliperidone  156 mg Intramuscular Once     paliperidone  6 mg Oral At Bedtime     senna-docusate  1 tablet Oral BID       PRN Meds:.acetaminophen, albuterol, alum & mag hydroxide-simethicone, Benzocaine-Menthol-Zinc Cl, cloNIDine, haloperidol lactate, hydrOXYzine HCl, melatonin, nicotine, psyllium     ALLERGIES   Allergies   Allergen Reactions     Morphine Sulfate Nausea and Vomiting     Cannot take it in pill form, IV ok   Pancreatitis     Peanuts [Nuts] GI Disturbance     Internal cuts-GERD     Gold Au 198 [Gold] Rash        MENTAL STATUS EXAM   Vitals: BP (!) 149/107   Pulse 77   Temp 98.2  F (36.8  C) (Temporal)   Resp 13   Ht 1.651 m (5' 5\")   Wt 138.9 kg (306 lb 3.2 oz)   SpO2 97%   BMI 50.95 kg/m      Appearance:  awake, alert, dressed in hospital scrubs, appeared as age stated, and disheveled   Attitude:Guarded  Eye Contact: Fair  Mood: \"calm\"  Affect:less restricted  Speech: Regular rate  Psychomotor Behavior:  no evidence of tardive dyskinesia, dystonia, or tics  Thought Process: Tangential   Associations: Loose associations present  Thought Content: No SI or HI elicited. Denies AVH. Paranoid.  Insight: improving  Judgment: improving  Oriented to:  time, person, and " "place  Attention Span and Concentration: Poor  Recent and Remote Memory:  fair  Fund of Knowledge: Low to average  Muscle Strength and Tone: normal  Gait and Station: Normal       LABS   No results found for this or any previous visit (from the past 24 hour(s)).      IMPRESSION     This is a 33 year old male with a PMH of unspecified mood disorder, ADHD, SAGE, insomnia who was brought to the ED from his PCP office after exhibiting psychotic symptoms. Patient was found to be disorganized and delusional. He was placed on a hold and petition for commitment was filed in ED prior to transfer to behavioral health. Today patient is irritable. He answers most questions with \"that's confidential\". He does mention the [place], FBI, and government being involved. During our interaction he uses sign language when staring at the camera in the ceiling as if communicating to someone. He reports that he will not take any medication, because it is not real and he does not trust it. Writer and SW did attempt to explain the commitment process, however patient refused to take the paperwork offered and instead ended the interview, stating that that we needed to watch out for the [place], FBI, and secret service. Will schedule Seroquel at bedtime for tonight as patient had been on this in the past, though I suspect he will refuse. If continuing to refuse medication, will likely need to file Nieves petition tomorrow.     2/23/24: The patient continues to be manic and psychotic. Refusing all scheduled medications. Nieves re-submitted.    2/24/24: declines psychiatric interview today. Remains manic and psychotic. Nieves hearing set for 3/4/24    2/25/24: only utters one word today on examination. Remains decompensated.     2/26/24: converses today. He is illogical tangentially talking about nonsensical things.     2/27/24: attempted to meet with Marija twice today. He cites that Dr. Martinez is \"fake\" and does not wish to communicate today. " "    2/28/24: will not communicate with writer, waving him away.  He may be using sing language to sign the word \"crying\" but this is unclear.     2/29/24: nonsensical in communication. Remains disorganized.     3/1/24: does not participate in interview    3/2/24: upon interview today, Marija does not communicate. He puts up his hand and shows all 5 fingers, nothing else.     3/3/24: encouraged to attend his court hearing tomorrow. He remains disorganized. He is in need of a Nieves for stabilization.     3/4/24: declines to participate in interview and Nieves hearing. He declined to speak with his  despite his  meeting with him several times.   stated in court he is approving order for Nieves. We await signed paperwork    3/5/24: Nieves approved. We await paperwork. Will move forwad with oral paliperidone and IM olanzapine back-up.     3/6/24: Nieves paper received. Will start paliperidone 3 mg BID with 10 mg olanzapine IM back-up    3/7/24: receiving IM olanzapine, he engages in more conversations today compared to other days when he has not had neuroleptics.     3/8/24: more conversational, however much more irritable    3/9/24: continues to take IM olanzapine over oral. He is becoming more vocal about his paranoia     3/10/24: irritability persists, however he is more talkative over last 4 days. Will consider increasing olanzapine IM tomorrow    3/11/24: increase olanzapine to 10 mg IM TID    3/12/24: no changes, resistant to cooperating with Nieves and continues to require IM formulation of olanzapine. Will continue with olanzapine for the next week to see if there is any improvement in his delusional construct before switching neuroleptic agents.     3/13/24: decision made today to switch to haloperidol instead of olanzapine. No appreciable different with olanzapine.  He is more argumentative and slightly more communicative. Continues to state he is not going to take medications on a long term " "basis and that he only needs \"pot\"    3/14/24: explained side effects benefits and complications of medication suboxone. He agrees to trial for off-label usage of pain. He is encouraged to take paliperidone oral isntead of IM haloperidol. He is likely getting some EPS from haloperidol.     3/15/24: reports suboxone was helpful for pain for him. He is engaging in a back and forth conversation today and even states thank you to writer today. He is taking oral paliperidone and was made aware we will consolidate to evening.     3/16/24: today Marija states that although he is tolerating suboxone for pain, he wishes to stop as he reports he is having too many GI side effects. He agrees to continue to take paliperidone. We will hold off on linking it to an IM right now and encourage him to take it as prescribed, we will revert back to linked if needed.  Marija has made great strides in the last several days, he is now showering and shaving.     3/17/24: maintain paliperidone at 6 mg at bedtime and will work to transition to MONTERIO this week. Continues to improve. He talked about discharge for the first time today and talked about a desire to get a job.     3/18/24: Invega Sustenna IM today    3/19/24: tolerated Invega Sustenna IM yesterday, will maintain overlap. Noting some consitpation, will add senna.  Will get in contact with patient's  to coordinate outpatient plan    3/20/24: no changes to regimen    3/21/24: CM comfortable with dismissal after second loading dose of Invega Sustenna     DIAGNOSES     Bipolar 1 disorder, current episode manic with psychotic features        PLAN     Location: [address]  Legal Status: Committed.    Nieves: granted    Safety Assessment:    Behavioral Orders   Procedures     Code 1 - Restrict to Unit     Routine Programming     As clinically indicated     Status 15     Every 15 minutes.      PTA psychotropic medications stopped:     -no PTA meds    PTA psychotropic medications " continued/changed:     -no PTA meds    New medications tried and stopped:     -Seroquel 100 mg at bedtime due to him refusing     New medications initiated:     -standard unit PRN medications    Today's Changes:  -continue paliperidone at 6 mg at bedtime   -Invega Sustenna 234 mg IM tgiven /18/24, followed by second loading dose of Invega Sustenna 156 mg IM on 3/25/24      Programming: Patient will be treated in a therapeutic milieu with appropriate individual and group therapies. Education will be provided on diagnoses, medications, and treatments.     Medical diagnoses:  Per medicine    Consult: None  Tests: None    Anticipated LOS: > 7 days  Disposition: home with services       ATTESTATION    Bernardo Martinez MD  [facility]   Psychiatry    Video Visit: Patient has given verbal consent for video visit?: Yes  Type of Service: video visit for mental health treatment  Reason for Video Visit: COVID-19 [place]  Originating Site (patient location): [facility]  Distant Site (provider location): Remote Location  Mode of Communication: Video Conference via [employer]  Time of Service: Date: 03/21/2024 , Start:09:00 end: 09:30

## 2024-03-21 NOTE — PLAN OF CARE
SHIFT SUMMARY:  Pacing the halls. Withdrawn. Cooperative. Denies suicidal ideation, pain and hallucinations. Difficulty remembering in which town his apartment is located; Eventually able to get him a phone number to call his landlord. Paranoid - declined water with medications and needed to open medication packages himself.     Problem: Adult Behavioral Health Plan of Care  Goal: Patient-Specific Goal (Individualization)  Description: Patient will sleep 6 to 8 hours per night  Patient will eat at least 50% of meals  Patient will attend at least 50% of groups  Patient will comply with recommendations of treatment team  Patient will remain medication compliant    Outcome: Progressing  Goal: Absence of New-Onset Illness or Injury  Outcome: Progressing     Problem: Thought Process Alteration  Goal: Optimal Thought Clarity  Description: Pt will be able to have a reality based conversation prior to discharge.    Outcome: Progressing   Goal Outcome Evaluation:

## 2024-03-21 NOTE — PLAN OF CARE
Face to face shift report received from LIGIA Mckeon. Rounding completed, pt observed walking in hallway at the start of shift.    Pt. Is calm and cooperative today. Pt. Declines depression, anxiety, SI/HI, and hallucinations. Pt. Endorses back pain. Pt. Requested ice and states that is his only problem today. Pt. Is med compliant. Pt. Walks halls for most of today. Pts. Appetite is good today.       PRNs this shift:    acetaminophen (TYLENOL) tablet 650 mg, 650 mg at 03/21/24 1256     Face to face report will be communicated to oncoming RN.    Belkys Woods RN  3/21/2024  2:28PM  Problem: Adult Behavioral Health Plan of Care  Goal: Patient-Specific Goal (Individualization)  Description: Patient will sleep 6 to 8 hours per night  Patient will eat at least 50% of meals  Patient will attend at least 50% of groups  Patient will comply with recommendations of treatment team  Patient will remain medication compliant    Outcome: Progressing     Problem: Thought Process Alteration  Goal: Optimal Thought Clarity  Description: Pt will be able to have a reality based conversation prior to discharge.    Outcome: Progressing

## 2024-03-21 NOTE — PLAN OF CARE
Problem: Thought Process Alteration  Goal: Optimal Thought Clarity  Description: Pt will be able to have a reality based conversation prior to discharge.    Outcome: Progressing     Problem: Adult Behavioral Health Plan of Care  Goal: Patient-Specific Goal (Individualization)  Description: Patient will sleep 6 to 8 hours per night  Patient will eat at least 50% of meals  Patient will attend at least 50% of groups  Patient will comply with recommendations of treatment team  Patient will remain medication compliant    Outcome: Progressing       Patient out walking in the hallways much of the afternoon.  Avoids social contact but will talk with staff when approached.  Brighter affect and he does occasionally joke with staff.  Took all medications as prescribed.  Hopefull to be discharging home within the next week.  No complaints of pain.  Vs WNL.  Face to face end of shift report communicated to night shift RN.     Alexa Resendiz RN  3/20/2024  9:08 PM

## 2024-03-22 PROCEDURE — 250N000013 HC RX MED GY IP 250 OP 250 PS 637: Performed by: PSYCHIATRY & NEUROLOGY

## 2024-03-22 PROCEDURE — 124N000004

## 2024-03-22 RX ORDER — PALIPERIDONE 3 MG/1
3 TABLET, EXTENDED RELEASE ORAL AT BEDTIME
Status: DISCONTINUED | OUTPATIENT
Start: 2024-03-22 | End: 2024-03-26 | Stop reason: HOSPADM

## 2024-03-22 RX ADMIN — PALIPERIDONE 3 MG: 3 TABLET, EXTENDED RELEASE ORAL at 20:39

## 2024-03-22 RX ADMIN — SENNOSIDES AND DOCUSATE SODIUM 1 TABLET: 8.6; 5 TABLET ORAL at 08:12

## 2024-03-22 RX ADMIN — SENNOSIDES AND DOCUSATE SODIUM 1 TABLET: 8.6; 5 TABLET ORAL at 20:39

## 2024-03-22 ASSESSMENT — ACTIVITIES OF DAILY LIVING (ADL)
ADLS_ACUITY_SCORE: 29
ORAL_HYGIENE: INDEPENDENT
ADLS_ACUITY_SCORE: 29
DRESS: SCRUBS (BEHAVIORAL HEALTH);INDEPENDENT
ADLS_ACUITY_SCORE: 29
HYGIENE/GROOMING: INDEPENDENT
ADLS_ACUITY_SCORE: 29
LAUNDRY: UNABLE TO COMPLETE
ADLS_ACUITY_SCORE: 29

## 2024-03-22 NOTE — PLAN OF CARE
Goal Outcome Evaluation:       Problem: Adult Behavioral Health Plan of Care  Goal: Patient-Specific Goal (Individualization)  Description: Patient will sleep 6 to 8 hours per night  Patient will eat at least 50% of meals  Patient will attend at least 50% of groups  Patient will comply with recommendations of treatment team  Patient will remain medication compliant  Outcome: Progressing  Problem: Thought Process Alteration  Goal: Optimal Thought Clarity  Description: Pt will be able to have a reality based conversation prior to discharge.  Outcome: Progressing     Face to face end of shift report received from José Miguel Pereira RN. Rounding completed. Patient observed resting in bed at beginning of shift.         Pt appeared to be sleeping for about 5 hours this shift. 15 minute checks completed. New ice pack given to pt this AM.      Face to face report given with opportunity to observe patient.    Report given to Jeanne Murry RN   3/22/2024  7:18 AM

## 2024-03-22 NOTE — PLAN OF CARE
"SHIFT SUMMARY:  Paces the [place]wood related to lower back pain; Offered PRN pain medication throughout shift, but he declined. He did use a cold pack on his lower back.     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        Problem: Adult Behavioral Health Plan of Care  Goal: Patient-Specific Goal (Individualization)  Description: Patient will sleep 6 to 8 hours per night  Patient will eat at least 50% of meals  Patient will attend at least 50% of groups  Patient will comply with recommendations of treatment team  Patient will remain medication compliant    Outcome: Progressing  Goal: Absence of New-Onset Illness or Injury  Outcome: Progressing     Problem: Thought Process Alteration  Goal: Optimal Thought Clarity  Description: Pt will be able to have a reality based conversation prior to discharge.    Outcome: Progressing   Goal Outcome Evaluation:                        "

## 2024-03-22 NOTE — PLAN OF CARE
Face to face shift report received from LIGIA Courtney.       Problem: Adult Behavioral Health Plan of Care  Goal: Patient-Specific Goal (Individualization)  Description: Patient will sleep 6 to 8 hours per night  Patient will eat at least 50% of meals  Patient will attend at least 50% of groups  Patient will comply with recommendations of treatment team  Patient will remain medication compliant  Outcome: Progressing   Calm and cooperative. Medication compliant. Denies mental health issues. Appears paranoid, requested to take medication from package and chose to use own water instead of writer's. Pacing in hallways throughout shift. No reports of pain.     Problem: Thought Process Alteration  Goal: Optimal Thought Clarity  Description: Pt will be able to have a reality based conversation prior to discharge.  Outcome: Progressing         Face to face end of shift report to be communicated to oncoming RN.

## 2024-03-23 PROCEDURE — 99232 SBSQ HOSP IP/OBS MODERATE 35: CPT | Mod: 95 | Performed by: PSYCHIATRY & NEUROLOGY

## 2024-03-23 PROCEDURE — 250N000013 HC RX MED GY IP 250 OP 250 PS 637: Performed by: PSYCHIATRY & NEUROLOGY

## 2024-03-23 PROCEDURE — 124N000004

## 2024-03-23 PROCEDURE — 250N000013 HC RX MED GY IP 250 OP 250 PS 637

## 2024-03-23 RX ADMIN — ACETAMINOPHEN 650 MG: 325 TABLET, FILM COATED ORAL at 08:57

## 2024-03-23 RX ADMIN — SENNOSIDES AND DOCUSATE SODIUM 1 TABLET: 8.6; 5 TABLET ORAL at 08:35

## 2024-03-23 RX ADMIN — SENNOSIDES AND DOCUSATE SODIUM 1 TABLET: 8.6; 5 TABLET ORAL at 20:03

## 2024-03-23 RX ADMIN — PALIPERIDONE 3 MG: 3 TABLET, EXTENDED RELEASE ORAL at 20:03

## 2024-03-23 ASSESSMENT — ACTIVITIES OF DAILY LIVING (ADL)
ADLS_ACUITY_SCORE: 29
DRESS: SCRUBS (BEHAVIORAL HEALTH)
ADLS_ACUITY_SCORE: 29
HYGIENE/GROOMING: INDEPENDENT
ADLS_ACUITY_SCORE: 29
ADLS_ACUITY_SCORE: 29
ORAL_HYGIENE: INDEPENDENT
ADLS_ACUITY_SCORE: 29
LAUNDRY: UNABLE TO COMPLETE
ADLS_ACUITY_SCORE: 29

## 2024-03-23 NOTE — PLAN OF CARE
Problem: Adult Behavioral Health Plan of Care  Goal: Patient-Specific Goal (Individualization)  Description: Patient will sleep 6 to 8 hours per night  Patient will eat at least 50% of meals  Patient will attend at least 50% of groups  Patient will comply with recommendations of treatment team  Patient will remain medication compliant  Outcome: Progressing     Problem: Thought Process Alteration  Goal: Optimal Thought Clarity  Description: Pt will be able to have a reality based conversation prior to discharge.  Outcome: Progressing     Face to face shift report received from Randall. Rounding completed, patient laying in bed, appeared to be sleeping, non-labored even respirations noted.    Patient appeared to be sleeping for approximately 8 hours since 2045 last shift.    Face to face report will be communicated to oncoming RN.    Rima Mallory RN  3/23/2024  6:18 AM

## 2024-03-23 NOTE — PLAN OF CARE
Face to face shift report received from Rima JENKINS RN.       Problem: Adult Behavioral Health Plan of Care  Goal: Patient-Specific Goal (Individualization)  Description: Patient will sleep 6 to 8 hours per night  Patient will eat at least 50% of meals  Patient will attend at least 50% of groups  Patient will comply with recommendations of treatment team  Patient will remain medication compliant  Outcome: Progressing   Medication compliant, but prefers to open own medication packages. Pt declines to drink water provided by writer, but retrieves own water from ice machine in lounge area. Denies mental health issues. Pacing in hallways frequently. Reported pain, requested and received Tylenol 650mg this AM.     Problem: Thought Process Alteration  Goal: Optimal Thought Clarity  Description: Pt will be able to have a reality based conversation prior to discharge.  Outcome: Progressing       Face to face end of shift report to be communicated to oncoming RN.

## 2024-03-23 NOTE — PROGRESS NOTES
"[facility] PSYCHIATRY  PROGRESS NOTE     SUBJECTIVE     Prior to interviewing the patient, I met with nursing and reviewed patient's clinical condition. We discussed clinical care both before and after the interview. I have reviewed the patient's clinical course by review of records including previous notes, labs, and vital signs.     Per nursing, the patient had the following behavioral events over the last 24-hours: none    On psychiatric interview, he is met in his room. Marija states he is \"decent\". He states he no longer going to drink alcohol outside the hospital. He states he plans to go on disability.  He is still hopeful he will be able to do some sort of work moving forward, he discusses the diagnoses \"you guys have given me\" and his \"physical ailments\".  He is sleeping well.  He denies SI, no plan, no intent.      MEDICATIONS   Scheduled Meds:  Not on any psychiatric medications and denies any need for psychiatric medications    [START ON 3/25/2024] paliperidone  156 mg Intramuscular Once     paliperidone  3 mg Oral At Bedtime     senna-docusate  1 tablet Oral BID       PRN Meds:.acetaminophen, albuterol, alum & mag hydroxide-simethicone, Benzocaine-Menthol-Zinc Cl, cloNIDine, haloperidol lactate, hydrOXYzine HCl, melatonin, nicotine, psyllium     ALLERGIES   Allergies   Allergen Reactions     Morphine Sulfate Nausea and Vomiting     Cannot take it in pill form, IV ok   Pancreatitis     Peanuts [Nuts] GI Disturbance     Internal cuts-GERD     Gold Au 198 [Gold] Rash        MENTAL STATUS EXAM   Vitals: /96   Pulse 92   Temp 98.7  F (37.1  C) (Temporal)   Resp 16   Ht 1.651 m (5' 5\")   Wt 138.9 kg (306 lb 3.2 oz)   SpO2 98%   BMI 50.95 kg/m      Appearance:  awake, alert, dressed in hospital scrubs, appeared as age stated, and disheveled   Attitude:Guarded  Eye Contact: Fair  Mood: \"decent\"  Affect:less restricted  Speech: Regular rate  Psychomotor Behavior:  no evidence of tardive " "dyskinesia, dystonia, or tics  Thought Process: Tangential   Associations: Loose associations present  Thought Content: No SI or HI elicited. Denies AVH. Paranoid.  Insight: improving  Judgment: improving  Oriented to:  time, person, and place  Attention Span and Concentration: Poor  Recent and Remote Memory:  fair  Fund of Knowledge: Low to average  Muscle Strength and Tone: normal  Gait and Station: Normal       LABS   No results found for this or any previous visit (from the past 24 hour(s)).      IMPRESSION     This is a 33 year old male with a PMH of unspecified mood disorder, ADHD, SAGE, insomnia who was brought to the ED from his PCP office after exhibiting psychotic symptoms. Patient was found to be disorganized and delusional. He was placed on a hold and petition for commitment was filed in ED prior to transfer to behavioral health. Today patient is irritable. He answers most questions with \"that's confidential\". He does mention the [place], FBI, and government being involved. During our interaction he uses sign language when staring at the camera in the ceiling as if communicating to someone. He reports that he will not take any medication, because it is not real and he does not trust it. Writer and SW did attempt to explain the commitment process, however patient refused to take the paperwork offered and instead ended the interview, stating that that we needed to watch out for the [place], FBI, and secret service. Will schedule Seroquel at bedtime for tonight as patient had been on this in the past, though I suspect he will refuse. If continuing to refuse medication, will likely need to file Nieves petition tomorrow.     2/23/24: The patient continues to be manic and psychotic. Refusing all scheduled medications. Nieves re-submitted.    2/24/24: declines psychiatric interview today. Remains manic and psychotic. Nieves hearing set for 3/4/24    2/25/24: only utters one word today on examination. Remains " "decompensated.     2/26/24: converses today. He is illogical tangentially talking about nonsensical things.     2/27/24: attempted to meet with Marija twice today. He cites that Dr. Martinez is \"fake\" and does not wish to communicate today.     2/28/24: will not communicate with writer, waving him away.  He may be using sing language to sign the word \"crying\" but this is unclear.     2/29/24: nonsensical in communication. Remains disorganized.     3/1/24: does not participate in interview    3/2/24: upon interview today, Marija does not communicate. He puts up his hand and shows all 5 fingers, nothing else.     3/3/24: encouraged to attend his court hearing tomorrow. He remains disorganized. He is in need of a Nieves for stabilization.     3/4/24: declines to participate in interview and Nieves hearing. He declined to speak with his  despite his  meeting with him several times.   stated in court he is approving order for Nieves. We await signed paperwork    3/5/24: Nieves approved. We await paperwork. Will move forwad with oral paliperidone and IM olanzapine back-up.     3/6/24: Nieves paper received. Will start paliperidone 3 mg BID with 10 mg olanzapine IM back-up    3/7/24: receiving IM olanzapine, he engages in more conversations today compared to other days when he has not had neuroleptics.     3/8/24: more conversational, however much more irritable    3/9/24: continues to take IM olanzapine over oral. He is becoming more vocal about his paranoia     3/10/24: irritability persists, however he is more talkative over last 4 days. Will consider increasing olanzapine IM tomorrow    3/11/24: increase olanzapine to 10 mg IM TID    3/12/24: no changes, resistant to cooperating with Nieves and continues to require IM formulation of olanzapine. Will continue with olanzapine for the next week to see if there is any improvement in his delusional construct before switching neuroleptic agents. " "    3/13/24: decision made today to switch to haloperidol instead of olanzapine. No appreciable different with olanzapine.  He is more argumentative and slightly more communicative. Continues to state he is not going to take medications on a long term basis and that he only needs \"pot\"    3/14/24: explained side effects benefits and complications of medication suboxone. He agrees to trial for off-label usage of pain. He is encouraged to take paliperidone oral isntead of IM haloperidol. He is likely getting some EPS from haloperidol.     3/15/24: reports suboxone was helpful for pain for him. He is engaging in a back and forth conversation today and even states thank you to writer today. He is taking oral paliperidone and was made aware we will consolidate to evening.     3/16/24: today Marija states that although he is tolerating suboxone for pain, he wishes to stop as he reports he is having too many GI side effects. He agrees to continue to take paliperidone. We will hold off on linking it to an IM right now and encourage him to take it as prescribed, we will revert back to linked if needed.  Marija has made great strides in the last several days, he is now showering and shaving.     3/17/24: maintain paliperidone at 6 mg at bedtime and will work to transition to MONTEIRO this week. Continues to improve. He talked about discharge for the first time today and talked about a desire to get a job.     3/18/24: Invega Sustenna IM today    3/19/24: tolerated Invega Sustenna IM yesterday, will maintain overlap. Noting some consitpation, will add senna.  Will get in contact with patient's  to coordinate outpatient plan    3/20/24: no changes to regimen    3/21/24: CM comfortable with dismissal after second loading dose of Invega Sustenna    3/23/24: tolerated decrease in paliperidone at bedtime      DIAGNOSES     Bipolar 1 disorder, current episode manic with psychotic features        PLAN     Location: Unit " 5  Legal Status: Committed.    Nieves: granted    Safety Assessment:    Behavioral Orders   Procedures     Code 1 - Restrict to Unit     Routine Programming     As clinically indicated     Status 15     Every 15 minutes.      PTA psychotropic medications stopped:     -no PTA meds    PTA psychotropic medications continued/changed:     -no PTA meds    New medications tried and stopped:     -Seroquel 100 mg at bedtime due to him refusing     New medications initiated:     -standard unit PRN medications    Today's Changes:  -continue decrease in paliperidone to 3 mg q daily   -Invega Sustenna 234 mg IM tgiven /18/24, followed by second loading dose of Invega Sustenna 156 mg IM on 3/25/24      Programming: Patient will be treated in a therapeutic milieu with appropriate individual and group therapies. Education will be provided on diagnoses, medications, and treatments.     Medical diagnoses:  Per medicine    Consult: None  Tests: None    Anticipated LOS: > 7 days  Disposition: home with services       ATTESTATION    Bernardo Martinez MD  [facility]   Psychiatry    Video Visit: Patient has given verbal consent for video visit?: Yes  Type of Service: video visit for mental health treatment  Reason for Video Visit: COVID-19 [place]  Originating Site (patient location): [facility]  Distant Site (provider location): Remote Location  Mode of Communication: Video Conference via [employer]  Time of Service: Date: 03/23/2024 , Start: 08:30 end: 09:00

## 2024-03-24 PROCEDURE — 250N000013 HC RX MED GY IP 250 OP 250 PS 637: Performed by: NURSE PRACTITIONER

## 2024-03-24 PROCEDURE — 250N000013 HC RX MED GY IP 250 OP 250 PS 637: Performed by: PSYCHIATRY & NEUROLOGY

## 2024-03-24 PROCEDURE — 250N000013 HC RX MED GY IP 250 OP 250 PS 637

## 2024-03-24 PROCEDURE — 124N000004

## 2024-03-24 RX ADMIN — ACETAMINOPHEN 650 MG: 325 TABLET, FILM COATED ORAL at 17:36

## 2024-03-24 RX ADMIN — PALIPERIDONE 3 MG: 3 TABLET, EXTENDED RELEASE ORAL at 20:07

## 2024-03-24 RX ADMIN — SENNOSIDES AND DOCUSATE SODIUM 1 TABLET: 8.6; 5 TABLET ORAL at 08:02

## 2024-03-24 RX ADMIN — SENNOSIDES AND DOCUSATE SODIUM 1 TABLET: 8.6; 5 TABLET ORAL at 20:07

## 2024-03-24 RX ADMIN — PSYLLIUM HUSK 1 PACKET: 3.4 POWDER ORAL at 08:07

## 2024-03-24 RX ADMIN — ACETAMINOPHEN 650 MG: 325 TABLET, FILM COATED ORAL at 12:30

## 2024-03-24 ASSESSMENT — ACTIVITIES OF DAILY LIVING (ADL)
ADLS_ACUITY_SCORE: 29
LAUNDRY: UNABLE TO COMPLETE
ADLS_ACUITY_SCORE: 29
ORAL_HYGIENE: INDEPENDENT
ADLS_ACUITY_SCORE: 29
DRESS: SCRUBS (BEHAVIORAL HEALTH)
ADLS_ACUITY_SCORE: 29
HYGIENE/GROOMING: INDEPENDENT

## 2024-03-24 NOTE — PLAN OF CARE
Problem: Adult Behavioral Health Plan of Care  Goal: Patient-Specific Goal (Individualization)  Description: Patient will sleep 6 to 8 hours per night  Patient will eat at least 50% of meals  Patient will attend at least 50% of groups  Patient will comply with recommendations of treatment team  Patient will remain medication compliant  Outcome: Progressing     Problem: Thought Process Alteration  Goal: Optimal Thought Clarity  Description: Pt will be able to have a reality based conversation prior to discharge.  Outcome: Progressing     Face to face shift report received from Matthieu. Rounding completed, patient laying in bed, appeared to be sleeping, non-labored even respirations noted.    Patient appeared to be sleeping for approximately 5.75 hours since 2330.    Patient refused morning vitals.     Face to face report will be communicated to oncoming RN.    Rima Mallory RN  3/24/2024  6:31 AM

## 2024-03-24 NOTE — PLAN OF CARE
Face to face shift report received from Rima JENKINS RN.       Problem: Adult Behavioral Health Plan of Care  Goal: Patient-Specific Goal (Individualization)  Description: Patient will sleep 6 to 8 hours per night  Patient will eat at least 50% of meals  Patient will attend at least 50% of groups  Patient will comply with recommendations of treatment team  Patient will remain medication compliant  Outcome: Progressing   Calm and cooperative. Medication compliant. Offers little during conversation, but pleasant. Denies mental health issues. Pacing in hallways frequently. Reports feet and back pain 5/10. Utilized Tylenol 650mg at 1230.  0807: Utilized Metamucil for constipation.     Problem: Thought Process Alteration  Goal: Optimal Thought Clarity  Description: Pt will be able to have a reality based conversation prior to discharge.  Outcome: Progressing       Face to face end of shift report to be communicated to oncoming RN.

## 2024-03-24 NOTE — PLAN OF CARE
Problem: Adult Behavioral Health Plan of Care  Goal: Patient-Specific Goal (Individualization)  Description: Patient will sleep 6 to 8 hours per night  Patient will eat at least 50% of meals  Patient will attend at least 50% of groups  Patient will comply with recommendations of treatment team  Patient will remain medication compliant    Outcome: Progressing     Problem: Thought Process Alteration  Goal: Optimal Thought Clarity  Description: Pt will be able to have a reality based conversation prior to discharge.    Outcome: Progressing     Pt denies SI/HI, pain, and anxiety. Pt is medication compliant and VSS. Pt ate 100% of dinner. Pt has a flat affect. Pt is calm and alert. Pt is using appropriate behavior with staff and peers. Pt spent most of shift walking halls.     Face to face report will be communicated to oncoming RN.    Jagruti Arguello RN  3/23/2024

## 2024-03-25 ENCOUNTER — TELEPHONE (OUTPATIENT)
Dept: PSYCHIATRY | Facility: OTHER | Age: 34
End: 2024-03-25
Payer: COMMERCIAL

## 2024-03-25 PROCEDURE — 124N000004

## 2024-03-25 PROCEDURE — 250N000013 HC RX MED GY IP 250 OP 250 PS 637

## 2024-03-25 PROCEDURE — 250N000013 HC RX MED GY IP 250 OP 250 PS 637: Performed by: PSYCHIATRY & NEUROLOGY

## 2024-03-25 PROCEDURE — 250N000011 HC RX IP 250 OP 636: Mod: JZ | Performed by: PSYCHIATRY & NEUROLOGY

## 2024-03-25 PROCEDURE — 99239 HOSP IP/OBS DSCHRG MGMT >30: CPT | Mod: 95 | Performed by: PSYCHIATRY & NEUROLOGY

## 2024-03-25 RX ORDER — PALIPERIDONE PALMITATE 234 MG/1.5ML
234 INJECTION INTRAMUSCULAR
Qty: 1.5 ML | Refills: 1 | Status: SHIPPED | OUTPATIENT
Start: 2024-04-22 | End: 2024-04-23

## 2024-03-25 RX ADMIN — Medication 3 MG: at 20:06

## 2024-03-25 RX ADMIN — ACETAMINOPHEN 650 MG: 325 TABLET, FILM COATED ORAL at 10:32

## 2024-03-25 RX ADMIN — SENNOSIDES AND DOCUSATE SODIUM 1 TABLET: 8.6; 5 TABLET ORAL at 20:07

## 2024-03-25 RX ADMIN — PALIPERIDONE 3 MG: 3 TABLET, EXTENDED RELEASE ORAL at 20:07

## 2024-03-25 RX ADMIN — PALIPERIDONE PALMITATE 156 MG: 156 INJECTION INTRAMUSCULAR at 10:32

## 2024-03-25 RX ADMIN — SENNOSIDES AND DOCUSATE SODIUM 1 TABLET: 8.6; 5 TABLET ORAL at 08:14

## 2024-03-25 RX ADMIN — ACETAMINOPHEN 650 MG: 325 TABLET, FILM COATED ORAL at 16:09

## 2024-03-25 ASSESSMENT — ACTIVITIES OF DAILY LIVING (ADL)
LAUNDRY: UNABLE TO COMPLETE
HYGIENE/GROOMING: INDEPENDENT
ADLS_ACUITY_SCORE: 29
ADLS_ACUITY_SCORE: 29
DRESS: SCRUBS (BEHAVIORAL HEALTH)
ADLS_ACUITY_SCORE: 29
ORAL_HYGIENE: INDEPENDENT
ADLS_ACUITY_SCORE: 29

## 2024-03-25 NOTE — PLAN OF CARE
"  Problem: Adult Behavioral Health Plan of Care  Goal: Patient-Specific Goal (Individualization)  Description: Patient will sleep 6 to 8 hours per night  Patient will eat at least 50% of meals  Patient will attend at least 50% of groups  Patient will comply with recommendations of treatment team  Patient will remain medication compliant  Outcome: Progressing     Problem: Thought Process Alteration  Goal: Optimal Thought Clarity  Description: Pt will be able to have a reality based conversation prior to discharge.    Outcome: Progressing   Goal Outcome Evaluation:    Plan of Care Reviewed With: patient          Patient is restless, pacing the unit. Affect is flat, mood is calm. He is guarded in conversation, and paranoid, opening his own medications. He denies SI, HI, anxiety, depression, hallucinations, and pain. States he is good, that he is \"excited to leave tomorrow\". Has been appropriate with staff and peers.   1032-requested and accepted Tylenol 650 mg for shoulder, knee and hip pain.   Patient received MONTEIRO of Invega, he tolerated this well.  Face to face end of shift report communicated to oncoming RN.     Shell Wood RN  3/25/2024  2:55 PM               "

## 2024-03-25 NOTE — PLAN OF CARE
Problem: Adult Behavioral Health Plan of Care  Goal: Patient-Specific Goal (Individualization)  Description: Patient will sleep 6 to 8 hours per night  Patient will eat at least 50% of meals  Patient will attend at least 50% of groups  Patient will comply with recommendations of treatment team  Patient will remain medication compliant    Outcome: Progressing     Problem: Thought Process Alteration  Goal: Optimal Thought Clarity  Description: Pt will be able to have a reality based conversation prior to discharge.    Outcome: Progressing     Pt denies SI/HI, pain, and anxiety. Pt is medication compliant and VSS. Pt ate 100% of dinner. Pt has a flat affect. Pt is calm and alert. Pt is using appropriate behavior with staff and peers. Pt spent most of shift walking halls. 1736 tylenol 650mg for 5/10 pain in knees and hips.     Face to face report will be communicated to oncoming RN.    Jagruti Arguello RN  3/24/2024

## 2024-03-25 NOTE — PLAN OF CARE
Problem: Adult Behavioral Health Plan of Care  Goal: Patient-Specific Goal (Individualization)  Description: Patient will sleep 6 to 8 hours per night  Patient will eat at least 50% of meals  Patient will attend at least 50% of groups  Patient will comply with recommendations of treatment team  Patient will remain medication compliant  Outcome: Progressing     Problem: Thought Process Alteration  Goal: Optimal Thought Clarity  Description: Pt will be able to have a reality based conversation prior to discharge.  Outcome: Progressing     Face to face shift report received from Jagruti. Rounding completed, patient laying in bed, appeared to be sleeping, non-labored even respirations noted.    Patient appeared to be sleeping for approximately 7 hours since 2145 last shift.    Patient refused morning vitals.    Face to face report will be communicated to oncoming RN.    Rima Mallory RN  3/25/2024  6:14 AM

## 2024-03-25 NOTE — TELEPHONE ENCOUNTER
He needs to get prescribed medical marijuana through the [facility]. I do not write those prescriptions, but some primary care providers do. He can contact the [place] [employer] [employer] to get  more information    David Felix

## 2024-03-25 NOTE — TELEPHONE ENCOUNTER
After verifying patient's name and date of birth, patient was given the below information.  Norma J. Gosselin, LPN....3/25/2024 2:14 PM

## 2024-03-25 NOTE — DISCHARGE SUMMARY
"  [facility] PSYCHIATRY  DISCHARGE SUMMARY     DISCHARGE DATA     Marija Albright MRN# [id number]   Age: 33 year old YOB: 1990     Date of Admission: 1/31/2024  Date of Discharge: March 26, 2024  Discharge Provider: Bernardo Martinez MD       REASON FOR ADMISSION   This is a 33 year old male with a PMH of unspecified mood disorder, ADHD, SAGE, insomnia who was brought to the ED from his PCP office after exhibiting psychotic symptoms. Patient was found to be disorganized and delusional. He was placed on a hold and petition for commitment was filed in ED prior to transfer to behavioral health. Today patient is irritable. He answers most questions with \"that's confidential\". He does mention the [place], FBI, and government being involved. During our interaction he uses sign language when staring at the camera in the ceiling as if communicating to someone. He reports that he will not take any medication, because it is not real and he does not trust it. Writer and SW did attempt to explain the commitment process, however patient refused to take the paperwork offered and instead ended the interview, stating that that we needed to watch out for the [place], FBI, and secret service. Will schedule Seroquel at bedtime for tonight as patient had been on this in the past, though I suspect he will refuse. If continuing to refuse medication, will likely need to file Nieves petition tomorrow.      2/23/24: The patient continues to be manic and psychotic. Refusing all scheduled medications. Nieves re-submitted.     2/24/24: declines psychiatric interview today. Remains manic and psychotic. Nieves hearing set for 3/4/24     2/25/24: only utters one word today on examination. Remains decompensated.      2/26/24: converses today. He is illogical tangentially talking about nonsensical things.      2/27/24: attempted to meet with Marija twice today. He cites that Dr. Martinez is \"fake\" and does not wish to communicate " "today.      2/28/24: will not communicate with writer, waving him away.  He may be using sing language to sign the word \"crying\" but this is unclear.      2/29/24: nonsensical in communication. Remains disorganized.      3/1/24: does not participate in interview     3/2/24: upon interview today, Marija does not communicate. He puts up his hand and shows all 5 fingers, nothing else.      3/3/24: encouraged to attend his court hearing tomorrow. He remains disorganized. He is in need of a Nieves for stabilization.      3/4/24: declines to participate in interview and Nieves hearing. He declined to speak with his  despite his  meeting with him several times.   stated in court he is approving order for Nieves. We await signed paperwork     3/5/24: Nieves approved. We await paperwork. Will move forwad with oral paliperidone and IM olanzapine back-up.      3/6/24: Nieves paper received. Will start paliperidone 3 mg BID with 10 mg olanzapine IM back-up     3/7/24: receiving IM olanzapine, he engages in more conversations today compared to other days when he has not had neuroleptics.      3/8/24: more conversational, however much more irritable     3/9/24: continues to take IM olanzapine over oral. He is becoming more vocal about his paranoia      3/10/24: irritability persists, however he is more talkative over last 4 days. Will consider increasing olanzapine IM tomorrow     3/11/24: increase olanzapine to 10 mg IM TID     3/12/24: no changes, resistant to cooperating with Nieves and continues to require IM formulation of olanzapine. Will continue with olanzapine for the next week to see if there is any improvement in his delusional construct before switching neuroleptic agents.      3/13/24: decision made today to switch to haloperidol instead of olanzapine. No appreciable different with olanzapine.  He is more argumentative and slightly more communicative. Continues to state he is not going to take " "medications on a long term basis and that he only needs \"pot\"     3/14/24: explained side effects benefits and complications of medication suboxone. He agrees to trial for off-label usage of pain. He is encouraged to take paliperidone oral isntead of IM haloperidol. He is likely getting some EPS from haloperidol.      3/15/24: reports suboxone was helpful for pain for him. He is engaging in a back and forth conversation today and even states thank you to writer today. He is taking oral paliperidone and was made aware we will consolidate to evening.      3/16/24: today Marija states that although he is tolerating suboxone for pain, he wishes to stop as he reports he is having too many GI side effects. He agrees to continue to take paliperidone. We will hold off on linking it to an IM right now and encourage him to take it as prescribed, we will revert back to linked if needed.  Marija has made great strides in the last several days, he is now showering and shaving.      3/17/24: maintain paliperidone at 6 mg at bedtime and will work to transition to MONTEIRO this week. Continues to improve. He talked about discharge for the first time today and talked about a desire to get a job.      3/18/24: Invega Sustenna IM today     3/19/24: tolerated Invega Sustenna IM yesterday, will maintain overlap. Noting some consitpation, will add senna.  Will get in contact with patient's  to coordinate outpatient plan     3/20/24: no changes to regimen     3/21/24: CM comfortable with dismissal after second loading dose of Invega Sustenna     3/23/24: tolerated decrease in paliperidone at bedtime     3/25/24: tapered off oral paliperidone. Will not be on Invega Sustenna q monthly        DISCHARGE DIAGNOSES   Bipolar 1 disorder, current episode manic with psychotic features   Cannabis Use Disorder, Severe  Alcohol Abuse       HOSPITAL COURSE   Patient was admitted to unit 5 due to the aforementioned presentation. The patient was " placed under 15 minute checks to ensure patient safety. The patient participated in unit programming and groups as able.    Legal status during hospitalization was involuntary .Mr. Albright did require seclusion/restraint during hospitalization.     During hospitalization, Marija was given full commitment and Nieves through [facility].     We reviewed with Mr. Albright current and past medication trials including duration, dose, response and side effects. During this hospitalization, the following changes to the patient's psychotropic medications were made:    PTA psychotropic medications stopped:      -no PTA meds     PTA psychotropic medications continued/changed:      -no PTA meds     New medications tried and stopped:      -Seroquel 100 mg at bedtime due to him refusing   -olanzapine - no response  -haloperidol - no response     New medications initiated:   -paliperidone, titrated up to 6 mg at bedtime and transitioned to Invega Sustenna (Invega Sustenna 234 mg IM tgiven /18/24, followed by second loading dose of Invega Sustenna 156 mg IM on 3/25/24 )      Mr. Albright progressed slowly at first related to response to medication changes, struggle with acceptance of psychosocial stressors, difficulty accepting illness, urgency to leave due to family and work pressure, confidence in skills to manage symptoms, establishment of a supportive community network, abstaining from further substance use, and psychosocial stressors outside hospital  . By the time of discharge, his symptoms had improved adequately.  Psychosis improved with adequate outpatient plan in place. and Psychotropic medications utilized were found to be helpful without significant adverse side effects. The treatment goals (long-term goal/discharge criteria) were reached.     With the aforementioned changes and supports the patient noticed improvement in their symptoms and felt sufficiently ready for discharge. As a result, Marija Albright  was discharged. At the time of discharge, Marija Albright was determined to not be a danger to self or others. The patient was also medically stable for discharge. At the current time of discharge, the patient does not meet criteria for involuntary hospitalization. On the day of discharge, the patient reports that they do not have suicidal or homicidal ideation. Steps taken to minimize risk include: assessing patient s behavior and thought process daily during hospital stay, discharging patient with adequate plan for follow up for mental and physical health and discussing safety plan of returning to the hospital should the patient ever have thoughts of harming themselves or others. Therefore, based on all available evidence including the factors cited above, the patient does not appear to be at imminent risk for self-harm, and is appropriate for outpatient level of care. The acute crisis is resolved and Marija is discharging in improved condition. Though Marija's acute crisis has resolved, there remains a higher risk of suicide over the long term compared to the general population. Factors that may be associated with relapse include worsening of depressive symptoms, alcohol use, illicit substance use, not taking medications, lack of mental health follow-up appointments and work/family/relationship stressors. Marija Albright has a plan in place to mitigate these stressors, is hopeful about the future ,and is not assessed to be a imminent risk to self or anyone else and thus is not assessed to be holdable.  Marija has received maximal benefit from the current hospital stay. Marija Albright set to discharge.        DISCHARGE MEDICATIONS     Current Discharge Medication List        CONTINUE these medications which have NOT CHANGED    Details   traMADol (ULTRAM) 50 MG tablet Take 50 mg by mouth every 6 hours as needed for severe pain Filled for 6 tablets 1/23/24, ONE TABLET REMAINING.      ibuprofen (ADVIL/MOTRIN)  "600 MG tablet Take 1 tablet (600 mg) by mouth every 6 hours as needed for moderate pain  Qty: 30 tablet, Refills: 0    Associated Diagnoses: Dental infection           STOP taking these medications       amoxicillin-clavulanate (AUGMENTIN) 875-125 MG tablet Comments:   Reason for Stopping:                  VITALS   Vitals: /96   Pulse 92   Temp 98.7  F (37.1  C) (Temporal)   Resp 16   Ht 1.651 m (5' 5\")   Wt 138.9 kg (306 lb 3.2 oz)   SpO2 98%   BMI 50.95 kg/m       MENTAL STATUS EXAM   Appearance: Alert, oriented, dressed in hospital scrubs, appears stated age   Attitude: Cooperative   Eye Contact: Good  Mood: \"Better\"  Affect: Full range of affect  Speech: Normal rate and rhythm   Psychomotor Behavior: No tremor, rigidity, or psychomotor abnormality   Thought Process: Logical, goal directed   Associations: No loose associations   Thought Content: Denies SI or plan. No SIB. Denies A/V hallucinations. No evidence of delusional thought.  Insight: remains limited   Judgment: Good  Oriented to: Person, place, and time  Attention Span and Concentration: Intact  Recent and Remote Memory: Intact  Language: English with appropriate syntax and vocabulary  Fund of Knowledge: Average  Muscle Strength and Tone: Grossly normal  Gait and Station: Grossly normal       DISCHARGE PLAN     1.  Education given regarding diagnostic and treatment options with risks, benefits and alternatives with adequate verbalization of understanding.  2.  Discharge to home. Upon detailed review of risk factors, patient amenable for release.   3.  Continue aforementioned medications and associated medication changes with follow-up by outpatient provider.  4.  Crisis management planning in place.    5.  Nursing and  to review further discharge recommendations.   6.  Active issues: No active medical issues requiring immediate follow-up care.  7.  Patient is being discharged with the following appointments as detailed " below:    See AVS       DISCHARGE SERVICES PROVIDED     80 minutes spent on discharge services, including:  Final examination of patient.  Review and discussion of hospital stay.  Instructions for continued outpatient care/goals.  Preparation of discharge records.  Preparation of medications refills and new prescriptions.  Preparation of applicable referral forms.        ATTESTATION   Bernardo Martinez MD  [facility]   Psychiatry    Video Visit: Patient has given verbal consent for video visit?: Yes  Type of Service: video visit for mental health treatment  Reason for Video Visit: COVID-[place]  Originating Site (patient location): [facility]  Distant Site (provider location): Remote Location  Mode of Communication: Video Conference via [employer]  Time of Service: Date: March 25, 2024 , Start: 07:00,  Stop: 08:00     LABS THIS ADMISSION     No results found for any visits on 01/31/24.

## 2024-03-25 NOTE — PLAN OF CARE
"  Problem: Adult Behavioral Health Plan of Care  Goal: Patient-Specific Goal (Individualization)  Description: Patient will sleep 6 to 8 hours per night  Patient will eat at least 50% of meals  Patient will attend at least 50% of groups  Patient will comply with recommendations of treatment team  Patient will remain medication compliant    Outcome: Progressing  Note: 15:40: Received end of shift report from LIGIA Goff Pt amb in halls upon arrival-----     19:40: Withdrawn, restless activity. Showered post dinner, mood somewhat depressed. Flat affect. Co-RN administered prn acetaminophen given  per pt request for \"sore feet\"--    20:30; Co-RN administered prn melatonin with scheduled HS medication. Activity et group participation encouraged but politely declined. Returned to room for bed. Little conversation this PM shift. No notable change in psychological status-- Displays little emotion or interest.     Face to face end of shift report to be communicated to on-coming [facility] staff.     Charla Castro RN  3/25/2024  9:38 PM           Problem: Thought Process Alteration  Goal: Optimal Thought Clarity  Description: Pt will be able to have a reality based conversation prior to discharge.    Outcome: Progressing   Goal Outcome Evaluation:                        "

## 2024-03-25 NOTE — PROGRESS NOTES
Ride has been scheduled with [facility] [employer] for 10:00am on 3/26/24.     Provisional discharge completed with patient and sent to CM for signature.

## 2024-03-26 VITALS
HEART RATE: 92 BPM | RESPIRATION RATE: 16 BRPM | SYSTOLIC BLOOD PRESSURE: 148 MMHG | DIASTOLIC BLOOD PRESSURE: 96 MMHG | BODY MASS INDEX: 51.02 KG/M2 | HEIGHT: 65 IN | TEMPERATURE: 98.7 F | WEIGHT: 306.2 LBS | OXYGEN SATURATION: 98 %

## 2024-03-26 ASSESSMENT — ACTIVITIES OF DAILY LIVING (ADL)
ADLS_ACUITY_SCORE: 29
HYGIENE/GROOMING: INDEPENDENT
ADLS_ACUITY_SCORE: 29
ADLS_ACUITY_SCORE: 29
DRESS: SCRUBS (BEHAVIORAL HEALTH);INDEPENDENT
LAUNDRY: UNABLE TO COMPLETE
ADLS_ACUITY_SCORE: 29
ORAL_HYGIENE: INDEPENDENT
ADLS_ACUITY_SCORE: 29

## 2024-03-26 NOTE — PLAN OF CARE
Problem: Adult Behavioral Health Plan of Care  Goal: Patient-Specific Goal (Individualization)  Description: Patient will sleep 6 to 8 hours per night  Patient will eat at least 50% of meals  Patient will attend at least 50% of groups  Patient will comply with recommendations of treatment team  Patient will remain medication compliant    Outcome: Progressing     Problem: Thought Process Alteration  Goal: Optimal Thought Clarity  Description: Pt will be able to have a reality based conversation prior to discharge.    Outcome: Progressing   Goal Outcome Evaluation:    Pt in lounge at the start of the shift. Pt walking in the hallways. Pt denies all symptoms of pain, anxiety, depression, SI, HI and hallucinations. Pt states he is ready to go. Pt refused his am senna, states he will not need that out of here.            Face to face end of shift report communicated to evening shift RN.     Cristina Montoya RN  3/26/2024  7:41 AM         Discharge Note    Patient Discharged to home on 3/26/2024 9:47 AM via [employer] transportation accompanied by [facility] staff.     Patient informed of discharge instructions in AVS. patient verbalizes understanding and denies having any questions pertaining to AVS. Patient stable at time of discharge. Patient denies SI, HI, and thoughts of self harm at time of discharge. All personal belongings returned to patient. Discharge prescriptions sent to [facility] via electronic communication. AVS and discharge summary sent with pt.      Cristina Montoya RN  3/26/2024  9:55 AM

## 2024-03-26 NOTE — PROGRESS NOTES
Pt is discharging at the recommendation of the treatment team. Pt is discharging to moms house transported by [facility]. Pt denies having any thoughts of hurting themself or anyone else. Pt denies anxiety or depression. Pt has follow up with psychiatry and PCP. Discharge instructions, including; demographic sheet, psychiatric evaluation, discharge summary, and AVS were faxed to these next level of care providers.     [facility] will be here at 10:00am to .

## 2024-03-26 NOTE — PLAN OF CARE
Problem: Adult Behavioral Health Plan of Care  Goal: Patient-Specific Goal (Individualization)  Description: Patient will sleep 6 to 8 hours per night  Patient will eat at least 50% of meals  Patient will attend at least 50% of groups  Patient will comply with recommendations of treatment team  Patient will remain medication compliant  Outcome: Progressing     Problem: Thought Process Alteration  Goal: Optimal Thought Clarity  Description: Pt will be able to have a reality based conversation prior to discharge.  Outcome: Progressing     Report received from previously assigned nurse.     Rounds completed. Safety checks completed every 15 minutes throughout the night. Pt noted to be resting with eyes closed and easy resp. for 4.5 hours. No suicidal gestures or comments noted.    Face to face end of shift report to be communicated to oncoming RN.

## 2024-03-26 NOTE — PLAN OF CARE
Problem: Adult Behavioral Health Plan of Care  Goal: Plan of Care Review  Outcome: Adequate for Care Transition  Flowsheets (Taken 3/26/2024 0917)  Patient Agreement with Plan of Care: agrees  Goal: Patient-Specific Goal (Individualization)  Description: Patient will sleep 6 to 8 hours per night  Patient will eat at least 50% of meals  Patient will attend at least 50% of groups  Patient will comply with recommendations of treatment team  Patient will remain medication compliant    3/26/2024 0958 by Cristina Montoya RN  Outcome: Adequate for Care Transition  3/26/2024 0740 by Cristina Montoya RN  Outcome: Progressing  Goal: Adheres to Safety Considerations for Self and Others  Outcome: Adequate for Care Transition  Intervention: Develop and Maintain Individualized Safety Plan  Recent Flowsheet Documentation  Taken 3/26/2024 0917 by Cristina Montoya RN  Safety Measures:   environmental rounds completed   safety rounds completed  Goal: Absence of New-Onset Illness or Injury  Outcome: Adequate for Care Transition  Intervention: Identify and Manage Fall Risk  Recent Flowsheet Documentation  Taken 3/26/2024 0917 by Cristina Montoya RN  Safety Measures:   environmental rounds completed   safety rounds completed  Goal: Optimized Coping Skills in Response to Life Stressors  Outcome: Adequate for Care Transition  Intervention: Promote Effective Coping Strategies  Recent Flowsheet Documentation  Taken 3/26/2024 0917 by Cristina Montoya RN  Supportive Measures:   active listening utilized   verbalization of feelings encouraged   self-care encouraged  Goal: Develops/Participates in Therapeutic Shoshoni to Support Successful Transition  Outcome: Adequate for Care Transition  Intervention: Foster Therapeutic Shoshoni  Recent Flowsheet Documentation  Taken 3/26/2024 0917 by Cristina Montoya RN  Trust Relationship/Rapport:   care explained   thoughts/feelings acknowledged   questions answered   emotional support  provided   empathic listening provided   choices provided     Problem: Thought Process Alteration  Goal: Optimal Thought Clarity  Description: Pt will be able to have a reality based conversation prior to discharge.    3/26/2024 0958 by Cristina Montoya RN  Outcome: Adequate for Care Transition  3/26/2024 0740 by Cristina Montoya RN  Outcome: Progressing   Goal Outcome Evaluation:     Plan of Care Reviewed With: patient

## 2024-03-27 ENCOUNTER — TELEPHONE (OUTPATIENT)
Dept: BEHAVIORAL HEALTH | Facility: HOSPITAL | Age: 34
End: 2024-03-27

## 2024-03-27 NOTE — TELEPHONE ENCOUNTER
"[facility]: Post-Hospital Discharge Note     Situation   Post hospital discharge call placed 03/27/24.      Marija did not answer. A message was not left as it was not an option.  Messages are left with a call back number should they need to reach staff with any questions, need for additional resources, or need assistance setting up a post hospitalization follow up appointments, if unable to do so independently.    Inpatient Mental Health Admission Information:  Admission Date: 1/31/24  Admission Reason: This is a 33 year old male with a PMH of unspecified mood disorder, ADHD, SAGE, insomnia who was brought to the ED from his PCP office after exhibiting psychotic symptoms. Patient was found to be disorganized and delusional. He was placed on a hold and petition for commitment was filed in ED prior to transfer to behavioral health. Today patient is irritable. He answers most questions with \"that's confidential\". He does mention the [place], FBI, and government being involved. During our interaction he uses sign language when staring at the camera in the ceiling as if communicating to someone. He reports that he will not take any medication, because it is not real and he does not trust it. Writer and SW did attempt to explain the commitment process, however patient refused to take the paperwork offered and instead ended the interview, stating that that we needed to watch out for the [place], FBI, and secret service. Will schedule Seroquel at bedtime for tonight as patient had been on this in the past, though I suspect he will refuse. If continuing to refuse medication, will likely need to file Nieves petition tomorrow.      2/23/24: The patient continues to be manic and psychotic. Refusing all scheduled medications. Nieves re-submitted.     2/24/24: declines psychiatric interview today. Remains manic and psychotic. Nieves hearing set for 3/4/24     2/25/24: only utters one word today on examination. Remains decompensated. " "     2/26/24: converses today. He is illogical tangentially talking about nonsensical things.      2/27/24: attempted to meet with Marija twice today. He cites that Dr. Martinez is \"fake\" and does not wish to communicate today.      2/28/24: will not communicate with writer, waving him away.  He may be using sing language to sign the word \"crying\" but this is unclear.      2/29/24: nonsensical in communication. Remains disorganized.      3/1/24: does not participate in interview     3/2/24: upon interview today, Marija does not communicate. He puts up his hand and shows all 5 fingers, nothing else.      3/3/24: encouraged to attend his court hearing tomorrow. He remains disorganized. He is in need of a Nieves for stabilization.      3/4/24: declines to participate in interview and Nieves hearing. He declined to speak with his  despite his  meeting with him several times.   stated in court he is approving order for Nieves. We await signed paperwork     3/5/24: Nieves approved. We await paperwork. Will move forwad with oral paliperidone and IM olanzapine back-up.      3/6/24: Nieves paper received. Will start paliperidone 3 mg BID with 10 mg olanzapine IM back-up     3/7/24: receiving IM olanzapine, he engages in more conversations today compared to other days when he has not had neuroleptics.      3/8/24: more conversational, however much more irritable     3/9/24: continues to take IM olanzapine over oral. He is becoming more vocal about his paranoia      3/10/24: irritability persists, however he is more talkative over last 4 days. Will consider increasing olanzapine IM tomorrow     3/11/24: increase olanzapine to 10 mg IM TID     3/12/24: no changes, resistant to cooperating with Nieves and continues to require IM formulation of olanzapine. Will continue with olanzapine for the next week to see if there is any improvement in his delusional construct before switching neuroleptic agents.    " "  3/13/24: decision made today to switch to haloperidol instead of olanzapine. No appreciable different with olanzapine.  He is more argumentative and slightly more communicative. Continues to state he is not going to take medications on a long term basis and that he only needs \"pot\"     3/14/24: explained side effects benefits and complications of medication suboxone. He agrees to trial for off-label usage of pain. He is encouraged to take paliperidone oral isntead of IM haloperidol. He is likely getting some EPS from haloperidol.      3/15/24: reports suboxone was helpful for pain for him. He is engaging in a back and forth conversation today and even states thank you to writer today. He is taking oral paliperidone and was made aware we will consolidate to evening.      3/16/24: today Marija states that although he is tolerating suboxone for pain, he wishes to stop as he reports he is having too many GI side effects. He agrees to continue to take paliperidone. We will hold off on linking it to an IM right now and encourage him to take it as prescribed, we will revert back to linked if needed.  Marija has made great strides in the last several days, he is now showering and shaving.      3/17/24: maintain paliperidone at 6 mg at bedtime and will work to transition to MONTEIRO this week. Continues to improve. He talked about discharge for the first time today and talked about a desire to get a job.      3/18/24: Invega Sustenna IM today     3/19/24: tolerated Invega Sustenna IM yesterday, will maintain overlap. Noting some consitpation, will add senna.  Will get in contact with patient's  to coordinate outpatient plan     3/20/24: no changes to regimen     3/21/24: CM comfortable with dismissal after second loading dose of Invega Sustenna     3/23/24: tolerated decrease in paliperidone at bedtime      3/25/24: tapered off oral paliperidone. Will not be on Invega Sustenna q monthly     Inpatient Mental Health " "Discharge Information:  Discharge Date: 3/26/24  Discharged to: Home/ self care  Discharge Diagnosis: Bipolar 1 disorder, current episode manic with psychotic features   Cannabis Use Disorder, Severe  Alcohol Abuse    Background    The following information is obtained from the hospital after visit summary and inpatient provider notes.     \"Patient was admitted to unit 5 due to the aforementioned presentation. The patient was placed under 15 minute checks to ensure patient safety. The patient participated in unit programming and groups as able.     Legal status during hospitalization was involuntary .Mr. Albright did require seclusion/restraint during hospitalization.      During hospitalization, Marija was given full commitment and Nieves through [facility].      We reviewed with Mr. Albright current and past medication trials including duration, dose, response and side effects. During this hospitalization, the following changes to the patient's psychotropic medications were made:    PTA psychotropic medications stopped:      -no PTA meds     PTA psychotropic medications continued/changed:      -no PTA meds     New medications tried and stopped:      -Seroquel 100 mg at bedtime due to him refusing   -olanzapine - no response  -haloperidol - no response     New medications initiated:   -paliperidone, titrated up to 6 mg at bedtime and transitioned to Invega Sustenna (Invega Sustenna 234 mg IM tgiven /18/24, followed by second loading dose of Invega Sustenna 156 mg IM on 3/25/24 )        Mr. Albright progressed slowly at first related to response to medication changes, struggle with acceptance of psychosocial stressors, difficulty accepting illness, urgency to leave due to family and work pressure, confidence in skills to manage symptoms, establishment of a supportive community network, abstaining from further substance use, and psychosocial stressors outside hospital  . By the time of discharge, his " symptoms had improved adequately.  Psychosis improved with adequate outpatient plan in place. and Psychotropic medications utilized were found to be helpful without significant adverse side effects. The treatment goals (long-term goal/discharge criteria) were reached.      With the aforementioned changes and supports the patient noticed improvement in their symptoms and felt sufficiently ready for discharge. As a result, Marija Albright was discharged. At the time of discharge, Marija Albright was determined to not be a danger to self or others. The patient was also medically stable for discharge. At the current time of discharge, the patient does not meet criteria for involuntary hospitalization. On the day of discharge, the patient reports that they do not have suicidal or homicidal ideation. Steps taken to minimize risk include: assessing patient s behavior and thought process daily during hospital stay, discharging patient with adequate plan for follow up for mental and physical health and discussing safety plan of returning to the hospital should the patient ever have thoughts of harming themselves or others. Therefore, based on all available evidence including the factors cited above, the patient does not appear to be at imminent risk for self-harm, and is appropriate for outpatient level of care. The acute crisis is resolved and Marija is discharging in improved condition. Though Marija's acute crisis has resolved, there remains a higher risk of suicide over the long term compared to the general population. Factors that may be associated with relapse include worsening of depressive symptoms, alcohol use, illicit substance use, not taking medications, lack of mental health follow-up appointments and work/family/relationship stressors. Marija Albright has a plan in place to mitigate these stressors, is hopeful about the future ,and is not assessed to be a imminent risk to self or anyone else and thus is  "not assessed to be holdable.  Marija has received maximal benefit from the current hospital stay. Marija Albright set to discharge. \"    (DELETE THE FOLLOWING: Insert data from provider discharge summary-hospital course. Must include medications (stopped, added, changed) and narrative relating to how patient was doing at time of discharge -improvement, new symptoms, worsening symptoms)    Post Discharge Assessment   How have your symptoms been since being discharged from the hospital? Unable to reach patient  Do you have your discharge instructions/after visit summary? NA  Do you have any questions related to your discharge instructions? NA    Discharge Medication Assessment   Medications were reviewed in full on discharge, including: Medications to be started, medications to be stopped, medications to be continued from preadmission and any side effects.      Prescriptions were e-scribed or sent to their preferred pharmacy at discharge and were able to be filled: Yes  Do you have any questions about your medications? NA    Outpatient Plan/Future Appointments  Discharge follow up appointment scheduled within 14 days of discharging from hospital? No   Health Care Follow-up:  [facility]  [address],  [place] [address]  [phone]  Psychiatry: Dr. David Felix- April 23rd @ 9:15am - arrive @ 9:00am unit 2.  PCP: Dr. Erasmo Slade- May 30th @ @ 11:05am unit 3  Injection: Will be on April 23rd @ your psychiatry appointment      Further information, barriers, or follow up this writer has addressed: N/A    "

## 2024-03-27 NOTE — PHARMACY
The patient was not charged for the Invega Sustenna 156 mg injection administered on 3/25/24 due to receiving drug from the 's free trial program. The patient is NOT eligible for additional free unit this calendar year per program rules.     Fátima Albright, NaniD on 3/27/2024

## 2024-04-23 ENCOUNTER — OFFICE VISIT (OUTPATIENT)
Dept: PSYCHIATRY | Facility: OTHER | Age: 34
End: 2024-04-23
Attending: PSYCHIATRY & NEUROLOGY
Payer: COMMERCIAL

## 2024-04-23 ENCOUNTER — ALLIED HEALTH/NURSE VISIT (OUTPATIENT)
Dept: FAMILY MEDICINE | Facility: OTHER | Age: 34
End: 2024-04-23
Attending: PSYCHIATRY & NEUROLOGY
Payer: COMMERCIAL

## 2024-04-23 VITALS
TEMPERATURE: 97.8 F | HEIGHT: 66 IN | DIASTOLIC BLOOD PRESSURE: 82 MMHG | RESPIRATION RATE: 16 BRPM | BODY MASS INDEX: 48.68 KG/M2 | SYSTOLIC BLOOD PRESSURE: 119 MMHG | HEART RATE: 94 BPM | OXYGEN SATURATION: 96 % | WEIGHT: 302.9 LBS

## 2024-04-23 DIAGNOSIS — F90.9 ATTENTION DEFICIT HYPERACTIVITY DISORDER (ADHD), UNSPECIFIED ADHD TYPE: ICD-10-CM

## 2024-04-23 DIAGNOSIS — F31.2 SEVERE MANIC BIPOLAR 1 DISORDER WITH PSYCHOTIC BEHAVIOR (H): ICD-10-CM

## 2024-04-23 DIAGNOSIS — R73.9 HYPERGLYCEMIA: ICD-10-CM

## 2024-04-23 DIAGNOSIS — E22.1 IDIOPATHIC HYPERPROLACTINEMIA (H): ICD-10-CM

## 2024-04-23 DIAGNOSIS — Z13.220 SCREENING FOR HYPERLIPIDEMIA: ICD-10-CM

## 2024-04-23 DIAGNOSIS — T88.7XXA MEDICATION SIDE EFFECTS: Primary | ICD-10-CM

## 2024-04-23 DIAGNOSIS — F20.3 UNDIFFERENTIATED SCHIZOPHRENIA (H): Primary | ICD-10-CM

## 2024-04-23 LAB
ALBUMIN SERPL BCG-MCNC: 4.9 G/DL (ref 3.5–5.2)
ALP SERPL-CCNC: 73 U/L (ref 40–150)
ALT SERPL W P-5'-P-CCNC: 23 U/L (ref 0–70)
ANION GAP SERPL CALCULATED.3IONS-SCNC: 15 MMOL/L (ref 7–15)
AST SERPL W P-5'-P-CCNC: 18 U/L (ref 0–45)
BASOPHILS # BLD AUTO: 0 10E3/UL (ref 0–0.2)
BASOPHILS NFR BLD AUTO: 0 %
BILIRUB SERPL-MCNC: 0.5 MG/DL
BUN SERPL-MCNC: 11.8 MG/DL (ref 6–20)
CALCIUM SERPL-MCNC: 9.5 MG/DL (ref 8.6–10)
CHLORIDE SERPL-SCNC: 101 MMOL/L (ref 98–107)
CHOLEST SERPL-MCNC: 227 MG/DL
CREAT SERPL-MCNC: 1.17 MG/DL (ref 0.67–1.17)
DEPRECATED HCO3 PLAS-SCNC: 24 MMOL/L (ref 22–29)
EGFRCR SERPLBLD CKD-EPI 2021: 85 ML/MIN/1.73M2
EOSINOPHIL # BLD AUTO: 0.1 10E3/UL (ref 0–0.7)
EOSINOPHIL NFR BLD AUTO: 1 %
ERYTHROCYTE [DISTWIDTH] IN BLOOD BY AUTOMATED COUNT: 13 % (ref 10–15)
FASTING STATUS PATIENT QL REPORTED: YES
GLUCOSE SERPL-MCNC: 100 MG/DL (ref 70–99)
HBA1C MFR BLD: 5.4 % (ref 4–6.2)
HCT VFR BLD AUTO: 45.8 % (ref 40–53)
HDLC SERPL-MCNC: 54 MG/DL
HGB BLD-MCNC: 15.4 G/DL (ref 13.3–17.7)
IMM GRANULOCYTES # BLD: 0 10E3/UL
IMM GRANULOCYTES NFR BLD: 0 %
LDLC SERPL CALC-MCNC: 150 MG/DL
LYMPHOCYTES # BLD AUTO: 1.7 10E3/UL (ref 0.8–5.3)
LYMPHOCYTES NFR BLD AUTO: 24 %
MCH RBC QN AUTO: 29.6 PG (ref 26.5–33)
MCHC RBC AUTO-ENTMCNC: 33.6 G/DL (ref 31.5–36.5)
MCV RBC AUTO: 88 FL (ref 78–100)
MONOCYTES # BLD AUTO: 0.4 10E3/UL (ref 0–1.3)
MONOCYTES NFR BLD AUTO: 6 %
NEUTROPHILS # BLD AUTO: 4.6 10E3/UL (ref 1.6–8.3)
NEUTROPHILS NFR BLD AUTO: 69 %
NONHDLC SERPL-MCNC: 173 MG/DL
NRBC # BLD AUTO: 0 10E3/UL
NRBC BLD AUTO-RTO: 0 /100
PLATELET # BLD AUTO: 184 10E3/UL (ref 150–450)
POTASSIUM SERPL-SCNC: 4 MMOL/L (ref 3.4–5.3)
PROLACTIN SERPL 3RD IS-MCNC: 61 NG/ML (ref 4–15)
PROT SERPL-MCNC: 7.6 G/DL (ref 6.4–8.3)
RBC # BLD AUTO: 5.2 10E6/UL (ref 4.4–5.9)
SODIUM SERPL-SCNC: 140 MMOL/L (ref 135–145)
TRIGL SERPL-MCNC: 116 MG/DL
WBC # BLD AUTO: 6.8 10E3/UL (ref 4–11)

## 2024-04-23 PROCEDURE — 85025 COMPLETE CBC W/AUTO DIFF WBC: CPT | Mod: ZL | Performed by: PSYCHIATRY & NEUROLOGY

## 2024-04-23 PROCEDURE — 250N000011 HC RX IP 250 OP 636

## 2024-04-23 PROCEDURE — 83036 HEMOGLOBIN GLYCOSYLATED A1C: CPT | Mod: ZL | Performed by: PSYCHIATRY & NEUROLOGY

## 2024-04-23 PROCEDURE — G0463 HOSPITAL OUTPT CLINIC VISIT: HCPCS

## 2024-04-23 PROCEDURE — 96372 THER/PROPH/DIAG INJ SC/IM: CPT

## 2024-04-23 PROCEDURE — 36415 COLL VENOUS BLD VENIPUNCTURE: CPT | Mod: ZL | Performed by: PSYCHIATRY & NEUROLOGY

## 2024-04-23 PROCEDURE — 84155 ASSAY OF PROTEIN SERUM: CPT | Mod: ZL | Performed by: PSYCHIATRY & NEUROLOGY

## 2024-04-23 PROCEDURE — 99215 OFFICE O/P EST HI 40 MIN: CPT | Performed by: PSYCHIATRY & NEUROLOGY

## 2024-04-23 PROCEDURE — 80061 LIPID PANEL: CPT | Mod: ZL | Performed by: PSYCHIATRY & NEUROLOGY

## 2024-04-23 PROCEDURE — 84146 ASSAY OF PROLACTIN: CPT | Mod: ZL | Performed by: PSYCHIATRY & NEUROLOGY

## 2024-04-23 PROCEDURE — G0463 HOSPITAL OUTPT CLINIC VISIT: HCPCS | Mod: 25

## 2024-04-23 PROCEDURE — 82374 ASSAY BLOOD CARBON DIOXIDE: CPT | Mod: ZL | Performed by: PSYCHIATRY & NEUROLOGY

## 2024-04-23 RX ORDER — PALIPERIDONE PALMITATE 234 MG/1.5ML
234 INJECTION INTRAMUSCULAR
Qty: 1.5 ML | Refills: 5 | Status: SHIPPED | OUTPATIENT
Start: 2024-04-23

## 2024-04-23 RX ORDER — DEXTROAMPHETAMINE SACCHARATE, AMPHETAMINE ASPARTATE MONOHYDRATE, DEXTROAMPHETAMINE SULFATE AND AMPHETAMINE SULFATE 1.25; 1.25; 1.25; 1.25 MG/1; MG/1; MG/1; MG/1
5 CAPSULE, EXTENDED RELEASE ORAL DAILY
Qty: 30 CAPSULE | Refills: 0 | Status: SHIPPED | OUTPATIENT
Start: 2024-06-24 | End: 2024-05-22 | Stop reason: DRUGHIGH

## 2024-04-23 RX ORDER — DEXTROAMPHETAMINE SACCHARATE, AMPHETAMINE ASPARTATE MONOHYDRATE, DEXTROAMPHETAMINE SULFATE AND AMPHETAMINE SULFATE 1.25; 1.25; 1.25; 1.25 MG/1; MG/1; MG/1; MG/1
5 CAPSULE, EXTENDED RELEASE ORAL DAILY
Qty: 30 CAPSULE | Refills: 0 | Status: SHIPPED | OUTPATIENT
Start: 2024-04-23 | End: 2024-05-22

## 2024-04-23 RX ORDER — DEXTROAMPHETAMINE SACCHARATE, AMPHETAMINE ASPARTATE MONOHYDRATE, DEXTROAMPHETAMINE SULFATE AND AMPHETAMINE SULFATE 1.25; 1.25; 1.25; 1.25 MG/1; MG/1; MG/1; MG/1
5 CAPSULE, EXTENDED RELEASE ORAL DAILY
Qty: 30 CAPSULE | Refills: 0 | Status: SHIPPED | OUTPATIENT
Start: 2024-05-24 | End: 2024-05-22 | Stop reason: DRUGHIGH

## 2024-04-23 ASSESSMENT — ANXIETY QUESTIONNAIRES
8. IF YOU CHECKED OFF ANY PROBLEMS, HOW DIFFICULT HAVE THESE MADE IT FOR YOU TO DO YOUR WORK, TAKE CARE OF THINGS AT HOME, OR GET ALONG WITH OTHER PEOPLE?: VERY DIFFICULT
2. NOT BEING ABLE TO STOP OR CONTROL WORRYING: SEVERAL DAYS
6. BECOMING EASILY ANNOYED OR IRRITABLE: SEVERAL DAYS
GAD7 TOTAL SCORE: 10
7. FEELING AFRAID AS IF SOMETHING AWFUL MIGHT HAPPEN: MORE THAN HALF THE DAYS
1. FEELING NERVOUS, ANXIOUS, OR ON EDGE: SEVERAL DAYS
IF YOU CHECKED OFF ANY PROBLEMS ON THIS QUESTIONNAIRE, HOW DIFFICULT HAVE THESE PROBLEMS MADE IT FOR YOU TO DO YOUR WORK, TAKE CARE OF THINGS AT HOME, OR GET ALONG WITH OTHER PEOPLE: VERY DIFFICULT
7. FEELING AFRAID AS IF SOMETHING AWFUL MIGHT HAPPEN: MORE THAN HALF THE DAYS
5. BEING SO RESTLESS THAT IT IS HARD TO SIT STILL: MORE THAN HALF THE DAYS
GAD7 TOTAL SCORE: 10
4. TROUBLE RELAXING: MORE THAN HALF THE DAYS
GAD7 TOTAL SCORE: 10
3. WORRYING TOO MUCH ABOUT DIFFERENT THINGS: SEVERAL DAYS

## 2024-04-23 ASSESSMENT — PATIENT HEALTH QUESTIONNAIRE - PHQ9
10. IF YOU CHECKED OFF ANY PROBLEMS, HOW DIFFICULT HAVE THESE PROBLEMS MADE IT FOR YOU TO DO YOUR WORK, TAKE CARE OF THINGS AT HOME, OR GET ALONG WITH OTHER PEOPLE: SOMEWHAT DIFFICULT
SUM OF ALL RESPONSES TO PHQ QUESTIONS 1-9: 4
SUM OF ALL RESPONSES TO PHQ QUESTIONS 1-9: 4

## 2024-04-23 ASSESSMENT — PAIN SCALES - GENERAL: PAINLEVEL: SEVERE PAIN (6)

## 2024-04-23 NOTE — PROGRESS NOTES
Clinic Administered Medication Documentation    Lot: [id number]  EXP:8/31/25  Site: right glut    Patient was given Invega. Prior to medication administration, verified patient's identity using patient s name and date of birth. Please see MAR and medication order for additional information. Patient instructed to remain in clinic for 15 minutes and report any adverse reaction to staff immediately but patient declined.    Vial/Syringe: Syringe- patient supplied    Scarlett Rodriguez RN on 4/23/2024 at 10:18 AM

## 2024-04-23 NOTE — PROGRESS NOTES
Psychiatric Progress Note/Visit  April 23, 2024    Identifying Data:  This is a 33-year-old man seen for follow-up psychiatric medication management visit for treatment of bipolar 1 disorder and ADHD    Interval History:  He was seen by me in the clinic most recently on April 20, 2022.  At that time we were adjusting his BuSpar dosage and his Adderall dosage.  This is his first appointment since that appointment.    On January 31, 2024 he was admitted to inpatient psychiatric treatment at [facility] in [place].  He presented psychotic, paranoid and manic.  He was eventually treated with Invega Sustenna and received 2 shots prior to his discharge on March 26, 2024.    Today he appears much calmer than previously with no acute complaints.  He did report some concern about dry skin previously, but says it is not a problem now.  On observation he has tongue fasciculations.  No muscle stiffness was noted, but some indication of minor restlessness or pacing.  He does not report any distress from any of these physical observations.  He reports tolerating the Invega Sustenna shot with no significant mood concerns.  He did request going back on Adderall, but wants to take a very low dosage, suggesting starting at 5 mg of the extended release.  We discussed possible concerns, but there are no obvious contraindications.    Mental Status Exam:  Anxiety appears minimal, depression also appears minimal.  He does not appear to be experiencing any significant mood lability.  Remainder of MSE is essentially unchanged from April 20, 2022.    Current Psychiatric Medications:  Invega Sustenna 234 mg IM every 28 days (receiving a dosage today)  Adderall XR 5 mg in the morning    Lab Tests/Results:  His weight today was 302 pounds and 9 ounces, weight on April 20, 2022 was 312 pounds, weight on February 7, 2022 was 312 pounds and 4 ounces, weight on August 9, 2021 was 311 pounds and 9.6 ounces.    The following almost  fasting (he reports drinking a Mountain Dew approximately 3 hours before the test) blood work is being done today: Lipid panel, CMP, CBC with differential and platelets, hemoglobin A1c, serum prolactin level.    Diagnosis:  Bipolar 1 disorder, most recent episode manic with psychotic features  ADHD  Cannabis use disorder  Alcohol use disorder    Impression/Assessment:  Marija appears reasonably stable psychiatrically since receiving his first 2 dosages of Invega Sustenna.  He wants to try taking a very low-dose of Adderall again to help him function throughout the day.  At this time he does not feel that he can commit to individual psychotherapy, because he lives over 30 minutes away and does not have dependable transportation.    Plan:  Continue Invega Sustenna 234 mg IM every 28 days for bipolar disorder  Restart Adderall XR at 5 mg a day for ADHD  Follow-up with Dr. Felix in 4-6 weeks, or next available appointment.    Time spent on day of visit 62 minutes - 17 minutes (7:36 AM through 7:53 AM) review of EMR prior to visit, 31 minutes (9:17 AM through 9:48 AM) face-to-face meeting with patient, including discussion of risk/benefits, alternatives and possible side effects to medication, counseling on above issues, and prescription of medications in the EMR, 14 minutes (9:48 AM through 10:02 AM) documentation in the EMR      David Felix MD    Answers submitted by the patient for this visit:  Patient Health Questionnaire (Submitted on 4/23/2024)  If you checked off any problems, how difficult have these problems made it for you to do your work, take care of things at home, or get along with other people?: Somewhat difficult  PHQ9 TOTAL SCORE: 4  SAGE-7 (Submitted on 4/23/2024)  SAGE 7 TOTAL SCORE: 10

## 2024-04-23 NOTE — PROGRESS NOTES
"Chief Complaint   Patient presents with    Recheck Medication     Patient presents today for a medication check. He was last seen here for mental health in 2022.       Initial /82 (BP Location: Right arm, Patient Position: Sitting, Cuff Size: Adult Large)   Pulse 94   Temp 97.8  F (36.6  C) (Tympanic)   Resp 16   Ht 1.676 m (5' 6\")   Wt 137.4 kg (302 lb 14.4 oz)   SpO2 96%   BMI 48.89 kg/m   Estimated body mass index is 48.89 kg/m  as calculated from the following:    Height as of this encounter: 1.676 m (5' 6\").    Weight as of this encounter: 137.4 kg (302 lb 14.4 oz).     FOOD SECURITY SCREENING QUESTIONS:    The next two questions are to help us understand your food security.  If you are feeling you need any assistance in this area, we have resources available to support you today.    Hunger Vital Signs:  Within the past 12 months we worried whether our food would run out before we got money to buy more. Never  Within the past 12 months the food we bought just didn't last and we didn't have money to get more. Never      Elliott Lyon    Answers submitted by the patient for this visit:  Patient Health Questionnaire (Submitted on 4/23/2024)  If you checked off any problems, how difficult have these problems made it for you to do your work, take care of things at home, or get along with other people?: Somewhat difficult  PHQ9 TOTAL SCORE: 4  SAGE-7 (Submitted on 4/23/2024)  SAGE 7 TOTAL SCORE: 10    "

## 2024-05-22 ENCOUNTER — VIRTUAL VISIT (OUTPATIENT)
Dept: PSYCHIATRY | Facility: OTHER | Age: 34
End: 2024-05-22
Attending: PSYCHIATRY & NEUROLOGY
Payer: COMMERCIAL

## 2024-05-22 DIAGNOSIS — F90.9 ATTENTION DEFICIT HYPERACTIVITY DISORDER (ADHD), UNSPECIFIED ADHD TYPE: Primary | ICD-10-CM

## 2024-05-22 DIAGNOSIS — E22.1 IDIOPATHIC HYPERPROLACTINEMIA (H): ICD-10-CM

## 2024-05-22 DIAGNOSIS — F31.2 SEVERE MANIC BIPOLAR 1 DISORDER WITH PSYCHOTIC BEHAVIOR (H): ICD-10-CM

## 2024-05-22 PROCEDURE — 99417 PROLNG OP E/M EACH 15 MIN: CPT | Mod: 95 | Performed by: PSYCHIATRY & NEUROLOGY

## 2024-05-22 PROCEDURE — 99215 OFFICE O/P EST HI 40 MIN: CPT | Mod: 95 | Performed by: PSYCHIATRY & NEUROLOGY

## 2024-05-22 PROCEDURE — G0463 HOSPITAL OUTPT CLINIC VISIT: HCPCS | Mod: GT

## 2024-05-22 RX ORDER — DEXTROAMPHETAMINE SACCHARATE, AMPHETAMINE ASPARTATE MONOHYDRATE, DEXTROAMPHETAMINE SULFATE AND AMPHETAMINE SULFATE 2.5; 2.5; 2.5; 2.5 MG/1; MG/1; MG/1; MG/1
10 CAPSULE, EXTENDED RELEASE ORAL DAILY
Qty: 30 CAPSULE | Refills: 0 | Status: SHIPPED | OUTPATIENT
Start: 2024-05-22 | End: 2024-06-21

## 2024-05-22 RX ORDER — DEXTROAMPHETAMINE SACCHARATE, AMPHETAMINE ASPARTATE MONOHYDRATE, DEXTROAMPHETAMINE SULFATE AND AMPHETAMINE SULFATE 2.5; 2.5; 2.5; 2.5 MG/1; MG/1; MG/1; MG/1
10 CAPSULE, EXTENDED RELEASE ORAL DAILY
Qty: 30 CAPSULE | Refills: 0 | Status: SHIPPED | OUTPATIENT
Start: 2024-06-21 | End: 2024-07-21

## 2024-05-22 RX ORDER — DEXTROAMPHETAMINE SACCHARATE, AMPHETAMINE ASPARTATE MONOHYDRATE, DEXTROAMPHETAMINE SULFATE AND AMPHETAMINE SULFATE 2.5; 2.5; 2.5; 2.5 MG/1; MG/1; MG/1; MG/1
10 CAPSULE, EXTENDED RELEASE ORAL DAILY
Qty: 30 CAPSULE | Refills: 0 | Status: SHIPPED | OUTPATIENT
Start: 2024-07-21 | End: 2024-08-20

## 2024-05-22 RX ORDER — ARIPIPRAZOLE 2 MG/1
2 TABLET ORAL DAILY
Qty: 30 TABLET | Refills: 5 | Status: SHIPPED | OUTPATIENT
Start: 2024-05-22

## 2024-05-22 NOTE — PROGRESS NOTES
"Psychiatric Progress Note/Visit  May 22, 2024    Identifying Data:  This is a 33-year-old man seen virtually for psychiatric medication management follow-up visit for treatment of bipolar 1 disorder and ADHD    Interval History:  He was seen most recently on April 23, 2024.  That was his first follow-up visit after psychiatric hospitalization and starting treatment with Invega Sustenna.  He reported tolerating the Invega without problems.  He requested going back on Adderall for ADHD, but wanted to start very low and we started 5 mg extended release.    Today he reports that he is \"doing okay\".  He continues to benefit from the Invega Sustenna.  He has been using Adderall XR 5 mg a day, but says that it is \"not quite enough\".  We discussed a trial of the 10 mg extended release dosage once a day.  In addition we reviewed his laboratory studies that were done on April 23, 2024.  We discussed specifically the significantly elevated prolactin level and possible side effects.  He did report some breast/nipple tenderness, but no lactation.  We discussed the option to add very low-dose Abilify that sometimes lowers prolactin level when added to another atypical antipsychotic medication.  He was not comfortable doing that at this time, but I told him I would put the order in in case he wanted to start that prior to our next visit.  I also ordered a repeat prolactin level to evaluate secondary hyperprolactinemia, probably caused by Invega.    Mental Status Exam:  Anxiety remains minimal, depression remains minimal.  MSE is essentially unchanged from April 23, 2024.    Current Psychiatric Medications:  Invega Sustenna 234 mg IM every 28-30 days  Adderall XR 5 mg (being increased to 10 mg today) daily    Lab Tests/Results:  Essentially fasting laboratory studies were done on April 23, 2024 with the following results: Hemoglobin A1c within normal range at 5.4, serum prolactin level significantly elevated at 61 (normal range " 4-15), CBC with differential and platelets within normal limits, CMP showed no significant laboratory abnormalities (glucose was minimally increased at 100 with maximum desired value of 99), lipid panel showed cholesterol increased at 227, LDL increased at 150 (desired range less than or equal to 100, value at 6 years ago was 112), non-HDL cholesterol increased at 173 (desired value less than 130), HDL within normal range at 54 (56 in the past, desired range greater than or equal to 40), triglycerides within desired range at 116 (previous value 112, desired range less than 150)    He reports his weight today as about the same, weight on April 23, 2024 was 302 pounds and 9 ounces, weight on February 7, 2022 was 312 pounds and 4 ounces, weight on August 9, 2021 was 311 pounds and 9.6 ounces.    Diagnosis:  Bipolar 1 disorder, most recent episode manic with psychotic features  ADHD  Secondary (idiopathic) hyperprolactinemia  Cannabis use disorder  Alcohol use disorder    Impression/Assessment:  Marija continues to respond well to regular treatment with Invega Sustenna IM.  He had a minimal and subtherapeutic response to very low-dose Adderall.  We discussed gradually increasing the dosage to see if we can find an effective dosage that he tolerates well.  We also discussed the option of adding low-dose Abilify to try to lower the prolactin level.    Plan:  Increase Adderall XR to 10 mg once a day for ADHD  Continue Invega Sustenna 234 mg IM every 28-30 days for bipolar disorder  Consider a trial of Abilify 2 mg a day to try to lower prolactin level  Repeat prolactin level in 1-2 months.  Follow-up with Dr. Felix in 4 weeks    Time spent on day of visit 64 minutes - 14 minutes (8:12 AM through 8:26 AM) review of EMR prior to visit, 34 minutes (2:12 PM through 2:46 PM) virtual meeting with patient, including discussion of risk/benefits, alternatives and possible side effects to medication, counseling on above issues,  and prescription of medications in the EMR, 16 minutes (2:46 PM through 3:02 PM) documentation in the EMR      David Felix MD

## 2024-05-22 NOTE — NURSING NOTE
"Chief Complaint   Patient presents with    Recheck Medication   Link: Phone    Initial There were no vitals taken for this visit. Estimated body mass index is 48.89 kg/m  as calculated from the following:    Height as of 4/23/24: 1.676 m (5' 6\").    Weight as of 4/23/24: 137.4 kg (302 lb 14.4 oz).  Medication Review: complete    The next two questions are to help us understand your food security.  If you are feeling you need any assistance in this area, we have resources available to support you today.          1/30/2024   SDOH- Food Insecurity   Within the past 12 months, did you worry that your food would run out before you got money to buy more? Y   Within the past 12 months, did the food you bought just not last and you didn t have money to get more? Y             Jeremy Martinez      "

## 2024-05-24 ENCOUNTER — ALLIED HEALTH/NURSE VISIT (OUTPATIENT)
Dept: FAMILY MEDICINE | Facility: OTHER | Age: 34
End: 2024-05-24
Attending: PSYCHIATRY & NEUROLOGY
Payer: COMMERCIAL

## 2024-05-24 DIAGNOSIS — F20.3 UNDIFFERENTIATED SCHIZOPHRENIA (H): Primary | ICD-10-CM

## 2024-05-24 PROCEDURE — 250N000011 HC RX IP 250 OP 636

## 2024-05-24 PROCEDURE — 96372 THER/PROPH/DIAG INJ SC/IM: CPT

## 2024-05-24 RX ORDER — PALIPERIDONE PALMITATE 234 MG/1.5ML
234 INJECTION INTRAMUSCULAR ONCE
Qty: 1.5 ML | Refills: 0 | Status: CANCELLED | OUTPATIENT
Start: 2024-05-24 | End: 2024-05-24

## 2024-05-24 NOTE — PROGRESS NOTES
Clinic Administered Medication Documentation      Injectable Medication Documentation    Is there an active order (written within the past 365 days, with administrations remaining, not ) in the chart? Yes.     Patient was given  Invega . Prior to medication administration, verified patient's identity using patient s name and date of birth. Please see MAR and medication order for additional information. Patient instructed to remain in clinic for 15 minutes and report any adverse reaction to staff immediately.    Vial/Syringe: Syringe  Was this medication supplied by the patient? Yes, Medication was received directly from a [facility] pharmacy in a staff to staff handoff or via  delivery and the patient HAS already been billed for the medication (follow site specific policies)     Is this a medication the patient will need to receive again? No - not necessary to check for refills remaining.      After  verifying patient's name and date of birth, medication was confirmed with pharmacy staff. Injection given to patient and he will follow up with provider.     Lot: [id number]  Exp:     Scarlett Rodriguez RN on 2024 at 8:49 AM

## 2024-11-18 ENCOUNTER — PATIENT OUTREACH (OUTPATIENT)
Dept: INTERNAL MEDICINE | Facility: OTHER | Age: 34
End: 2024-11-18
Payer: COMMERCIAL

## 2024-11-18 NOTE — TELEPHONE ENCOUNTER
Patient Quality Outreach    Patient is due for the following:   Asthma  -  {Asthma:525984}    Action(s) Taken:   Schedule a office visit for Asthma    Type of outreach:    Sent letter.    Questions for provider review:    None     {Resources   Quality Outreach Report :063455}      Tanvi Cuevas  {Chart Routed (Optional):614490}

## 2025-05-19 ENCOUNTER — TELEPHONE (OUTPATIENT)
Dept: INTERNAL MEDICINE | Facility: OTHER | Age: 35
End: 2025-05-19
Payer: COMMERCIAL

## 2025-05-19 NOTE — TELEPHONE ENCOUNTER
Reason for call: Patient wanting a work in appointment.    Is the appointment for a Hospital Follow up?  No     (If yes - Unable to find an appointment with any provider during the time frame needed. Nurse/Provider - Can this patient be worked into a schedule with PCP or team member?)    Patient is having the following symptoms and/or what is the appt for:  Depression for 4 week    The patient is requesting an appointment with  Erasom Saha    Was an appointment offered for this call? Yes    If Yes, what is the date of the appointment?  August 29th, 2025     Preferred method for responding to this message: Telephone Call    Phone number patient can be reached at? Cell number on file:    Telephone Information:   Mobile [phone]       If we can't reach you directly, may we leave a detailed response at the number you provided? Yes    Mother of the patient would like the patient to be worked in sooner than later.  She is stating that he needs to be seen as she knows that he will only see [facility].      Charla Espinosa on 5/19/2025 at 12:30 PM

## 2025-05-19 NOTE — PLAN OF CARE
Face to face end of shift report received from [facility] [employer]. Rounding completed. Patient observed in bed appears asleep.     Problem: Adult Behavioral Health Plan of Care  Goal: Patient-Specific Goal (Individualization)  Description: Patient will sleep 6 to 8 hours per night  Patient will eat at least 50% of meals  Patient will attend at least 50% of groups  Patient will comply with recommendations of treatment team  Patient will remain medication compliant    2/10- Pt is not to wean today due to increased paranoia, posturing at staff, and unpredictable behaviors. Treatment team to assess daily.     Outcome: Progressing     Problem: Thought Process Alteration  Goal: Optimal Thought Clarity  Outcome: Progressing   Goal Outcome Evaluation:         No observed episodes of SIB this shift. Patient slept (7) hours.Face to face end of shift report communicated to oncoming shift.     John Badillo RN  2/12/2024  0608 AM                Present, unchanged Absent or reduced

## 2025-05-19 NOTE — TELEPHONE ENCOUNTER
Patient's mother was contacted and an appointment was made with Dr. Saha for 5.21.2025.  Brianna Giang on 5/19/2025 at 4:31 PM

## 2025-05-22 NOTE — PROGRESS NOTES
"[facility] PSYCHIATRY  PROGRESS NOTE     SUBJECTIVE     Prior to interviewing the patient, I met with nursing and reviewed patient's clinical condition. We discussed clinical care both before and after the interview. I have reviewed the patient's clinical course by review of records including previous notes, labs, and vital signs.     Per nursing, the patient had the following behavioral events over the last 24-hours: Disorganized and nonsensical at times    This is my first time meeting with him.  He paced around the room the majority of time we were talking.  He asked me how long I had worked here and when I responded 12 years he stated \"you must like it, what is the craziest thing you have ever seen\".  I told him that I really did not use the term crazy because I found it offensive.  I told him that I understood he was on a civil commitment and he told me that he is going to \"fight it because it is unlawful\".  He then began talking with a feigned accent for the remainder of our conversation.  I asked him where he was from as it sounded as though he may have a southern accent.  He did not answer the majority of my questions appropriately and started rhyming random words some of the words created by himself/neologisms.  He was not angry in tone or agitated though was difficult to engage with in any formal appropriate conversation.  I asked him if there is anybody in his family he would like me to contact and he told me something along the lines of \"they are dead to me\".  During this time he started creating raps and rhyming words with a feigned  gang like slang or accent.  At one point during our conversation he became grandiose and stated \"I am famous.  I am a voice of Hafsa\"         MEDICATIONS   Scheduled Meds:  Not on any psychiatric medications and denies any need for psychiatric medications      PRN Meds:.acetaminophen, albuterol, alum & mag hydroxide-simethicone, Benzocaine-Menthol-Zinc Cl, cloNIDine, " "hydrOXYzine HCl, melatonin, nicotine, OLANZapine **OR** OLANZapine, psyllium     ALLERGIES   Allergies   Allergen Reactions     Corn Syrup [Glucose] GI Disturbance     Morphine Sulfate Nausea and Vomiting     Cannot take it in pill form, IV ok   Pancreatitis     Peanuts [Nuts] GI Disturbance     Internal cuts-GERD     Gold Au 198 [Gold] Rash        MENTAL STATUS EXAM   Vitals: BP (!) 179/104   Pulse 71   Temp 98.3  F (36.8  C) (Tympanic)   Resp 16   Ht 1.651 m (5' 5\")   Wt 144.5 kg (318 lb 9.6 oz)   SpO2 96%   BMI 53.02 kg/m      Appearance:  awake, alert, dressed in hospital scrubs, appeared as age stated, and disheveled   Attitude:  guarded  Eye Contact: Poor  Mood: restricted  Affect: Flat mostly though exaggerated at times when he started rhyming/rapping   Speech: Extremely difficult to follow in conversation.  Does have a feigned accent at times. Neologisms present.  Psychomotor Behavior:  no evidence of tardive dyskinesia, dystonia, or tics  Thought Process:  tangential  Associations: Loose associations present  Thought Content: Very preoccupied paranoid.  Grandiose statements \"I am one of the voices of shirley\"  Insight: None  Judgment: Poor  Oriented to:  time, person, and place  Attention Span and Concentration: Poor  Recent and Remote Memory:  fair  Fund of Knowledge: Difficult to assess  Muscle Strength and Tone: normal  Gait and Station: Normal       LABS   No results found for this or any previous visit (from the past 24 hour(s)).      IMPRESSION     This is a 33 year old male with a PMH of unspecified mood disorder, ADHD, SAGE, insomnia who was brought to the ED from his PCP office after exhibiting psychotic symptoms. Patient was found to be disorganized and delusional. He was placed on a hold and petition for commitment was filed in ED prior to transfer to behavioral health. Today patient is irritable. He answers most questions with \"that's confidential\". He does mention the [place], FBI, and " government being involved. During our interaction he uses sign language when staring at the camera in the ceiling as if communicating to someone. He reports that he will not take any medication, because it is not real and he does not trust it. Writer and SW did attempt to explain the commitment process, however patient refused to take the paperwork offered and instead ended the interview, stating that that we needed to watch out for the [place], FBI, and secret service. Will schedule Seroquel at bedtime for tonight as patient had been on this in the past, though I suspect he will refuse. If continuing to refuse medication, will likely need to file Harris petition tomorrow.        DIAGNOSES     Bipolar 1 disorder, current episode manic with psychotic features        PLAN     Location: Unit 5  Legal Status: Committed.    Petition for commitment and harris filed through [facility]. [address] 2/16/24 @ 1:30 PM    Safety Assessment:    Behavioral Orders   Procedures     Code 1 - Restrict to Unit     Routine Programming     As clinically indicated     Status 15     Every 15 minutes.      PTA psychotropic medications stopped:     -no PTA meds    PTA psychotropic medications continued/changed:     -no PTA meds    New medications tried and stopped:     -Seroquel 100 mg at bedtime due to him refusing     New medications initiated:     -standard unit PRN medications    Today's Changes:    Will likely need to fill out a [facility] petition as he is refusing all medications and is grossly disorganized and can hardly hold any meaningful conversation    Programming: Patient will be treated in a therapeutic milieu with appropriate individual and group therapies. Education will be provided on diagnoses, medications, and treatments.     Medical diagnoses:  Per medicine    Consult: None  Tests: None    Anticipated LOS: > 7 days  Disposition: IRTS vs home with services       ATTESTATION    April Gillian Lyon NP          [FreeTextEntry1] : - RTC 4 months for the 3rd and final dose of HPV vaccine  - patient does have HSIL and will need pap q6 months for close surveillance - All questions answered   Case d/w Dr. Clinton

## (undated) RX ORDER — TRAMADOL HYDROCHLORIDE 50 MG/1
TABLET ORAL
Status: DISPENSED
Start: 2024-01-30

## (undated) RX ORDER — TRAMADOL HYDROCHLORIDE 50 MG/1
TABLET ORAL
Status: DISPENSED
Start: 2024-01-31

## (undated) RX ORDER — OLANZAPINE 10 MG/2ML
INJECTION, POWDER, FOR SOLUTION INTRAMUSCULAR
Status: DISPENSED
Start: 2024-01-30

## (undated) RX ORDER — IPRATROPIUM BROMIDE AND ALBUTEROL SULFATE 2.5; .5 MG/3ML; MG/3ML
SOLUTION RESPIRATORY (INHALATION)
Status: DISPENSED
Start: 2018-05-30